# Patient Record
Sex: MALE | Race: WHITE | NOT HISPANIC OR LATINO | Employment: OTHER | URBAN - METROPOLITAN AREA
[De-identification: names, ages, dates, MRNs, and addresses within clinical notes are randomized per-mention and may not be internally consistent; named-entity substitution may affect disease eponyms.]

---

## 2017-07-24 ENCOUNTER — HOSPITAL ENCOUNTER (OUTPATIENT)
Dept: LAB | Facility: HOSPITAL | Age: 82
Discharge: HOME | End: 2017-07-24
Attending: PSYCHIATRY & NEUROLOGY | Admitting: PSYCHIATRY & NEUROLOGY

## 2017-07-24 LAB
FERRITIN SERPL-MCNC: 142 NG/ML (ref 24–336)
IRON SATN MFR SERPL: 31 % (ref 20–50)
IRON SERPL-MCNC: 79 UG/DL (ref 45–182)
TIBC SERPL-MCNC: 256 UG/DL (ref 228–428)

## 2018-04-03 ENCOUNTER — OFFICE (OUTPATIENT)
Dept: URBAN - METROPOLITAN AREA CLINIC 64 | Facility: CLINIC | Age: 83
End: 2018-04-03
Payer: COMMERCIAL

## 2018-04-03 VITALS
DIASTOLIC BLOOD PRESSURE: 85 MMHG | WEIGHT: 172 LBS | HEART RATE: 74 BPM | SYSTOLIC BLOOD PRESSURE: 141 MMHG | HEIGHT: 71 IN

## 2018-04-03 DIAGNOSIS — K59.00 CONSTIPATION, UNSPECIFIED: ICD-10-CM

## 2018-04-03 DIAGNOSIS — R10.32 LEFT LOWER QUADRANT PAIN: ICD-10-CM

## 2018-04-03 PROCEDURE — 99213 OFFICE O/P EST LOW 20 MIN: CPT | Performed by: NURSE PRACTITIONER

## 2018-06-05 ENCOUNTER — OFFICE (OUTPATIENT)
Dept: URBAN - METROPOLITAN AREA CLINIC 64 | Facility: CLINIC | Age: 83
End: 2018-06-05
Payer: COMMERCIAL

## 2018-06-05 VITALS
DIASTOLIC BLOOD PRESSURE: 88 MMHG | SYSTOLIC BLOOD PRESSURE: 124 MMHG | HEART RATE: 56 BPM | HEIGHT: 71 IN | WEIGHT: 168 LBS

## 2018-06-05 DIAGNOSIS — R10.32 LEFT LOWER QUADRANT PAIN: ICD-10-CM

## 2018-06-05 DIAGNOSIS — K59.00 CONSTIPATION, UNSPECIFIED: ICD-10-CM

## 2018-06-05 DIAGNOSIS — K21.9 GASTRO-ESOPHAGEAL REFLUX DISEASE WITHOUT ESOPHAGITIS: ICD-10-CM

## 2018-06-05 PROCEDURE — 99212 OFFICE O/P EST SF 10 MIN: CPT | Performed by: INTERNAL MEDICINE

## 2018-06-18 ENCOUNTER — HOSPITAL ENCOUNTER (OUTPATIENT)
Dept: OTHER | Facility: HOSPITAL | Age: 83
Discharge: HOME | End: 2018-06-18
Attending: SURGERY | Admitting: SURGERY

## 2018-06-18 LAB
ANION GAP SERPL CALC-SCNC: 9.9 MMOL/L (ref 10–20)
BASOPHILS # BLD AUTO: 0.1 10*3/UL (ref 0–0.2)
BASOPHILS NFR BLD AUTO: 1 % (ref 0–2)
BUN SERPL-MCNC: 22 MG/DL (ref 8–20)
BUN/CREAT SERPL: 16.9 (ref 6.2–20.3)
CALCIUM SERPL-MCNC: 9.2 MG/DL (ref 8.9–10.3)
CHLORIDE SERPL-SCNC: 103 MMOL/L (ref 101–111)
CONV CO2: 28 MMOL/L (ref 22–32)
CREAT UR-MCNC: 1.3 MG/DL (ref 0.7–1.2)
DIFFERENTIAL METHOD BLD: (no result)
EOSINOPHIL # BLD AUTO: 0.1 10*3/UL (ref 0–0.3)
EOSINOPHIL # BLD AUTO: 1 % (ref 0–3)
ERYTHROCYTE [DISTWIDTH] IN BLOOD BY AUTOMATED COUNT: 13.3 % (ref 11.5–14.5)
GLUCOSE SERPL-MCNC: 118 MG/DL (ref 65–99)
HCT VFR BLD AUTO: 42.1 % (ref 40–54)
HGB BLD-MCNC: 14.2 G/DL (ref 14–18)
LYMPHOCYTES # BLD AUTO: 1.7 10*3/UL (ref 0.8–4.8)
LYMPHOCYTES NFR BLD AUTO: 20 % (ref 18–42)
Lab: NORMAL
MCH RBC QN AUTO: 30.5 PG (ref 26–32)
MCHC RBC AUTO-ENTMCNC: 33.8 G/DL (ref 32–36)
MCV RBC AUTO: 90.2 FL (ref 80–94)
MICRO REPORT STATUS: NORMAL
MONOCYTES # BLD AUTO: 0.7 10*3/UL (ref 0.1–1.3)
MONOCYTES NFR BLD AUTO: 8 % (ref 2–11)
NEUTROPHILS # BLD AUTO: 5.9 10*3/UL (ref 2.3–8.6)
NEUTROPHILS NFR BLD AUTO: 70 % (ref 50–75)
NRBC BLD AUTO-RTO: 0 /100{WBCS}
NRBC/RBC NFR BLD MANUAL: 0 10*3/UL
PLATELET # BLD AUTO: 168 10*3/UL (ref 150–450)
PMV BLD AUTO: 10.4 FL (ref 7.4–10.4)
POTASSIUM SERPL-SCNC: 3.9 MMOL/L (ref 3.6–5.1)
RBC # BLD AUTO: 4.67 10*6/UL (ref 4.6–6)
SODIUM SERPL-SCNC: 137 MMOL/L (ref 136–144)
SPECIMEN SOURCE: NORMAL
WBC # BLD AUTO: 8.6 10*3/UL (ref 4.5–11.5)

## 2018-06-20 ENCOUNTER — HOSPITAL ENCOUNTER (OUTPATIENT)
Dept: PREOP | Facility: HOSPITAL | Age: 83
Setting detail: HOSPITAL OUTPATIENT SURGERY
Discharge: HOME | End: 2018-06-20
Attending: SURGERY | Admitting: SURGERY

## 2019-06-14 ENCOUNTER — OFFICE (OUTPATIENT)
Dept: URBAN - METROPOLITAN AREA CLINIC 64 | Facility: CLINIC | Age: 84
End: 2019-06-14
Payer: COMMERCIAL

## 2019-06-14 VITALS
DIASTOLIC BLOOD PRESSURE: 58 MMHG | HEIGHT: 71 IN | SYSTOLIC BLOOD PRESSURE: 119 MMHG | HEART RATE: 106 BPM | WEIGHT: 162 LBS

## 2019-06-14 DIAGNOSIS — K59.00 CONSTIPATION, UNSPECIFIED: ICD-10-CM

## 2019-06-14 DIAGNOSIS — K21.9 GASTRO-ESOPHAGEAL REFLUX DISEASE WITHOUT ESOPHAGITIS: ICD-10-CM

## 2019-06-14 PROCEDURE — 99212 OFFICE O/P EST SF 10 MIN: CPT | Performed by: INTERNAL MEDICINE

## 2019-06-14 RX ORDER — OMEPRAZOLE 20 MG/1
CAPSULE, DELAYED RELEASE ORAL
Qty: 90 | Refills: 4 | Status: COMPLETED
End: 2024-04-08

## 2019-06-14 RX ORDER — POLYETHYLENE GLYCOL 3350 17 G/17G
17 POWDER, FOR SOLUTION ORAL
Qty: 3 | Refills: 3 | Status: COMPLETED
Start: 2019-06-14 | End: 2024-04-08

## 2019-07-01 ENCOUNTER — CLINICAL SUPPORT NO REQUIREMENTS (OUTPATIENT)
Dept: CARDIOLOGY | Facility: CLINIC | Age: 84
End: 2019-07-01

## 2019-07-01 DIAGNOSIS — Z95.818 STATUS POST PLACEMENT OF IMPLANTABLE LOOP RECORDER: ICD-10-CM

## 2019-07-01 DIAGNOSIS — R55 SYNCOPE AND COLLAPSE: Primary | ICD-10-CM

## 2019-07-01 PROCEDURE — 93299 PR REM INTERROG ICPMS/SCRMS <30 D TECH REVIEW: CPT | Performed by: INTERNAL MEDICINE

## 2019-07-01 PROCEDURE — 93298 REM INTERROG DEV EVAL SCRMS: CPT | Performed by: INTERNAL MEDICINE

## 2019-07-12 ENCOUNTER — CLINICAL SUPPORT NO REQUIREMENTS (OUTPATIENT)
Dept: CARDIOLOGY | Facility: CLINIC | Age: 84
End: 2019-07-12

## 2019-07-12 DIAGNOSIS — Z95.818 STATUS POST PLACEMENT OF IMPLANTABLE LOOP RECORDER: ICD-10-CM

## 2019-07-12 DIAGNOSIS — R55 SYNCOPE AND COLLAPSE: Primary | ICD-10-CM

## 2019-07-12 PROCEDURE — 93299 PR REM INTERROG ICPMS/SCRMS <30 D TECH REVIEW: CPT | Performed by: INTERNAL MEDICINE

## 2019-07-12 PROCEDURE — 93298 REM INTERROG DEV EVAL SCRMS: CPT | Performed by: INTERNAL MEDICINE

## 2019-08-01 ENCOUNTER — CLINICAL SUPPORT NO REQUIREMENTS (OUTPATIENT)
Dept: CARDIOLOGY | Facility: CLINIC | Age: 84
End: 2019-08-01

## 2019-08-01 DIAGNOSIS — Z95.818 STATUS POST PLACEMENT OF IMPLANTABLE LOOP RECORDER: ICD-10-CM

## 2019-08-01 DIAGNOSIS — R55 SYNCOPE, UNSPECIFIED SYNCOPE TYPE: Primary | ICD-10-CM

## 2019-08-12 RX ORDER — DIAZEPAM 5 MG/1
TABLET ORAL
Qty: 60 TABLET | Refills: 2 | Status: SHIPPED | OUTPATIENT
Start: 2019-08-12 | End: 2019-11-09 | Stop reason: SDUPTHER

## 2019-08-22 RX ORDER — AZELASTINE HCL 205.5 UG/1
SPRAY NASAL
COMMUNITY
Start: 2019-03-07 | End: 2019-12-04 | Stop reason: SDUPTHER

## 2019-08-22 RX ORDER — AMLODIPINE BESYLATE 10 MG/1
TABLET ORAL
COMMUNITY
Start: 2017-02-21 | End: 2019-10-14 | Stop reason: SDUPTHER

## 2019-08-22 RX ORDER — HYDROCHLOROTHIAZIDE 25 MG/1
TABLET ORAL EVERY 24 HOURS
COMMUNITY
Start: 2018-08-10 | End: 2020-09-30 | Stop reason: SDUPTHER

## 2019-08-22 RX ORDER — OMEPRAZOLE 20 MG/1
20 CAPSULE, DELAYED RELEASE ORAL DAILY
COMMUNITY
Start: 2019-07-13 | End: 2022-09-07 | Stop reason: SDUPTHER

## 2019-08-23 ENCOUNTER — OFFICE VISIT (OUTPATIENT)
Dept: FAMILY MEDICINE CLINIC | Facility: CLINIC | Age: 84
End: 2019-08-23

## 2019-08-23 VITALS
TEMPERATURE: 97.7 F | WEIGHT: 161 LBS | SYSTOLIC BLOOD PRESSURE: 128 MMHG | RESPIRATION RATE: 16 BRPM | DIASTOLIC BLOOD PRESSURE: 74 MMHG | BODY MASS INDEX: 22.45 KG/M2 | HEART RATE: 76 BPM

## 2019-08-23 DIAGNOSIS — N30.00 ACUTE CYSTITIS WITHOUT HEMATURIA: ICD-10-CM

## 2019-08-23 DIAGNOSIS — R60.0 LOWER EXTREMITY EDEMA: ICD-10-CM

## 2019-08-23 DIAGNOSIS — R42 DIZZINESS: ICD-10-CM

## 2019-08-23 DIAGNOSIS — R07.81 RIB PAIN ON LEFT SIDE: Primary | ICD-10-CM

## 2019-08-23 DIAGNOSIS — I10 ESSENTIAL HYPERTENSION: ICD-10-CM

## 2019-08-23 DIAGNOSIS — R07.81 RIB PAIN ON LEFT SIDE: ICD-10-CM

## 2019-08-23 PROBLEM — J06.9 VIRAL URI: Status: ACTIVE | Noted: 2019-03-07

## 2019-08-23 PROBLEM — I25.10 CORONARY HEART DISEASE: Status: ACTIVE | Noted: 2017-11-16

## 2019-08-23 PROBLEM — E78.5 HYPERLIPIDEMIA: Status: ACTIVE | Noted: 2019-08-23

## 2019-08-23 PROBLEM — K40.90 INGUINAL HERNIA, RIGHT: Status: ACTIVE | Noted: 2018-05-31

## 2019-08-23 PROBLEM — Z95.818 PRESENCE OF OTHER CARDIAC IMPLANTS AND GRAFTS: Status: ACTIVE | Noted: 2018-01-18

## 2019-08-23 PROBLEM — Z98.61 STATUS POST PERCUTANEOUS TRANSLUMINAL CORONARY ANGIOPLASTY: Status: ACTIVE | Noted: 2019-08-23

## 2019-08-23 PROBLEM — J30.9 ALLERGIC RHINITIS: Status: ACTIVE | Noted: 2019-03-07

## 2019-08-23 PROBLEM — I21.9 MYOCARDIAL INFARCTION (HCC): Status: ACTIVE | Noted: 2019-08-23

## 2019-08-23 PROBLEM — Z23 NEED FOR OTHER PROPHYLACTIC VACCINATION AND INOCULATION AGAINST SINGLE DISEASES: Status: ACTIVE | Noted: 2018-10-01

## 2019-08-23 LAB
BILIRUB BLD-MCNC: ABNORMAL MG/DL
CLARITY, POC: CLEAR
COLOR UR: YELLOW
GLUCOSE UR STRIP-MCNC: NEGATIVE MG/DL
KETONES UR QL: ABNORMAL
LEUKOCYTE EST, POC: ABNORMAL
NITRITE UR-MCNC: NEGATIVE MG/ML
PH UR: 5.5 [PH] (ref 5–8)
PROT UR STRIP-MCNC: ABNORMAL MG/DL
RBC # UR STRIP: NEGATIVE /UL
SP GR UR: 1.02 (ref 1–1.03)
UROBILINOGEN UR QL: NORMAL

## 2019-08-23 PROCEDURE — 81003 URINALYSIS AUTO W/O SCOPE: CPT | Performed by: INTERNAL MEDICINE

## 2019-08-23 PROCEDURE — 99213 OFFICE O/P EST LOW 20 MIN: CPT | Performed by: INTERNAL MEDICINE

## 2019-08-23 PROCEDURE — 87086 URINE CULTURE/COLONY COUNT: CPT | Performed by: INTERNAL MEDICINE

## 2019-08-23 RX ORDER — AZITHROMYCIN MONOHYDRATE 10 MG/ML
SOLUTION/ DROPS OPHTHALMIC
COMMUNITY
Start: 2019-08-21 | End: 2020-11-21 | Stop reason: RX

## 2019-08-23 RX ORDER — NITROFURANTOIN 25; 75 MG/1; MG/1
100 CAPSULE ORAL 2 TIMES DAILY
Qty: 14 CAPSULE | Refills: 0 | Status: SHIPPED | OUTPATIENT
Start: 2019-08-23 | End: 2019-10-14

## 2019-08-24 LAB — BACTERIA SPEC AEROBE CULT: NO GROWTH

## 2019-08-27 ENCOUNTER — TELEPHONE (OUTPATIENT)
Dept: FAMILY MEDICINE CLINIC | Facility: CLINIC | Age: 84
End: 2019-08-27

## 2019-09-03 ENCOUNTER — CLINICAL SUPPORT NO REQUIREMENTS (OUTPATIENT)
Dept: CARDIOLOGY | Facility: CLINIC | Age: 84
End: 2019-09-03

## 2019-09-03 DIAGNOSIS — R55 SYNCOPE, UNSPECIFIED SYNCOPE TYPE: Primary | ICD-10-CM

## 2019-09-03 DIAGNOSIS — Z95.818 STATUS POST PLACEMENT OF IMPLANTABLE LOOP RECORDER: ICD-10-CM

## 2019-09-03 PROCEDURE — 93299 PR REM INTERROG ICPMS/SCRMS <30 D TECH REVIEW: CPT | Performed by: INTERNAL MEDICINE

## 2019-09-03 PROCEDURE — 93298 REM INTERROG DEV EVAL SCRMS: CPT | Performed by: INTERNAL MEDICINE

## 2019-09-16 ENCOUNTER — TELEPHONE (OUTPATIENT)
Dept: FAMILY MEDICINE CLINIC | Facility: CLINIC | Age: 84
End: 2019-09-16

## 2019-09-16 RX ORDER — GABAPENTIN 100 MG/1
100 CAPSULE ORAL 2 TIMES DAILY
Qty: 180 CAPSULE | Refills: 2 | Status: SHIPPED | OUTPATIENT
Start: 2019-09-16 | End: 2020-05-18

## 2019-09-16 NOTE — TELEPHONE ENCOUNTER
Patient is asking for Gabapentin 100mg 1 po bid to be sent to pharmacy. Stated he used to get this from Dr. Negrete. Would like 90-day supply with refills. Thank you.

## 2019-09-26 ENCOUNTER — CLINICAL SUPPORT NO REQUIREMENTS (OUTPATIENT)
Dept: CARDIOLOGY | Facility: CLINIC | Age: 84
End: 2019-09-26

## 2019-09-26 DIAGNOSIS — Z95.818 STATUS POST PLACEMENT OF IMPLANTABLE LOOP RECORDER: ICD-10-CM

## 2019-09-26 DIAGNOSIS — R55 SYNCOPE, UNSPECIFIED SYNCOPE TYPE: Primary | ICD-10-CM

## 2019-10-04 ENCOUNTER — CLINICAL SUPPORT NO REQUIREMENTS (OUTPATIENT)
Dept: CARDIOLOGY | Facility: CLINIC | Age: 84
End: 2019-10-04

## 2019-10-04 DIAGNOSIS — Z95.818 STATUS POST PLACEMENT OF IMPLANTABLE LOOP RECORDER: ICD-10-CM

## 2019-10-04 DIAGNOSIS — R55 SYNCOPE AND COLLAPSE: Primary | ICD-10-CM

## 2019-10-04 PROCEDURE — 93298 REM INTERROG DEV EVAL SCRMS: CPT | Performed by: INTERNAL MEDICINE

## 2019-10-04 PROCEDURE — 93299 PR REM INTERROG ICPMS/SCRMS <30 D TECH REVIEW: CPT | Performed by: INTERNAL MEDICINE

## 2019-10-14 ENCOUNTER — OFFICE VISIT (OUTPATIENT)
Dept: CARDIOLOGY | Facility: CLINIC | Age: 84
End: 2019-10-14

## 2019-10-14 VITALS
WEIGHT: 162 LBS | HEIGHT: 71 IN | DIASTOLIC BLOOD PRESSURE: 75 MMHG | SYSTOLIC BLOOD PRESSURE: 131 MMHG | BODY MASS INDEX: 22.68 KG/M2 | HEART RATE: 53 BPM | OXYGEN SATURATION: 97 %

## 2019-10-14 DIAGNOSIS — Z95.818 PRESENCE OF OTHER CARDIAC IMPLANTS AND GRAFTS: ICD-10-CM

## 2019-10-14 DIAGNOSIS — I10 ESSENTIAL HYPERTENSION: ICD-10-CM

## 2019-10-14 DIAGNOSIS — Z98.61 STATUS POST PERCUTANEOUS TRANSLUMINAL CORONARY ANGIOPLASTY: ICD-10-CM

## 2019-10-14 DIAGNOSIS — I25.10 CORONARY ARTERY DISEASE INVOLVING NATIVE CORONARY ARTERY OF NATIVE HEART WITHOUT ANGINA PECTORIS: ICD-10-CM

## 2019-10-14 DIAGNOSIS — I48.0 PAROXYSMAL ATRIAL FIBRILLATION (HCC): Primary | ICD-10-CM

## 2019-10-14 DIAGNOSIS — E78.2 MIXED HYPERLIPIDEMIA: ICD-10-CM

## 2019-10-14 DIAGNOSIS — Z95.1 STATUS POST CORONARY ARTERY BYPASS GRAFT: ICD-10-CM

## 2019-10-14 PROCEDURE — 99214 OFFICE O/P EST MOD 30 MIN: CPT | Performed by: INTERNAL MEDICINE

## 2019-10-14 PROCEDURE — 93000 ELECTROCARDIOGRAM COMPLETE: CPT | Performed by: INTERNAL MEDICINE

## 2019-10-14 RX ORDER — AMLODIPINE BESYLATE 10 MG/1
10 TABLET ORAL DAILY
Qty: 90 TABLET | Refills: 3 | Status: SHIPPED | OUTPATIENT
Start: 2019-10-14 | End: 2020-08-24

## 2019-10-14 NOTE — PROGRESS NOTES
Encounter Date:10/14/2019  Last seen 4/15/2019      Patient ID: Sage Flores is a 89 y.o. male.    Chief Complaint:  Follow-up  CABG  History of syncope  History of atrial fibrillation  Tachycardia bradycardia syndrome  Hypertension  Dyslipidemia    History of Present Illness    Since I have last seen, the patient has been without any chest discomfort ,shortness of breath, palpitations, dizziness or syncope.  Denies having any headache ,abdominal pain ,nausea, vomiting , diarrhea constipation, loss of weight or loss of appetite.  Denies having any excessive bruising ,hematuria or blood in the stool.    Review of all systems negative except as indicated  Assessment and Plan       //////////////////////////  Impression  =============  - history of syncope-no further episodes.    -status post loop recorder placement.  [employer] LINQ 12/29/2017     - status post CABG October 2001. cardiac catheterization 12/26/2014 revealed 60% distal circumflex and total LAD and right coronary arteries.  Lima to LAD was patent ( lima coming off the left vertebral artery).  SVG to diagonal   marginal and PDA were patent.  SVG to left ventricular branch was totally occluded (chronic)    - atrial fibrillation -has converted to sinus rhythm and maintaining sinus rhythm.  Recently patient was noted to have atrial dysrhythmia on the monitor with loop recorder.    - sinus bradycardia-asymptomatic    - status post acute inferior myocardial infarction prior to surgery requiring acute stent placement to right coronary artery ) complicated by ventricular fibrillation)     -Dyslipidemia and hypertension    - lower extremity weakness.   Arterial Doppler study of the lower extremity is normal. -  improved .   arterial Doppler study of the lower extremity was normal    - status post cholecystectomy    - allergy to penicillin iodine and metoprolol (rash).  Intolerance to atenolol due to bradycardia.  Allergy to Levaquin and penicillin.  Intolerance  to prednisone Nitropatch IV nitroglycerin and prednisone.  =================    Plan  ==============  EKG showed sinus bradycardia nonspecific ST-T changes   patient did not have any dizziness or syncope  Patient is not having any angina pectoris or congestive heart failure   no need for pacemaker at this time  Medications were reviewed and updated.     Followup in the office in   Six months  //////////////////////////////////////             Diagnosis Plan   1. Paroxysmal atrial fibrillation (CMS/HCC)     2. Coronary artery disease involving native coronary artery of native heart without angina pectoris     3. Mixed hyperlipidemia     4. Essential hypertension     5. Status post coronary artery bypass graft     6. Status post percutaneous transluminal coronary angioplasty     7. Presence of other cardiac implants and grafts     LAB RESULTS (LAST 7 DAYS)    CBC        BMP        CMP         BNP        TROPONIN        CoAg        Creatinine Clearance  CrCl cannot be calculated (Patient's most recent lab result is older than the maximum 30 days allowed.).    ABG        Radiology  No radiology results for the last day                The following portions of the patient's history were reviewed and updated as appropriate: allergies, current medications, past family history, past medical history, past social history, past surgical history and problem list.    Review of Systems   Constitution: Negative for malaise/fatigue.   Cardiovascular: Positive for chest pain and leg swelling. Negative for palpitations and syncope.   Respiratory: Positive for shortness of breath.    Skin: Negative for rash.   Gastrointestinal: Negative for nausea and vomiting.   Neurological: Negative for dizziness, light-headedness and numbness.         Current Outpatient Medications:   •  amLODIPine (NORVASC) 10 MG tablet, Take 1 tablet by mouth Daily., Disp: 90 tablet, Rfl: 3  •  AZASITE 1 % ophthalmic solution, , Disp: , Rfl:   •  azelastine  (ASTEPRO) 0.15 % solution nasal spray, ASTEPRO 0.15 % SOLN, Disp: , Rfl:   •  diazePAM (VALIUM) 5 MG tablet, TAKE 1 TABLET BY MOUTH TWICE DAILY, Disp: 60 tablet, Rfl: 2  •  gabapentin (NEURONTIN) 100 MG capsule, Take 1 capsule by mouth 2 (Two) Times a Day., Disp: 180 capsule, Rfl: 2  •  hydrochlorothiazide (HYDRODIURIL) 25 MG tablet, Daily., Disp: , Rfl:   •  omeprazole (priLOSEC) 20 MG capsule, , Disp: , Rfl:     Allergies   Allergen Reactions   • Iodine Unknown (See Comments)   • Levofloxacin Unknown (See Comments)   • Nitroglycerin Unknown (See Comments) and Other (See Comments)   • Paroxetine Unknown (See Comments)   • Penicillin G Unknown (See Comments)   • Prednisone Unknown (See Comments)   • Sucralfate Unknown (See Comments)   • Diltiazem Hcl Hives   • Metoprolol Other (See Comments)     Lowers heart rate    • Pramipexole Dihydrochloride Unknown (See Comments)   • Ropinirole Hcl Other (See Comments)       Family History   Problem Relation Age of Onset   • Heart disease Mother    • Heart disease Father        No past surgical history on file.    Past Medical History:   Diagnosis Date   • Atrial fibrillation (CMS/HCC)    • Benign prostatic hyperplasia    • CAD (coronary artery disease)    • Hyperlipidemia    • Hypertension    • Myocardial infarction (CMS/HCC)        Family History   Problem Relation Age of Onset   • Heart disease Mother    • Heart disease Father        Social History     Socioeconomic History   • Marital status:      Spouse name: Not on file   • Number of children: Not on file   • Years of education: Not on file   • Highest education level: Not on file   Tobacco Use   • Smoking status: Former Smoker   • Smokeless tobacco: Never Used   • Tobacco comment: more than 50yrs   Substance and Sexual Activity   • Alcohol use: No     Frequency: Never   • Drug use: No   • Sexual activity: Defer           ECG 12 Lead  Date/Time: 10/14/2019 2:42 PM  Performed by: Missy Domínguez MD  Authorized by:  "Missy Domínguez MD   Comparison: compared with previous ECG   Comments: Sinus bradycardia short MN interval 59/min nonspecific ST-T wave changes normal axis normal intervals otherwise no ectopy.  No change from 4/15/2019              Objective:       Physical Exam    /75 (BP Location: Left arm, Patient Position: Sitting, Cuff Size: Adult)   Pulse 53   Ht 180.3 cm (71\")   Wt 73.5 kg (162 lb)   SpO2 97%   BMI 22.59 kg/m²   The patient is alert, oriented and in no distress.    Vital signs as noted above.    Head and neck revealed no carotid bruits or jugular venous distension.  No thyromegaly or lymphadenopathy is present.    Lungs clear.  No wheezing.  Breath sounds are normal bilaterally.    Heart normal first and second heart sounds.  No murmur..  No pericardial rub is present.  No gallop is present.    Abdomen soft and nontender.  No organomegaly is present.    Extremities revealed good peripheral pulses without any pedal edema.    Skin warm and dry.  Loop recorder site looks normal.    Musculoskeletal system is grossly normal.    CNS grossly normal.        "

## 2019-10-28 ENCOUNTER — FLU SHOT (OUTPATIENT)
Dept: FAMILY MEDICINE CLINIC | Facility: CLINIC | Age: 84
End: 2019-10-28

## 2019-10-28 DIAGNOSIS — Z23 IMMUNIZATION DUE: Primary | ICD-10-CM

## 2019-10-28 PROCEDURE — G0008 ADMIN INFLUENZA VIRUS VAC: HCPCS | Performed by: INTERNAL MEDICINE

## 2019-10-28 PROCEDURE — 90653 IIV ADJUVANT VACCINE IM: CPT | Performed by: INTERNAL MEDICINE

## 2019-11-05 ENCOUNTER — CLINICAL SUPPORT NO REQUIREMENTS (OUTPATIENT)
Dept: CARDIOLOGY | Facility: CLINIC | Age: 84
End: 2019-11-05

## 2019-11-05 DIAGNOSIS — Z95.818 STATUS POST PLACEMENT OF IMPLANTABLE LOOP RECORDER: ICD-10-CM

## 2019-11-05 DIAGNOSIS — R55 SYNCOPE AND COLLAPSE: Primary | ICD-10-CM

## 2019-11-05 PROCEDURE — 93299 PR REM INTERROG ICPMS/SCRMS <30 D TECH REVIEW: CPT | Performed by: INTERNAL MEDICINE

## 2019-11-05 PROCEDURE — 93298 REM INTERROG DEV EVAL SCRMS: CPT | Performed by: INTERNAL MEDICINE

## 2019-11-11 RX ORDER — DIAZEPAM 5 MG/1
TABLET ORAL
Qty: 60 TABLET | Refills: 2 | Status: SHIPPED | OUTPATIENT
Start: 2019-11-11 | End: 2019-12-04 | Stop reason: SDUPTHER

## 2019-12-04 ENCOUNTER — OFFICE VISIT (OUTPATIENT)
Dept: FAMILY MEDICINE CLINIC | Facility: CLINIC | Age: 84
End: 2019-12-04

## 2019-12-04 VITALS
DIASTOLIC BLOOD PRESSURE: 68 MMHG | SYSTOLIC BLOOD PRESSURE: 136 MMHG | HEIGHT: 71 IN | OXYGEN SATURATION: 95 % | TEMPERATURE: 97.5 F | WEIGHT: 163.8 LBS | RESPIRATION RATE: 18 BRPM | BODY MASS INDEX: 22.93 KG/M2 | HEART RATE: 62 BPM

## 2019-12-04 DIAGNOSIS — L30.4 INTERTRIGO: ICD-10-CM

## 2019-12-04 DIAGNOSIS — H61.21 IMPACTED CERUMEN OF RIGHT EAR: ICD-10-CM

## 2019-12-04 DIAGNOSIS — J06.9 ACUTE URI: Primary | ICD-10-CM

## 2019-12-04 DIAGNOSIS — F41.9 ANXIETY: ICD-10-CM

## 2019-12-04 PROCEDURE — 99214 OFFICE O/P EST MOD 30 MIN: CPT | Performed by: NURSE PRACTITIONER

## 2019-12-04 PROCEDURE — 69210 REMOVE IMPACTED EAR WAX UNI: CPT | Performed by: NURSE PRACTITIONER

## 2019-12-04 RX ORDER — POLYMYXIN B SULFATE AND TRIMETHOPRIM 1; 10000 MG/ML; [USP'U]/ML
1 SOLUTION OPHTHALMIC 3 TIMES DAILY
COMMUNITY
End: 2020-11-21 | Stop reason: RX

## 2019-12-04 RX ORDER — POLYETHYLENE GLYCOL 3350 17 G/17G
17 POWDER, FOR SOLUTION ORAL DAILY
COMMUNITY
End: 2021-04-01

## 2019-12-04 RX ORDER — AZELASTINE HCL 205.5 UG/1
2 SPRAY NASAL 2 TIMES DAILY
Qty: 1 EACH | Refills: 3 | Status: SHIPPED | OUTPATIENT
Start: 2019-12-04 | End: 2020-10-27

## 2019-12-04 RX ORDER — DIAZEPAM 5 MG/1
5 TABLET ORAL 2 TIMES DAILY
Qty: 60 TABLET | Refills: 0 | Status: SHIPPED | OUTPATIENT
Start: 2019-12-04 | End: 2020-01-09

## 2019-12-04 NOTE — PROGRESS NOTES
"Subjective   Sage Flores is a 89 y.o. male.     Chief Complaint   Patient presents with   • URI   • Rash       /68 (BP Location: Left arm, Patient Position: Sitting, Cuff Size: Adult)   Pulse 62   Temp 97.5 °F (36.4 °C) (Oral)   Resp 18   Ht 180.3 cm (71\")   Wt 74.3 kg (163 lb 12.8 oz)   SpO2 95%   BMI 22.85 kg/m²     BP Readings from Last 3 Encounters:   12/04/19 136/68   10/14/19 131/75   08/23/19 128/74       Wt Readings from Last 3 Encounters:   12/04/19 74.3 kg (163 lb 12.8 oz)   10/14/19 73.5 kg (162 lb)   08/23/19 73 kg (161 lb)       Pt comes in today with c/o drainage, congestion, and runny nose. No cough. No fever or chills. Gets similar symptoms about once a year during this time.   Not taking anything otc.   Does have rx for astepro and needs to get refill.    Also with c/o rash and irritation in butt crack. Has been going on for about 1-3 months. Has tried mult products and lotions. Was trying vicks vapor rub and that made it worse. Has tried Gisela lotion and it helps some. No diarrhea or change in bowels. Denies changing in detergents, soaps, etc.     Pt is also requesting refill on valium. Takes this BID for anxiety and his \"nerves\".          The following portions of the patient's history were reviewed and updated as appropriate: allergies, current medications, past family history, past medical history, past social history, past surgical history and problem list.    Review of Systems   Constitutional: Negative for chills and fever.   HENT: Positive for congestion, postnasal drip, rhinorrhea and sneezing. Negative for ear pain, sinus pressure, sore throat and swollen glands.    Respiratory: Negative for cough, chest tightness, shortness of breath and wheezing.    Gastrointestinal: Negative for nausea and vomiting.   Skin: Positive for rash.   Neurological: Negative for headache.       Objective     Physical Exam   Constitutional: He is oriented to person, place, and time. He appears " well-developed and well-nourished.   HENT:   Nose: Rhinorrhea present.   +PND     Right ear with cerumen impaction    Eyes: Pupils are equal, round, and reactive to light.   Cardiovascular: Normal rate and regular rhythm.   Pulmonary/Chest: Effort normal and breath sounds normal.   Neurological: He is alert and oriented to person, place, and time.   Skin:   Raw and redness rash intergluteal cleft      Ear Cerumen Removal  Date/Time: 12/4/2019 7:54 AM  Performed by: Gema Lu APRN  Authorized by: Gema Lu APRN     Anesthesia:  Local Anesthetic: none  Location details: right ear  Patient tolerance: Patient tolerated the procedure well with no immediate complications  Procedure type: instrumentation and irrigation   Sedation:  Patient sedated: no          Diagnoses and all orders for this visit:    1. Acute URI (Primary)  Comments:  will refill astepro, and also start otc claritin.   Orders:  -     azelastine (ASTEPRO) 0.15 % solution nasal spray; 2 sprays into the nostril(s) as directed by provider 2 (Two) Times a Day.  Dispense: 1 each; Refill: 3    2. Intertrigo  Comments:  will try vasoline     3. Impacted cerumen of right ear    4. Anxiety    Other orders  -     diazePAM (VALIUM) 5 MG tablet; Take 1 tablet by mouth 2 (Two) Times a Day.  Dispense: 60 tablet; Refill: 0  -     Ear Cerumen Removal    During this office visit, we discussed the pertinent aspects of the visit and treatment recommendations. Pt verbalizes understanding. Follow up was discussed. Patient was given the opportunity to ask questions and discuss other concerns.       Return if symptoms worsen or fail to improve.

## 2019-12-06 ENCOUNTER — CLINICAL SUPPORT NO REQUIREMENTS (OUTPATIENT)
Dept: CARDIOLOGY | Facility: CLINIC | Age: 84
End: 2019-12-06

## 2019-12-06 DIAGNOSIS — R55 SYNCOPE AND COLLAPSE: Primary | ICD-10-CM

## 2019-12-06 DIAGNOSIS — Z95.818 STATUS POST PLACEMENT OF IMPLANTABLE LOOP RECORDER: ICD-10-CM

## 2019-12-06 PROCEDURE — 93298 REM INTERROG DEV EVAL SCRMS: CPT | Performed by: INTERNAL MEDICINE

## 2019-12-06 PROCEDURE — 93299 PR REM INTERROG ICPMS/SCRMS <30 D TECH REVIEW: CPT | Performed by: INTERNAL MEDICINE

## 2019-12-16 ENCOUNTER — CLINICAL SUPPORT NO REQUIREMENTS (OUTPATIENT)
Dept: CARDIOLOGY | Facility: CLINIC | Age: 84
End: 2019-12-16

## 2019-12-16 DIAGNOSIS — Z95.818 STATUS POST PLACEMENT OF IMPLANTABLE LOOP RECORDER: ICD-10-CM

## 2019-12-16 DIAGNOSIS — I48.0 PAROXYSMAL ATRIAL FIBRILLATION (HCC): Primary | ICD-10-CM

## 2019-12-26 ENCOUNTER — CLINICAL SUPPORT NO REQUIREMENTS (OUTPATIENT)
Dept: CARDIOLOGY | Facility: CLINIC | Age: 84
End: 2019-12-26

## 2019-12-26 DIAGNOSIS — I48.0 PAROXYSMAL ATRIAL FIBRILLATION (HCC): Primary | ICD-10-CM

## 2019-12-26 DIAGNOSIS — Z95.818 STATUS POST PLACEMENT OF IMPLANTABLE LOOP RECORDER: ICD-10-CM

## 2019-12-26 DIAGNOSIS — R55 SYNCOPE AND COLLAPSE: ICD-10-CM

## 2020-01-06 ENCOUNTER — CLINICAL SUPPORT NO REQUIREMENTS (OUTPATIENT)
Dept: CARDIOLOGY | Facility: CLINIC | Age: 85
End: 2020-01-06

## 2020-01-06 DIAGNOSIS — Z95.818 STATUS POST PLACEMENT OF IMPLANTABLE LOOP RECORDER: ICD-10-CM

## 2020-01-06 DIAGNOSIS — I48.0 PAROXYSMAL ATRIAL FIBRILLATION (HCC): Primary | ICD-10-CM

## 2020-01-06 PROCEDURE — 93298 REM INTERROG DEV EVAL SCRMS: CPT | Performed by: INTERNAL MEDICINE

## 2020-01-09 RX ORDER — DIAZEPAM 5 MG/1
TABLET ORAL
Qty: 60 TABLET | Refills: 3 | Status: SHIPPED | OUTPATIENT
Start: 2020-01-09 | End: 2020-05-07

## 2020-01-27 ENCOUNTER — CLINICAL SUPPORT NO REQUIREMENTS (OUTPATIENT)
Dept: CARDIOLOGY | Facility: CLINIC | Age: 85
End: 2020-01-27

## 2020-01-27 DIAGNOSIS — I48.0 PAROXYSMAL ATRIAL FIBRILLATION (HCC): Primary | ICD-10-CM

## 2020-01-27 DIAGNOSIS — Z95.818 STATUS POST PLACEMENT OF IMPLANTABLE LOOP RECORDER: ICD-10-CM

## 2020-02-07 ENCOUNTER — CLINICAL SUPPORT NO REQUIREMENTS (OUTPATIENT)
Dept: CARDIOLOGY | Facility: CLINIC | Age: 85
End: 2020-02-07

## 2020-02-07 DIAGNOSIS — R55 SYNCOPE AND COLLAPSE: Primary | ICD-10-CM

## 2020-02-07 DIAGNOSIS — Z95.818 STATUS POST PLACEMENT OF IMPLANTABLE LOOP RECORDER: ICD-10-CM

## 2020-02-07 PROCEDURE — G2066 INTER DEVC REMOTE 30D: HCPCS | Performed by: INTERNAL MEDICINE

## 2020-02-07 PROCEDURE — 93298 REM INTERROG DEV EVAL SCRMS: CPT | Performed by: INTERNAL MEDICINE

## 2020-02-24 ENCOUNTER — OFFICE VISIT (OUTPATIENT)
Dept: FAMILY MEDICINE CLINIC | Facility: CLINIC | Age: 85
End: 2020-02-24

## 2020-02-24 VITALS
HEIGHT: 71 IN | HEART RATE: 64 BPM | RESPIRATION RATE: 8 BRPM | SYSTOLIC BLOOD PRESSURE: 140 MMHG | WEIGHT: 158 LBS | DIASTOLIC BLOOD PRESSURE: 80 MMHG | BODY MASS INDEX: 22.12 KG/M2 | TEMPERATURE: 98 F

## 2020-02-24 DIAGNOSIS — L30.9 DERMATITIS: Primary | ICD-10-CM

## 2020-02-24 PROCEDURE — 99213 OFFICE O/P EST LOW 20 MIN: CPT | Performed by: NURSE PRACTITIONER

## 2020-02-24 NOTE — PROGRESS NOTES
"Subjective   Sage Flores is a 89 y.o. male.     Chief Complaint   Patient presents with   • Edema     ankles       /80 (BP Location: Left arm, Patient Position: Sitting, Cuff Size: Adult)   Pulse 64   Temp 98 °F (36.7 °C) (Oral)   Resp 8   Ht 180.3 cm (71\")   Wt 71.7 kg (158 lb)   BMI 22.04 kg/m²     BP Readings from Last 3 Encounters:   02/24/20 140/80   12/04/19 136/68   10/14/19 131/75       Wt Readings from Last 3 Encounters:   02/24/20 71.7 kg (158 lb)   12/04/19 74.3 kg (163 lb 12.8 oz)   10/14/19 73.5 kg (162 lb)       Pt comes in today with c/o rash around left ankle that started about 2 months ago. Has tried neosporin, Vicks, and hydrocortisone. C/o itching if he doesn't put anything on it.   Says he usually wears compression socks for edema.  Denies any change in laundry detergents, soaps, etc.        The following portions of the patient's history were reviewed and updated as appropriate: allergies, current medications, past family history, past medical history, past social history, past surgical history and problem list.    Review of Systems   Skin: Positive for rash.       Objective   Physical Exam   Constitutional: He is oriented to person, place, and time. He appears well-developed and well-nourished.   Eyes: Pupils are equal, round, and reactive to light.   Cardiovascular: Normal rate and regular rhythm.   Pulmonary/Chest: Effort normal and breath sounds normal.   Neurological: He is alert and oriented to person, place, and time.   Skin: Rash noted. Rash is maculopapular.              Diagnoses and all orders for this visit:    1. Dermatitis (Primary)  Comments:  will try triamcinalone prn. Recommend lubiderm lotion for dry skin.   Orders:  -     triamcinolone (KENALOG) 0.1 % ointment; Apply  topically to the appropriate area as directed 2 (Two) Times a Day.  Dispense: 30 g; Refill: 0    During this office visit, we discussed the pertinent aspects of the visit and treatment " recommendations. Pt verbalizes understanding. Follow up was discussed. Patient was given the opportunity to ask questions and discuss other concerns.       Return if symptoms worsen or fail to improve.

## 2020-02-25 ENCOUNTER — HOSPITAL ENCOUNTER (OUTPATIENT)
Facility: HOSPITAL | Age: 85
Setting detail: HOSPITAL OUTPATIENT SURGERY
End: 2020-02-25
Attending: INTERNAL MEDICINE | Admitting: INTERNAL MEDICINE

## 2020-02-25 ENCOUNTER — OFFICE (OUTPATIENT)
Dept: URBAN - METROPOLITAN AREA CLINIC 64 | Facility: CLINIC | Age: 85
End: 2020-02-25
Payer: COMMERCIAL

## 2020-02-25 VITALS
DIASTOLIC BLOOD PRESSURE: 69 MMHG | HEIGHT: 71 IN | HEART RATE: 60 BPM | WEIGHT: 157 LBS | SYSTOLIC BLOOD PRESSURE: 127 MMHG

## 2020-02-25 DIAGNOSIS — K59.00 CONSTIPATION, UNSPECIFIED: ICD-10-CM

## 2020-02-25 DIAGNOSIS — R13.10 DYSPHAGIA, UNSPECIFIED: ICD-10-CM

## 2020-02-25 DIAGNOSIS — R10.31 RIGHT LOWER QUADRANT PAIN: ICD-10-CM

## 2020-02-25 DIAGNOSIS — R10.32 LEFT LOWER QUADRANT PAIN: ICD-10-CM

## 2020-02-25 PROCEDURE — 99214 OFFICE O/P EST MOD 30 MIN: CPT | Performed by: NURSE PRACTITIONER

## 2020-02-27 ENCOUNTER — TELEPHONE (OUTPATIENT)
Dept: FAMILY MEDICINE CLINIC | Facility: CLINIC | Age: 85
End: 2020-02-27

## 2020-02-27 NOTE — TELEPHONE ENCOUNTER
PT CALLED AND STATED THAT AT HIS APPT HE WAS GIVEN A NAME OF A COMPANY FOR A [employer] [facility] AND CANNOT REMEMBER THE NAME     PLEASE ADVISE       [phone]

## 2020-03-09 ENCOUNTER — CLINICAL SUPPORT NO REQUIREMENTS (OUTPATIENT)
Dept: CARDIOLOGY | Facility: CLINIC | Age: 85
End: 2020-03-09

## 2020-03-09 DIAGNOSIS — I48.0 PAROXYSMAL ATRIAL FIBRILLATION (HCC): Primary | ICD-10-CM

## 2020-03-09 DIAGNOSIS — Z95.818 STATUS POST PLACEMENT OF IMPLANTABLE LOOP RECORDER: ICD-10-CM

## 2020-03-09 PROCEDURE — 93298 REM INTERROG DEV EVAL SCRMS: CPT | Performed by: INTERNAL MEDICINE

## 2020-03-09 PROCEDURE — G2066 INTER DEVC REMOTE 30D: HCPCS | Performed by: INTERNAL MEDICINE

## 2020-03-11 DIAGNOSIS — L30.9 DERMATITIS: ICD-10-CM

## 2020-04-05 DIAGNOSIS — L30.9 DERMATITIS: ICD-10-CM

## 2020-04-10 ENCOUNTER — CLINICAL SUPPORT NO REQUIREMENTS (OUTPATIENT)
Dept: CARDIOLOGY | Facility: CLINIC | Age: 85
End: 2020-04-10

## 2020-04-10 DIAGNOSIS — Z95.818 STATUS POST PLACEMENT OF IMPLANTABLE LOOP RECORDER: ICD-10-CM

## 2020-04-10 DIAGNOSIS — I48.0 PAROXYSMAL ATRIAL FIBRILLATION (HCC): Primary | ICD-10-CM

## 2020-04-10 PROCEDURE — G2066 INTER DEVC REMOTE 30D: HCPCS | Performed by: INTERNAL MEDICINE

## 2020-04-10 PROCEDURE — 93298 REM INTERROG DEV EVAL SCRMS: CPT | Performed by: INTERNAL MEDICINE

## 2020-04-13 ENCOUNTER — OFFICE VISIT (OUTPATIENT)
Dept: CARDIOLOGY | Facility: CLINIC | Age: 85
End: 2020-04-13

## 2020-04-13 VITALS
WEIGHT: 158 LBS | BODY MASS INDEX: 22.12 KG/M2 | SYSTOLIC BLOOD PRESSURE: 130 MMHG | DIASTOLIC BLOOD PRESSURE: 79 MMHG | HEIGHT: 71 IN | HEART RATE: 60 BPM

## 2020-04-13 DIAGNOSIS — E78.2 MIXED HYPERLIPIDEMIA: ICD-10-CM

## 2020-04-13 DIAGNOSIS — Z95.818 STATUS POST PLACEMENT OF IMPLANTABLE LOOP RECORDER: Primary | ICD-10-CM

## 2020-04-13 DIAGNOSIS — Z95.1 STATUS POST CORONARY ARTERY BYPASS GRAFT: ICD-10-CM

## 2020-04-13 DIAGNOSIS — I10 ESSENTIAL HYPERTENSION: ICD-10-CM

## 2020-04-13 PROCEDURE — 99442 PR PHYS/QHP TELEPHONE EVALUATION 11-20 MIN: CPT | Performed by: INTERNAL MEDICINE

## 2020-04-13 RX ORDER — SACCHAROMYCES BOULARDII 250 MG
250 CAPSULE ORAL 2 TIMES DAILY
COMMUNITY
End: 2020-11-21 | Stop reason: RX

## 2020-04-13 NOTE — PROGRESS NOTES
You have chosen to receive care through a telephone visit. Do you consent to use a telephone visit for your medical care today? Yes  This visit has been rescheduled as a phone visit to comply with patient safety concerns in accordance with CDC recommendations. Total time of discussion was 12  minutes.    Encounter Date:04/13/2020  Last seen October 2019      Patient ID: Sage Flores is a 89 y.o. male.    Chief Complaint:  Status post CABG  History of syncope  Dyslipidemia  Hypertension    History of Present Illness  Since I have last seen, the patient has been without any chest discomfort ,shortness of breath, palpitations, dizziness or syncope.  Denies having any headache ,abdominal pain ,nausea, vomiting , diarrhea constipation, loss of weight or loss of appetite.  Denies having any excessive bruising ,hematuria or blood in the stool.    Review of all systems negative except as indicated    Assessment and Plan       //////////////////////////  Impression  =============  - history of syncope-no further episodes.     -status post loop recorder placement.  [employer] LINQ 12/29/2017      - status post CABG October 2001. cardiac catheterization 12/26/2014 revealed 60% distal circumflex and total LAD and right coronary arteries.  Lima to LAD was patent ( lima coming off the left vertebral artery).  SVG to diagonal   marginal and PDA were patent.  SVG to left ventricular branch was totally occluded (chronic)     - atrial fibrillation -has converted to sinus rhythm and maintaining sinus rhythm.  Recently patient was noted to have atrial dysrhythmia on the monitor with loop recorder.     - sinus bradycardia-asymptomatic     - status post acute inferior myocardial infarction prior to surgery requiring acute stent placement to right coronary artery ) complicated by ventricular fibrillation)      -Dyslipidemia and hypertension     - lower extremity weakness.   Arterial Doppler study of the lower extremity is normal. -   improved .   arterial Doppler study of the lower extremity was normal     - status post cholecystectomy     - allergy to penicillin iodine and metoprolol (rash).  Intolerance to atenolol due to bradycardia.  Allergy to Levaquin and penicillin.  Intolerance to prednisone Nitropatch IV nitroglycerin and prednisone.  =================    Plan  ==============  Telephone visit  Patient did not have any dizziness or syncope  Patient is not having any angina pectoris or congestive heart failure  no need for pacemaker at this time  Medications were reviewed and updated.  Followup in the office in Six months  Further plan will depend on patient's progress.  //////////////////////////////////////           Diagnosis Plan   1. Status post placement of implantable loop recorder     2. Status post coronary artery bypass graft     3. Mixed hyperlipidemia     4. Essential hypertension     LAB RESULTS (LAST 7 DAYS)    CBC        BMP        CMP         BNP        TROPONIN        CoAg        Creatinine Clearance  CrCl cannot be calculated (Patient's most recent lab result is older than the maximum 30 days allowed.).    ABG        Radiology  No radiology results for the last day                The following portions of the patient's history were reviewed and updated as appropriate: allergies, current medications, past family history, past medical history, past social history, past surgical history and problem list.    Review of Systems   Constitution: Negative for malaise/fatigue.   Cardiovascular: Negative for chest pain, leg swelling, palpitations and syncope.   Respiratory: Negative for shortness of breath.    Skin: Negative for rash.   Gastrointestinal: Negative for nausea and vomiting.   Neurological: Negative for dizziness, light-headedness and numbness.         Current Outpatient Medications:   •  amLODIPine (NORVASC) 10 MG tablet, Take 1 tablet by mouth Daily., Disp: 90 tablet, Rfl: 3  •  AZASITE 1 % ophthalmic solution, ,  Disp: , Rfl:   •  azelastine (ASTEPRO) 0.15 % solution nasal spray, 2 sprays into the nostril(s) as directed by provider 2 (Two) Times a Day., Disp: 1 each, Rfl: 3  •  diazePAM (VALIUM) 5 MG tablet, TAKE 1 TABLET BY MOUTH TWICE DAILY, Disp: 60 tablet, Rfl: 3  •  gabapentin (NEURONTIN) 100 MG capsule, Take 1 capsule by mouth 2 (Two) Times a Day., Disp: 180 capsule, Rfl: 2  •  hydrochlorothiazide (HYDRODIURIL) 25 MG tablet, Daily., Disp: , Rfl:   •  omeprazole (priLOSEC) 20 MG capsule, Take 20 mg by mouth Daily., Disp: , Rfl:   •  polyethylene glycol (MIRALAX) packet, Take 17 g by mouth Daily., Disp: , Rfl:   •  saccharomyces boulardii (FLORASTOR) 250 MG capsule, Take 250 mg by mouth 2 (Two) Times a Day., Disp: , Rfl:   •  triamcinolone (KENALOG) 0.1 % ointment, APPLY TO THE AFFECTED AREA TWICE DAILY, Disp: 30 g, Rfl: 0  •  trimethoprim-polymyxin b (POLYTRIM) 33204-9.1 UNIT/ML-% ophthalmic solution, 1 drop 3 (Three) Times a Day., Disp: , Rfl:     Allergies   Allergen Reactions   • Iodine Unknown (See Comments)   • Levofloxacin Unknown (See Comments)   • Nitroglycerin Unknown (See Comments) and Other (See Comments)   • Paroxetine Unknown (See Comments)   • Penicillin G Unknown (See Comments)   • Prednisone Unknown (See Comments)   • Sucralfate Unknown (See Comments)   • Diltiazem Hcl Hives   • Metoprolol Other (See Comments)     Lowers heart rate    • Pramipexole Dihydrochloride Unknown (See Comments)   • Ropinirole Hcl Other (See Comments)       Family History   Problem Relation Age of Onset   • Heart disease Mother    • Heart disease Father        History reviewed. No pertinent surgical history.    Past Medical History:   Diagnosis Date   • Atrial fibrillation (CMS/HCC)    • Benign prostatic hyperplasia    • CAD (coronary artery disease)    • Hyperlipidemia    • Hypertension    • Myocardial infarction (CMS/HCC)        Family History   Problem Relation Age of Onset   • Heart disease Mother    • Heart disease Father   "      Social History     Socioeconomic History   • Marital status:      Spouse name: Not on file   • Number of children: Not on file   • Years of education: Not on file   • Highest education level: Not on file   Tobacco Use   • Smoking status: Former Smoker   • Smokeless tobacco: Never Used   • Tobacco comment: more than 50yrs   Substance and Sexual Activity   • Alcohol use: No     Frequency: Never   • Drug use: No   • Sexual activity: Defer         Procedures      Objective:       Physical Exam    /79   Pulse 60   Ht 180.3 cm (71\")   Wt 71.7 kg (158 lb)   BMI 22.04 kg/m²   The patient is alert, oriented and in no distress.    Vital signs as noted above.    Speech is normal.  CNS grossly normal  "

## 2020-05-07 DIAGNOSIS — F41.9 ANXIETY: Primary | ICD-10-CM

## 2020-05-07 RX ORDER — DIAZEPAM 5 MG/1
TABLET ORAL
Qty: 60 TABLET | Refills: 1 | Status: SHIPPED | OUTPATIENT
Start: 2020-05-07 | End: 2020-07-06

## 2020-05-11 ENCOUNTER — CLINICAL SUPPORT NO REQUIREMENTS (OUTPATIENT)
Dept: CARDIOLOGY | Facility: CLINIC | Age: 85
End: 2020-05-11

## 2020-05-11 DIAGNOSIS — Z95.818 STATUS POST PLACEMENT OF IMPLANTABLE LOOP RECORDER: Primary | ICD-10-CM

## 2020-05-11 DIAGNOSIS — R55 SYNCOPE AND COLLAPSE: ICD-10-CM

## 2020-05-11 PROCEDURE — G2066 INTER DEVC REMOTE 30D: HCPCS | Performed by: INTERNAL MEDICINE

## 2020-05-11 PROCEDURE — 93298 REM INTERROG DEV EVAL SCRMS: CPT | Performed by: INTERNAL MEDICINE

## 2020-05-18 RX ORDER — GABAPENTIN 100 MG/1
CAPSULE ORAL
Qty: 180 CAPSULE | Refills: 2 | Status: SHIPPED | OUTPATIENT
Start: 2020-05-18 | End: 2021-03-17 | Stop reason: SDUPTHER

## 2020-06-11 ENCOUNTER — CLINICAL SUPPORT NO REQUIREMENTS (OUTPATIENT)
Dept: CARDIOLOGY | Facility: CLINIC | Age: 85
End: 2020-06-11

## 2020-06-11 DIAGNOSIS — I48.0 PAROXYSMAL ATRIAL FIBRILLATION (HCC): Primary | ICD-10-CM

## 2020-06-11 DIAGNOSIS — Z95.818 STATUS POST PLACEMENT OF IMPLANTABLE LOOP RECORDER: ICD-10-CM

## 2020-06-11 PROCEDURE — 93298 REM INTERROG DEV EVAL SCRMS: CPT | Performed by: INTERNAL MEDICINE

## 2020-06-11 PROCEDURE — G2066 INTER DEVC REMOTE 30D: HCPCS | Performed by: INTERNAL MEDICINE

## 2020-07-06 DIAGNOSIS — F41.9 ANXIETY: ICD-10-CM

## 2020-07-13 ENCOUNTER — CLINICAL SUPPORT NO REQUIREMENTS (OUTPATIENT)
Dept: CARDIOLOGY | Facility: CLINIC | Age: 85
End: 2020-07-13

## 2020-07-13 DIAGNOSIS — I48.0 PAROXYSMAL ATRIAL FIBRILLATION (HCC): Primary | ICD-10-CM

## 2020-07-13 DIAGNOSIS — Z95.818 STATUS POST PLACEMENT OF IMPLANTABLE LOOP RECORDER: ICD-10-CM

## 2020-07-13 PROCEDURE — 93298 REM INTERROG DEV EVAL SCRMS: CPT | Performed by: INTERNAL MEDICINE

## 2020-07-13 PROCEDURE — G2066 INTER DEVC REMOTE 30D: HCPCS | Performed by: INTERNAL MEDICINE

## 2020-07-14 RX ORDER — DIAZEPAM 5 MG/1
TABLET ORAL
Qty: 60 TABLET | Refills: 2 | Status: SHIPPED | OUTPATIENT
Start: 2020-07-14 | End: 2020-10-06

## 2020-07-14 NOTE — TELEPHONE ENCOUNTER
PT CALLED TO CHECK THE STATUS ON HIS MED REFILL. PT SAYS THAT HE TOOK HIS LAST PILL THIS MORNING.    CALL BACK # [phone]

## 2020-08-14 ENCOUNTER — CLINICAL SUPPORT NO REQUIREMENTS (OUTPATIENT)
Dept: CARDIOLOGY | Facility: CLINIC | Age: 85
End: 2020-08-14

## 2020-08-14 DIAGNOSIS — Z95.818 STATUS POST PLACEMENT OF IMPLANTABLE LOOP RECORDER: Primary | ICD-10-CM

## 2020-08-14 DIAGNOSIS — R55 SYNCOPE AND COLLAPSE: ICD-10-CM

## 2020-08-14 PROCEDURE — 93298 REM INTERROG DEV EVAL SCRMS: CPT | Performed by: INTERNAL MEDICINE

## 2020-08-14 PROCEDURE — G2066 INTER DEVC REMOTE 30D: HCPCS | Performed by: INTERNAL MEDICINE

## 2020-08-24 RX ORDER — AMLODIPINE BESYLATE 10 MG/1
10 TABLET ORAL DAILY
Qty: 90 TABLET | Refills: 3 | Status: SHIPPED | OUTPATIENT
Start: 2020-08-24 | End: 2021-01-13

## 2020-08-25 DIAGNOSIS — L30.9 DERMATITIS: ICD-10-CM

## 2020-09-15 ENCOUNTER — OFFICE VISIT (OUTPATIENT)
Dept: FAMILY MEDICINE CLINIC | Facility: CLINIC | Age: 85
End: 2020-09-15

## 2020-09-15 VITALS
TEMPERATURE: 97.8 F | HEIGHT: 71 IN | BODY MASS INDEX: 20.58 KG/M2 | SYSTOLIC BLOOD PRESSURE: 140 MMHG | WEIGHT: 147 LBS | OXYGEN SATURATION: 98 % | HEART RATE: 60 BPM | DIASTOLIC BLOOD PRESSURE: 80 MMHG

## 2020-09-15 DIAGNOSIS — R07.81 RIB PAIN ON LEFT SIDE: ICD-10-CM

## 2020-09-15 DIAGNOSIS — I87.2 VENOUS STASIS DERMATITIS OF LEFT LOWER EXTREMITY: Primary | ICD-10-CM

## 2020-09-15 DIAGNOSIS — R35.1 NOCTURIA: ICD-10-CM

## 2020-09-15 DIAGNOSIS — I48.0 PAROXYSMAL ATRIAL FIBRILLATION (HCC): ICD-10-CM

## 2020-09-15 DIAGNOSIS — R60.0 LOWER EXTREMITY EDEMA: ICD-10-CM

## 2020-09-15 DIAGNOSIS — R35.0 URINARY FREQUENCY: ICD-10-CM

## 2020-09-15 DIAGNOSIS — I73.9 CLAUDICATION (HCC): ICD-10-CM

## 2020-09-15 LAB
BACTERIA UR QL AUTO: ABNORMAL /HPF
BILIRUB UR QL STRIP: NEGATIVE
CLARITY UR: CLEAR
COLOR UR: ABNORMAL
GLUCOSE UR STRIP-MCNC: NEGATIVE MG/DL
HGB UR QL STRIP.AUTO: NEGATIVE
HYALINE CASTS UR QL AUTO: ABNORMAL /LPF
KETONES UR QL STRIP: ABNORMAL
LEUKOCYTE ESTERASE UR QL STRIP.AUTO: ABNORMAL
NITRITE UR QL STRIP: NEGATIVE
PH UR STRIP.AUTO: 6 [PH] (ref 5–8)
PROT UR QL STRIP: ABNORMAL
RBC # UR: ABNORMAL /HPF
REF LAB TEST METHOD: ABNORMAL
SP GR UR STRIP: 1.03 (ref 1–1.03)
SQUAMOUS #/AREA URNS HPF: ABNORMAL /HPF
UROBILINOGEN UR QL STRIP: ABNORMAL
WBC UR QL AUTO: ABNORMAL /HPF

## 2020-09-15 PROCEDURE — 81001 URINALYSIS AUTO W/SCOPE: CPT | Performed by: NURSE PRACTITIONER

## 2020-09-15 PROCEDURE — G0008 ADMIN INFLUENZA VIRUS VAC: HCPCS | Performed by: NURSE PRACTITIONER

## 2020-09-15 PROCEDURE — 90694 VACC AIIV4 NO PRSRV 0.5ML IM: CPT | Performed by: NURSE PRACTITIONER

## 2020-09-15 PROCEDURE — 99214 OFFICE O/P EST MOD 30 MIN: CPT | Performed by: NURSE PRACTITIONER

## 2020-09-15 NOTE — PROGRESS NOTES
"Subjective   Sage Flores is a 89 y.o. male.     Chief Complaint   Patient presents with   • Rash       /80 (BP Location: Left arm, Patient Position: Sitting, Cuff Size: Adult)   Pulse 60   Temp 97.8 °F (36.6 °C) (Temporal)   Ht 180.3 cm (71\")   Wt 66.7 kg (147 lb)   SpO2 98%   BMI 20.50 kg/m²     BP Readings from Last 3 Encounters:   09/15/20 140/80   04/13/20 130/79   02/24/20 140/80       Wt Readings from Last 3 Encounters:   09/15/20 66.7 kg (147 lb)   04/13/20 71.7 kg (158 lb)   02/24/20 71.7 kg (158 lb)       Patient presents with 3 complaints:  1. \"Rash\" on his L lower medial leg. The rash has been present since the beginning of the year. He had been using a steroid ointment on it but there have been no changes to it. The reddened area is not raised. Does not itch. No drainage or oozing. He does have significant swelling of his bilateral lower extremities that is worse at the end of the day and is improved by elevation.   2. He has pain located on his L rib cage. He had a fall 2 years ago and ever since then he has an intermittent ache in his L rib cage that is occasionally sharp. The sharp pain occurs at night when he is laying down, when he pushes on his ribs, and when he takes a deep breath. The pain wakes him up from sleep and has progressively gotten worse over the last few months.   3. He has had nocturia that occurs intermittently over the last year. He c/o frequency and urgency at night. He says at times he will get up 6-7 times per night. No fever or chills. No back pain. He says at times he has burning with urination. He does have a hx of BPH but is not currently on Flomax. No blood in urine. His urine is dark in color. He does not drink much water at all during the day. He says he will usually have one cup of water in the morning and that's all for the day. He usually drinks apple juice with supper.        The following portions of the patient's history were reviewed and updated as " appropriate: allergies, current medications, past family history, past medical history, past social history, past surgical history and problem list.    Review of Systems   Respiratory: Negative for cough and shortness of breath.    Cardiovascular: Positive for leg swelling. Negative for chest pain and palpitations.   Genitourinary: Positive for nocturia and urgency. Negative for difficulty urinating and hematuria.   Musculoskeletal: Positive for arthralgias.   Skin: Positive for color change and rash.       Objective   Physical Exam  Constitutional:       Appearance: He is well-developed.   Eyes:      Pupils: Pupils are equal, round, and reactive to light.   Cardiovascular:      Rate and Rhythm: Normal rate and regular rhythm.   Pulmonary:      Effort: Pulmonary effort is normal.      Breath sounds: Normal breath sounds.   Musculoskeletal:      Right lower leg: Edema (2+) present.      Left lower leg: Edema (2+) present.   Skin:     Comments: Left lower leg around ankle with venous stasis dermatitis    Neurological:      Mental Status: He is alert and oriented to person, place, and time.           Diagnoses and all orders for this visit:    1. Venous stasis dermatitis of left lower extremity (Primary)    2. Rib pain on left side  -     XR Ribs 2 View Left; Future    3. Nocturia  -     Urinalysis With Culture If Indicated -; Future  -     Urinalysis With Culture If Indicated - Urine, Clean Catch  -     Urinalysis, Microscopic Only - Urine, Clean Catch    4. Urinary frequency  -     Urinalysis With Culture If Indicated -; Future  -     Urinalysis With Culture If Indicated - Urine, Clean Catch  -     Urinalysis, Microscopic Only - Urine, Clean Catch    5. Paroxysmal atrial fibrillation (CMS/[facility])  Assessment & Plan:  Sees cardiology. No change       6. Claudication (CMS/[facility])  Assessment & Plan:  Unchanged       7. Lower extremity edema  Comments:  compression socks, elevate legs, limit sodium     Other orders  -      Fluad Quad >65 years  x-ray ribs  Check UA  Compression socks  During this office visit, we discussed the pertinent aspects of the visit and treatment recommendations. Pt verbalizes understanding. Follow up was discussed. Patient was given the opportunity to ask questions and discuss other concerns.       Return in about 3 months (around 12/15/2020) for Medicare Wellness.

## 2020-09-16 DIAGNOSIS — R07.81 RIB PAIN ON LEFT SIDE: ICD-10-CM

## 2020-09-30 RX ORDER — HYDROCHLOROTHIAZIDE 25 MG/1
25 TABLET ORAL DAILY
Qty: 90 TABLET | Refills: 1 | Status: SHIPPED | OUTPATIENT
Start: 2020-09-30 | End: 2021-01-13

## 2020-10-06 DIAGNOSIS — F41.9 ANXIETY: ICD-10-CM

## 2020-10-06 RX ORDER — DIAZEPAM 5 MG/1
TABLET ORAL
Qty: 60 TABLET | Refills: 2 | Status: SHIPPED | OUTPATIENT
Start: 2020-10-06

## 2020-10-26 DIAGNOSIS — J06.9 ACUTE URI: ICD-10-CM

## 2020-10-27 RX ORDER — AZELASTINE HCL 205.5 UG/1
SPRAY NASAL
Qty: 30 ML | Refills: 3 | Status: SHIPPED | OUTPATIENT
Start: 2020-10-27

## 2020-11-04 ENCOUNTER — TELEPHONE (OUTPATIENT)
Dept: FAMILY MEDICINE CLINIC | Facility: CLINIC | Age: 85
End: 2020-11-04

## 2020-11-04 NOTE — TELEPHONE ENCOUNTER
Patient is wanting to know if he can use aleve instead of Tylenol Arthritis because the store was out of the tylenol.    Please advise  [phone]

## 2020-11-21 ENCOUNTER — APPOINTMENT (OUTPATIENT)
Dept: CARDIOLOGY | Facility: HOSPITAL | Age: 85
End: 2020-11-21

## 2020-11-21 ENCOUNTER — APPOINTMENT (OUTPATIENT)
Dept: GENERAL RADIOLOGY | Facility: HOSPITAL | Age: 85
End: 2020-11-21

## 2020-11-21 ENCOUNTER — APPOINTMENT (OUTPATIENT)
Dept: CT IMAGING | Facility: HOSPITAL | Age: 85
End: 2020-11-21

## 2020-11-21 ENCOUNTER — HOSPITAL ENCOUNTER (INPATIENT)
Facility: HOSPITAL | Age: 85
LOS: 1 days | Discharge: NURSING, REHAB OR LONG-TERM CARE | End: 2020-11-23
Attending: HOSPITALIST | Admitting: STUDENT IN AN ORGANIZED HEALTH CARE EDUCATION/TRAINING PROGRAM

## 2020-11-21 DIAGNOSIS — D72.829 LEUKOCYTOSIS, UNSPECIFIED TYPE: ICD-10-CM

## 2020-11-21 DIAGNOSIS — R05.9 COUGH: ICD-10-CM

## 2020-11-21 DIAGNOSIS — A41.9 SEPSIS, DUE TO UNSPECIFIED ORGANISM, UNSPECIFIED WHETHER ACUTE ORGAN DYSFUNCTION PRESENT (HCC): ICD-10-CM

## 2020-11-21 DIAGNOSIS — Z20.822 LAB TEST NEGATIVE FOR COVID-19 VIRUS: Primary | ICD-10-CM

## 2020-11-21 DIAGNOSIS — R50.9 FEVER, UNSPECIFIED FEVER CAUSE: ICD-10-CM

## 2020-11-21 LAB
ALBUMIN SERPL-MCNC: 4.3 G/DL (ref 3.5–5.2)
ALBUMIN/GLOB SERPL: 2.5 G/DL
ALP SERPL-CCNC: 86 U/L (ref 39–117)
ALT SERPL W P-5'-P-CCNC: 16 U/L (ref 1–41)
ANION GAP SERPL CALCULATED.3IONS-SCNC: 10 MMOL/L (ref 5–15)
ANION GAP SERPL CALCULATED.3IONS-SCNC: 8 MMOL/L (ref 5–15)
AST SERPL-CCNC: 19 U/L (ref 1–40)
B PARAPERT DNA SPEC QL NAA+PROBE: NOT DETECTED
B PERT DNA SPEC QL NAA+PROBE: NOT DETECTED
BASOPHILS # BLD AUTO: 0.1 10*3/MM3 (ref 0–0.2)
BASOPHILS NFR BLD AUTO: 0.4 % (ref 0–1.5)
BH CV LOWER VASCULAR LEFT COMMON FEMORAL AUGMENT: NORMAL
BH CV LOWER VASCULAR LEFT COMMON FEMORAL COMPETENT: NORMAL
BH CV LOWER VASCULAR LEFT COMMON FEMORAL COMPRESS: NORMAL
BH CV LOWER VASCULAR LEFT COMMON FEMORAL PHASIC: NORMAL
BH CV LOWER VASCULAR LEFT COMMON FEMORAL SPONT: NORMAL
BH CV LOWER VASCULAR LEFT DISTAL FEMORAL COMPRESS: NORMAL
BH CV LOWER VASCULAR LEFT GASTRONEMIUS COMPRESS: NORMAL
BH CV LOWER VASCULAR LEFT GREATER SAPH AK COMPRESS: NORMAL
BH CV LOWER VASCULAR LEFT GREATER SAPH BK COMPRESS: NORMAL
BH CV LOWER VASCULAR LEFT LESSER SAPH COMPRESS: NORMAL
BH CV LOWER VASCULAR LEFT MID FEMORAL AUGMENT: NORMAL
BH CV LOWER VASCULAR LEFT MID FEMORAL COMPETENT: NORMAL
BH CV LOWER VASCULAR LEFT MID FEMORAL COMPRESS: NORMAL
BH CV LOWER VASCULAR LEFT MID FEMORAL PHASIC: NORMAL
BH CV LOWER VASCULAR LEFT MID FEMORAL SPONT: NORMAL
BH CV LOWER VASCULAR LEFT PERONEAL COMPRESS: NORMAL
BH CV LOWER VASCULAR LEFT POPLITEAL AUGMENT: NORMAL
BH CV LOWER VASCULAR LEFT POPLITEAL COMPETENT: NORMAL
BH CV LOWER VASCULAR LEFT POPLITEAL COMPRESS: NORMAL
BH CV LOWER VASCULAR LEFT POPLITEAL PHASIC: NORMAL
BH CV LOWER VASCULAR LEFT POPLITEAL SPONT: NORMAL
BH CV LOWER VASCULAR LEFT POSTERIOR TIBIAL COMPRESS: NORMAL
BH CV LOWER VASCULAR LEFT PROXIMAL FEMORAL COMPRESS: NORMAL
BH CV LOWER VASCULAR LEFT SAPHENOFEMORAL JUNCTION COMPRESS: NORMAL
BH CV LOWER VASCULAR RIGHT COMMON FEMORAL AUGMENT: NORMAL
BH CV LOWER VASCULAR RIGHT COMMON FEMORAL COMPETENT: NORMAL
BH CV LOWER VASCULAR RIGHT COMMON FEMORAL COMPRESS: NORMAL
BH CV LOWER VASCULAR RIGHT COMMON FEMORAL PHASIC: NORMAL
BH CV LOWER VASCULAR RIGHT COMMON FEMORAL SPONT: NORMAL
BH CV LOWER VASCULAR RIGHT DISTAL FEMORAL COMPRESS: NORMAL
BH CV LOWER VASCULAR RIGHT GASTRONEMIUS COMPRESS: NORMAL
BH CV LOWER VASCULAR RIGHT GREATER SAPH AK COMPRESS: NORMAL
BH CV LOWER VASCULAR RIGHT GREATER SAPH BK COMPRESS: NORMAL
BH CV LOWER VASCULAR RIGHT LESSER SAPH COMPRESS: NORMAL
BH CV LOWER VASCULAR RIGHT MID FEMORAL AUGMENT: NORMAL
BH CV LOWER VASCULAR RIGHT MID FEMORAL COMPETENT: NORMAL
BH CV LOWER VASCULAR RIGHT MID FEMORAL COMPRESS: NORMAL
BH CV LOWER VASCULAR RIGHT MID FEMORAL PHASIC: NORMAL
BH CV LOWER VASCULAR RIGHT MID FEMORAL SPONT: NORMAL
BH CV LOWER VASCULAR RIGHT PERONEAL COMPRESS: NORMAL
BH CV LOWER VASCULAR RIGHT POPLITEAL AUGMENT: NORMAL
BH CV LOWER VASCULAR RIGHT POPLITEAL COMPETENT: NORMAL
BH CV LOWER VASCULAR RIGHT POPLITEAL COMPRESS: NORMAL
BH CV LOWER VASCULAR RIGHT POPLITEAL PHASIC: NORMAL
BH CV LOWER VASCULAR RIGHT POPLITEAL SPONT: NORMAL
BH CV LOWER VASCULAR RIGHT POSTERIOR TIBIAL COMPRESS: NORMAL
BH CV LOWER VASCULAR RIGHT PROXIMAL FEMORAL COMPRESS: NORMAL
BH CV LOWER VASCULAR RIGHT SAPHENOFEMORAL JUNCTION COMPRESS: NORMAL
BH CV LOWER VASCULAR RIGHT VARICOSITY BK COMPRESS: NORMAL
BILIRUB SERPL-MCNC: 1.2 MG/DL (ref 0–1.2)
BILIRUB UR QL STRIP: NEGATIVE
BUN SERPL-MCNC: 24 MG/DL (ref 8–23)
BUN SERPL-MCNC: 27 MG/DL (ref 8–23)
BUN/CREAT SERPL: 20.3 (ref 7–25)
BUN/CREAT SERPL: 24.1 (ref 7–25)
C PNEUM DNA NPH QL NAA+NON-PROBE: NOT DETECTED
CALCIUM SPEC-SCNC: 8.2 MG/DL (ref 8.2–9.6)
CALCIUM SPEC-SCNC: 9.1 MG/DL (ref 8.2–9.6)
CHLORIDE SERPL-SCNC: 104 MMOL/L (ref 98–107)
CHLORIDE SERPL-SCNC: 105 MMOL/L (ref 98–107)
CK SERPL-CCNC: 156 U/L (ref 20–200)
CLARITY UR: CLEAR
CO2 SERPL-SCNC: 23 MMOL/L (ref 22–29)
CO2 SERPL-SCNC: 25 MMOL/L (ref 22–29)
COLOR UR: YELLOW
CREAT SERPL-MCNC: 1.12 MG/DL (ref 0.76–1.27)
CREAT SERPL-MCNC: 1.18 MG/DL (ref 0.76–1.27)
CRP SERPL-MCNC: 0.36 MG/DL (ref 0–0.5)
D DIMER PPP FEU-MCNC: 1.96 MG/L (FEU) (ref 0–0.59)
D-LACTATE SERPL-SCNC: 1.2 MMOL/L (ref 0.5–2)
DEPRECATED RDW RBC AUTO: 44.2 FL (ref 37–54)
DEPRECATED RDW RBC AUTO: 45.5 FL (ref 37–54)
EOSINOPHIL # BLD AUTO: 0 10*3/MM3 (ref 0–0.4)
EOSINOPHIL NFR BLD AUTO: 0 % (ref 0.3–6.2)
ERYTHROCYTE [DISTWIDTH] IN BLOOD BY AUTOMATED COUNT: 13.3 % (ref 12.3–15.4)
ERYTHROCYTE [DISTWIDTH] IN BLOOD BY AUTOMATED COUNT: 13.5 % (ref 12.3–15.4)
FERRITIN SERPL-MCNC: 503.8 NG/ML (ref 30–400)
FLUAV H1 2009 PAND RNA NPH QL NAA+PROBE: NOT DETECTED
FLUAV H1 HA GENE NPH QL NAA+PROBE: NOT DETECTED
FLUAV H3 RNA NPH QL NAA+PROBE: NOT DETECTED
FLUAV SUBTYP SPEC NAA+PROBE: NOT DETECTED
FLUBV RNA ISLT QL NAA+PROBE: NOT DETECTED
GFR SERPL CREATININE-BSD FRML MDRD: 58 ML/MIN/1.73
GFR SERPL CREATININE-BSD FRML MDRD: 62 ML/MIN/1.73
GIANT PLATELETS: ABNORMAL
GLOBULIN UR ELPH-MCNC: 1.7 GM/DL
GLUCOSE SERPL-MCNC: 144 MG/DL (ref 65–99)
GLUCOSE SERPL-MCNC: 96 MG/DL (ref 65–99)
GLUCOSE UR STRIP-MCNC: ABNORMAL MG/DL
HADV DNA SPEC NAA+PROBE: NOT DETECTED
HCOV 229E RNA SPEC QL NAA+PROBE: NOT DETECTED
HCOV HKU1 RNA SPEC QL NAA+PROBE: NOT DETECTED
HCOV NL63 RNA SPEC QL NAA+PROBE: NOT DETECTED
HCOV OC43 RNA SPEC QL NAA+PROBE: NOT DETECTED
HCT VFR BLD AUTO: 33.7 % (ref 37.5–51)
HCT VFR BLD AUTO: 38 % (ref 37.5–51)
HGB BLD-MCNC: 11.2 G/DL (ref 13–17.7)
HGB BLD-MCNC: 12.8 G/DL (ref 13–17.7)
HGB UR QL STRIP.AUTO: NEGATIVE
HMPV RNA NPH QL NAA+NON-PROBE: NOT DETECTED
HOLD SPECIMEN: NORMAL
HOLD SPECIMEN: NORMAL
HPIV1 RNA SPEC QL NAA+PROBE: NOT DETECTED
HPIV2 RNA SPEC QL NAA+PROBE: NOT DETECTED
HPIV3 RNA NPH QL NAA+PROBE: NOT DETECTED
HPIV4 P GENE NPH QL NAA+PROBE: NOT DETECTED
KETONES UR QL STRIP: ABNORMAL
LDH SERPL-CCNC: 221 U/L (ref 135–225)
LEUKOCYTE ESTERASE UR QL STRIP.AUTO: NEGATIVE
LYMPHOCYTES # BLD AUTO: 0.5 10*3/MM3 (ref 0.7–3.1)
LYMPHOCYTES # BLD MANUAL: 0.35 10*3/MM3 (ref 0.7–3.1)
LYMPHOCYTES NFR BLD AUTO: 2.2 % (ref 19.6–45.3)
LYMPHOCYTES NFR BLD MANUAL: 1 % (ref 19.6–45.3)
LYMPHOCYTES NFR BLD MANUAL: 1 % (ref 5–12)
M PNEUMO IGG SER IA-ACNC: NOT DETECTED
MCH RBC QN AUTO: 31.6 PG (ref 26.6–33)
MCH RBC QN AUTO: 31.7 PG (ref 26.6–33)
MCHC RBC AUTO-ENTMCNC: 33.2 G/DL (ref 31.5–35.7)
MCHC RBC AUTO-ENTMCNC: 33.6 G/DL (ref 31.5–35.7)
MCV RBC AUTO: 94.4 FL (ref 79–97)
MCV RBC AUTO: 95.1 FL (ref 79–97)
MONOCYTES # BLD AUTO: 0.35 10*3/MM3 (ref 0.1–0.9)
MONOCYTES # BLD AUTO: 0.9 10*3/MM3 (ref 0.1–0.9)
MONOCYTES NFR BLD AUTO: 4 % (ref 5–12)
NEUTROPHILS # BLD AUTO: 33.81 10*3/MM3 (ref 1.7–7)
NEUTROPHILS NFR BLD AUTO: 20 10*3/MM3 (ref 1.7–7)
NEUTROPHILS NFR BLD AUTO: 93.4 % (ref 42.7–76)
NEUTROPHILS NFR BLD MANUAL: 64 % (ref 42.7–76)
NEUTS BAND NFR BLD MANUAL: 34 % (ref 0–5)
NITRITE UR QL STRIP: NEGATIVE
NRBC BLD AUTO-RTO: 0 /100 WBC (ref 0–0.2)
NT-PROBNP SERPL-MCNC: 942.4 PG/ML (ref 0–1800)
PH UR STRIP.AUTO: 8 [PH] (ref 5–8)
PLATELET # BLD AUTO: 126 10*3/MM3 (ref 140–450)
PLATELET # BLD AUTO: 130 10*3/MM3 (ref 140–450)
PMV BLD AUTO: 10.4 FL (ref 6–12)
PMV BLD AUTO: 10.6 FL (ref 6–12)
POTASSIUM SERPL-SCNC: 3.4 MMOL/L (ref 3.5–5.2)
POTASSIUM SERPL-SCNC: 3.9 MMOL/L (ref 3.5–5.2)
PROCALCITONIN SERPL-MCNC: 0.71 NG/ML (ref 0–0.25)
PROT SERPL-MCNC: 6 G/DL (ref 6–8.5)
PROT UR QL STRIP: NEGATIVE
RBC # BLD AUTO: 3.54 10*6/MM3 (ref 4.14–5.8)
RBC # BLD AUTO: 4.03 10*6/MM3 (ref 4.14–5.8)
RBC MORPH BLD: NORMAL
RHINOVIRUS RNA SPEC NAA+PROBE: NOT DETECTED
RSV RNA NPH QL NAA+NON-PROBE: NOT DETECTED
SARS-COV-2 RNA NPH QL NAA+NON-PROBE: NOT DETECTED
SCAN SLIDE: NORMAL
SMALL PLATELETS BLD QL SMEAR: ABNORMAL
SODIUM SERPL-SCNC: 136 MMOL/L (ref 136–145)
SODIUM SERPL-SCNC: 139 MMOL/L (ref 136–145)
SP GR UR STRIP: 1.01 (ref 1–1.03)
TROPONIN T SERPL-MCNC: 0.01 NG/ML (ref 0–0.03)
UROBILINOGEN UR QL STRIP: ABNORMAL
WBC # BLD AUTO: 21.4 10*3/MM3 (ref 3.4–10.8)
WBC # BLD AUTO: 34.5 10*3/MM3 (ref 3.4–10.8)
WBC MORPH BLD: NORMAL

## 2020-11-21 PROCEDURE — 94640 AIRWAY INHALATION TREATMENT: CPT

## 2020-11-21 PROCEDURE — 83880 ASSAY OF NATRIURETIC PEPTIDE: CPT | Performed by: NURSE PRACTITIONER

## 2020-11-21 PROCEDURE — 93005 ELECTROCARDIOGRAM TRACING: CPT | Performed by: NURSE PRACTITIONER

## 2020-11-21 PROCEDURE — 81003 URINALYSIS AUTO W/O SCOPE: CPT | Performed by: NURSE PRACTITIONER

## 2020-11-21 PROCEDURE — 82550 ASSAY OF CK (CPK): CPT | Performed by: HOSPITALIST

## 2020-11-21 PROCEDURE — 85007 BL SMEAR W/DIFF WBC COUNT: CPT | Performed by: HOSPITALIST

## 2020-11-21 PROCEDURE — 82728 ASSAY OF FERRITIN: CPT | Performed by: NURSE PRACTITIONER

## 2020-11-21 PROCEDURE — 83605 ASSAY OF LACTIC ACID: CPT | Performed by: NURSE PRACTITIONER

## 2020-11-21 PROCEDURE — 25010000002 HEPARIN (PORCINE) PER 1000 UNITS: Performed by: HOSPITALIST

## 2020-11-21 PROCEDURE — 83615 LACTATE (LD) (LDH) ENZYME: CPT | Performed by: NURSE PRACTITIONER

## 2020-11-21 PROCEDURE — 25010000002 DIPHENHYDRAMINE PER 50 MG: Performed by: NURSE PRACTITIONER

## 2020-11-21 PROCEDURE — 25010000002 VANCOMYCIN 10 G RECONSTITUTED SOLUTION: Performed by: HOSPITALIST

## 2020-11-21 PROCEDURE — 0202U NFCT DS 22 TRGT SARS-COV-2: CPT | Performed by: NURSE PRACTITIONER

## 2020-11-21 PROCEDURE — 99221 1ST HOSP IP/OBS SF/LOW 40: CPT | Performed by: HOSPITALIST

## 2020-11-21 PROCEDURE — 99284 EMERGENCY DEPT VISIT MOD MDM: CPT

## 2020-11-21 PROCEDURE — 85379 FIBRIN DEGRADATION QUANT: CPT | Performed by: NURSE PRACTITIONER

## 2020-11-21 PROCEDURE — 84484 ASSAY OF TROPONIN QUANT: CPT | Performed by: NURSE PRACTITIONER

## 2020-11-21 PROCEDURE — 71045 X-RAY EXAM CHEST 1 VIEW: CPT

## 2020-11-21 PROCEDURE — G0378 HOSPITAL OBSERVATION PER HR: HCPCS

## 2020-11-21 PROCEDURE — U0004 COV-19 TEST NON-CDC HGH THRU: HCPCS | Performed by: HOSPITALIST

## 2020-11-21 PROCEDURE — 85025 COMPLETE CBC W/AUTO DIFF WBC: CPT | Performed by: NURSE PRACTITIONER

## 2020-11-21 PROCEDURE — 94799 UNLISTED PULMONARY SVC/PX: CPT

## 2020-11-21 PROCEDURE — 25010000002 CEFTRIAXONE IN SWFI 1 GRAM/10ML IV PUSH SYRINGE (SIMPLE): Performed by: NURSE PRACTITIONER

## 2020-11-21 PROCEDURE — 71275 CT ANGIOGRAPHY CHEST: CPT

## 2020-11-21 PROCEDURE — 85025 COMPLETE CBC W/AUTO DIFF WBC: CPT | Performed by: HOSPITALIST

## 2020-11-21 PROCEDURE — 84145 PROCALCITONIN (PCT): CPT | Performed by: NURSE PRACTITIONER

## 2020-11-21 PROCEDURE — 93970 EXTREMITY STUDY: CPT

## 2020-11-21 PROCEDURE — 80053 COMPREHEN METABOLIC PANEL: CPT | Performed by: NURSE PRACTITIONER

## 2020-11-21 PROCEDURE — 0 IOPAMIDOL PER 1 ML: Performed by: NURSE PRACTITIONER

## 2020-11-21 PROCEDURE — 86140 C-REACTIVE PROTEIN: CPT | Performed by: NURSE PRACTITIONER

## 2020-11-21 RX ORDER — CALCIUM CARBONATE 200(500)MG
1 TABLET,CHEWABLE ORAL 2 TIMES DAILY PRN
Status: DISCONTINUED | OUTPATIENT
Start: 2020-11-21 | End: 2020-11-23 | Stop reason: HOSPADM

## 2020-11-21 RX ORDER — GABAPENTIN 100 MG/1
100 CAPSULE ORAL 2 TIMES DAILY
Status: DISCONTINUED | OUTPATIENT
Start: 2020-11-21 | End: 2020-11-23 | Stop reason: HOSPADM

## 2020-11-21 RX ORDER — ACETAMINOPHEN 500 MG
1000 TABLET ORAL ONCE
Status: COMPLETED | OUTPATIENT
Start: 2020-11-21 | End: 2020-11-21

## 2020-11-21 RX ORDER — DIPHENHYDRAMINE HYDROCHLORIDE 50 MG/ML
25 INJECTION INTRAMUSCULAR; INTRAVENOUS ONCE
Status: COMPLETED | OUTPATIENT
Start: 2020-11-21 | End: 2020-11-21

## 2020-11-21 RX ORDER — ONDANSETRON 2 MG/ML
4 INJECTION INTRAMUSCULAR; INTRAVENOUS EVERY 6 HOURS PRN
Status: DISCONTINUED | OUTPATIENT
Start: 2020-11-21 | End: 2020-11-23 | Stop reason: HOSPADM

## 2020-11-21 RX ORDER — HYDROCHLOROTHIAZIDE 25 MG/1
25 TABLET ORAL DAILY
Status: DISCONTINUED | OUTPATIENT
Start: 2020-11-21 | End: 2020-11-23 | Stop reason: HOSPADM

## 2020-11-21 RX ORDER — DIAZEPAM 5 MG/1
5 TABLET ORAL 2 TIMES DAILY
Status: DISCONTINUED | OUTPATIENT
Start: 2020-11-21 | End: 2020-11-23 | Stop reason: HOSPADM

## 2020-11-21 RX ORDER — ACETAMINOPHEN 325 MG/1
650 TABLET ORAL EVERY 4 HOURS PRN
Status: DISCONTINUED | OUTPATIENT
Start: 2020-11-21 | End: 2020-11-23 | Stop reason: HOSPADM

## 2020-11-21 RX ORDER — DOCUSATE SODIUM 100 MG/1
100 CAPSULE, LIQUID FILLED ORAL 2 TIMES DAILY PRN
Status: DISCONTINUED | OUTPATIENT
Start: 2020-11-21 | End: 2020-11-23 | Stop reason: HOSPADM

## 2020-11-21 RX ORDER — ACETAMINOPHEN 650 MG/1
650 SUPPOSITORY RECTAL EVERY 4 HOURS PRN
Status: DISCONTINUED | OUTPATIENT
Start: 2020-11-21 | End: 2020-11-23 | Stop reason: HOSPADM

## 2020-11-21 RX ORDER — SODIUM CHLORIDE 0.9 % (FLUSH) 0.9 %
10 SYRINGE (ML) INJECTION AS NEEDED
Status: DISCONTINUED | OUTPATIENT
Start: 2020-11-21 | End: 2020-11-23 | Stop reason: HOSPADM

## 2020-11-21 RX ORDER — ACETAMINOPHEN 160 MG/5ML
650 SOLUTION ORAL EVERY 4 HOURS PRN
Status: DISCONTINUED | OUTPATIENT
Start: 2020-11-21 | End: 2020-11-23 | Stop reason: HOSPADM

## 2020-11-21 RX ORDER — FAMOTIDINE 10 MG/ML
20 INJECTION, SOLUTION INTRAVENOUS ONCE
Status: COMPLETED | OUTPATIENT
Start: 2020-11-21 | End: 2020-11-21

## 2020-11-21 RX ORDER — SODIUM CHLORIDE 0.9 % (FLUSH) 0.9 %
10 SYRINGE (ML) INJECTION EVERY 12 HOURS SCHEDULED
Status: DISCONTINUED | OUTPATIENT
Start: 2020-11-21 | End: 2020-11-23 | Stop reason: HOSPADM

## 2020-11-21 RX ORDER — HEPARIN SODIUM 5000 [USP'U]/ML
5000 INJECTION, SOLUTION INTRAVENOUS; SUBCUTANEOUS EVERY 8 HOURS SCHEDULED
Status: DISCONTINUED | OUTPATIENT
Start: 2020-11-21 | End: 2020-11-23 | Stop reason: HOSPADM

## 2020-11-21 RX ORDER — AMLODIPINE BESYLATE 5 MG/1
10 TABLET ORAL DAILY
Status: DISCONTINUED | OUTPATIENT
Start: 2020-11-21 | End: 2020-11-23 | Stop reason: HOSPADM

## 2020-11-21 RX ORDER — SODIUM CHLORIDE 9 MG/ML
100 INJECTION, SOLUTION INTRAVENOUS CONTINUOUS
Status: DISCONTINUED | OUTPATIENT
Start: 2020-11-21 | End: 2020-11-23

## 2020-11-21 RX ORDER — PANTOPRAZOLE SODIUM 40 MG/1
40 TABLET, DELAYED RELEASE ORAL
Status: DISCONTINUED | OUTPATIENT
Start: 2020-11-22 | End: 2020-11-23 | Stop reason: HOSPADM

## 2020-11-21 RX ORDER — ONDANSETRON 4 MG/1
4 TABLET, FILM COATED ORAL EVERY 6 HOURS PRN
Status: DISCONTINUED | OUTPATIENT
Start: 2020-11-21 | End: 2020-11-23 | Stop reason: HOSPADM

## 2020-11-21 RX ORDER — IPRATROPIUM BROMIDE AND ALBUTEROL SULFATE 2.5; .5 MG/3ML; MG/3ML
3 SOLUTION RESPIRATORY (INHALATION)
Status: DISCONTINUED | OUTPATIENT
Start: 2020-11-21 | End: 2020-11-23 | Stop reason: HOSPADM

## 2020-11-21 RX ADMIN — HEPARIN SODIUM 5000 UNITS: 5000 INJECTION INTRAVENOUS; SUBCUTANEOUS at 22:01

## 2020-11-21 RX ADMIN — SODIUM CHLORIDE 100 ML/HR: 9 INJECTION, SOLUTION INTRAVENOUS at 17:43

## 2020-11-21 RX ADMIN — IOPAMIDOL 100 ML: 755 INJECTION, SOLUTION INTRAVENOUS at 13:09

## 2020-11-21 RX ADMIN — ACETAMINOPHEN 1000 MG: 500 TABLET, FILM COATED ORAL at 10:11

## 2020-11-21 RX ADMIN — HYDROCHLOROTHIAZIDE 25 MG: 25 TABLET ORAL at 17:46

## 2020-11-21 RX ADMIN — HEPARIN SODIUM 5000 UNITS: 5000 INJECTION INTRAVENOUS; SUBCUTANEOUS at 17:45

## 2020-11-21 RX ADMIN — Medication 10 ML: at 22:02

## 2020-11-21 RX ADMIN — IPRATROPIUM BROMIDE AND ALBUTEROL SULFATE 3 ML: 2.5; .5 SOLUTION RESPIRATORY (INHALATION) at 16:25

## 2020-11-21 RX ADMIN — DIPHENHYDRAMINE HYDROCHLORIDE 25 MG: 50 INJECTION, SOLUTION INTRAMUSCULAR; INTRAVENOUS at 12:47

## 2020-11-21 RX ADMIN — DIAZEPAM 5 MG: 5 TABLET ORAL at 20:28

## 2020-11-21 RX ADMIN — IPRATROPIUM BROMIDE AND ALBUTEROL SULFATE 3 ML: 2.5; .5 SOLUTION RESPIRATORY (INHALATION) at 20:32

## 2020-11-21 RX ADMIN — SODIUM CHLORIDE 500 ML: 0.9 INJECTION, SOLUTION INTRAVENOUS at 10:12

## 2020-11-21 RX ADMIN — GABAPENTIN 100 MG: 100 CAPSULE ORAL at 20:28

## 2020-11-21 RX ADMIN — VANCOMYCIN HYDROCHLORIDE 1500 MG: 100 INJECTION, POWDER, LYOPHILIZED, FOR SOLUTION INTRAVENOUS at 17:43

## 2020-11-21 RX ADMIN — AMLODIPINE BESYLATE 10 MG: 5 TABLET ORAL at 17:46

## 2020-11-21 RX ADMIN — FAMOTIDINE 20 MG: 10 INJECTION, SOLUTION INTRAVENOUS at 12:52

## 2020-11-21 RX ADMIN — CEFTRIAXONE SODIUM 1 G: 1 INJECTION, POWDER, FOR SOLUTION INTRAMUSCULAR; INTRAVENOUS at 13:25

## 2020-11-21 NOTE — ED PROVIDER NOTES
"Subjective   Chief complaint: Fever weakness      Context: Patient is a 89-year-old male who comes in complaining of a fever weakness started around 4:00 this morning.  He denies any chest pain shortness of breath nausea vomiting diarrhea or urinary complaints.  He states he has had a cough that has been intermittently productive since January.  He has some left-sided chest wall pain that he states he has had since September that has previously been evaluated by his family doctor and is not new worse or different than it has been.  He denies any unilateral focal deficits confusion ataxia or lethargy but does complain of some just generalized weakness and chills.  No recent antibiotics.  States he did not take any antipyretics prior to arrival.  He states he has chronic swelling to his BLE which is not new worse or different than normal, states the discoloration with the redness has \"been there for years and is not new or worsening.\"    Duration: 0400    Timing: waxes and wanes    Severity: mild to moderate    Associated symptoms: denies          PCP:  tere soriano          Review of Systems   Constitutional: Positive for fever.   HENT: Positive for congestion.    Eyes: Negative for visual disturbance.   Respiratory: Positive for cough and shortness of breath.    Cardiovascular: Negative.    Gastrointestinal: Negative.    Genitourinary: Negative.    Musculoskeletal: Positive for myalgias.   Skin: Negative.    Allergic/Immunologic: Negative for immunocompromised state.   Neurological: Positive for weakness.   Psychiatric/Behavioral: Negative for confusion.       Past Medical History:   Diagnosis Date   • Atrial fibrillation (CMS/HCC)    • Benign prostatic hyperplasia    • CAD (coronary artery disease)    • Hyperlipidemia    • Hypertension    • Myocardial infarction (CMS/HCC)        Allergies   Allergen Reactions   • Iodine Unknown (See Comments)     Pt unsure of reaction     • Levofloxacin Unknown (See Comments)   • " Nitroglycerin Unknown (See Comments) and Other (See Comments)   • Paroxetine Unknown (See Comments)   • Penicillin G Unknown (See Comments)   • Prednisone Unknown (See Comments)   • Sucralfate Unknown (See Comments)   • Diltiazem Hcl Hives   • Metoprolol Other (See Comments)     Lowers heart rate    • Pramipexole Dihydrochloride Unknown (See Comments)   • Ropinirole Hcl Other (See Comments)       No past surgical history on file.    Family History   Problem Relation Age of Onset   • Heart disease Mother    • Heart disease Father        Social History     Socioeconomic History   • Marital status:      Spouse name: Not on file   • Number of children: Not on file   • Years of education: Not on file   • Highest education level: Not on file   Tobacco Use   • Smoking status: Former Smoker   • Smokeless tobacco: Never Used   • Tobacco comment: more than 50yrs   Substance and Sexual Activity   • Alcohol use: No     Frequency: Never   • Drug use: No   • Sexual activity: Defer           Objective   Physical Exam     Vital signs and triage nurse note reviewed.   Constitutional: Awake, alert; thin  HEENT: Normocephalic, atraumatic; pupils are PERRL with intact EOM; oropharynx is pink and moist without exudate or erythema.   Neck: Supple, full range of motion without pain;    Cardiovascular: Regular rate and rhythm, normal S1-S2.   Pulmonary: Respiratory effort regular nonlabored, breath sounds diminished bilateral lower lobes   Abdomen: Soft, nontender nondistended with normoactive bowel sounds; no rebound or guarding.   Musculoskeletal: Independent range of motion of all extremities.  1-2+ nonpitting edema bilateral lower extremities.  There is some erythema and warmth noted to the bilateral tib-fib area which patient states is normal for him.  Negative Benedict.   Neuro: Alert oriented x3, speech is clear and appropriate, GCS 15   Skin:  Fleshtone warm, dry, intact; no erythematous or petechial rash or lesion       ECG 12  "Lead      Date/Time: 11/21/2020 10:20 AM  Performed by: Purvi Valdez APRN  Authorized by: Purvi Valdez APRN   Interpreted by physician (Garrick)  Rhythm: sinus rhythm  BPM: 85  Comments: Baseline artifact.                 ED Course  ED Course as of Nov 21 1344   Sat Nov 21, 2020   1223 Vascular tech reports ultrasound negative    [JW]      ED Course User Index  [JW] Purvi Valdez APRN           Labs Reviewed   COMPREHENSIVE METABOLIC PANEL - Abnormal; Notable for the following components:       Result Value    BUN 27 (*)     Potassium 3.4 (*)     All other components within normal limits    Narrative:     GFR Normal >60  Chronic Kidney Disease <60  Kidney Failure <15     PROCALCITONIN - Abnormal; Notable for the following components:    Procalcitonin 0.71 (*)     All other components within normal limits    Narrative:     As a Marker for Sepsis (Non-Neonates):   1. <0.5 ng/mL represents a low risk of severe sepsis and/or septic shock.  1. >2 ng/mL represents a high risk of severe sepsis and/or septic shock.    As a Marker for Lower Respiratory Tract Infections that require antibiotic therapy:  PCT on Admission     Antibiotic Therapy             6-12 Hrs later  > 0.5                Strongly Recommended            >0.25 - <0.5         Recommended  0.1 - 0.25           Discouraged                   Remeasure/reassess PCT  <0.1                 Strongly Discouraged          Remeasure/reassess PCT      As 28 day mortality risk marker: \"Change in Procalcitonin Result\" (> 80 % or <=80 %) if Day 0 (or Day 1) and Day 4 values are available. Refer to http://www.[facility]-pct-calculator.com/   Change in PCT <=80 %   A decrease of PCT levels below or equal to 80 % defines a positive change in PCT test result representing a higher risk for 28-day all-cause mortality of patients diagnosed with severe sepsis or septic shock.  Change in PCT > 80 %   A decrease of PCT levels of more than 80 % defines a negative change in " PCT result representing a lower risk for 28-day all-cause mortality of patients diagnosed with severe sepsis or septic shock.                Results may be falsely decreased if patient taking Biotin.    D-DIMER, QUANTITATIVE - Abnormal; Notable for the following components:    D-Dimer, Quantitative 1.96 (*)     All other components within normal limits    Narrative:     Reference Range  --------------------------------------------------------------------     < 0.50   Negative Predictive Value  0.50-0.59   Indeterminate    >= 0.60   Probable VTE             A very low percentage of patients with DVT may yield D-Dimer results   below the cut-off of 0.50 mg/L FEU.  This is known to be more   prevalent in patients with distal DVT.             Results of this test should always be interpreted in conjunction with   the patient's medical history, clinical presentation and other   findings.  Clinical diagnosis should not be based on the result of   INNOVANCE D-Dimer alone.   FERRITIN - Abnormal; Notable for the following components:    Ferritin 503.80 (*)     All other components within normal limits    Narrative:     Results may be falsely decreased if patient taking Biotin.     URINALYSIS W/ CULTURE IF INDICATED - Abnormal; Notable for the following components:    Glucose,  mg/dL (Trace) (*)     Ketones, UA Trace (*)     All other components within normal limits    Narrative:     Urine microscopic not indicated.   CBC WITH AUTO DIFFERENTIAL - Abnormal; Notable for the following components:    WBC 21.40 (*)     RBC 4.03 (*)     Hemoglobin 12.8 (*)     Platelets 130 (*)     Neutrophil % 93.4 (*)     Lymphocyte % 2.2 (*)     Monocyte % 4.0 (*)     Eosinophil % 0.0 (*)     Neutrophils, Absolute 20.00 (*)     Lymphocytes, Absolute 0.50 (*)     All other components within normal limits   RESPIRATORY PANEL PCR W/ COVID-19 (SARS-COV-2) SANDRA/EMILY/EULOGIO/PAD/COR/MAD/MIGNON IN-HOUSE, NP SWAB IN [place], 3-4 HR TAT - Normal     Narrative:     Fact sheet for providers: https://docs.[employer]/wp-content/uploads/LCY9498-4598-FC7.1-EUA-Provider-Fact-Sheet-3.pdf    Fact sheet for patients: https://docs.[employer]/wp-content/uploads/QQE4204-0715-AD8.1-EUA-Patient-Fact-Sheet-1.pdf   LACTATE DEHYDROGENASE - Normal   C-REACTIVE PROTEIN - Normal   LACTIC ACID, PLASMA - Normal   BNP (IN-HOUSE) - Normal    Narrative:     Among patients with dyspnea, NT-proBNP is highly sensitive for the detection of acute congestive heart failure. In addition NT-proBNP of <300 pg/ml effectively rules out acute congestive heart failure with 99% negative predictive value.    Results may be falsely decreased if patient taking Biotin.     TROPONIN (IN-HOUSE) - Normal    Narrative:     Troponin T Reference Range:  <= 0.03 ng/mL-   Negative for AMI  >0.03 ng/mL-     Abnormal for myocardial necrosis.  Clinicians would have to utilize clinical acumen, EKG, Troponin and serial changes to determine if it is an Acute Myocardial Infarction or myocardial injury due to an underlying chronic condition.       Results may be falsely decreased if patient taking Biotin.     CBC AND DIFFERENTIAL    Narrative:     The following orders were created for panel order CBC & Differential.  Procedure                               Abnormality         Status                     ---------                               -----------         ------                     CBC Auto Differential[[id number]]        Abnormal            Final result                 Please view results for these tests on the individual orders.   EXTRA TUBES    Narrative:     The following orders were created for panel order Extra Tubes.  Procedure                               Abnormality         Status                     ---------                               -----------         ------                     Gold Top - SST[[id number]]                                   Final result               Green Top (Gel)[[id number]]                                   Final result                 Please view results for these tests on the individual orders.   GOLD TOP - SST   GREEN TOP     Medications   acetaminophen (TYLENOL) tablet 1,000 mg (1,000 mg Oral Given 11/21/20 1011)   sodium chloride 0.9 % bolus 500 mL (0 mL Intravenous Stopped 11/21/20 1028)   cefTRIAXone (ROCEPHIN) in SWFI 1 gram/10ml IV PUSH syringe (1 g Intravenous Given 11/21/20 1325)   diphenhydrAMINE (BENADRYL) injection 25 mg (25 mg Intravenous Given 11/21/20 1247)   famotidine (PEPCID) injection 20 mg (20 mg Intravenous Given 11/21/20 1252)   iopamidol (ISOVUE-370) 76 % injection 100 mL (100 mL Intravenous Given 11/21/20 1309)     Ct Chest Pulmonary Embolism    Result Date: 11/21/2020   1.  No evidence of pulmonary embolus 2.  Minimal bilateral lower lobe atelectasis  Electronically Signed By-Aidan Romero MD On:11/21/2020 1:37 PM This report was finalized on [id number] by  Aidan Romero MD.    Xr Chest Ap    Result Date: 11/21/2020   1. No acute cardiopulmonary disease.   Electronically Signed By-Aidan Romero MD On:11/21/2020 10:17 AM This report was finalized on [id number] by  Aidan Romero MD.                                    MDM  Number of Diagnoses or Management Options  Cough:   Fever, unspecified fever cause:   Lab test negative for COVID-19 virus:   Leukocytosis, unspecified type:   Sepsis, due to unspecified organism, unspecified whether acute organ dysfunction present (CMS/HCC):   Diagnosis management comments: Chart review: X-ray reviewed of left ribs from 9/15/2020 from [facility] negative    Comorbidities:  has a past medical history of Atrial fibrillation (CMS/HCC), Benign prostatic hyperplasia, CAD (coronary artery disease), Hyperlipidemia, Hypertension, and Myocardial infarction (CMS/HCC).  Differentials: Virus pneumonia infection sepsis not all inclusive of differentials considered  Discussion with provider: Hospitalist  Radiology  interpretation:  X-rays reviewed by me and interpreted by radiologist,   Ct Chest Pulmonary Embolism    Result Date: 11/21/2020   1.  No evidence of pulmonary embolus 2.  Minimal bilateral lower lobe atelectasis  Electronically Signed By-Aidan Romero MD On:11/21/2020 1:37 PM This report was finalized on [id number] by  Aidan Romero MD.    Xr Chest Ap    Result Date: 11/21/2020   1. No acute cardiopulmonary disease.   Electronically Signed By-Aidan Romero MD On:11/21/2020 10:17 AM This report was finalized on [id number] by  Aidan Romero MD.    Lab interpretation:  Labs viewed by me significant for, elevated procalcitonin D-dimer and white blood cell count    Appropriate PPE worn during exam.   Patient screened positive for hospital sepsis policy and orders were placed accordingly. patient was given antibiotics, IVF, and antipyretic.  I attest that I have reassessed tissue perfusion      i discussed findings with patient who voices understanding of admission        Final diagnoses:   Lab test negative for COVID-19 virus   Fever, unspecified fever cause   Sepsis, due to unspecified organism, unspecified whether acute organ dysfunction present (CMS/[facility])   Leukocytosis, unspecified type   Cough            Purvi Valdez, APRN  11/21/20 1052

## 2020-11-21 NOTE — PROGRESS NOTES
"Pharmacy Antimicrobial Dosing Service    Subjective:  Sage Flores is a 89 y.o.male admitted with BLE swelling with possible cellulitis. Pharmacy has been consulted to dose Vancomycin for possible SSTI.    PMH:       Assessment/Plan    1. Day #1 Vancomycin: Goal -600 mcg*h/mL. Will give 1500mg (20 mg/kg ABW) x1 dose then start with 1000mg (15mg/kg ABW) Q24H. Will order levels prior to fourth dose: Peak 11/23 @1900, Trough 11/24@1400    2. Day #1 Ceftriaxone: 2g IV q24h.    Will continue to monitor drug levels, renal function, culture and sensitivities, and patient clinical status.       Objective:  Relevant clinical data and objective history reviewed:  182.9 cm (72\")   72.6 kg (160 lb)   Ideal body weight: 77.6 kg (171 lb 1.2 oz)  Body mass index is 21.7 kg/m².        Results from last 7 days   Lab Units 11/21/20  1011   CREATININE mg/dL 1.12     Estimated Creatinine Clearance: 45 mL/min (by C-G formula based on SCr of 1.12 mg/dL).  No intake/output data recorded.    Results from last 7 days   Lab Units 11/21/20  1011   WBC 10*3/mm3 21.40*     Temperature    11/21/20 0901 11/21/20 1125   Temp: (!) 101.3 °F (38.5 °C) 99.7 °F (37.6 °C)     Baseline culture/source/susceptibility:  Microbiology Results (last 10 days)       Procedure Component Value - Date/Time    Respiratory Panel PCR w/COVID-19(SARS-CoV-2) SANDRA/EMILY/EULOGIO/PAD/COR/MAD/MIGNON In-House, NP Swab in UTM/VTM, 3-4 HR TAT - Swab, Nasopharynx [[id number]]  (Normal) Collected: 11/21/20 1011    Lab Status: Final result Specimen: Swab from Nasopharynx Updated: 11/21/20 1118     ADENOVIRUS, PCR Not Detected     Coronavirus 229E Not Detected     Coronavirus HKU1 Not Detected     Coronavirus NL63 Not Detected     Coronavirus OC43 Not Detected     COVID19 Not Detected     Human Metapneumovirus Not Detected     Human Rhinovirus/Enterovirus Not Detected     Influenza A PCR Not Detected     Influenza A H1 Not Detected     Influenza A H1 2009 PCR Not Detected     " Influenza A H3 Not Detected     Influenza B PCR Not Detected     Parainfluenza Virus 1 Not Detected     Parainfluenza Virus 2 Not Detected     Parainfluenza Virus 3 Not Detected     Parainfluenza Virus 4 Not Detected     RSV, PCR Not Detected     Bordetella pertussis pcr Not Detected     Bordetella parapertussis PCR Not Detected     Chlamydophila pneumoniae PCR Not Detected     Mycoplasma pneumo by PCR Not Detected    Narrative:      Fact sheet for providers: https://docs.[employer]/wp-content/uploads/KAX3140-9573-WW6.1-EUA-Provider-Fact-Sheet-3.pdf    Fact sheet for patients: https://docs.[employer]/wp-content/uploads/ORZ3155-7900-MR2.1-EUA-Patient-Fact-Sheet-1.pdf             Anti-Infectives (From admission, onward)      Ordered     Dose/Rate Route Frequency Start Stop    11/21/20 1418  cefTRIAXone (ROCEPHIN) in SWFI 1 gram/10ml IV PUSH syringe     Note to Pharmacy: Please give after verifying reaction to reported pcn allergy if appropriate   Ordering Provider: John Blackwell MD    1 g  over 5 Minutes Intravenous Every 24 Hours 11/21/20 1420 11/28/20 1419    11/21/20 1418  Pharmacy to dose vancomycin     Ordering Provider: John Blackwell MD     Does not apply Continuous PRN 11/21/20 1417 11/28/20 1416    11/21/20 1123  cefTRIAXone (ROCEPHIN) in SWFI 1 gram/10ml IV PUSH syringe     Note to Pharmacy: Please give after verifying reaction to reported pcn allergy if appropriate   Ordering Provider: Purvi Valdez APRN    1 g  over 5 Minutes Intravenous Once 11/21/20 1124 11/21/20 1330            Deonna Ba, PharmD  11/21/20 14:26 EST

## 2020-11-21 NOTE — H&P
"[facility] HOSPITALIST     Ashley Cano MD    CHIEF COMPLAINT: Fever    HISTORY OF PRESENT ILLNESS:  Patient is a 9-year-old gentleman with past medical history as outlined below.  He lives by himself however his son checks up on him daily.  This morning he woke up not feeling well.  He was having shaking chills all morning.  He is also had a subjective fever at home.  He called his son and he was brought to the hospital for evaluation.  He otherwise complains of left lower extremity pain radiating from the hip down.  He says the pain is new today as well.  Otherwise he denies headache denies shortness of breath denies chest pain denies cough denies body aches denies abdominal pain denies diarrhea denies dysuria denies polyuria denies back pain denies focal weakness denies focal numbness.  He has had left lower extremity redness for \"some time\" however he thinks it might have gotten worse.  He also notes to have chronic bilateral lower extremity edema.  He tries to use compression stockings for that with variable result.  He denies blood per any orifice denies syncope denies dizziness denies trauma.  Denies taking recently any steroids denies taking any antibiotics recently denies hospitalization recently    ER left lower extremity duplex negative for DVT, CT chest showed no evidence of pulmonary embolus minimal bilateral lower lobe atelectasis, UA negative leukocytes negative nitrite creatinine WBC count 21 hemoglobin 12.8 platelet count 130 1.12 sodium 139 potassium 3.4 CO2 25 ALT 16 AST 19 alk phos 86  procalcitonin 0.71, D-dimer 1.96, CRP 0.36 lactate 1.2 ferritin 503 COVID-19 negative respiratory viral panel otherwise negative proBNP 942 troponin 0 0.01 EKG normal sinus rhythm without acute injury pattern,    Review of systems negative apart for HPI      Past Medical History:   Diagnosis Date   • Atrial fibrillation (CMS/HCC)    • Benign prostatic hyperplasia    • CAD (coronary " "artery disease)    • Hyperlipidemia    • Hypertension    • Myocardial infarction (CMS/HCC)      No past surgical history on file.  Family History   Problem Relation Age of Onset   • Heart disease Mother    • Heart disease Father      Social History     Tobacco Use   • Smoking status: Former Smoker   • Smokeless tobacco: Never Used   • Tobacco comment: more than 50yrs   Substance Use Topics   • Alcohol use: No     Frequency: Never   • Drug use: No     (Not in a hospital admission)    Allergies:  Iodine, Levofloxacin, Nitroglycerin, Paroxetine, Penicillin g, Prednisone, Sucralfate, Diltiazem hcl, Metoprolol, Pramipexole dihydrochloride, and Ropinirole hcl    Immunization History   Administered Date(s) Administered   • Flu Vaccine Intradermal Quad 18-64YR 10/25/2012, 10/07/2013, 09/30/2014   • Flu Vaccine Quad PF >18YRS 10/16/2015   • Fluad Quad 65+ 10/28/2019, 09/15/2020   • Fluzone High Dose =>65 Years ([employer] ONLY) 10/21/2016, 10/12/2017, 10/01/2018, 10/28/2019   • Pneumococcal Conjugate 13-Valent (PCV13) 04/14/2016   • Pneumococcal Polysaccharide (PPSV23) 01/01/2010, 02/22/2016           REVIEW OF SYSTEMS:  Please see the above history of present illness for pertinent positives and negatives.  The remainder of the patient's systems have been reviewed and are negative.     Vital Signs  Visit Vitals  /59 (BP Location: Right arm, Patient Position: Sitting)   Pulse 76   Temp 99.7 °F (37.6 °C) (Oral)   Resp 20   Ht 182.9 cm (72\")   Wt 72.6 kg (160 lb)   SpO2 96%   BMI 21.70 kg/m²       Physical Exam  Physical Exam   Constitutional:  oriented to person, place, and time. No distress.   HENT:   Head: Normocephalic and atraumatic.   Eyes: Conjunctivae and EOM are normal. Pupils are equal, round, and reactive to light.   Neck: No JVD present. No thyromegaly present.   Cardiovascular: Normal rate, regular rhythm, normal heart sounds and intact distal pulses. Exam reveals no gallop and no friction rub.   No murmur " heard.  Pulmonary/Chest: Effort normal and breath sounds normal. No stridor. No respiratory distress.  has no wheezes.  has no rales.  exhibits no tenderness.   Abdominal: Soft. Bowel sounds are normal.  no distension and no mass. There is no tenderness. There is no rebound and no guarding. No hernia.   Musculoskeletal: Normal range of motion.  Lateral lower extremity edema left more than right.  Left lower extremity with erythema extending from distally below the knee just proximally to the ankle on the anterior side, tender to touch with pitting edema distal pulses intact leg feels warm  Lymphadenopathy:     no cervical adenopathy.   Neurological:  alert and oriented to person, place, and time. No cranial nerve deficit or sensory deficit. exhibits normal muscle tone.   Skin:   not diaphoretic.   Psychiatric:  normal mood and affect.   Vitals reviewed.         Results Review:    I reviewed the patient's new clinical results.  Lab Results (last 24 hours)     Procedure Component Value Units Date/Time    Respiratory Panel PCR w/COVID-19(SARS-CoV-2) SANDRA/EMILY/EULOGIO/PAD/COR/MAD/MIGNON In-House, NP Swab in UTM/VTM, 3-4 HR TAT - Swab, Nasopharynx [[id number]]  (Normal) Collected: 11/21/20 1011    Specimen: Swab from Nasopharynx Updated: 11/21/20 1118     ADENOVIRUS, PCR Not Detected     Coronavirus 229E Not Detected     Coronavirus HKU1 Not Detected     Coronavirus NL63 Not Detected     Coronavirus OC43 Not Detected     COVID19 Not Detected     Human Metapneumovirus Not Detected     Human Rhinovirus/Enterovirus Not Detected     Influenza A PCR Not Detected     Influenza A H1 Not Detected     Influenza A H1 2009 PCR Not Detected     Influenza A H3 Not Detected     Influenza B PCR Not Detected     Parainfluenza Virus 1 Not Detected     Parainfluenza Virus 2 Not Detected     Parainfluenza Virus 3 Not Detected     Parainfluenza Virus 4 Not Detected     RSV, PCR Not Detected     Bordetella pertussis pcr Not Detected     Bordetella  parapertussis PCR Not Detected     Chlamydophila pneumoniae PCR Not Detected     Mycoplasma pneumo by PCR Not Detected    Narrative:      Fact sheet for providers: https://docs.[employer].com/wp-content/uploads/RQW6392-9150-YA4.1-EUA-Provider-Fact-Sheet-3.pdf    Fact sheet for patients: https://docs.[employer].com/wp-content/uploads/XOU6316-5738-MX0.1-EUA-Patient-Fact-Sheet-1.pdf    Extra Tubes [[id number]] Collected: 11/21/20 1011    Specimen: Blood, Venous Line Updated: 11/21/20 1115    Narrative:      The following orders were created for panel order Extra Tubes.  Procedure                               Abnormality         Status                     ---------                               -----------         ------                     Gold Top - SST[[id number]]                                   Final result               Green Top (Gel)[[id number]]                                  Final result                 Please view results for these tests on the individual orders.    Green Top (Gel) [[id number]] Collected: 11/21/20 1011    Specimen: Blood Updated: 11/21/20 1115     Extra Tube Hold for add-ons.     Comment: Auto resulted.       Gold Top - SST [[id number]] Collected: 11/21/20 1011    Specimen: Blood Updated: 11/21/20 1115     Extra Tube Hold for add-ons.     Comment: Auto resulted.       Procalcitonin [[id number]]  (Abnormal) Collected: 11/21/20 1011    Specimen: Blood Updated: 11/21/20 1052     Procalcitonin 0.71 ng/mL     Narrative:      As a Marker for Sepsis (Non-Neonates):   1. <0.5 ng/mL represents a low risk of severe sepsis and/or septic shock.  1. >2 ng/mL represents a high risk of severe sepsis and/or septic shock.    As a Marker for Lower Respiratory Tract Infections that require antibiotic therapy:  PCT on Admission     Antibiotic Therapy             6-12 Hrs later  > 0.5                Strongly Recommended            >0.25 - <0.5         Recommended  0.1 - 0.25           Discouraged                    "Remeasure/reassess PCT  <0.1                 Strongly Discouraged          Remeasure/reassess PCT      As 28 day mortality risk marker: \"Change in Procalcitonin Result\" (> 80 % or <=80 %) if Day 0 (or Day 1) and Day 4 values are available. Refer to http://www.[facility]-pct-calculator.com/   Change in PCT <=80 %   A decrease of PCT levels below or equal to 80 % defines a positive change in PCT test result representing a higher risk for 28-day all-cause mortality of patients diagnosed with severe sepsis or septic shock.  Change in PCT > 80 %   A decrease of PCT levels of more than 80 % defines a negative change in PCT result representing a lower risk for 28-day all-cause mortality of patients diagnosed with severe sepsis or septic shock.                Results may be falsely decreased if patient taking Biotin.     Ferritin [[id number]]  (Abnormal) Collected: 11/21/20 1011    Specimen: Blood Updated: 11/21/20 1051     Ferritin 503.80 ng/mL     Narrative:      Results may be falsely decreased if patient taking Biotin.      BNP [[id number]]  (Normal) Collected: 11/21/20 1011    Specimen: Blood Updated: 11/21/20 1051     proBNP 942.4 pg/mL     Narrative:      Among patients with dyspnea, NT-proBNP is highly sensitive for the detection of acute congestive heart failure. In addition NT-proBNP of <300 pg/ml effectively rules out acute congestive heart failure with 99% negative predictive value.    Results may be falsely decreased if patient taking Biotin.      Troponin [[id number]]  (Normal) Collected: 11/21/20 1011    Specimen: Blood Updated: 11/21/20 1051     Troponin T 0.011 ng/mL     Narrative:      Troponin T Reference Range:  <= 0.03 ng/mL-   Negative for AMI  >0.03 ng/mL-     Abnormal for myocardial necrosis.  Clinicians would have to utilize clinical acumen, EKG, Troponin and serial changes to determine if it is an Acute Myocardial Infarction or myocardial injury due to an underlying chronic condition.       Results may be " falsely decreased if patient taking Biotin.      Comprehensive Metabolic Panel [[id number]]  (Abnormal) Collected: 11/21/20 1011    Specimen: Blood Updated: 11/21/20 1049     Glucose 96 mg/dL      BUN 27 mg/dL      Creatinine 1.12 mg/dL      Sodium 139 mmol/L      Potassium 3.4 mmol/L      Chloride 104 mmol/L      CO2 25.0 mmol/L      Calcium 9.1 mg/dL      Total Protein 6.0 g/dL      Albumin 4.30 g/dL      ALT (SGPT) 16 U/L      AST (SGOT) 19 U/L      Alkaline Phosphatase 86 U/L      Total Bilirubin 1.2 mg/dL      eGFR Non African Amer 62 mL/min/1.73      Globulin 1.7 gm/dL      A/G Ratio 2.5 g/dL      BUN/Creatinine Ratio 24.1     Anion Gap 10.0 mmol/L     Narrative:      GFR Normal >60  Chronic Kidney Disease <60  Kidney Failure <15      Lactate Dehydrogenase [[id number]]  (Normal) Collected: 11/21/20 1011    Specimen: Blood Updated: 11/21/20 1049      U/L     C-reactive Protein [[id number]]  (Normal) Collected: 11/21/20 1011    Specimen: Blood Updated: 11/21/20 1049     C-Reactive Protein 0.36 mg/dL     Lactic Acid, Plasma [[id number]]  (Normal) Collected: 11/21/20 1011    Specimen: Blood Updated: 11/21/20 1048     Lactate 1.2 mmol/L     D-dimer, Quantitative [[id number]]  (Abnormal) Collected: 11/21/20 1011    Specimen: Blood Updated: 11/21/20 1037     D-Dimer, Quantitative 1.96 mg/L (FEU)     Narrative:      Reference Range  --------------------------------------------------------------------     < 0.50   Negative Predictive Value  0.50-0.59   Indeterminate    >= 0.60   Probable VTE             A very low percentage of patients with DVT may yield D-Dimer results   below the cut-off of 0.50 mg/L FEU.  This is known to be more   prevalent in patients with distal DVT.             Results of this test should always be interpreted in conjunction with   the patient's medical history, clinical presentation and other   findings.  Clinical diagnosis should not be based on the result of   IN[place]OV[place] D-Dimer alone.     Urinalysis With Culture If Indicated - Urine, Clean Catch [[id number]]  (Abnormal) Collected: 11/21/20 1028    Specimen: Urine, Clean Catch Updated: 11/21/20 1035     Color, UA Yellow     Appearance, UA Clear     pH, UA 8.0     Specific Gravity, UA 1.015     Glucose,  mg/dL (Trace)     Ketones, UA Trace     Bilirubin, UA Negative     Blood, UA Negative     Protein, UA Negative     Leuk Esterase, UA Negative     Nitrite, UA Negative     Urobilinogen, UA 1.0 E.U./dL    Narrative:      Urine microscopic not indicated.    CBC & Differential [[id number]]  (Abnormal) Collected: 11/21/20 1011    Specimen: Blood Updated: 11/21/20 1022    Narrative:      The following orders were created for panel order CBC & Differential.  Procedure                               Abnormality         Status                     ---------                               -----------         ------                     CBC Auto Differential[[id number]]        Abnormal            Final result                 Please view results for these tests on the individual orders.    CBC Auto Differential [[id number]]  (Abnormal) Collected: 11/21/20 1011    Specimen: Blood Updated: 11/21/20 1022     WBC 21.40 10*3/mm3      RBC 4.03 10*6/mm3      Hemoglobin 12.8 g/dL      Hematocrit 38.0 %      MCV 94.4 fL      MCH 31.7 pg      MCHC 33.6 g/dL      RDW 13.3 %      RDW-SD 44.2 fl      MPV 10.4 fL      Platelets 130 10*3/mm3      Neutrophil % 93.4 %      Lymphocyte % 2.2 %      Monocyte % 4.0 %      Eosinophil % 0.0 %      Basophil % 0.4 %      Neutrophils, Absolute 20.00 10*3/mm3      Lymphocytes, Absolute 0.50 10*3/mm3      Monocytes, Absolute 0.90 10*3/mm3      Eosinophils, Absolute 0.00 10*3/mm3      Basophils, Absolute 0.10 10*3/mm3      nRBC 0.0 /100 WBC               Assessment/Plan   Fever  The only source at this time appears to be left lower extremity cellulitis  His COVID-19 test is negative with mildly elevated inflammatory markers will repeat COVID-19  test  He is saturating comfortably on room air  Chest imaging without any acute infiltrates or PE  We will cover with antibiotics for cellulitis, he received ceftriaxone in the ER and will add vancomycin  Check CPK-if CPK high we will plan on checking more imaging of the left lower extremity to rule out deep infection  Blood cultures  IV fluids  Symptomatic treatment    Peripheral neuropathy  Restart home meds on gabapentin    Hypertension  Continue amlodipine hydrochlorothiazide    GERD  Continue PPI    DVT PUD prophylaxis    Plan as above        John Blackwell MD  11/21/20  14:22 EST

## 2020-11-21 NOTE — ED NOTES
Patient marked ready for CT per provider's request.       Elin Martinez, RegSched Rep  11/21/20 7206

## 2020-11-21 NOTE — PLAN OF CARE
Goal Outcome Evaluation:        Outcome Summary: admitted from the ER left lower leg cellulitis. leg is red adn painful. ABT given. will continue to monitor.

## 2020-11-22 ENCOUNTER — APPOINTMENT (OUTPATIENT)
Dept: CT IMAGING | Facility: HOSPITAL | Age: 85
End: 2020-11-22

## 2020-11-22 LAB
ANION GAP SERPL CALCULATED.3IONS-SCNC: 10 MMOL/L (ref 5–15)
BASOPHILS # BLD AUTO: 0.1 10*3/MM3 (ref 0–0.2)
BASOPHILS NFR BLD AUTO: 0.4 % (ref 0–1.5)
BUN SERPL-MCNC: 23 MG/DL (ref 8–23)
BUN/CREAT SERPL: 19.2 (ref 7–25)
CALCIUM SPEC-SCNC: 8.1 MG/DL (ref 8.2–9.6)
CHLORIDE SERPL-SCNC: 105 MMOL/L (ref 98–107)
CO2 SERPL-SCNC: 25 MMOL/L (ref 22–29)
CREAT SERPL-MCNC: 1.2 MG/DL (ref 0.76–1.27)
DEPRECATED RDW RBC AUTO: 45.1 FL (ref 37–54)
EOSINOPHIL # BLD AUTO: 0 10*3/MM3 (ref 0–0.4)
EOSINOPHIL NFR BLD AUTO: 0 % (ref 0.3–6.2)
ERYTHROCYTE [DISTWIDTH] IN BLOOD BY AUTOMATED COUNT: 13.6 % (ref 12.3–15.4)
GFR SERPL CREATININE-BSD FRML MDRD: 57 ML/MIN/1.73
GLUCOSE BLDC GLUCOMTR-MCNC: 121 MG/DL (ref 70–105)
GLUCOSE SERPL-MCNC: 63 MG/DL (ref 65–99)
HCT VFR BLD AUTO: 33.3 % (ref 37.5–51)
HGB BLD-MCNC: 11 G/DL (ref 13–17.7)
LYMPHOCYTES # BLD AUTO: 1.1 10*3/MM3 (ref 0.7–3.1)
LYMPHOCYTES NFR BLD AUTO: 3.7 % (ref 19.6–45.3)
MAGNESIUM SERPL-MCNC: 1.5 MG/DL (ref 1.6–2.4)
MCH RBC QN AUTO: 31.2 PG (ref 26.6–33)
MCHC RBC AUTO-ENTMCNC: 32.9 G/DL (ref 31.5–35.7)
MCV RBC AUTO: 94.9 FL (ref 79–97)
MONOCYTES # BLD AUTO: 0.7 10*3/MM3 (ref 0.1–0.9)
MONOCYTES NFR BLD AUTO: 2.5 % (ref 5–12)
NEUTROPHILS NFR BLD AUTO: 27.9 10*3/MM3 (ref 1.7–7)
NEUTROPHILS NFR BLD AUTO: 93.4 % (ref 42.7–76)
NRBC BLD AUTO-RTO: 0 /100 WBC (ref 0–0.2)
PLATELET # BLD AUTO: 124 10*3/MM3 (ref 140–450)
PMV BLD AUTO: 11.2 FL (ref 6–12)
POTASSIUM SERPL-SCNC: 3.3 MMOL/L (ref 3.5–5.2)
QT INTERVAL: 379 MS
RBC # BLD AUTO: 3.51 10*6/MM3 (ref 4.14–5.8)
SODIUM SERPL-SCNC: 140 MMOL/L (ref 136–145)
WBC # BLD AUTO: 29.9 10*3/MM3 (ref 3.4–10.8)

## 2020-11-22 PROCEDURE — 25010000002 VANCOMYCIN 1 G RECONSTITUTED SOLUTION 1 EACH VIAL: Performed by: HOSPITALIST

## 2020-11-22 PROCEDURE — 99232 SBSQ HOSP IP/OBS MODERATE 35: CPT | Performed by: HOSPITALIST

## 2020-11-22 PROCEDURE — 87040 BLOOD CULTURE FOR BACTERIA: CPT | Performed by: HOSPITALIST

## 2020-11-22 PROCEDURE — 94799 UNLISTED PULMONARY SVC/PX: CPT

## 2020-11-22 PROCEDURE — 97530 THERAPEUTIC ACTIVITIES: CPT

## 2020-11-22 PROCEDURE — 93005 ELECTROCARDIOGRAM TRACING: CPT | Performed by: HOSPITALIST

## 2020-11-22 PROCEDURE — 73700 CT LOWER EXTREMITY W/O DYE: CPT

## 2020-11-22 PROCEDURE — 97162 PT EVAL MOD COMPLEX 30 MIN: CPT

## 2020-11-22 PROCEDURE — G0378 HOSPITAL OBSERVATION PER HR: HCPCS

## 2020-11-22 PROCEDURE — 93010 ELECTROCARDIOGRAM REPORT: CPT | Performed by: INTERNAL MEDICINE

## 2020-11-22 PROCEDURE — 25010000002 HEPARIN (PORCINE) PER 1000 UNITS: Performed by: HOSPITALIST

## 2020-11-22 PROCEDURE — 85025 COMPLETE CBC W/AUTO DIFF WBC: CPT | Performed by: HOSPITALIST

## 2020-11-22 PROCEDURE — 82962 GLUCOSE BLOOD TEST: CPT

## 2020-11-22 PROCEDURE — 83735 ASSAY OF MAGNESIUM: CPT | Performed by: HOSPITALIST

## 2020-11-22 PROCEDURE — 80048 BASIC METABOLIC PNL TOTAL CA: CPT | Performed by: HOSPITALIST

## 2020-11-22 PROCEDURE — 25010000002 CEFTRIAXONE PER 250 MG: Performed by: HOSPITALIST

## 2020-11-22 RX ORDER — MAGNESIUM SULFATE HEPTAHYDRATE 40 MG/ML
2 INJECTION, SOLUTION INTRAVENOUS AS NEEDED
Status: DISCONTINUED | OUTPATIENT
Start: 2020-11-22 | End: 2020-11-23 | Stop reason: HOSPADM

## 2020-11-22 RX ORDER — POTASSIUM CHLORIDE 20 MEQ/1
40 TABLET, EXTENDED RELEASE ORAL AS NEEDED
Status: DISCONTINUED | OUTPATIENT
Start: 2020-11-22 | End: 2020-11-23 | Stop reason: HOSPADM

## 2020-11-22 RX ORDER — POTASSIUM CHLORIDE 7.45 MG/ML
10 INJECTION INTRAVENOUS
Status: DISCONTINUED | OUTPATIENT
Start: 2020-11-22 | End: 2020-11-23 | Stop reason: HOSPADM

## 2020-11-22 RX ORDER — POTASSIUM CHLORIDE 1.5 G/1.77G
40 POWDER, FOR SOLUTION ORAL AS NEEDED
Status: DISCONTINUED | OUTPATIENT
Start: 2020-11-22 | End: 2020-11-23 | Stop reason: HOSPADM

## 2020-11-22 RX ORDER — MAGNESIUM SULFATE HEPTAHYDRATE 40 MG/ML
4 INJECTION, SOLUTION INTRAVENOUS AS NEEDED
Status: DISCONTINUED | OUTPATIENT
Start: 2020-11-22 | End: 2020-11-23 | Stop reason: HOSPADM

## 2020-11-22 RX ADMIN — CEFTRIAXONE SODIUM 1 G: 1 INJECTION, POWDER, FOR SOLUTION INTRAMUSCULAR; INTRAVENOUS at 15:13

## 2020-11-22 RX ADMIN — Medication 10 ML: at 21:06

## 2020-11-22 RX ADMIN — IPRATROPIUM BROMIDE AND ALBUTEROL SULFATE 3 ML: 2.5; .5 SOLUTION RESPIRATORY (INHALATION) at 06:34

## 2020-11-22 RX ADMIN — AMLODIPINE BESYLATE 10 MG: 5 TABLET ORAL at 08:29

## 2020-11-22 RX ADMIN — DIAZEPAM 5 MG: 5 TABLET ORAL at 08:30

## 2020-11-22 RX ADMIN — IPRATROPIUM BROMIDE AND ALBUTEROL SULFATE 3 ML: 2.5; .5 SOLUTION RESPIRATORY (INHALATION) at 19:36

## 2020-11-22 RX ADMIN — ACETAMINOPHEN 650 MG: 325 TABLET, FILM COATED ORAL at 12:01

## 2020-11-22 RX ADMIN — IPRATROPIUM BROMIDE AND ALBUTEROL SULFATE 3 ML: 2.5; .5 SOLUTION RESPIRATORY (INHALATION) at 15:49

## 2020-11-22 RX ADMIN — HEPARIN SODIUM 5000 UNITS: 5000 INJECTION INTRAVENOUS; SUBCUTANEOUS at 06:06

## 2020-11-22 RX ADMIN — SODIUM CHLORIDE 100 ML/HR: 9 INJECTION, SOLUTION INTRAVENOUS at 18:28

## 2020-11-22 RX ADMIN — HYDROCHLOROTHIAZIDE 25 MG: 25 TABLET ORAL at 08:30

## 2020-11-22 RX ADMIN — GABAPENTIN 100 MG: 100 CAPSULE ORAL at 08:29

## 2020-11-22 RX ADMIN — Medication 10 ML: at 08:31

## 2020-11-22 RX ADMIN — DIAZEPAM 5 MG: 5 TABLET ORAL at 21:07

## 2020-11-22 RX ADMIN — DOCUSATE SODIUM 100 MG: 100 CAPSULE, LIQUID FILLED ORAL at 21:07

## 2020-11-22 RX ADMIN — GABAPENTIN 100 MG: 100 CAPSULE ORAL at 21:07

## 2020-11-22 RX ADMIN — HEPARIN SODIUM 5000 UNITS: 5000 INJECTION INTRAVENOUS; SUBCUTANEOUS at 21:06

## 2020-11-22 RX ADMIN — SODIUM CHLORIDE 1000 MG: 900 INJECTION, SOLUTION INTRAVENOUS at 16:03

## 2020-11-22 RX ADMIN — PANTOPRAZOLE SODIUM 40 MG: 40 TABLET, DELAYED RELEASE ORAL at 08:30

## 2020-11-22 RX ADMIN — HEPARIN SODIUM 5000 UNITS: 5000 INJECTION INTRAVENOUS; SUBCUTANEOUS at 15:19

## 2020-11-22 NOTE — PLAN OF CARE
Goal Outcome Evaluation:      Patient rested through most of the shift without complaints of pain or discomfort. Will continue to monitor.

## 2020-11-22 NOTE — PLAN OF CARE
Goal Outcome Evaluation:  Plan of Care Reviewed With: patient     Outcome Summary: patient has had no complaints this shift. had a CT of leg. planning rehab at [place]

## 2020-11-22 NOTE — PLAN OF CARE
Problem: Adult Inpatient Plan of Care  Goal: Plan of Care Review  Recent Flowsheet Documentation  Taken 11/22/2020 0815 by Christin Lennon PT  Plan of Care Reviewed With: patient  Outcome Summary: 89 yo male from home with LLE pain, fever.  d/o LLE cellulitis.  Pt lives alone, normally independent using RW, drives, mows his lawn.  Pt presents with weakness BLEs, pain LLE with movement and wt bearing.  Requires min A for transfers and limited ambulation in room.  Will follow 3x/week and recommend IP rehab at d/c.  PPE worn:  gloves, mask, goggles.

## 2020-11-22 NOTE — THERAPY EVALUATION
Patient Name: Sage Flores  : 1930    MRN: [id number]                              Today's Date: 2020       Admit Date: 2020    Visit Dx:     ICD-10-CM ICD-9-CM   1. Lab test negative for COVID-19 virus  Z03.818 V71.83   2. Fever, unspecified fever cause  R50.9 780.60   3. Sepsis, due to unspecified organism, unspecified whether acute organ dysfunction present (CMS/HCC)  A41.9 038.9     995.91   4. Leukocytosis, unspecified type  D72.829 288.60   5. Cough  R05 786.2     Patient Active Problem List   Diagnosis   • Allergic rhinitis   • Atrial fibrillation (CMS/HCC)   • Claudication (CMS/HCC)   • Coronary heart disease   • Edema   • Extremity pain   • History of left heart catheterization (LHC)   • Hyperlipidemia   • Hypertension   • Inguinal hernia, right   • Laceration   • Lower extremity edema   • Myocardial infarction (CMS/HCC)   • Need for other prophylactic vaccination and inoculation against single diseases   • Presence of other cardiac implants and grafts   • Status post coronary artery bypass graft   • Status post percutaneous transluminal coronary angioplasty   • Viral URI   • Rib pain on left side   • Dizziness   • Acute cystitis without hematuria   • Status post placement of implantable loop recorder   • Lab test negative for COVID-19 virus     Past Medical History:   Diagnosis Date   • Atrial fibrillation (CMS/HCC)    • Benign prostatic hyperplasia    • CAD (coronary artery disease)    • Hyperlipidemia    • Hypertension    • Myocardial infarction (CMS/HCC)      No past surgical history on file.  General Information     Row Name 20 0813          Physical Therapy Time and Intention    Document Type  evaluation  -     Mode of Treatment  physical therapy  -     Row Name 20 0813          General Information    Patient Profile Reviewed  yes  -JH     Prior Level of Function  independent: RW or cane, still drives short distances  -     Existing Precautions/Restrictions  fall   -[facility] Name 11/22/20 0813          Living Environment    Lives With  alone son checks on him daily  -[facility] Name 11/22/20 0813          Home Main Entrance    Number of Stairs, Main Entrance  none  -JH     Row Name 11/22/20 0813          Stairs Within Home, Primary    Number of Stairs, Within Home, Primary  none  -JH     Row Name 11/22/20 0813          Cognition    Orientation Status (Cognition)  oriented x 4  -JH     Row Name 11/22/20 0813          Safety Issues, Functional Mobility    Impairments Affecting Function (Mobility)  balance;strength;pain;range of motion (ROM)  -       User Key  (r) = Recorded By, (t) = Taken By, (c) = Cosigned By    Initials Name Provider Type     Christin Lennon PT Physical Therapist        Mobility     [facility] Name 11/22/20 0813          Bed Mobility    Bed Mobility  -- up on [facility] upon entering  -[facility] 11/22/20 0813          Transfers    Comment (Transfers)  stand from [facility], support needed in standing for cleaning self, [facility] to bed  -[facility] 11/22/20 0813          Bed-Chair Transfer    Bed-Chair [place] (Transfers)  minimum assist (75% patient effort);1 person assist  -JH     Row Name 11/22/20 0813          Sit-Stand Transfer    Sit-Stand [place] (Transfers)  minimum assist (75% patient effort);1 person assist  -JH     Row Name 11/22/20 0813          Gait/Stairs (Locomotion)    [place] Level (Gait)  minimum assist (75% patient effort);1 person assist  -     Assistive Device (Gait)  walker, front-wheeled  -     Distance in Feet (Gait)  10'  -     Deviations/Abnormal Patterns (Gait)  festinating/shuffling;gait speed decreased  -     Bilateral Gait Deviations  forward flexed posture;heel strike decreased  -       User Key  (r) = Recorded By, (t) = Taken By, (c) = Cosigned By    Initials Name Provider Type    Christin Oneil PT Physical Therapist        Obj/Interventions     [facility] Name 11/22/20 0814          Range of Motion Comprehensive     General Range of Motion  bilateral upper extremity ROM [facility]     Comment, General Range of Motion  limited R knee AROM, L ankle limited DF with pain and edema  -[facility] Name 11/22/20 0814          Strength Comprehensive (MMT)    Comment, General Manual Muscle Testing (MMT) Assessment  UEs 4/5,  RLE hip/knee 3-/5, ankle 3/5.  LLE hip/knee 3+/5, ankle 2/5  -[facility] Name 11/22/20 0814          Motor Skills    Therapeutic Exercise  -- seated knee flexion/extension, hip flexion, ankle pumps  -[facility] Name 11/22/20 0814          Balance    Balance Assessment  sitting static balance;sitting dynamic balance;standing static balance;standing dynamic balance  -     Static Sitting Balance  WFL  -     Dynamic Sitting Balance  WFL  -     Static Standing Balance  mild impairment;standing;supported  -     Dynamic Standing Balance  mild impairment;supported;standing  -       User Key  (r) = Recorded By, (t) = Taken By, (c) = Cosigned By    Initials Name Provider Type     Christin Lennon, PT Physical Therapist        Goals/Plan     Row Name 11/22/20 0817          Bed Mobility Goal 1 (PT)    Activity/Assistive Device (Bed Mobility Goal 1, PT)  bed mobility activities, all  -     [place] Level/Cues Needed (Bed Mobility Goal 1, PT)  modified independence  -     Time Frame (Bed Mobility Goal 1, PT)  long term goal (LTG);2 weeks  -JH     Row Name 11/22/20 0817          Transfer Goal 1 (PT)    Activity/Assistive Device (Transfer Goal 1, PT)  sit-to-stand/stand-to-sit;bed-to-chair/chair-to-bed;walker, rolling  -     [place] Level/Cues Needed (Transfer Goal 1, PT)  contact guard assist;1 person assist  -     Time Frame (Transfer Goal 1, PT)  long term goal (LTG);2 weeks  -JH     Row Name 11/22/20 0817          Gait Training Goal 1 (PT)    Activity/Assistive Device (Gait Training Goal 1, PT)  gait (walking locomotion);assistive device use;walker, rolling  -     [place] Level (Gait Training Goal  1, PT)  contact guard assist  -     Distance (Gait Training Goal 1, PT)  100'  -     Time Frame (Gait Training Goal 1, PT)  long term goal (LTG);2 weeks  -       User Key  (r) = Recorded By, (t) = Taken By, (c) = Cosigned By    Initials Name Provider Type     Christin Lennon, PT Physical Therapist        Clinical Impression     Row Name 11/22/20 0815          Pain    Additional Documentation  Pain Scale: FACES Pre/Post-Treatment (Group)  -[facility] Name 11/22/20 0815          Pain Scale: FACES Pre/Post-Treatment    Pain: FACES Scale, Pretreatment  2-->hurts little bit  -     Posttreatment Pain Rating  4-->hurts little more  -     Pain Location - Side  Left  -     Pain Location - Orientation  lower  -     Pain Location  extremity  -[facility] Name 11/22/20 0815          Plan of Care Review    Plan of Care Reviewed With  patient  -     Outcome Summary  89 yo male from home with LLE pain, fever.  d/o LLE cellulitis.  Pt lives alone, normally independent using RW, drives, mows his lawn.  Pt presents with weakness BLEs, pain LLE with movement and wt bearing.  Requires min A for transfers and limited ambulation in room.  Will follow 3x/week and recommend IP rehab at d/c.  PPE worn:  gloves, mask, goggles.  -[facility] Name 11/22/20 0815          Therapy Assessment/Plan (PT)    Rehab Potential (PT)  good, to achieve stated therapy goals  -     Criteria for Skilled Interventions Met (PT)  yes;skilled treatment is necessary  -[facility] Name 11/22/20 0815          Vital Signs    O2 Delivery Pre Treatment  room air  -     O2 Delivery Intra Treatment  room air  -     O2 Delivery Post Treatment  room air  -[facility] Name 11/22/20 0815          Positioning and Restraints    Pre-Treatment Position  bedside commode  -     Post Treatment Position  chair  -     In Chair  notified nsg;sitting;call light within reach;encouraged to call for assist;exit alarm on  -       User Key  (r) = Recorded By, (t) =  Taken By, (c) = Cosigned By    Initials Name Provider Type     Christin Lennon PT Physical Therapist        Outcome Measures    No documentation.       Physical Therapy Education                 Title: PT OT SLP Therapies (In Progress)     Topic: Physical Therapy (In Progress)     Point: Mobility training (In Progress)     Learning Progress Summary           Patient Acceptance, E, NR by  at 11/22/2020 0818                   Point: Home exercise program (Not Started)     Learner Progress:  Not documented in this visit.          Point: Body mechanics (Not Started)     Learner Progress:  Not documented in this visit.          Point: Precautions (In Progress)     Learning Progress Summary           Patient Acceptance, E, NR by  at 11/22/2020 0818                               User Key     Initials Effective Dates Name Provider Type Discipline     03/01/19 -  Christin Lennon PT Physical Therapist PT              PT Recommendation and Plan  Planned Therapy Interventions (PT): balance training, bed mobility training, gait training, transfer training, ROM (range of motion), strengthening, patient/family education  Plan of Care Reviewed With: patient  Outcome Summary: 89 yo male from home with LLE pain, fever.  d/o LLE cellulitis.  Pt lives alone, normally independent using RW, drives, mows his lawn.  Pt presents with weakness BLEs, pain LLE with movement and wt bearing.  Requires min A for transfers and limited ambulation in room.  Will follow 3x/week and recommend IP rehab at d/c.  PPE worn:  gloves, mask, goggles.     Time Calculation:   PT Charges     Row Name 11/22/20 0819             Time Calculation    Start Time  0748  -      Stop Time  0808  -      Time Calculation (min)  20 min  -      PT Received On  11/22/20  -      PT - Next Appointment  11/24/20  -      PT Goal Re-Cert Due Date  12/06/20  -         Time Calculation- PT    Total Timed Code Minutes- PT  8 minute(s)  -        User Key  (r) =  Recorded By, (t) = Taken By, (c) = Cosigned By    Initials Name Provider Type     Christin Lennon, PT Physical Therapist        Therapy Charges for Today     Code Description Service Date Service Provider Modifiers Qty    56641105391  PT EVAL MOD COMPLEXITY 2 11/22/2020 Christin Lennon, PT GP 1    [id number]  PT THERAPEUTIC ACT EA 15 MIN 11/22/2020 Christin Lennon, PT GP 1               Christin Lennon PT  11/22/2020

## 2020-11-22 NOTE — PROGRESS NOTES
"Chief complaint left lower extremity pain        Subjective     Patient seen and examined at bedside.  He continues to have left lower extremity pain distally to the knee.       Objective     Vital Signs  Visit Vitals  /63 (BP Location: Right arm, Patient Position: Lying)   Pulse 64   Temp 99.1 °F (37.3 °C) (Oral)   Resp 16   Ht 182.9 cm (72\")   Wt 72.6 kg (160 lb)   SpO2 93%   BMI 21.70 kg/m²       Physical Exam:  Physical Exam   Constitutional:  oriented to person, place, and time. No distress.   HENT:   Head: Normocephalic and atraumatic.   Eyes: Conjunctivae and EOM are normal. Pupils are equal, round, and reactive to light.   Neck: No JVD present. No thyromegaly present.   Cardiovascular: Normal rate, regular rhythm, normal heart sounds and intact distal pulses. Exam reveals no gallop and no friction rub.   No murmur heard.  Pulmonary/Chest: Effort normal and breath sounds normal. No stridor. No respiratory distress.  has no wheezes.  has no rales.  exhibits no tenderness.   Abdominal: Soft. Bowel sounds are normal.  no distension and no mass. There is no tenderness. There is no rebound and no guarding. No hernia.   Musculoskeletal: Normal range of motion.  Left lower extremity distal from knee with erythema edema tenderness especially on the anterior aspect distal pulses intact   Lymphadenopathy:     no cervical adenopathy.   Neurological:  alert and oriented to person, place, and time. No cranial nerve deficit or sensory deficit. exhibits normal muscle tone.   Skin: No rash noted.  not diaphoretic.   Psychiatric:  normal mood and affect.   Vitals reviewed.    Physical Exam          Results Review:    CMP:  Lab Results   Component Value Date    BUN 23 11/22/2020    CREATININE 1.20 11/22/2020    EGFRIFNONA 57 (L) 11/22/2020     11/22/2020    K 3.3 (L) 11/22/2020     11/22/2020    CALCIUM 8.1 (L) 11/22/2020    ALBUMIN 4.30 11/21/2020    BILITOT 1.2 11/21/2020    ALKPHOS 86 11/21/2020    AST 19 " 11/21/2020    ALT 16 11/21/2020     CBC:  Lab Results   Component Value Date    WBC 29.90 (H) 11/22/2020    RBC 3.51 (L) 11/22/2020    HGB 11.0 (L) 11/22/2020    HCT 33.3 (L) 11/22/2020    MCV 94.9 11/22/2020    MCH 31.2 11/22/2020    MCHC 32.9 11/22/2020    RDW 13.6 11/22/2020     (L) 11/22/2020         Medication Review:     Scheduled Meds:[START ON 11/23/2020] !Vancomycin Level Draw Needed, , Does not apply, Once  [START ON 11/24/2020] !Vancomycin Level Draw Needed, , Does not apply, Once  amLODIPine, 10 mg, Oral, Daily  cefTRIAXone, 1 g, Intravenous, Q24H  diazePAM, 5 mg, Oral, BID  gabapentin, 100 mg, Oral, BID  heparin (porcine), 5,000 Units, Subcutaneous, Q8H  hydroCHLOROthiazide, 25 mg, Oral, Daily  ipratropium-albuterol, 3 mL, Nebulization, 4x Daily - RT  pantoprazole, 40 mg, Oral, QAM AC  sodium chloride, 10 mL, Intravenous, Q12H  vancomycin, 1,000 mg, Intravenous, Q24H      Continuous Infusions:Pharmacy to dose vancomycin,   sodium chloride, 100 mL/hr, Last Rate: 100 mL/hr (11/21/20 1743)      PRN Meds:.acetaminophen **OR** acetaminophen **OR** acetaminophen  •  calcium carbonate  •  docusate sodium  •  ondansetron **OR** ondansetron  •  Pharmacy to dose vancomycin  •  sodium chloride    Assessment/Plan      Left lower extremity pain  With pain and tenderness that significant and also with high WBC count  CPK is normal lactic normal inflammatory markers initially not very impressive however with fever and high white count will need to rule out deep infection such as necrotizing fasciitis will get CT scan of that extremity  Continue broad-spectrum antibiotics  Blood cultures pending    Fever  The only source at this time appears to be left lower extremity cellulitis-is only complaint is left lower extremity pain  His COVID-19 test is negative with mildly elevated inflammatory markers will repeat COVID-19 test  He is saturating comfortably on room air  Chest imaging without any acute infiltrates or  PE  We will cover with antibiotics for cellulitis, he received ceftriaxone in the ER and will add vancomycin     Peripheral neuropathy  Restart home meds on gabapentin     Hypertension  Continue amlodipine hydrochlorothiazide     GERD  Continue PPI     DVT PUD prophylaxis     Plan as above      John Blackwell MD  11/22/20  09:44 EST

## 2020-11-23 VITALS
RESPIRATION RATE: 18 BRPM | HEIGHT: 72 IN | SYSTOLIC BLOOD PRESSURE: 107 MMHG | DIASTOLIC BLOOD PRESSURE: 69 MMHG | HEART RATE: 68 BPM | TEMPERATURE: 99.9 F | BODY MASS INDEX: 21.67 KG/M2 | WEIGHT: 160 LBS | OXYGEN SATURATION: 96 %

## 2020-11-23 PROBLEM — L03.116 CELLULITIS OF LEFT LEG: Status: ACTIVE | Noted: 2020-11-23

## 2020-11-23 LAB
ANION GAP SERPL CALCULATED.3IONS-SCNC: 8 MMOL/L (ref 5–15)
BASOPHILS # BLD AUTO: 0.1 10*3/MM3 (ref 0–0.2)
BASOPHILS NFR BLD AUTO: 0.3 % (ref 0–1.5)
BUN SERPL-MCNC: 23 MG/DL (ref 8–23)
BUN/CREAT SERPL: 23.2 (ref 7–25)
CALCIUM SPEC-SCNC: 8.1 MG/DL (ref 8.2–9.6)
CHLORIDE SERPL-SCNC: 108 MMOL/L (ref 98–107)
CO2 SERPL-SCNC: 24 MMOL/L (ref 22–29)
CREAT SERPL-MCNC: 0.99 MG/DL (ref 0.76–1.27)
DEPRECATED RDW RBC AUTO: 46.4 FL (ref 37–54)
EOSINOPHIL # BLD AUTO: 0 10*3/MM3 (ref 0–0.4)
EOSINOPHIL NFR BLD AUTO: 0.2 % (ref 0.3–6.2)
ERYTHROCYTE [DISTWIDTH] IN BLOOD BY AUTOMATED COUNT: 14.1 % (ref 12.3–15.4)
GFR SERPL CREATININE-BSD FRML MDRD: 71 ML/MIN/1.73
GLUCOSE SERPL-MCNC: 88 MG/DL (ref 65–99)
HCT VFR BLD AUTO: 30.1 % (ref 37.5–51)
HGB BLD-MCNC: 10 G/DL (ref 13–17.7)
LYMPHOCYTES # BLD AUTO: 1.1 10*3/MM3 (ref 0.7–3.1)
LYMPHOCYTES NFR BLD AUTO: 5.6 % (ref 19.6–45.3)
MAGNESIUM SERPL-MCNC: 1.8 MG/DL (ref 1.6–2.4)
MCH RBC QN AUTO: 31.1 PG (ref 26.6–33)
MCHC RBC AUTO-ENTMCNC: 33.2 G/DL (ref 31.5–35.7)
MCV RBC AUTO: 93.7 FL (ref 79–97)
MONOCYTES # BLD AUTO: 0.6 10*3/MM3 (ref 0.1–0.9)
MONOCYTES NFR BLD AUTO: 3.2 % (ref 5–12)
NEUTROPHILS NFR BLD AUTO: 17.3 10*3/MM3 (ref 1.7–7)
NEUTROPHILS NFR BLD AUTO: 90.7 % (ref 42.7–76)
NRBC BLD AUTO-RTO: 0 /100 WBC (ref 0–0.2)
PLATELET # BLD AUTO: 98 10*3/MM3 (ref 140–450)
PMV BLD AUTO: 10.8 FL (ref 6–12)
POTASSIUM SERPL-SCNC: 3.2 MMOL/L (ref 3.5–5.2)
RBC # BLD AUTO: 3.22 10*6/MM3 (ref 4.14–5.8)
SARS-COV-2 RNA RESP QL NAA+PROBE: NOT DETECTED
SODIUM SERPL-SCNC: 140 MMOL/L (ref 136–145)
WBC # BLD AUTO: 19 10*3/MM3 (ref 3.4–10.8)

## 2020-11-23 PROCEDURE — 99239 HOSP IP/OBS DSCHRG MGMT >30: CPT | Performed by: STUDENT IN AN ORGANIZED HEALTH CARE EDUCATION/TRAINING PROGRAM

## 2020-11-23 PROCEDURE — 25010000002 CEFTRIAXONE PER 250 MG: Performed by: HOSPITALIST

## 2020-11-23 PROCEDURE — 80048 BASIC METABOLIC PNL TOTAL CA: CPT | Performed by: HOSPITALIST

## 2020-11-23 PROCEDURE — 25010000002 HEPARIN (PORCINE) PER 1000 UNITS: Performed by: HOSPITALIST

## 2020-11-23 PROCEDURE — 83735 ASSAY OF MAGNESIUM: CPT | Performed by: HOSPITALIST

## 2020-11-23 PROCEDURE — 94799 UNLISTED PULMONARY SVC/PX: CPT

## 2020-11-23 PROCEDURE — 85025 COMPLETE CBC W/AUTO DIFF WBC: CPT | Performed by: HOSPITALIST

## 2020-11-23 PROCEDURE — 97110 THERAPEUTIC EXERCISES: CPT

## 2020-11-23 PROCEDURE — 97116 GAIT TRAINING THERAPY: CPT

## 2020-11-23 RX ORDER — CLINDAMYCIN HYDROCHLORIDE 300 MG/1
300 CAPSULE ORAL 3 TIMES DAILY
Qty: 30 CAPSULE | Refills: 0 | Status: SHIPPED | OUTPATIENT
Start: 2020-11-23 | End: 2020-12-03

## 2020-11-23 RX ADMIN — AMLODIPINE BESYLATE 10 MG: 5 TABLET ORAL at 10:09

## 2020-11-23 RX ADMIN — HEPARIN SODIUM 5000 UNITS: 5000 INJECTION INTRAVENOUS; SUBCUTANEOUS at 13:03

## 2020-11-23 RX ADMIN — IPRATROPIUM BROMIDE AND ALBUTEROL SULFATE 3 ML: 2.5; .5 SOLUTION RESPIRATORY (INHALATION) at 11:32

## 2020-11-23 RX ADMIN — HEPARIN SODIUM 5000 UNITS: 5000 INJECTION INTRAVENOUS; SUBCUTANEOUS at 06:15

## 2020-11-23 RX ADMIN — CEFTRIAXONE SODIUM 1 G: 1 INJECTION, POWDER, FOR SOLUTION INTRAMUSCULAR; INTRAVENOUS at 13:02

## 2020-11-23 RX ADMIN — Medication 10 ML: at 10:09

## 2020-11-23 RX ADMIN — DIAZEPAM 5 MG: 5 TABLET ORAL at 10:10

## 2020-11-23 RX ADMIN — IPRATROPIUM BROMIDE AND ALBUTEROL SULFATE 3 ML: 2.5; .5 SOLUTION RESPIRATORY (INHALATION) at 07:50

## 2020-11-23 RX ADMIN — PANTOPRAZOLE SODIUM 40 MG: 40 TABLET, DELAYED RELEASE ORAL at 10:10

## 2020-11-23 RX ADMIN — GABAPENTIN 100 MG: 100 CAPSULE ORAL at 10:10

## 2020-11-23 RX ADMIN — HYDROCHLOROTHIAZIDE 25 MG: 25 TABLET ORAL at 10:09

## 2020-11-23 NOTE — DISCHARGE SUMMARY
[facility] Medicine Services  DISCHARGE SUMMARY        Prepared For PCP:  Ashley Cano MD    Patient Name: Sage Flores  : 1930  MRN: [id number]      Date of Admission:   2020    Date of Discharge:  2020    Length of stay:  LOS: 0 days     Hospital Course     Presenting Problem:   Cough [R05]  Fever, unspecified fever cause [R50.9]  Leukocytosis, unspecified type [D72.829]  Sepsis, due to unspecified organism, unspecified whether acute organ dysfunction present (CMS/[facility]) [A41.9]  Lab test negative for COVID-19 virus [Z03.818]  Cellulitis of left leg [L03.116]      Active Hospital Problems    Diagnosis  POA   • Cellulitis of left leg [L03.116]  Yes   • Lab test negative for COVID-19 virus [Z03.818]  Not Applicable      Resolved Hospital Problems   No resolved problems to display.           Hospital Course:  Sage Flores is a 89 y.o. male who presented to [facility] ED with fevers and LLE Erythema complicated by leukocytosis and elevated CRP.  Patient was started on broad spectrum abx.  Venous duplex negative for DVT and CT PE unremarkable as was COVID 19.  CT of LLE revealed soft tissue swelling consistent with cellulitis.  White count  Dropped dramatically from 35 and patient became afebrile on abx.  He was evaluated by PT/OT who recommended SNF, patient was agreeable.  Patient discharged on 2020 to finish a 10 day course of abx.  Discharge instructions as below.          Recommendation for Outpatient Providers:       -Follow up with PCP in 5-7 days  -Take Clindamycin 300mg by mouth three times a day for 10 days  -Wrap legs in compression stockings daily  -Weekly CBC and BMP on abx        Reasons For Change In Medications and Indications for New Medications:        Day of Discharge     HPI:   Patient eager for discharge, approved for SNF, afebrile with downtrending white count.    Vital Signs:   Temp:  [98.4 °F (36.9 °C)-99.9 °F (37.7 °C)] 99.9 °F (37.7  °C)  Heart Rate:  [58-81] 68  Resp:  [17-18] 18  BP: (107-118)/(57-69) 107/69     Physical Exam:  Physical Exam  Constitutional:       General: He is not in acute distress.     Appearance: He is not toxic-appearing.      Comments: Thin   HENT:      Head: Normocephalic.      Nose: Nose normal.      Mouth/Throat:      Pharynx: No oropharyngeal exudate.   Eyes:      General: No scleral icterus.  Neck:      Musculoskeletal: Normal range of motion.   Cardiovascular:      Rate and Rhythm: Normal rate and regular rhythm.      Heart sounds: No murmur. No friction rub. No gallop.    Pulmonary:      Effort: No respiratory distress.      Breath sounds: No wheezing or rales.   Abdominal:      General: There is no distension.      Tenderness: There is no abdominal tenderness. There is no guarding.   Musculoskeletal:      Right lower leg: Edema present.      Left lower leg: Edema present.      Comments: LLE erythema, serosanguinous drainage   Skin:     Coloration: Skin is not jaundiced.      Findings: Erythema present. No bruising or lesion.   Neurological:      Mental Status: He is alert and oriented to person, place, and time.      Cranial Nerves: No cranial nerve deficit.      Motor: No weakness.      Gait: Gait normal.   Psychiatric:         Mood and Affect: Mood normal.         Behavior: Behavior normal.         Thought Content: Thought content normal.         Judgment: Judgment normal.         Pertinent  and/or Most Recent Results     Results from last 7 days   Lab Units 11/23/20  0527 11/22/20  0239 11/21/20  1915 11/21/20  1011   WBC 10*3/mm3 19.00* 29.90* 34.50* 21.40*   HEMOGLOBIN g/dL 10.0* 11.0* 11.2* 12.8*   HEMATOCRIT % 30.1* 33.3* 33.7* 38.0   PLATELETS 10*3/mm3 98* 124* 126* 130*   SODIUM mmol/L 140 140 136 139   POTASSIUM mmol/L 3.2* 3.3* 3.9 3.4*   CHLORIDE mmol/L 108* 105 105 104   CO2 mmol/L 24.0 25.0 23.0 25.0   BUN mg/dL 23 23 24* 27*   CREATININE mg/dL 0.99 1.20 1.18 1.12   GLUCOSE mg/dL 88 63* 144* 96    CALCIUM mg/dL 8.1* 8.1* 8.2 9.1     Results from last 7 days   Lab Units 11/21/20  1011   BILIRUBIN mg/dL 1.2   ALK PHOS U/L 86   ALT (SGPT) U/L 16   AST (SGOT) U/L 19           Invalid input(s): TG, LDLCALC, LDLREALC  Results from last 7 days   Lab Units 11/21/20  1011   PROBNP pg/mL 942.4   TROPONIN T ng/mL 0.011   PROCALCITONIN ng/mL 0.71*   LACTATE mmol/L 1.2       Brief Urine Lab Results  (Last result in the past 365 days)      Color   Clarity   Blood   Leuk Est   Nitrite   Protein   CREAT   Urine HCG        11/21/20 1028 Yellow Clear Negative Negative Negative Negative               Microbiology Results Abnormal     Procedure Component Value - Date/Time    Blood Culture - Blood, Arm, Left [[id number]] Collected: 11/22/20 0834    Lab Status: Preliminary result Specimen: Blood from Arm, Left Updated: 11/23/20 0900     Blood Culture No growth at 24 hours    Blood Culture - Blood, Arm, Right [[id number]] Collected: 11/22/20 0834    Lab Status: Preliminary result Specimen: Blood from Arm, Right Updated: 11/23/20 0900     Blood Culture No growth at 24 hours    COVID PRE-OP / PRE-PROCEDURE SCREENING ORDER (NO ISOLATION) - Swab, Nasopharynx [[id number]] Collected: 11/21/20 1836    Lab Status: Final result Specimen: Swab from Nasopharynx Updated: 11/23/20 0139    Narrative:      The following orders were created for panel order COVID PRE-OP / PRE-PROCEDURE SCREENING ORDER (NO ISOLATION) - Swab, Nasopharynx.  Procedure                               Abnormality         Status                     ---------                               -----------         ------                     COVID-19 PCR, [facility]...[[id number]]                      Final result                 Please view results for these tests on the individual orders.    COVID-19 PCR, [facility], NP SWAB IN LEXAR VIRAL TRANSPORT MEDIA 24-30 HR TAT - Swab, Nasopharynx [[id number]] Collected: 11/21/20 1836    Lab Status: Final result Specimen: Swab from  Nasopharynx Updated: 11/23/20 0139     SARS-CoV-2 BELKIS Not Detected    Respiratory Panel PCR w/COVID-19(SARS-CoV-2) [facility] In-House, NP Swab in UTM/VTM, 3-4 HR TAT - Swab, Nasopharynx [[id number]]  (Normal) Collected: 11/21/20 1011    Lab Status: Final result Specimen: Swab from Nasopharynx Updated: 11/21/20 1118     ADENOVIRUS, PCR Not Detected     Coronavirus 229E Not Detected     Coronavirus HKU1 Not Detected     Coronavirus NL63 Not Detected     Coronavirus OC43 Not Detected     COVID19 Not Detected     Human Metapneumovirus Not Detected     Human Rhinovirus/Enterovirus Not Detected     Influenza A PCR Not Detected     Influenza A H1 Not Detected     Influenza A H1 2009 PCR Not Detected     Influenza A H3 Not Detected     Influenza B PCR Not Detected     Parainfluenza Virus 1 Not Detected     Parainfluenza Virus 2 Not Detected     Parainfluenza Virus 3 Not Detected     Parainfluenza Virus 4 Not Detected     RSV, PCR Not Detected     Bordetella pertussis pcr Not Detected     Bordetella parapertussis PCR Not Detected     Chlamydophila pneumoniae PCR Not Detected     Mycoplasma pneumo by PCR Not Detected    Narrative:      Fact sheet for providers: https://docs.[employer]/wp-content/uploads/QBG2840-2828-YH9.1-EUA-Provider-Fact-Sheet-3.pdf    Fact sheet for patients: https://docs.[employer]/wp-content/uploads/WKQ5171-0351-XJ7.1-EUA-Patient-Fact-Sheet-1.pdf          Ct Chest Pulmonary Embolism    Result Date: 11/21/2020  Impression:  1.  No evidence of pulmonary embolus 2.  Minimal bilateral lower lobe atelectasis  Electronically Signed By-Aidan Romero MD On:11/21/2020 1:37 PM This report was finalized on [id number] by  Aidan Romero MD.    Ct Lower Extremity Left Without Contrast    Result Date: 11/22/2020  Impression:  1.  Diffuse soft tissue edema the left lower extremity and foot compatible changes of cellulitis.  No evidence of discrete fluid collection or abscess. 2.  No abnormal  periosteal reaction or bony destruction to suggest osteomyelitis at this time.  Electronically Signed By-Aidan Romero MD On:11/22/2020 11:45 AM This report was finalized on [id number] by  Aidan Romero MD.    Xr Chest Ap    Result Date: 11/21/2020  Impression:  1. No acute cardiopulmonary disease.   Electronically Signed By-Aidan Romero MD On:11/21/2020 10:17 AM This report was finalized on [id number] by  Aidan Romero MD.      Results for orders placed during the hospital encounter of 11/21/20   Duplex Venous Lower Extremity - Bilateral    Narrative · Normal bilateral lower extremity venous duplex scan.  · Varicose veins seen in the right leg below the level of the knee.          Results for orders placed during the hospital encounter of 11/21/20   Duplex Venous Lower Extremity - Bilateral    Narrative · Normal bilateral lower extremity venous duplex scan.  · Varicose veins seen in the right leg below the level of the knee.                       Test Results Pending at Discharge  Pending Labs     Order Current Status    Blood Culture - Blood, Arm, Left Preliminary result    Blood Culture - Blood, Arm, Right Preliminary result            Procedures Performed           Consults:   Consults     No orders found from 10/23/2020 to 11/22/2020.            Discharge Details        Discharge Medications      New Medications      Instructions Start Date   clindamycin 300 MG capsule  Commonly known as: Cleocin   300 mg, Oral, 3 Times Daily         Changes to Medications      Instructions Start Date   azelastine 0.15 % solution nasal spray  Commonly known as: ASTEPRO  What changed: See the new instructions.   USE 2 SPRAYS IN EACH NOSTRIL TWICE DAILY AS DIRECTED BY PROVIDER         Continue These Medications      Instructions Start Date   amLODIPine 10 MG tablet  Commonly known as: NORVASC   10 mg, Oral, Daily      diazePAM 5 MG tablet  Commonly known as: VALIUM   TAKE 1 TABLET BY MOUTH TWICE DAILY      gabapentin  100 MG capsule  Commonly known as: NEURONTIN   TAKE 1 CAPSULE BY MOUTH TWO TIMES DAILY      hydroCHLOROthiazide 25 MG tablet  Commonly known as: HYDRODIURIL   25 mg, Oral, Daily      omeprazole 20 MG capsule  Commonly known as: priLOSEC   20 mg, Oral, Daily      polyethylene glycol packet  Commonly known as: MIRALAX   17 g, Oral, Daily      Polyvinyl Alcohol-Povidone 5-6 MG/ML solution   1 drop, Ophthalmic, As Needed             Allergies   Allergen Reactions   • Iodine Unknown (See Comments)     Pt unsure of reaction     • Levofloxacin Unknown (See Comments)   • Nitroglycerin Unknown (See Comments) and Other (See Comments)   • Paroxetine Unknown (See Comments)   • Penicillin G Unknown (See Comments)   • Prednisone Unknown (See Comments)   • Sucralfate Unknown (See Comments)   • Diltiazem Hcl Hives   • Metoprolol Other (See Comments)     Lowers heart rate    • Pramipexole Dihydrochloride Unknown (See Comments)   • Ropinirole Hcl Other (See Comments)         Discharge Disposition:  Skilled Nursing Facility (DC - External)    Diet:  Hospital:  Diet Order   Procedures   • Diet Regular         Discharge Activity:   Activity Instructions     Activity as Tolerated              CODE STATUS:    Code Status and Medical Interventions:   Ordered at: 11/21/20 1357     Level Of Support Discussed With:    Patient     Code Status:    CPR     Medical Interventions (Level of Support Prior to Arrest):    Full         Follow-up Appointments  Future Appointments   Date Time Provider Department [place]   12/16/2020 10:30 AM Gema Lu APRN [facility]  [facility]       Additional Instructions for the Follow-ups that You Need to Schedule     Call MD With Problems / Concerns   As directed      Instructions: Call [phone] or email [email or web address] for problems or concerns.    Order Comments: Instructions: Call [phone] or email [email or web address] for problems or concerns.          Discharge Follow-up with PCP   As  directed       Currently Documented PCP:    Ashley Cano MD    PCP Phone Number:    [phone]     Follow Up Details: Follow up with PCP in 5-7 days                 Condition on Discharge:      Stable      This patient has been examined wearing appropriate Personal Protective Equipment and discussed with Patient. 11/23/20      Electronically signed by Talha Young DO, 11/23/20, 4:09 PM EST.      Time: I spent  38  minutes on this discharge activity which included face-to-face encounter with the patient/reviewing the data in the system/coordination of the care with the nursing staff as well as consultants/documentation/entering orders.

## 2020-11-23 NOTE — PROGRESS NOTES
"      [facility] Medicine Services Daily Progress Note      Hospitalist Team  LOS 0 days      Patient Care Team:  Ashley Cano MD as PCP - Missy Serrano MD as Consulting Physician (Cardiology)    Patient Location: 359/1      Subjective   Subjective     Chief Complaint / Subjective  Chief Complaint   Patient presents with   • Fever         Brief Synopsis of Hospital Course/HPI  Patient is a 9-year-old gentleman with past medical history as outlined below.  He lives by himself however his son checks up on him daily.  This morning he woke up not feeling well.  He was having shaking chills all morning.  He is also had a subjective fever at home.  He called his son and he was brought to the hospital for evaluation.  He otherwise complains of left lower extremity pain radiating from the hip down.  He says the pain is new today as well.  Otherwise he denies headache denies shortness of breath denies chest pain denies cough denies body aches denies abdominal pain denies diarrhea denies dysuria denies polyuria denies back pain denies focal weakness denies focal numbness.  He has had left lower extremity redness for \"some time\" however he thinks it might have gotten worse.  He also notes to have chronic bilateral lower extremity edema.  He tries to use compression stockings for that with variable result.  He denies blood per any orifice denies syncope denies dizziness denies trauma.  Denies taking recently any steroids denies taking any antibiotics recently denies hospitalization recently     ER left lower extremity duplex negative for DVT, CT chest showed no evidence of pulmonary embolus minimal bilateral lower lobe atelectasis, UA negative leukocytes negative nitrite creatinine WBC count 21 hemoglobin 12.8 platelet count 130 1.12 sodium 139 potassium 3.4 CO2 25 ALT 16 AST 19 alk phos 86  procalcitonin 0.71, D-dimer 1.96, CRP 0.36 lactate 1.2 ferritin 503 COVID-19 negative " "respiratory viral panel otherwise negative proBNP 942 troponin 0 0.01 EKG normal sinus rhythm without acute injury pattern,      Date::    11/23/2020  CT suggestive of cellulitis infection to LLE, no fracture or evidence of osteo.  Labs continue to improve, continue abx.  PT/OT.  Case mgt for possible SNF      ROS  ROS negative except as mentioned above    Objective   Objective      Vital Signs  Temp:  [98.4 °F (36.9 °C)-99.9 °F (37.7 °C)] 99.9 °F (37.7 °C)  Heart Rate:  [58-81] 68  Resp:  [17-18] 18  BP: (107-118)/(57-69) 107/69  Oxygen Therapy  SpO2: 96 %  Pulse Oximetry Type: Intermittent  Device (Oxygen Therapy): room air  Flowsheet Rows      First Filed Value   Admission Height  182.9 cm (72\") Documented at 11/21/2020 0901   Admission Weight  72.6 kg (160 lb) Documented at 11/21/2020 0901        Intake & Output (last 3 days)       11/20 0701 - 11/21 0700 11/21 0701 - 11/22 0700 11/22 0701 - 11/23 0700 11/23 0701 - 11/24 0700    P.O.   240     IV Piggyback  500      Total Intake(mL/kg)  500 (6.9) 240 (3.3)     Urine (mL/kg/hr)  400 900 (0.5)     Stool  0      Total Output  400 900     Net  +100 -660             Urine Unmeasured Occurrence   3 x 1 x    Stool Unmeasured Occurrence  1 x 3 x 1 x        Lines, Drains & Airways    Active LDAs     Name:   Placement date:   Placement time:   Site:   Days:    Peripheral IV 11/21/20 1007 Left Arm   11/21/20    1007    Arm   2                  Physical Exam:    Physical Exam  GENERAL: The patient is well developed and nontoxic.  HEENT: Nonicteric sclerae, PERRLA, EOMI. Oropharynx clear. Moist mucous membranes. Conjunctivae appear well perfused.  CHEST: Chest wall is nontender.  HEART: Regular rate and rhythm without murmurs. No MRG  LUNGS: Clear to auscultation bilaterally. No WRR  ABDOMEN: Soft, positive bowel sounds, nontender, no organomegaly.  RECTAL: Deferred.  SKIN: No rash, no excessive bruising, petechiae, or purpura.  Lateral lower extremity edema left more than " right.  Left lower extremity with erythema extending from distally below the knee just proximally to the ankle on the anterior side, tender to touch with pitting edema distal pulses intact leg feels warm  NEUROLOGIC: Cranial nerves II-XII intact without motor/sensory deficit          Procedures:              Results Review:     I reviewed the patient's new clinical results.      Lab Results (last 24 hours)     Procedure Component Value Units Date/Time    Blood Culture - Blood, Arm, Left [[id number]] Collected: 11/22/20 0834    Specimen: Blood from Arm, Left Updated: 11/23/20 0900     Blood Culture No growth at 24 hours    Blood Culture - Blood, Arm, Right [[id number]] Collected: 11/22/20 0834    Specimen: Blood from Arm, Right Updated: 11/23/20 0900     Blood Culture No growth at 24 hours    Basic Metabolic Panel [[id number]]  (Abnormal) Collected: 11/23/20 0527    Specimen: Blood Updated: 11/23/20 0718     Glucose 88 mg/dL      BUN 23 mg/dL      Creatinine 0.99 mg/dL      Sodium 140 mmol/L      Potassium 3.2 mmol/L      Chloride 108 mmol/L      CO2 24.0 mmol/L      Calcium 8.1 mg/dL      eGFR Non African Amer 71 mL/min/1.73      BUN/Creatinine Ratio 23.2     Anion Gap 8.0 mmol/L     Narrative:      GFR Normal >60  Chronic Kidney Disease <60  Kidney Failure <15      Magnesium [[id number]]  (Normal) Collected: 11/23/20 0527    Specimen: Blood Updated: 11/23/20 0718     Magnesium 1.8 mg/dL     CBC & Differential [[id number]]  (Abnormal) Collected: 11/23/20 0527    Specimen: Blood Updated: 11/23/20 0658    Narrative:      The following orders were created for panel order CBC & Differential.  Procedure                               Abnormality         Status                     ---------                               -----------         ------                     CBC Auto Differential[[id number]]        Abnormal            Final result                 Please view results for these tests on the individual orders.    CBC Auto  Differential [[id number]]  (Abnormal) Collected: 11/23/20 0527    Specimen: Blood Updated: 11/23/20 0658     WBC 19.00 10*3/mm3      RBC 3.22 10*6/mm3      Hemoglobin 10.0 g/dL      Hematocrit 30.1 %      MCV 93.7 fL      MCH 31.1 pg      MCHC 33.2 g/dL      RDW 14.1 %      RDW-SD 46.4 fl      MPV 10.8 fL      Platelets 98 10*3/mm3      Neutrophil % 90.7 %      Lymphocyte % 5.6 %      Monocyte % 3.2 %      Eosinophil % 0.2 %      Basophil % 0.3 %      Neutrophils, Absolute 17.30 10*3/mm3      Lymphocytes, Absolute 1.10 10*3/mm3      Monocytes, Absolute 0.60 10*3/mm3      Eosinophils, Absolute 0.00 10*3/mm3      Basophils, Absolute 0.10 10*3/mm3      nRBC 0.0 /100 WBC     COVID PRE-OP / PRE-PROCEDURE SCREENING ORDER (NO ISOLATION) - Swab, Nasopharynx [[id number]] Collected: 11/21/20 1836    Specimen: Swab from Nasopharynx Updated: 11/23/20 0139    Narrative:      The following orders were created for panel order COVID PRE-OP / PRE-PROCEDURE SCREENING ORDER (NO ISOLATION) - Swab, Nasopharynx.  Procedure                               Abnormality         Status                     ---------                               -----------         ------                     COVID-19 PCR, [facility]...[[id number]]                      Final result                 Please view results for these tests on the individual orders.    COVID-19 PCR, [facility], NP SWAB IN LEXAR VIRAL TRANSPORT MEDIA 24-30 HR TAT - Swab, Nasopharynx [[id number]] Collected: 11/21/20 1836    Specimen: Swab from Nasopharynx Updated: 11/23/20 0139     SARS-CoV-2 BELKIS Not Detected        No results found for: HGBA1C            No results found for: LIPASE  No results found for: CHOL, CHLPL, TRIG, HDL, LDL, LDLDIRECT    No results found for: INTRAOP, PREDX, FINALDX, COMDX    Microbiology Results (last 10 days)     Procedure Component Value - Date/Time    Blood Culture - Blood, Arm, Left [[id number]] Collected: 11/22/20 0834    Lab Status: Preliminary result Specimen:  Blood from Arm, Left Updated: 11/23/20 0900     Blood Culture No growth at 24 hours    Blood Culture - Blood, Arm, Right [[id number]] Collected: 11/22/20 0834    Lab Status: Preliminary result Specimen: Blood from Arm, Right Updated: 11/23/20 0900     Blood Culture No growth at 24 hours    COVID PRE-OP / PRE-PROCEDURE SCREENING ORDER (NO ISOLATION) - Swab, Nasopharynx [[id number]] Collected: 11/21/20 1836    Lab Status: Final result Specimen: Swab from Nasopharynx Updated: 11/23/20 0139    Narrative:      The following orders were created for panel order COVID PRE-OP / PRE-PROCEDURE SCREENING ORDER (NO ISOLATION) - Swab, Nasopharynx.  Procedure                               Abnormality         Status                     ---------                               -----------         ------                     COVID-19 PCR, [facility]...[[id number]]                      Final result                 Please view results for these tests on the individual orders.    COVID-19 PCR, [facility], NP SWAB IN LEXAR VIRAL TRANSPORT MEDIA 24-30 HR TAT - Swab, Nasopharynx [[id number]] Collected: 11/21/20 1836    Lab Status: Final result Specimen: Swab from Nasopharynx Updated: 11/23/20 0139     SARS-CoV-2 BELKIS Not Detected    Respiratory Panel PCR w/COVID-19(SARS-CoV-2) [facility]/EMILY/EULOGIO/PAD/COR/MAD/MIGNON In-House, NP Swab in UTM/VTM, 3-4 HR TAT - Swab, Nasopharynx [[id number]]  (Normal) Collected: 11/21/20 1011    Lab Status: Final result Specimen: Swab from Nasopharynx Updated: 11/21/20 1118     ADENOVIRUS, PCR Not Detected     Coronavirus 229E Not Detected     Coronavirus HKU1 Not Detected     Coronavirus NL63 Not Detected     Coronavirus OC43 Not Detected     COVID19 Not Detected     Human Metapneumovirus Not Detected     Human Rhinovirus/Enterovirus Not Detected     Influenza A PCR Not Detected     Influenza A H1 Not Detected     Influenza A H1 2009 PCR Not Detected     Influenza A H3 Not Detected     Influenza B PCR Not Detected      Parainfluenza Virus 1 Not Detected     Parainfluenza Virus 2 Not Detected     Parainfluenza Virus 3 Not Detected     Parainfluenza Virus 4 Not Detected     RSV, PCR Not Detected     Bordetella pertussis pcr Not Detected     Bordetella parapertussis PCR Not Detected     Chlamydophila pneumoniae PCR Not Detected     Mycoplasma pneumo by PCR Not Detected    Narrative:      Fact sheet for providers: https://docs.durchblicker.at/wp-content/uploads/XKG8210-8508-IE6.1-EUA-Provider-Fact-Sheet-3.pdf    Fact sheet for patients: https://docs.durchblicker.at/wp-content/uploads/NJX3939-9516-SB2.1-EUA-Patient-Fact-Sheet-1.pdf          ECG/EMG Results (most recent)     Procedure Component Value Units Date/Time    ECG 12 Lead [[id number]] Resulted: 11/21/20 1346     Updated: 11/21/20 1346    ECG 12 Lead [[id number]] Collected: 11/21/20 1003     Updated: 11/22/20 0832     QT Interval 379 ms     Narrative:      HEART RATE= 85  bpm  RR Interval= 708  ms  OK Interval= 132  ms  P Horizontal Axis= -59  deg  P Front Axis= 57  deg  QRSD Interval= 99  ms  QT Interval= 379  ms  QRS Axis= 19  deg  T Wave Axis=   deg  - ABNORMAL ECG -  Sinus rhythm  Atrial premature complexes  No previous ECG available for comparison  Electronically Signed By: Luc Mccauley ([facility]) 22-Nov-2020 08:31:15  Date and Time of Study: 2020-11-21 10:03:57    ECG 12 Lead [[id number]] Collected: 11/22/20 1215     Updated: 11/22/20 1217     QT Interval 384 ms     Narrative:      HEART RATE= 78  bpm  RR Interval= 768  ms  OK Interval= 136  ms  P Horizontal Axis= -55  deg  P Front Axis= 51  deg  QRSD Interval= 92  ms  QT Interval= 384  ms  QRS Axis= 14  deg  T Wave Axis= 61  deg  - ABNORMAL ECG -  Sinus rhythm  Low voltage, extremity leads  Consider anteroseptal infarct  When compared with ECG of 21-Nov-2020 10:03:57,  No significant change  Electronically Signed By:   Date and Time of Study: 2020-11-22 12:15:56          Results for orders placed during the hospital encounter of  11/21/20   Duplex Venous Lower Extremity - Bilateral    Narrative · Normal bilateral lower extremity venous duplex scan.  · Varicose veins seen in the right leg below the level of the knee.               Ct Chest Pulmonary Embolism    Result Date: 11/21/2020   1.  No evidence of pulmonary embolus 2.  Minimal bilateral lower lobe atelectasis  Electronically Signed By-Aidan Romero MD On:11/21/2020 1:37 PM This report was finalized on [id number] by  Aidan Romero MD.    Ct Lower Extremity Left Without Contrast    Result Date: 11/22/2020   1.  Diffuse soft tissue edema the left lower extremity and foot compatible changes of cellulitis.  No evidence of discrete fluid collection or abscess. 2.  No abnormal periosteal reaction or bony destruction to suggest osteomyelitis at this time.  Electronically Signed By-Aidan Romero MD On:11/22/2020 11:45 AM This report was finalized on [id number] by  Aidan Romero MD.    Xr Chest Ap    Result Date: 11/21/2020   1. No acute cardiopulmonary disease.   Electronically Signed By-Aidan Romero MD On:11/21/2020 10:17 AM This report was finalized on [id number] by  Aidan Romero MD.          Xrays, labs reviewed personally by physician.    Medication Review:   I have reviewed the patient's current medication list      Scheduled Meds  !Vancomycin Level Draw Needed, , Does not apply, Once  [START ON 11/24/2020] !Vancomycin Level Draw Needed, , Does not apply, Once  amLODIPine, 10 mg, Oral, Daily  cefTRIAXone, 1 g, Intravenous, Q24H  diazePAM, 5 mg, Oral, BID  gabapentin, 100 mg, Oral, BID  heparin (porcine), 5,000 Units, Subcutaneous, Q8H  hydroCHLOROthiazide, 25 mg, Oral, Daily  ipratropium-albuterol, 3 mL, Nebulization, 4x Daily - RT  pantoprazole, 40 mg, Oral, QAM AC  sodium chloride, 10 mL, Intravenous, Q12H  vancomycin, 1,000 mg, Intravenous, Q24H        Meds Infusions  Pharmacy to dose vancomycin,   sodium chloride, 100 mL/hr, Last Rate: 100 mL/hr (11/22/20  1828)        Meds PRN  acetaminophen **OR** acetaminophen **OR** acetaminophen  •  calcium carbonate  •  docusate sodium  •  magnesium sulfate **OR** magnesium sulfate **OR** magnesium sulfate  •  ondansetron **OR** ondansetron  •  Pharmacy to dose vancomycin  •  potassium chloride **OR** potassium chloride **OR** potassium chloride  •  sodium chloride        Assessment/Plan   Assessment/Plan     Active Hospital Problems    Diagnosis  POA   • Cellulitis of left leg [L03.116]  Yes   • Lab test negative for COVID-19 virus [Z03.818]  Not Applicable      Resolved Hospital Problems   No resolved problems to display.       MEDICAL DECISION MAKING COMPLEXITY BY PROBLEM:     #Left lower extremity Cellulitis  #Leukocytosis    -With pain and tenderness that significant and also with high WBC count  CPK is normal lactic normal inflammatory markers initially not very impressive however with fever and high white count will need to rule out deep infection such as necrotizing fasciitis will get CT scan of that extremity    -CT suggestive of LLE cellulitis    - White count decreased from 34.5 to 19 today    -Continue broad-spectrum antibiotics    -Blood cultures pending    - pt/ot and case mgt for possible SNF vs C    - Patient improving     #Fever    -most likely 2/2 cellulitis    -His COVID-19 test is negative with mildly elevated inflammatory markers     -He is saturating comfortably on room air    -Chest imaging without any acute infiltrates or PE    -afebrile overnight    #Chronic LE Edema  #Venous insufficiency    - pt wears compression stockings at home     #Peripheral neuropathy    -Restart home meds on gabapentin     #Hypertension    -Continue amlodipine hydrochlorothiazide     #GERD    -Continue PPI     #DVT prophylaxis    -heparin    VTE Prophylaxis -   Mechanical Order History:     None      Pharmalogical Order History:      Ordered     Dose Route Frequency Stop    11/21/20 9692  heparin (porcine) 5000 UNIT/ML  injection 5,000 Units      5,000 Units SC Every 8 Hours Scheduled --                  Code Status -   Code Status and Medical Interventions:   Ordered at: 11/21/20 7779     Level Of Support Discussed With:    Patient     Code Status:    CPR     Medical Interventions (Level of Support Prior to Arrest):    Full       This patient has been examined wearing appropriate Personal Protective Equipment and discussed with Patient. 11/23/20        Discharge Planning  Referral to [facility] for rehab      Electronically signed by Talha Young DO, 11/23/20, 12:45 EST.  [facility] Hospitalist Team

## 2020-11-23 NOTE — THERAPY TREATMENT NOTE
Patient Name: Sage Flores  : 1930    MRN: [id number]                              Today's Date: 2020       Admit Date: 2020    Visit Dx:     ICD-10-CM ICD-9-CM   1. Lab test negative for COVID-19 virus  Z03.818 V71.83   2. Fever, unspecified fever cause  R50.9 780.60   3. Sepsis, due to unspecified organism, unspecified whether acute organ dysfunction present (CMS/HCC)  A41.9 038.9     995.91   4. Leukocytosis, unspecified type  D72.829 288.60   5. Cough  R05 786.2     Patient Active Problem List   Diagnosis   • Allergic rhinitis   • Atrial fibrillation (CMS/HCC)   • Claudication (CMS/HCC)   • Coronary heart disease   • Edema   • Extremity pain   • History of left heart catheterization (LHC)   • Hyperlipidemia   • Hypertension   • Inguinal hernia, right   • Laceration   • Lower extremity edema   • Myocardial infarction (CMS/HCC)   • Need for other prophylactic vaccination and inoculation against single diseases   • Presence of other cardiac implants and grafts   • Status post coronary artery bypass graft   • Status post percutaneous transluminal coronary angioplasty   • Viral URI   • Rib pain on left side   • Dizziness   • Acute cystitis without hematuria   • Status post placement of implantable loop recorder   • Lab test negative for COVID-19 virus     Past Medical History:   Diagnosis Date   • Atrial fibrillation (CMS/HCC)    • Benign prostatic hyperplasia    • CAD (coronary artery disease)    • Hyperlipidemia    • Hypertension    • Myocardial infarction (CMS/HCC)      History reviewed. No pertinent surgical history.  General Information     Row Name 20 1107          Physical Therapy Time and Intention    Document Type  therapy note (daily note)  -     Mode of Treatment  physical therapy  -     Row Name 20 1107          General Information    Patient Profile Reviewed  yes  -     Row Name 20 1105          Cognition    Orientation Status (Cognition)  oriented x 3  -      Row Name 11/23/20 1107          Safety Issues, Functional Mobility    Impairments Affecting Function (Mobility)  strength;endurance/activity tolerance;pain;postural/trunk control;balance  -       User Key  (r) = Recorded By, (t) = Taken By, (c) = Cosigned By    Initials Name Provider Type    Olamide Sheehan, DEVEN Physical Therapist        Mobility     Row Name 11/23/20 1107          Bed Mobility    Bed Mobility  rolling right;sit-supine  -     Rolling Right Homer (Bed Mobility)  minimum assist (75% patient effort)  -     Sit-Supine Homer (Bed Mobility)  minimum assist (75% patient effort)  -     Assistive Device (Bed Mobility)  bed rails  -     Row Name 11/23/20 1107          Sit-Stand Transfer    Sit-Stand Homer (Transfers)  moderate assist (50% patient effort)  -     Assistive Device (Sit-Stand Transfers)  walker, front-wheeled  -     Row Name 11/23/20 1107          Gait/Stairs (Locomotion)    Homer Level (Gait)  minimum assist (75% patient effort)  -     Assistive Device (Gait)  walker, front-wheeled  -     Distance in Feet (Gait)  10 feet x 2  -WC     Deviations/Abnormal Patterns (Gait)  stride length decreased;gait speed decreased  -     Bilateral Gait Deviations  foot drop/toe drag;forward flexed posture  -     Left Sided Gait Deviations  foot drop/toe drag  -       User Key  (r) = Recorded By, (t) = Taken By, (c) = Cosigned By    Initials Name Provider Type    Olamide Sheehan PT Physical Therapist        Obj/Interventions     Row Name 11/23/20 1109          Balance    Static Sitting Balance  WFL  -     Dynamic Sitting Balance  mild impairment  -     Static Standing Balance  moderate impairment  -     Dynamic Standing Balance  moderate impairment  -     Balance Interventions  sitting;standing;sit to stand;supported;static;dynamic;minimal challenge  -       User Key  (r) = Recorded By, (t) = Taken By, (c) = Cosigned By    Initials Name Provider  Type    Olamide Sheehan, DEVEN Physical Therapist        Goals/Plan    No documentation.       Clinical Impression     Row Name 11/23/20 1109          Pain    Additional Documentation  Pain Scale: FACES Pre/Post-Treatment (Group)  -     Row Name 11/23/20 1109          Pain Scale: FACES Pre/Post-Treatment    Pain: FACES Scale, Pretreatment  2-->hurts little bit  -WC     Posttreatment Pain Rating  2-->hurts little bit  -WC     Pain Location - Side  Left  -     Pain Location - Orientation  lower  -     Pain Location  extremity  -     Row Name 11/23/20 1109          Plan of Care Review    Plan of Care Reviewed With  patient  -     Row Name 11/23/20 1109          Therapy Assessment/Plan (PT)    Rehab Potential (PT)  good, to achieve stated therapy goals  -     Criteria for Skilled Interventions Met (PT)  yes;skilled treatment is necessary  -     Predicted Duration of Therapy Intervention (PT)  Until D/C  -     Row Name 11/23/20 1109          Vital Signs    Pre Patient Position  Sitting  -     Intra Patient Position  Standing  -WC     Post Patient Position  Supine  -     Row Name 11/23/20 1109          Positioning and Restraints    Pre-Treatment Position  sitting in chair/recliner  -WC     Post Treatment Position  bed  -       User Key  (r) = Recorded By, (t) = Taken By, (c) = Cosigned By    Initials Name Provider Type    Olamide Sheehan PT Physical Therapist        Outcome Measures     Row Name 11/23/20 1110          How much help from another person do you currently need...    Turning from your back to your side while in flat bed without using bedrails?  3  -WC     Moving from lying on back to sitting on the side of a flat bed without bedrails?  3  -WC     Moving to and from a bed to a chair (including a wheelchair)?  3  -WC     Standing up from a chair using your arms (e.g., wheelchair, bedside chair)?  2  -WC     Climbing 3-5 steps with a railing?  2  -WC     To walk in hospital room?  2  -WC      -[facility] 6 Clicks Score (PT)  15  -     Row Name 11/23/20 1110          Functional Assessment    Outcome Measure Options  AM-PAC 6 Clicks Basic Mobility (PT)  -       User Key  (r) = Recorded By, (t) = Taken By, (c) = Cosigned By    Initials Name Provider Type     Olamide Germain PT Physical Therapist        Physical Therapy Education                 Title: PT OT SLP Therapies (Done)     Topic: Physical Therapy (Done)     Point: Mobility training (Done)     Learning Progress Summary           Patient Acceptance, E,TB, VU,NR by  at 11/23/2020 1111    Acceptance, E, NR by  at 11/22/2020 0818                   Point: Home exercise program (Done)     Learning Progress Summary           Patient Acceptance, E,TB, VU,NR by  at 11/23/2020 1111                   Point: Body mechanics (Done)     Learning Progress Summary           Patient Acceptance, E,TB, VU,NR by  at 11/23/2020 1111                   Point: Precautions (Done)     Learning Progress Summary           Patient Acceptance, E,TB, VU,NR by  at 11/23/2020 1111    Acceptance, E, NR by  at 11/22/2020 0818                               User Key     Initials Effective Dates Name Provider Type Discipline     03/01/19 -  Christin Lennon, PT Physical Therapist PT     01/07/20 -  Olamide Germain PT Physical Therapist PT              PT Recommendation and Plan  Pt was alert and oriented x 3. Pt c/o mild pain left LE. Left L LE weeping. Pt needed MOD A sit to stand with FWW x 5 with rests. Pt needed MIN A  gait 10 feet x 2. Pt needed VC for posture and hand placement for transfers. Pt needed assist with transfers and hygiene for toileting. Pt needed VC and visual cues for seated LE exercises, DF, PF, LAQ, hip flex 5 reps 2 sets. Pt needed MIN A sit to supine and assist for positioning Le's. Pt is below baseline.     Recommendation is D/C to IP Rehab. PPE: Mask, eyeshield, gloves.     Plan of Care Reviewed With: patient     Time Calculation:   PT Charges      Row Name 11/23/20 1115             Time Calculation    Start Time  1025  -WC      Stop Time  1100  -WC      Time Calculation (min)  35 min  -WC      PT Received On  11/23/20  -WC      PT - Next Appointment  11/24/20  -WC         Time Calculation- PT    TCU Minutes- PT  35 min  -WC        User Key  (r) = Recorded By, (t) = Taken By, (c) = Cosigned By    Initials Name Provider Type    WC Olamide Germain, PT Physical Therapist        Therapy Charges for Today     Code Description Service Date Service Provider Modifiers Qty    58422843516 HC GAIT TRAINING EA 15 MIN 11/23/2020 Olamide Germain, PT GP 1    25152984772 HC PT THER PROC EA 15 MIN 11/23/2020 Olamide Germain, PT GP 1          PT G-Codes  Outcome Measure Options: AM-PAC 6 Clicks Basic Mobility (PT)  AM-PAC 6 Clicks Score (PT): 15    Olamide Germain PT  11/23/2020

## 2020-11-23 NOTE — PROGRESS NOTES
Discharge Planning Assessment   Reji     Patient Name: Sage Flores  MRN: [id number]  Today's Date: 11/23/2020    Admit Date: 11/21/2020          Plan    Plan  accepted to [facility] subacute level for rehab    Plan Comments  per earline with [facility] patient is accepted to rehab there and he is able to discharge today primary nurse and md notified       Carol naegele rn  Case management  Office number [phone]  Cell phone [phone]

## 2020-11-23 NOTE — DISCHARGE PLACEMENT REQUEST
"Vikki Eli (89 y.o. Male)     Date of Birth Social Security Number Address Home Phone MRN    09/07/1930  [address] [facility] [place] [facility] [phone] [id number]    Spiritism Marital Status          Other        Admission Date Admission Type Admitting Provider Attending Provider Department, Room/Bed    11/21/20 Emergency Talha Young DO Taylor, Waitman, DO [facility] 3C MEDICAL INPATIENT, 359/1    Discharge Date Discharge Disposition Discharge Destination                       Attending Provider: Talha Young DO    Allergies: Iodine, Levofloxacin, Nitroglycerin, Paroxetine, Penicillin G, Prednisone, Sucralfate, Diltiazem Hcl, Metoprolol, Pramipexole Dihydrochloride, Ropinirole Hcl    Isolation: None   Infection: None   Code Status: CPR    Ht: 182.9 cm (72\")   Wt: 72.6 kg (160 lb)    Admission Cmt: None   Principal Problem: None                Active Insurance as of 11/21/2020     Primary Coverage     Payor Plan Insurance Group Employer/Plan Group    MEDICARE MEDICARE A & B      Payor Plan Address Payor Plan Phone Number Payor Plan Fax Number Effective Dates    [address] [phone]  9/1/1995 - None Entered    [facility] [address]       Subscriber Name Subscriber Birth Date Member ID       VIKKI ELI 9/7/1930 [id number]           Secondary Coverage     Payor Plan Insurance Group Employer/Plan Group    BANKERS FIDELITY BANKERS FIDELITY PLAN F     Payor Plan Address Payor Plan Phone Number Payor Plan Fax Number Effective Dates    [address] [phone]  1/1/2017 - None Entered    [facility] [address]       Subscriber Name Subscriber Birth Date Member ID       VIKKI ELI 9/7/1930 [id number]                 Emergency Contacts      (Rel.) Home Phone Work Phone Mobile Phone    GREGORIA ELI (Son) [phone] -- --    TODD BAIN (Power of ) -- -- [phone]              "

## 2020-11-23 NOTE — PLAN OF CARE
Goal Outcome Evaluation:  Plan of Care Reviewed With: patient  Progress: no change  Outcome Summary: Patient able to sleep through the night without complaint.  Will continue to monitor.

## 2020-11-23 NOTE — PLAN OF CARE
Problem: Adult Inpatient Plan of Care  Goal: Plan of Care Review  Recent Flowsheet Documentation  Taken 11/23/2020 1109 by Olamide Germain, PT  Plan of Care Reviewed With: patient   Pt was alert and oriented x 3. Pt c/o mild pain left LE. L LE weeping. Pt needed MOD A sit to stand with FWW x 5 with rests. Pt needed MIN A gait 10 feet x 2. Pt needed VC for posture and hand placement for transfers. Pt needed assist with transfers and hygiene for toileting. Pt needed VC and visual cues for seated LE exercises, DF, PF, LAQ, hip flex 5 reps 2 sets. Pt needed MIN A sit to supine and assist for positioning Le's. Pt is below baseline. Recommendation is D/C to IP Rehab. PPE: Mask, eyeshield, gloves.

## 2020-11-23 NOTE — PROGRESS NOTES
Discharge Planning Assessment   Reji     Patient Name: Sage Flores  MRN: [id number]  Today's Date: 11/23/2020    Admit Date: 11/21/2020    Discharge Needs Assessment     Row Name 11/23/20 0957       Living Environment    Lives With  alone    Current Living Arrangements  home/apartment/condo    Potentially Unsafe Housing Conditions  unable to assess    Primary Care Provided by  self    Provides Primary Care For  no one, unable/limited ability to care for self    Family Caregiver if Needed  child(laney), adult has son and daughter that help him    Quality of Family Relationships  unable to assess    Able to Return to Prior Arrangements  other (see comments) per therapy patient needs inpt rehab       Resource/Environmental Concerns    Resource/Environmental Concerns  none       Transition Planning    Patient/Family Anticipates Transition to  inpatient rehabilitation facility    Transportation Anticipated  family or friend will provide       Discharge Needs Assessment    Readmission Within the Last 30 Days  no previous admission in last 30 days    Equipment Currently Used at Home  walker, standard;cane, straight;wheelchair    Concerns to be Addressed  discharge planning    Anticipated Changes Related to Illness  inability to care for self    Provided Post Acute Provider List?  Yes    Post Acute Provider List  Inpatient Rehab;Nursing Home    Delivered To  Patient    Method of Delivery  In person        Discharge Plan     Row Name 11/23/20 0959       Plan    Plan  referral to [facility] for rehab    Patient/Family in Agreement with Plan  yes    Plan Comments  spoke to patient in room with ppe(mask and goggles); staying 6 feet away and less than 15 mins in room; he lives at home alone and follows with dr carranza; he states he drives but will not after this hospital stay; he denies any home health; reviewed with patient rehab choices and [facility] was first choice; referral made to liason        Continued Care and Services -  Admitted Since 11/21/2020     Destination     Service Provider Request Status Selected Services Address Phone Fax Patient Preferred    [facility]  Pending - Request Sent N/A [address] [facility] [place] [address] [phone] --             Carol naegele rn  Case management  Office number [phone]  Cell phone [phone]

## 2020-11-24 ENCOUNTER — EPISODE CHANGES (OUTPATIENT)
Dept: CASE MANAGEMENT | Facility: OTHER | Age: 85
End: 2020-11-24

## 2020-11-24 ENCOUNTER — LAB REQUISITION (OUTPATIENT)
Dept: LAB | Facility: HOSPITAL | Age: 85
End: 2020-11-24

## 2020-11-24 DIAGNOSIS — Z00.00 ENCOUNTER FOR GENERAL ADULT MEDICAL EXAMINATION WITHOUT ABNORMAL FINDINGS: ICD-10-CM

## 2020-11-24 LAB
ALBUMIN SERPL-MCNC: 2.9 G/DL (ref 3.5–5.2)
ALBUMIN/GLOB SERPL: 1.3 G/DL
ALP SERPL-CCNC: 77 U/L (ref 39–117)
ALT SERPL W P-5'-P-CCNC: 22 U/L (ref 1–41)
ANION GAP SERPL CALCULATED.3IONS-SCNC: 10 MMOL/L (ref 5–15)
AST SERPL-CCNC: 25 U/L (ref 1–40)
BILIRUB SERPL-MCNC: 0.6 MG/DL (ref 0–1.2)
BUN SERPL-MCNC: 20 MG/DL (ref 8–23)
BUN/CREAT SERPL: 19 (ref 7–25)
CALCIUM SPEC-SCNC: 8 MG/DL (ref 8.2–9.6)
CHLORIDE SERPL-SCNC: 106 MMOL/L (ref 98–107)
CO2 SERPL-SCNC: 23 MMOL/L (ref 22–29)
CREAT SERPL-MCNC: 1.05 MG/DL (ref 0.76–1.27)
DEPRECATED RDW RBC AUTO: 45.1 FL (ref 37–54)
ERYTHROCYTE [DISTWIDTH] IN BLOOD BY AUTOMATED COUNT: 13.9 % (ref 12.3–15.4)
GFR SERPL CREATININE-BSD FRML MDRD: 66 ML/MIN/1.73
GLOBULIN UR ELPH-MCNC: 2.2 GM/DL
GLUCOSE SERPL-MCNC: 91 MG/DL (ref 65–99)
HCT VFR BLD AUTO: 29.6 % (ref 37.5–51)
HGB BLD-MCNC: 10 G/DL (ref 13–17.7)
MAGNESIUM SERPL-MCNC: 1.6 MG/DL (ref 1.6–2.4)
MCH RBC QN AUTO: 31.7 PG (ref 26.6–33)
MCHC RBC AUTO-ENTMCNC: 33.7 G/DL (ref 31.5–35.7)
MCV RBC AUTO: 94 FL (ref 79–97)
PHOSPHATE SERPL-MCNC: 1.5 MG/DL (ref 2.5–4.5)
PLATELET # BLD AUTO: 106 10*3/MM3 (ref 140–450)
PMV BLD AUTO: 12.1 FL (ref 6–12)
POTASSIUM SERPL-SCNC: 3.1 MMOL/L (ref 3.5–5.2)
PROT SERPL-MCNC: 5.1 G/DL (ref 6–8.5)
RBC # BLD AUTO: 3.15 10*6/MM3 (ref 4.14–5.8)
SODIUM SERPL-SCNC: 139 MMOL/L (ref 136–145)
WBC # BLD AUTO: 14.7 10*3/MM3 (ref 3.4–10.8)

## 2020-11-24 PROCEDURE — 83735 ASSAY OF MAGNESIUM: CPT | Performed by: INTERNAL MEDICINE

## 2020-11-24 PROCEDURE — 84100 ASSAY OF PHOSPHORUS: CPT | Performed by: INTERNAL MEDICINE

## 2020-11-24 PROCEDURE — 85027 COMPLETE CBC AUTOMATED: CPT | Performed by: INTERNAL MEDICINE

## 2020-11-24 PROCEDURE — 80053 COMPREHEN METABOLIC PANEL: CPT | Performed by: INTERNAL MEDICINE

## 2020-11-24 NOTE — PROGRESS NOTES
Discharge Planning Assessment   Reji     Patient Name: Sage Flores  MRN: [id number]  Today's Date: 11/24/2020    Admit Date: 11/21/2020          Plan    Final Discharge Disposition Code  03 - skilled nursing facility (SNF)    Final Note  to [facility] for subacute level of rehab       Carol naegele rn  Case management  Office number [phone]  Cell phone [phone]

## 2020-11-25 ENCOUNTER — LAB REQUISITION (OUTPATIENT)
Dept: LAB | Facility: HOSPITAL | Age: 85
End: 2020-11-25

## 2020-11-25 DIAGNOSIS — Z00.00 ENCOUNTER FOR GENERAL ADULT MEDICAL EXAMINATION WITHOUT ABNORMAL FINDINGS: ICD-10-CM

## 2020-11-25 LAB
PHOSPHATE SERPL-MCNC: 1.9 MG/DL (ref 2.5–4.5)
QT INTERVAL: 384 MS

## 2020-11-25 PROCEDURE — 84100 ASSAY OF PHOSPHORUS: CPT | Performed by: INTERNAL MEDICINE

## 2020-11-26 ENCOUNTER — LAB REQUISITION (OUTPATIENT)
Dept: LAB | Facility: HOSPITAL | Age: 85
End: 2020-11-26

## 2020-11-26 DIAGNOSIS — L03.116 CELLULITIS OF LEFT LOWER LIMB: ICD-10-CM

## 2020-11-26 DIAGNOSIS — R50.9 FEVER, UNSPECIFIED: ICD-10-CM

## 2020-11-26 LAB
PHOSPHATE SERPL-MCNC: 2.7 MG/DL (ref 2.5–4.5)
POTASSIUM SERPL-SCNC: 3.8 MMOL/L (ref 3.5–5.2)

## 2020-11-26 PROCEDURE — 84100 ASSAY OF PHOSPHORUS: CPT | Performed by: INTERNAL MEDICINE

## 2020-11-26 PROCEDURE — 84132 ASSAY OF SERUM POTASSIUM: CPT | Performed by: INTERNAL MEDICINE

## 2020-11-27 LAB
BACTERIA SPEC AEROBE CULT: NORMAL
BACTERIA SPEC AEROBE CULT: NORMAL

## 2020-11-29 ENCOUNTER — LAB REQUISITION (OUTPATIENT)
Dept: LAB | Facility: HOSPITAL | Age: 85
End: 2020-11-29

## 2020-11-29 DIAGNOSIS — L03.116 CELLULITIS OF LEFT LOWER LIMB: ICD-10-CM

## 2020-11-29 DIAGNOSIS — L03.119 CELLULITIS OF UNSPECIFIED PART OF LIMB: ICD-10-CM

## 2020-11-29 LAB
ALBUMIN SERPL-MCNC: 2.8 G/DL (ref 3.5–5.2)
ALBUMIN/GLOB SERPL: 1.3 G/DL
ALP SERPL-CCNC: 117 U/L (ref 39–117)
ALT SERPL W P-5'-P-CCNC: 51 U/L (ref 1–41)
ANION GAP SERPL CALCULATED.3IONS-SCNC: 7 MMOL/L (ref 5–15)
AST SERPL-CCNC: 25 U/L (ref 1–40)
BASOPHILS # BLD AUTO: 0.1 10*3/MM3 (ref 0–0.2)
BASOPHILS NFR BLD AUTO: 0.7 % (ref 0–1.5)
BILIRUB SERPL-MCNC: 0.4 MG/DL (ref 0–1.2)
BUN SERPL-MCNC: 29 MG/DL (ref 8–23)
BUN/CREAT SERPL: 29.3 (ref 7–25)
CALCIUM SPEC-SCNC: 8.3 MG/DL (ref 8.2–9.6)
CHLORIDE SERPL-SCNC: 98 MMOL/L (ref 98–107)
CO2 SERPL-SCNC: 29 MMOL/L (ref 22–29)
CREAT SERPL-MCNC: 0.99 MG/DL (ref 0.76–1.27)
DEPRECATED RDW RBC AUTO: 44.6 FL (ref 37–54)
EOSINOPHIL # BLD AUTO: 0.3 10*3/MM3 (ref 0–0.4)
EOSINOPHIL NFR BLD AUTO: 3.1 % (ref 0.3–6.2)
ERYTHROCYTE [DISTWIDTH] IN BLOOD BY AUTOMATED COUNT: 13.6 % (ref 12.3–15.4)
GFR SERPL CREATININE-BSD FRML MDRD: 71 ML/MIN/1.73
GLOBULIN UR ELPH-MCNC: 2.2 GM/DL
GLUCOSE SERPL-MCNC: 105 MG/DL (ref 65–99)
HCT VFR BLD AUTO: 29.3 % (ref 37.5–51)
HGB BLD-MCNC: 10.3 G/DL (ref 13–17.7)
LYMPHOCYTES # BLD AUTO: 1.4 10*3/MM3 (ref 0.7–3.1)
LYMPHOCYTES NFR BLD AUTO: 12.5 % (ref 19.6–45.3)
MCH RBC QN AUTO: 33.1 PG (ref 26.6–33)
MCHC RBC AUTO-ENTMCNC: 35 G/DL (ref 31.5–35.7)
MCV RBC AUTO: 94.6 FL (ref 79–97)
MONOCYTES # BLD AUTO: 0.8 10*3/MM3 (ref 0.1–0.9)
MONOCYTES NFR BLD AUTO: 7.4 % (ref 5–12)
NEUTROPHILS NFR BLD AUTO: 76.3 % (ref 42.7–76)
NEUTROPHILS NFR BLD AUTO: 8.4 10*3/MM3 (ref 1.7–7)
NRBC BLD AUTO-RTO: 0.2 /100 WBC (ref 0–0.2)
PLATELET # BLD AUTO: 186 10*3/MM3 (ref 140–450)
PMV BLD AUTO: 11.2 FL (ref 6–12)
POTASSIUM SERPL-SCNC: 4.1 MMOL/L (ref 3.5–5.2)
PROT SERPL-MCNC: 5 G/DL (ref 6–8.5)
RBC # BLD AUTO: 3.1 10*6/MM3 (ref 4.14–5.8)
SODIUM SERPL-SCNC: 134 MMOL/L (ref 136–145)
WBC # BLD AUTO: 11 10*3/MM3 (ref 3.4–10.8)

## 2020-11-29 PROCEDURE — 85025 COMPLETE CBC W/AUTO DIFF WBC: CPT | Performed by: INTERNAL MEDICINE

## 2020-11-29 PROCEDURE — 80053 COMPREHEN METABOLIC PANEL: CPT | Performed by: INTERNAL MEDICINE

## 2020-11-30 ENCOUNTER — LAB REQUISITION (OUTPATIENT)
Dept: LAB | Facility: HOSPITAL | Age: 85
End: 2020-11-30

## 2020-11-30 ENCOUNTER — HOSPITAL ENCOUNTER (OUTPATIENT)
Dept: GENERAL RADIOLOGY | Facility: HOSPITAL | Age: 85
Discharge: HOME | End: 2020-11-30

## 2020-11-30 DIAGNOSIS — Z00.00 ENCOUNTER FOR GENERAL ADULT MEDICAL EXAMINATION WITHOUT ABNORMAL FINDINGS: ICD-10-CM

## 2020-11-30 DIAGNOSIS — R09.02 HYPOXIA: ICD-10-CM

## 2020-11-30 LAB
ANION GAP SERPL CALCULATED.3IONS-SCNC: 10 MMOL/L (ref 5–15)
BUN SERPL-MCNC: 24 MG/DL (ref 8–23)
BUN/CREAT SERPL: 26.7 (ref 7–25)
CALCIUM SPEC-SCNC: 8.4 MG/DL (ref 8.2–9.6)
CHLORIDE SERPL-SCNC: 95 MMOL/L (ref 98–107)
CO2 SERPL-SCNC: 29 MMOL/L (ref 22–29)
CREAT SERPL-MCNC: 0.9 MG/DL (ref 0.76–1.27)
DEPRECATED RDW RBC AUTO: 44.2 FL (ref 37–54)
ERYTHROCYTE [DISTWIDTH] IN BLOOD BY AUTOMATED COUNT: 13.4 % (ref 12.3–15.4)
GFR SERPL CREATININE-BSD FRML MDRD: 79 ML/MIN/1.73
GLUCOSE SERPL-MCNC: 98 MG/DL (ref 65–99)
HCT VFR BLD AUTO: 30.9 % (ref 37.5–51)
HGB BLD-MCNC: 10.2 G/DL (ref 13–17.7)
MCH RBC QN AUTO: 31.2 PG (ref 26.6–33)
MCHC RBC AUTO-ENTMCNC: 32.9 G/DL (ref 31.5–35.7)
MCV RBC AUTO: 94.7 FL (ref 79–97)
PLATELET # BLD AUTO: 256 10*3/MM3 (ref 140–450)
PMV BLD AUTO: 10.9 FL (ref 6–12)
POTASSIUM SERPL-SCNC: 4.4 MMOL/L (ref 3.5–5.2)
RBC # BLD AUTO: 3.26 10*6/MM3 (ref 4.14–5.8)
SODIUM SERPL-SCNC: 134 MMOL/L (ref 136–145)
WBC # BLD AUTO: 12.8 10*3/MM3 (ref 3.4–10.8)

## 2020-11-30 PROCEDURE — U0004 COV-19 TEST NON-CDC HGH THRU: HCPCS | Performed by: INTERNAL MEDICINE

## 2020-11-30 PROCEDURE — 85027 COMPLETE CBC AUTOMATED: CPT | Performed by: INTERNAL MEDICINE

## 2020-11-30 PROCEDURE — 80048 BASIC METABOLIC PNL TOTAL CA: CPT | Performed by: INTERNAL MEDICINE

## 2020-11-30 PROCEDURE — 71046 X-RAY EXAM CHEST 2 VIEWS: CPT

## 2020-12-01 ENCOUNTER — LAB REQUISITION (OUTPATIENT)
Dept: LAB | Facility: HOSPITAL | Age: 85
End: 2020-12-01

## 2020-12-01 DIAGNOSIS — R50.9 FEVER, UNSPECIFIED: ICD-10-CM

## 2020-12-01 DIAGNOSIS — Z00.00 ENCOUNTER FOR GENERAL ADULT MEDICAL EXAMINATION WITHOUT ABNORMAL FINDINGS: ICD-10-CM

## 2020-12-01 DIAGNOSIS — L03.818 CELLULITIS OF OTHER SITES: ICD-10-CM

## 2020-12-01 LAB
ANION GAP SERPL CALCULATED.3IONS-SCNC: 9 MMOL/L (ref 5–15)
BASOPHILS # BLD AUTO: 0.1 10*3/MM3 (ref 0–0.2)
BASOPHILS NFR BLD AUTO: 0.7 % (ref 0–1.5)
BILIRUB UR QL STRIP: NEGATIVE
BUN SERPL-MCNC: 24 MG/DL (ref 8–23)
BUN/CREAT SERPL: 25.3 (ref 7–25)
CALCIUM SPEC-SCNC: 8.2 MG/DL (ref 8.2–9.6)
CHLORIDE SERPL-SCNC: 94 MMOL/L (ref 98–107)
CLARITY UR: CLEAR
CO2 SERPL-SCNC: 29 MMOL/L (ref 22–29)
COLOR UR: YELLOW
CREAT SERPL-MCNC: 0.95 MG/DL (ref 0.76–1.27)
DEPRECATED RDW RBC AUTO: 44.6 FL (ref 37–54)
EOSINOPHIL # BLD AUTO: 0.3 10*3/MM3 (ref 0–0.4)
EOSINOPHIL NFR BLD AUTO: 2.8 % (ref 0.3–6.2)
ERYTHROCYTE [DISTWIDTH] IN BLOOD BY AUTOMATED COUNT: 13.6 % (ref 12.3–15.4)
GFR SERPL CREATININE-BSD FRML MDRD: 74 ML/MIN/1.73
GLUCOSE SERPL-MCNC: 93 MG/DL (ref 65–99)
GLUCOSE UR STRIP-MCNC: ABNORMAL MG/DL
HCT VFR BLD AUTO: 28 % (ref 37.5–51)
HGB BLD-MCNC: 9.4 G/DL (ref 13–17.7)
HGB UR QL STRIP.AUTO: NEGATIVE
KETONES UR QL STRIP: NEGATIVE
LEUKOCYTE ESTERASE UR QL STRIP.AUTO: NEGATIVE
LYMPHOCYTES # BLD AUTO: 2 10*3/MM3 (ref 0.7–3.1)
LYMPHOCYTES NFR BLD AUTO: 17 % (ref 19.6–45.3)
MCH RBC QN AUTO: 31.7 PG (ref 26.6–33)
MCHC RBC AUTO-ENTMCNC: 33.5 G/DL (ref 31.5–35.7)
MCV RBC AUTO: 94.6 FL (ref 79–97)
MONOCYTES # BLD AUTO: 1.1 10*3/MM3 (ref 0.1–0.9)
MONOCYTES NFR BLD AUTO: 8.9 % (ref 5–12)
NEUTROPHILS NFR BLD AUTO: 70.6 % (ref 42.7–76)
NEUTROPHILS NFR BLD AUTO: 8.4 10*3/MM3 (ref 1.7–7)
NITRITE UR QL STRIP: NEGATIVE
NRBC BLD AUTO-RTO: 0.1 /100 WBC (ref 0–0.2)
NT-PROBNP SERPL-MCNC: 1282 PG/ML (ref 0–1800)
PH UR STRIP.AUTO: 6 [PH] (ref 5–8)
PLATELET # BLD AUTO: 234 10*3/MM3 (ref 140–450)
PMV BLD AUTO: 10.8 FL (ref 6–12)
POTASSIUM SERPL-SCNC: 4.1 MMOL/L (ref 3.5–5.2)
PROT UR QL STRIP: NEGATIVE
RBC # BLD AUTO: 2.96 10*6/MM3 (ref 4.14–5.8)
SARS-COV-2 RNA RESP QL NAA+PROBE: NOT DETECTED
SODIUM SERPL-SCNC: 132 MMOL/L (ref 136–145)
SP GR UR STRIP: 1.01 (ref 1–1.03)
UROBILINOGEN UR QL STRIP: ABNORMAL
WBC # BLD AUTO: 11.9 10*3/MM3 (ref 3.4–10.8)

## 2020-12-01 PROCEDURE — 80048 BASIC METABOLIC PNL TOTAL CA: CPT

## 2020-12-01 PROCEDURE — 83880 ASSAY OF NATRIURETIC PEPTIDE: CPT

## 2020-12-01 PROCEDURE — 85025 COMPLETE CBC W/AUTO DIFF WBC: CPT

## 2020-12-01 PROCEDURE — 81003 URINALYSIS AUTO W/O SCOPE: CPT | Performed by: INTERNAL MEDICINE

## 2020-12-07 ENCOUNTER — LAB REQUISITION (OUTPATIENT)
Dept: LAB | Facility: HOSPITAL | Age: 85
End: 2020-12-07

## 2020-12-07 DIAGNOSIS — Z00.00 ENCOUNTER FOR GENERAL ADULT MEDICAL EXAMINATION WITHOUT ABNORMAL FINDINGS: ICD-10-CM

## 2020-12-07 LAB
ANION GAP SERPL CALCULATED.3IONS-SCNC: 9 MMOL/L (ref 5–15)
BUN SERPL-MCNC: 19 MG/DL (ref 8–23)
BUN/CREAT SERPL: 21.8 (ref 7–25)
CALCIUM SPEC-SCNC: 8.3 MG/DL (ref 8.2–9.6)
CHLORIDE SERPL-SCNC: 97 MMOL/L (ref 98–107)
CO2 SERPL-SCNC: 28 MMOL/L (ref 22–29)
CREAT SERPL-MCNC: 0.87 MG/DL (ref 0.76–1.27)
DEPRECATED RDW RBC AUTO: 42.9 FL (ref 37–54)
ERYTHROCYTE [DISTWIDTH] IN BLOOD BY AUTOMATED COUNT: 13 % (ref 12.3–15.4)
GFR SERPL CREATININE-BSD FRML MDRD: 82 ML/MIN/1.73
GLUCOSE SERPL-MCNC: 95 MG/DL (ref 65–99)
HCT VFR BLD AUTO: 27 % (ref 37.5–51)
HGB BLD-MCNC: 9.1 G/DL (ref 13–17.7)
MCH RBC QN AUTO: 31.5 PG (ref 26.6–33)
MCHC RBC AUTO-ENTMCNC: 33.8 G/DL (ref 31.5–35.7)
MCV RBC AUTO: 93.3 FL (ref 79–97)
PLATELET # BLD AUTO: 373 10*3/MM3 (ref 140–450)
PMV BLD AUTO: 9.6 FL (ref 6–12)
POTASSIUM SERPL-SCNC: 4.3 MMOL/L (ref 3.5–5.2)
RBC # BLD AUTO: 2.9 10*6/MM3 (ref 4.14–5.8)
SODIUM SERPL-SCNC: 134 MMOL/L (ref 136–145)
WBC # BLD AUTO: 9.2 10*3/MM3 (ref 3.4–10.8)

## 2020-12-07 PROCEDURE — 80048 BASIC METABOLIC PNL TOTAL CA: CPT | Performed by: INTERNAL MEDICINE

## 2020-12-07 PROCEDURE — 85027 COMPLETE CBC AUTOMATED: CPT | Performed by: INTERNAL MEDICINE

## 2020-12-08 PROCEDURE — 87086 URINE CULTURE/COLONY COUNT: CPT | Performed by: INTERNAL MEDICINE

## 2020-12-08 PROCEDURE — 81001 URINALYSIS AUTO W/SCOPE: CPT | Performed by: INTERNAL MEDICINE

## 2020-12-09 ENCOUNTER — LAB REQUISITION (OUTPATIENT)
Dept: LAB | Facility: HOSPITAL | Age: 85
End: 2020-12-09

## 2020-12-09 DIAGNOSIS — Z00.00 ENCOUNTER FOR GENERAL ADULT MEDICAL EXAMINATION WITHOUT ABNORMAL FINDINGS: ICD-10-CM

## 2020-12-09 LAB
BACTERIA UR QL AUTO: ABNORMAL /HPF
BILIRUB UR QL STRIP: ABNORMAL
BILIRUB UR QL STRIP: NEGATIVE
CLARITY UR: ABNORMAL
CLARITY UR: CLEAR
COLOR UR: ABNORMAL
COLOR UR: YELLOW
GLUCOSE UR STRIP-MCNC: ABNORMAL MG/DL
GLUCOSE UR STRIP-MCNC: NEGATIVE MG/DL
HGB UR QL STRIP.AUTO: NEGATIVE
HGB UR QL STRIP.AUTO: NEGATIVE
HYALINE CASTS UR QL AUTO: ABNORMAL /LPF
KETONES UR QL STRIP: ABNORMAL
KETONES UR QL STRIP: NEGATIVE
LEUKOCYTE ESTERASE UR QL STRIP.AUTO: ABNORMAL
LEUKOCYTE ESTERASE UR QL STRIP.AUTO: NEGATIVE
MUCOUS THREADS URNS QL MICRO: ABNORMAL /HPF
NITRITE UR QL STRIP: NEGATIVE
NITRITE UR QL STRIP: NEGATIVE
PH UR STRIP.AUTO: 5.5 [PH] (ref 5–8)
PH UR STRIP.AUTO: 6 [PH] (ref 5–8)
PROT UR QL STRIP: ABNORMAL
PROT UR QL STRIP: NEGATIVE
RBC # UR: ABNORMAL /HPF
REF LAB TEST METHOD: ABNORMAL
SP GR UR STRIP: 1.01 (ref 1–1.03)
SP GR UR STRIP: 1.03 (ref 1–1.03)
SQUAMOUS #/AREA URNS HPF: ABNORMAL /HPF
UROBILINOGEN UR QL STRIP: ABNORMAL
UROBILINOGEN UR QL STRIP: NORMAL
WBC UR QL AUTO: ABNORMAL /HPF

## 2020-12-09 PROCEDURE — 81003 URINALYSIS AUTO W/O SCOPE: CPT | Performed by: INTERNAL MEDICINE

## 2020-12-10 LAB — BACTERIA SPEC AEROBE CULT: ABNORMAL

## 2020-12-14 ENCOUNTER — LAB REQUISITION (OUTPATIENT)
Dept: LAB | Facility: HOSPITAL | Age: 85
End: 2020-12-14

## 2020-12-14 DIAGNOSIS — Z00.00 ENCOUNTER FOR GENERAL ADULT MEDICAL EXAMINATION WITHOUT ABNORMAL FINDINGS: ICD-10-CM

## 2020-12-14 LAB
ANION GAP SERPL CALCULATED.3IONS-SCNC: 6 MMOL/L (ref 5–15)
BUN SERPL-MCNC: 19 MG/DL (ref 8–23)
BUN/CREAT SERPL: 18.6 (ref 7–25)
CALCIUM SPEC-SCNC: 8.6 MG/DL (ref 8.2–9.6)
CHLORIDE SERPL-SCNC: 104 MMOL/L (ref 98–107)
CO2 SERPL-SCNC: 29 MMOL/L (ref 22–29)
CREAT SERPL-MCNC: 1.02 MG/DL (ref 0.76–1.27)
DEPRECATED RDW RBC AUTO: 44.6 FL (ref 37–54)
ERYTHROCYTE [DISTWIDTH] IN BLOOD BY AUTOMATED COUNT: 13.5 % (ref 12.3–15.4)
GFR SERPL CREATININE-BSD FRML MDRD: 69 ML/MIN/1.73
GLUCOSE SERPL-MCNC: 96 MG/DL (ref 65–99)
HCT VFR BLD AUTO: 27.9 % (ref 37.5–51)
HGB BLD-MCNC: 9.2 G/DL (ref 13–17.7)
MCH RBC QN AUTO: 30.9 PG (ref 26.6–33)
MCHC RBC AUTO-ENTMCNC: 33.1 G/DL (ref 31.5–35.7)
MCV RBC AUTO: 93.3 FL (ref 79–97)
PLATELET # BLD AUTO: 285 10*3/MM3 (ref 140–450)
PMV BLD AUTO: 10.1 FL (ref 6–12)
POTASSIUM SERPL-SCNC: 4.7 MMOL/L (ref 3.5–5.2)
RBC # BLD AUTO: 2.99 10*6/MM3 (ref 4.14–5.8)
SODIUM SERPL-SCNC: 139 MMOL/L (ref 136–145)
WBC # BLD AUTO: 8.8 10*3/MM3 (ref 3.4–10.8)

## 2020-12-14 PROCEDURE — 85027 COMPLETE CBC AUTOMATED: CPT | Performed by: INTERNAL MEDICINE

## 2020-12-14 PROCEDURE — 80048 BASIC METABOLIC PNL TOTAL CA: CPT | Performed by: INTERNAL MEDICINE

## 2020-12-21 ENCOUNTER — LAB REQUISITION (OUTPATIENT)
Dept: LAB | Facility: HOSPITAL | Age: 85
End: 2020-12-21

## 2020-12-21 DIAGNOSIS — Z00.00 ENCOUNTER FOR GENERAL ADULT MEDICAL EXAMINATION WITHOUT ABNORMAL FINDINGS: ICD-10-CM

## 2020-12-21 LAB
ANION GAP SERPL CALCULATED.3IONS-SCNC: 7 MMOL/L (ref 5–15)
BUN SERPL-MCNC: 22 MG/DL (ref 8–23)
BUN/CREAT SERPL: 20 (ref 7–25)
CALCIUM SPEC-SCNC: 8.5 MG/DL (ref 8.2–9.6)
CHLORIDE SERPL-SCNC: 100 MMOL/L (ref 98–107)
CO2 SERPL-SCNC: 28 MMOL/L (ref 22–29)
CREAT SERPL-MCNC: 1.1 MG/DL (ref 0.76–1.27)
DEPRECATED RDW RBC AUTO: 48.6 FL (ref 37–54)
ERYTHROCYTE [DISTWIDTH] IN BLOOD BY AUTOMATED COUNT: 14.7 % (ref 12.3–15.4)
GFR SERPL CREATININE-BSD FRML MDRD: 63 ML/MIN/1.73
GLUCOSE SERPL-MCNC: 89 MG/DL (ref 65–99)
HCT VFR BLD AUTO: 29.8 % (ref 37.5–51)
HGB BLD-MCNC: 9.8 G/DL (ref 13–17.7)
MCH RBC QN AUTO: 30.5 PG (ref 26.6–33)
MCHC RBC AUTO-ENTMCNC: 32.9 G/DL (ref 31.5–35.7)
MCV RBC AUTO: 92.8 FL (ref 79–97)
PLATELET # BLD AUTO: 219 10*3/MM3 (ref 140–450)
PMV BLD AUTO: 10.6 FL (ref 6–12)
POTASSIUM SERPL-SCNC: 4.3 MMOL/L (ref 3.5–5.2)
RBC # BLD AUTO: 3.21 10*6/MM3 (ref 4.14–5.8)
SODIUM SERPL-SCNC: 135 MMOL/L (ref 136–145)
WBC # BLD AUTO: 10.4 10*3/MM3 (ref 3.4–10.8)

## 2020-12-21 PROCEDURE — 80048 BASIC METABOLIC PNL TOTAL CA: CPT | Performed by: INTERNAL MEDICINE

## 2020-12-21 PROCEDURE — 85027 COMPLETE CBC AUTOMATED: CPT | Performed by: INTERNAL MEDICINE

## 2021-01-04 ENCOUNTER — TELEPHONE (OUTPATIENT)
Dept: FAMILY MEDICINE CLINIC | Facility: CLINIC | Age: 86
End: 2021-01-04

## 2021-01-04 NOTE — TELEPHONE ENCOUNTER
Hub staff attempted to follow warm transfer process and was unsuccessful     Caller: TODD BAIN    Relationship to patient: Emergency Contact    Best call back number: [phone]      Patient is needing: RETURNING CALL TO [facility], PLEASE CALL BACK

## 2021-01-04 NOTE — TELEPHONE ENCOUNTER
Hub staff attempted to follow warm transfer process and was unsuccessful     Caller: TODD BAIN    Relationship to patient: Emergency Contact    Best call back number:[phone]    Patient is needing: PATIENT DAUGHTER SAID SHE WAS DISCONNECTED WHILE TALKING TO RITA. TRIED TO GET HER BACK TO OFFICE AND UNABLE TO.

## 2021-01-04 NOTE — TELEPHONE ENCOUNTER
Spoke with patient's daughter and scheduled a hospital/rehab follow up with SANJUANITA on 01/13/2021 at 1pm.

## 2021-01-04 NOTE — TELEPHONE ENCOUNTER
Caller: TODD BAIN    Relationship to patient: Emergency Contact    Best call back number:  [phone]    Patient is needing: Patient called in and was wanting to return call to [facility] . Hub attempted to warm transfer not successful .

## 2021-01-04 NOTE — TELEPHONE ENCOUNTER
Caller: TODD BAIN    Relationship to patient: DAUGHTER    Best call back number: [phone] (M)    New or established patient?  [] New  [x] Established    Date of discharge: 12/30/2020    Facility discharged from: [facility]    Diagnosis/Symptoms: LEG INFECTION    Length of stay (If applicable): JUST UNDER 4 WEEKS    Specialty Only: Did you see a [facility] health provider?    [x] Yes  [] No  If so, who? KORTNEY CORTEZ    PATIENT'S DAUGHTER NEEDS A CALL BACK TO SCHEDULE THE HOSPITAL FOLLOW UP APPOINTMENT. ATTEMPTED TO WARM TRANSFER UNSUCCESSFULLY. NO APPOINTMENTS AVAILABLE ON SCHEDULE WITHIN 7 DAYS.    PLEASE ADVISE.

## 2021-01-05 ENCOUNTER — PATIENT OUTREACH (OUTPATIENT)
Dept: CASE MANAGEMENT | Facility: OTHER | Age: 86
End: 2021-01-05

## 2021-01-05 NOTE — OUTREACH NOTE
Patient Outreach Note    Pt discharged from [facility] to home with HH on 12/30/21 per PING notification. RN-ACM outreach attempts to [facility] for additional information unsuccessful, unable to reach staff. RN-ACM outreach call made to pt. Pt reports he is current with HH PT, he's unsure which HH agency, states his daughter coordinated scheduling. Pt reports to have all needed DME including walker, raised commode seat, and shower chair. He denies medication questions, states his son manages medications for him. Pt lives alone, has support from his children. He has follow up PCP appt scheduled for 1/13/21. No questions or concerns per pt. RN-ACM contact information and [facility] 24 hour nurse line number provided. Advised pt to call with any needs. Pt agrees to follow up outreach in 2 weeks, scheduled.     Jose Pizano RN  Ambulatory     1/5/2021, 15:03 EST

## 2021-01-13 ENCOUNTER — OFFICE VISIT (OUTPATIENT)
Dept: FAMILY MEDICINE CLINIC | Facility: CLINIC | Age: 86
End: 2021-01-13

## 2021-01-13 ENCOUNTER — LAB (OUTPATIENT)
Dept: FAMILY MEDICINE CLINIC | Facility: CLINIC | Age: 86
End: 2021-01-13

## 2021-01-13 VITALS
SYSTOLIC BLOOD PRESSURE: 126 MMHG | HEART RATE: 53 BPM | DIASTOLIC BLOOD PRESSURE: 62 MMHG | HEIGHT: 71 IN | TEMPERATURE: 97.1 F | BODY MASS INDEX: 19.82 KG/M2 | WEIGHT: 141.6 LBS | OXYGEN SATURATION: 98 % | RESPIRATION RATE: 16 BRPM

## 2021-01-13 DIAGNOSIS — Z76.89 ENCOUNTER FOR SUPPORT AND COORDINATION OF TRANSITION OF CARE: ICD-10-CM

## 2021-01-13 DIAGNOSIS — I48.0 PAROXYSMAL ATRIAL FIBRILLATION (HCC): ICD-10-CM

## 2021-01-13 DIAGNOSIS — D50.9 IRON DEFICIENCY ANEMIA, UNSPECIFIED IRON DEFICIENCY ANEMIA TYPE: Primary | ICD-10-CM

## 2021-01-13 DIAGNOSIS — L03.116 CELLULITIS OF LEFT LEG: ICD-10-CM

## 2021-01-13 DIAGNOSIS — D50.9 IRON DEFICIENCY ANEMIA, UNSPECIFIED IRON DEFICIENCY ANEMIA TYPE: ICD-10-CM

## 2021-01-13 LAB
BASOPHILS # BLD AUTO: 0.06 10*3/MM3 (ref 0–0.2)
BASOPHILS NFR BLD AUTO: 0.7 % (ref 0–1.5)
DEPRECATED RDW RBC AUTO: 46.1 FL (ref 37–54)
EOSINOPHIL # BLD AUTO: 0.17 10*3/MM3 (ref 0–0.4)
EOSINOPHIL NFR BLD AUTO: 2.1 % (ref 0.3–6.2)
ERYTHROCYTE [DISTWIDTH] IN BLOOD BY AUTOMATED COUNT: 14 % (ref 12.3–15.4)
FOLATE SERPL-MCNC: >20 NG/ML (ref 4.78–24.2)
HCT VFR BLD AUTO: 36.4 % (ref 37.5–51)
HGB BLD-MCNC: 11.9 G/DL (ref 13–17.7)
IMM GRANULOCYTES # BLD AUTO: 0.03 10*3/MM3 (ref 0–0.05)
IMM GRANULOCYTES NFR BLD AUTO: 0.4 % (ref 0–0.5)
IRON 24H UR-MRATE: 50 MCG/DL (ref 59–158)
IRON SATN MFR SERPL: 24 % (ref 20–50)
LYMPHOCYTES # BLD AUTO: 1.85 10*3/MM3 (ref 0.7–3.1)
LYMPHOCYTES NFR BLD AUTO: 22.7 % (ref 19.6–45.3)
MCH RBC QN AUTO: 30.6 PG (ref 26.6–33)
MCHC RBC AUTO-ENTMCNC: 32.7 G/DL (ref 31.5–35.7)
MCV RBC AUTO: 93.6 FL (ref 79–97)
MONOCYTES # BLD AUTO: 0.61 10*3/MM3 (ref 0.1–0.9)
MONOCYTES NFR BLD AUTO: 7.5 % (ref 5–12)
NEUTROPHILS NFR BLD AUTO: 5.42 10*3/MM3 (ref 1.7–7)
NEUTROPHILS NFR BLD AUTO: 66.6 % (ref 42.7–76)
NRBC BLD AUTO-RTO: 0 /100 WBC (ref 0–0.2)
PLATELET # BLD AUTO: 207 10*3/MM3 (ref 140–450)
PMV BLD AUTO: 12.9 FL (ref 6–12)
RBC # BLD AUTO: 3.89 10*6/MM3 (ref 4.14–5.8)
TIBC SERPL-MCNC: 210 MCG/DL (ref 298–536)
TRANSFERRIN SERPL-MCNC: 141 MG/DL (ref 200–360)
VIT B12 BLD-MCNC: 1029 PG/ML (ref 211–946)
WBC # BLD AUTO: 8.14 10*3/MM3 (ref 3.4–10.8)

## 2021-01-13 PROCEDURE — 83540 ASSAY OF IRON: CPT | Performed by: INTERNAL MEDICINE

## 2021-01-13 PROCEDURE — 99495 TRANSJ CARE MGMT MOD F2F 14D: CPT | Performed by: INTERNAL MEDICINE

## 2021-01-13 PROCEDURE — 82746 ASSAY OF FOLIC ACID SERUM: CPT | Performed by: INTERNAL MEDICINE

## 2021-01-13 PROCEDURE — 1111F DSCHRG MED/CURRENT MED MERGE: CPT | Performed by: INTERNAL MEDICINE

## 2021-01-13 PROCEDURE — 84466 ASSAY OF TRANSFERRIN: CPT | Performed by: INTERNAL MEDICINE

## 2021-01-13 PROCEDURE — 82607 VITAMIN B-12: CPT | Performed by: INTERNAL MEDICINE

## 2021-01-13 PROCEDURE — 85025 COMPLETE CBC W/AUTO DIFF WBC: CPT | Performed by: INTERNAL MEDICINE

## 2021-01-13 PROCEDURE — 36415 COLL VENOUS BLD VENIPUNCTURE: CPT

## 2021-01-13 RX ORDER — DOCUSATE SODIUM 250 MG
250 CAPSULE ORAL DAILY
COMMUNITY

## 2021-01-13 RX ORDER — MULTIPLE VITAMINS W/ MINERALS TAB 9MG-400MCG
1 TAB ORAL DAILY
COMMUNITY
End: 2021-04-01

## 2021-01-13 RX ORDER — TAMSULOSIN HYDROCHLORIDE 0.4 MG/1
1 CAPSULE ORAL DAILY
COMMUNITY
End: 2021-01-26 | Stop reason: SDUPTHER

## 2021-01-13 RX ORDER — TRAMADOL HYDROCHLORIDE 50 MG/1
50 TABLET ORAL EVERY 6 HOURS PRN
COMMUNITY
End: 2021-02-25

## 2021-01-13 RX ORDER — ASPIRIN 81 MG/1
81 TABLET, CHEWABLE ORAL DAILY
COMMUNITY

## 2021-01-15 ENCOUNTER — TELEPHONE (OUTPATIENT)
Dept: FAMILY MEDICINE CLINIC | Facility: CLINIC | Age: 86
End: 2021-01-15

## 2021-01-15 NOTE — TELEPHONE ENCOUNTER
PATIENT'S SON GREGOIRA CALLED AND STATES THAT THEY MISSED A PHONE CALL FROM THE OFFICE.    WARM TRANSFER UNSUCCESSFUL.    PLEASE CALL BACK @ [phone]

## 2021-01-26 ENCOUNTER — PATIENT OUTREACH (OUTPATIENT)
Dept: CASE MANAGEMENT | Facility: OTHER | Age: 86
End: 2021-01-26

## 2021-01-26 RX ORDER — TAMSULOSIN HYDROCHLORIDE 0.4 MG/1
1 CAPSULE ORAL DAILY
Qty: 30 CAPSULE | Refills: 6 | Status: SHIPPED | OUTPATIENT
Start: 2021-01-26 | End: 2021-01-27

## 2021-01-26 NOTE — OUTREACH NOTE
Care Plan Note      Responses   Annual Wellness Visit:   Patient Will Schedule   Care Gaps Addressed  Colon Cancer Screening, Flu Shot, Pneumonia Vaccine, Other (See Comment) [lipid panel, shingles vaccine, covid vaccine]   Colon Cancer Screening Type  Exempt   Flu Shot Status  Up to Date   Pneumonia Vaccine Status  Up to Date   Care Gap Comments  covid vaccine- scheduled for 1/31/21   Other Patient Education/Resources   24/7 [facility] Nurse Call Line   24/7 Nurse Call Line Education Method  Send Materials   Does patient have depression diagnosis?  No   Advanced Directives:  Patient Has        The main concerns and/or symptoms the patient would like to address are: Follow up RN-ACM outreach call made to pt. Pt states he's doing fine. He denies any symptoms or concerns.    Education/instruction provided by Care Coordinator: reviewed with pt: HH status- pt reports still current with  PT, states it has been going great, he's doing home exercises himself, using his walker with ambulation; safety/fall precautions; med adherence- pt reports compliant, states his daughter manages his medi-planner; diet; medical appointments; RN-ACM contact information; [facility] 24 hour nurse line number; health maintenance gaps; AD- on file; MyChart- pt not interested; SDOH- pt denies food, medication, or transportation insecurities. Pt has good family support. Discussed AWV, pt will complete. He has next routine PCP appt scheduled. No questions or concerns per pt. Pt appreciates the phone call and declines need for further calls. Advised pt to call with any needs. Follow up outreach as needed.    Follow Up Outreach Due: as needed    Jose Pizano RN  Ambulatory     1/26/2021, 11:24 EST

## 2021-01-26 NOTE — OUTREACH NOTE
Care Coordination Assessment    Documented/Reviewed By: Jose Pizano RN Date/time: 1/26/2021 11:15 AM   Assessment completed with: patient  Enrolled in care management program: No  Living arrangement: alone  Support system: children, home care staff, family  Type of residence: private residence  Home care services: Yes  Equipment used at home: walker, tub seat (Comment: raised commode seat)  Communication device: Yes  Bed or wheelchair confined: No  Inadequate nutrition: No  Medication adherence problem: No  History of fall(s) in last 6 months: No  Difficulty keeping appointments: No  Family aware of the patient's advance care planning wishes: Yes

## 2021-01-26 NOTE — TELEPHONE ENCOUNTER
Caller: BAIN, TODD    Relationship: Emergency Contact    Best call back number: [phone] (M)    Medication needed:   Requested Prescriptions     Pending Prescriptions Disp Refills   • tamsulosin (FLOMAX) 0.4 MG capsule 24 hr capsule 30 capsule      Sig: Take 1 capsule by mouth Daily.       When do you need the refill by: ASAP    What details did the patient provide when requesting the medication: PATIENT ONLY HAS A COUPLE OF DAYS LEFT     Does the patient have less than a 3 day supply:  [x] Yes  [] No    What is the patient's preferred pharmacy: [id number] [place] [place] - [address] - [phone] [facility] [phone] FX

## 2021-01-27 RX ORDER — TAMSULOSIN HYDROCHLORIDE 0.4 MG/1
1 CAPSULE ORAL DAILY
Qty: 90 CAPSULE | Refills: 1 | Status: SHIPPED | OUTPATIENT
Start: 2021-01-27 | End: 2021-07-06

## 2021-01-28 ENCOUNTER — TELEPHONE (OUTPATIENT)
Dept: FAMILY MEDICINE CLINIC | Facility: CLINIC | Age: 86
End: 2021-01-28

## 2021-01-28 NOTE — TELEPHONE ENCOUNTER
Hilda it looks like you talked to the patient on 1/15 about the results-   Call pt and see if he remembers or may be his daughter asking- but can let know we already talked on 1/15 and repeat the message

## 2021-01-28 NOTE — TELEPHONE ENCOUNTER
Caller: TODD BAIN    Relationship: Emergency Contact    Best call back number: [phone]       Caller requesting test results: LAB     What test was performed:LAB    When was the test performed: 01/13/2021    Where was the test performed: OFFICE     Additional notes:

## 2021-01-31 PROCEDURE — 93298 REM INTERROG DEV EVAL SCRMS: CPT | Performed by: INTERNAL MEDICINE

## 2021-01-31 PROCEDURE — G2066 INTER DEVC REMOTE 30D: HCPCS | Performed by: INTERNAL MEDICINE

## 2021-02-07 PROBLEM — D50.9 IRON DEFICIENCY ANEMIA: Status: ACTIVE | Noted: 2021-02-07

## 2021-02-07 PROBLEM — Z76.89 ENCOUNTER FOR SUPPORT AND COORDINATION OF TRANSITION OF CARE: Status: ACTIVE | Noted: 2021-02-07

## 2021-02-25 ENCOUNTER — OFFICE VISIT (OUTPATIENT)
Dept: FAMILY MEDICINE CLINIC | Facility: CLINIC | Age: 86
End: 2021-02-25

## 2021-02-25 VITALS
HEART RATE: 56 BPM | HEIGHT: 71 IN | OXYGEN SATURATION: 98 % | SYSTOLIC BLOOD PRESSURE: 136 MMHG | DIASTOLIC BLOOD PRESSURE: 64 MMHG | WEIGHT: 156 LBS | RESPIRATION RATE: 17 BRPM | BODY MASS INDEX: 21.84 KG/M2 | TEMPERATURE: 97.1 F

## 2021-02-25 DIAGNOSIS — R09.89 DISTENTION OF VEIN: Primary | ICD-10-CM

## 2021-02-25 PROCEDURE — 99212 OFFICE O/P EST SF 10 MIN: CPT | Performed by: NURSE PRACTITIONER

## 2021-02-25 NOTE — PROGRESS NOTES
"Subjective   Sage Flores is a 89 y.o. male presents for   Chief Complaint   Patient presents with   • Arm Pain       Health Maintenance Due   Topic Date Due   • TDAP/TD VACCINES (1 - Tdap) 09/07/1949   • ZOSTER VACCINE (1 of 2) 09/07/1980   • ANNUAL WELLNESS VISIT  07/01/2019   • LIPID PANEL  08/23/2019       History of Present Illness   Pt present with concern for \"lump\" on left forearm.  He denies pain, swelling, injury, or bruising and states it has been there for a few weeks.  He states it is worse in the morning and improves throughout the day.  He states he is concerned he has blood clot. He states the area became enlarged after several blood pressures were taken on that arm.      Vitals:    02/25/21 1111   BP: 136/64   Pulse: 56   Resp: 17   Temp: 97.1 °F (36.2 °C)   SpO2: 98%   Weight: 70.8 kg (156 lb)   Height: 180.3 cm (71\")     Body mass index is 21.76 kg/m².    Current Outpatient Medications on File Prior to Visit   Medication Sig Dispense Refill   • aspirin 81 MG chewable tablet Chew 81 mg Daily.     • azelastine (ASTEPRO) 0.15 % solution nasal spray USE 2 SPRAYS IN EACH NOSTRIL TWICE DAILY AS DIRECTED BY PROVIDER (Patient taking differently: 2 sprays into the nostril(s) as directed by provider 2 (Two) Times a Day.) 30 mL 3   • diazePAM (VALIUM) 5 MG tablet TAKE 1 TABLET BY MOUTH TWICE DAILY 60 tablet 2   • docusate sodium (COLACE) 250 MG capsule Take 250 mg by mouth Daily.     • gabapentin (NEURONTIN) 100 MG capsule TAKE 1 CAPSULE BY MOUTH TWO TIMES DAILY 180 capsule 2   • multivitamin with minerals tablet tablet Take 1 tablet by mouth Daily.     • omeprazole (priLOSEC) 20 MG capsule Take 20 mg by mouth Daily.     • polyethylene glycol (MIRALAX) packet Take 17 g by mouth Daily.     • tamsulosin (FLOMAX) 0.4 MG capsule 24 hr capsule TAKE 1 CAPSULE BY MOUTH DAILY 90 capsule 1   • Polyvinyl Alcohol-Povidone 5-6 MG/ML solution Apply 1 drop to eye(s) as directed by provider As Needed (dry eyes).     • " [DISCONTINUED] traMADol (ULTRAM) 50 MG tablet Take 50 mg by mouth Every 6 (Six) Hours As Needed for Moderate Pain .       No current facility-administered medications on file prior to visit.        The following portions of the patient's history were reviewed and updated as appropriate: allergies, current medications, past family history, past medical history, past social history, past surgical history, and problem list.    Review of Systems   Constitutional: Negative for chills and fever.   HENT: Negative for sinus pressure and sore throat.    Eyes: Negative for blurred vision.   Respiratory: Negative for cough and shortness of breath.    Cardiovascular: Negative for chest pain.        Enlarged blood vessel right upper extremity   Gastrointestinal: Negative for abdominal pain.   Endocrine: Negative.    Genitourinary: Negative.    Musculoskeletal: Negative for arthralgias and joint swelling.   Skin: Negative for color change.   Allergic/Immunologic: Negative.    Neurological: Negative for dizziness.   Hematological: Bruises/bleeds easily.   Psychiatric/Behavioral: Negative for behavioral problems.       Objective   Physical Exam  PHQ-9 Total Score: 0    Assessment/Plan   Diagnoses and all orders for this visit:    1. Distention of vein (Primary)  Comments:  pt and family instructed to monitor for increased size, pain, swelling and will order US if indicated.     pt instructed to elevate arm, continue daily aspirin and have blood pressures monitored opposite side.     There are no Patient Instructions on file for this visit.

## 2021-03-03 PROCEDURE — 93298 REM INTERROG DEV EVAL SCRMS: CPT | Performed by: INTERNAL MEDICINE

## 2021-03-03 PROCEDURE — G2066 INTER DEVC REMOTE 30D: HCPCS | Performed by: INTERNAL MEDICINE

## 2021-03-17 RX ORDER — GABAPENTIN 100 MG/1
100 CAPSULE ORAL 2 TIMES DAILY
Qty: 180 CAPSULE | Refills: 2 | Status: SHIPPED | OUTPATIENT
Start: 2021-03-17 | End: 2021-12-17

## 2021-03-17 NOTE — TELEPHONE ENCOUNTER
Caller: TAYA TODD    Relationship: Emergency Contact    Best call back number:[phone]    Medication needed:   Requested Prescriptions     Pending Prescriptions Disp Refills   • gabapentin (NEURONTIN) 100 MG capsule 180 capsule 2     Sig: Take 1 capsule by mouth 2 (Two) Times a Day.       When do you need the refill by: 1 WEEK      Does the patient have less than a 3 day supply:  [] Yes  [x] No    What is the patient's preferred pharmacy: [id number] - [place], [place] - [address] AT [facility] [address]  - [phone]  - [phone] FX

## 2021-03-26 ENCOUNTER — APPOINTMENT (OUTPATIENT)
Dept: GENERAL RADIOLOGY | Facility: HOSPITAL | Age: 86
End: 2021-03-26

## 2021-03-26 ENCOUNTER — APPOINTMENT (OUTPATIENT)
Dept: CARDIOLOGY | Facility: HOSPITAL | Age: 86
End: 2021-03-26

## 2021-03-26 ENCOUNTER — HOSPITAL ENCOUNTER (EMERGENCY)
Facility: HOSPITAL | Age: 86
Discharge: HOME | End: 2021-03-26
Admitting: EMERGENCY MEDICINE

## 2021-03-26 VITALS
TEMPERATURE: 98 F | OXYGEN SATURATION: 96 % | BODY MASS INDEX: 22.4 KG/M2 | HEART RATE: 62 BPM | HEIGHT: 71 IN | WEIGHT: 160 LBS | SYSTOLIC BLOOD PRESSURE: 159 MMHG | DIASTOLIC BLOOD PRESSURE: 65 MMHG | RESPIRATION RATE: 18 BRPM

## 2021-03-26 DIAGNOSIS — M79.604 RIGHT LEG PAIN: Primary | ICD-10-CM

## 2021-03-26 DIAGNOSIS — S80.11XA CONTUSION OF RIGHT LOWER EXTREMITY, INITIAL ENCOUNTER: ICD-10-CM

## 2021-03-26 LAB
ANION GAP SERPL CALCULATED.3IONS-SCNC: 10 MMOL/L (ref 5–15)
BASOPHILS # BLD AUTO: 0.1 10*3/MM3 (ref 0–0.2)
BASOPHILS NFR BLD AUTO: 0.8 % (ref 0–1.5)
BH CV LOWER VASCULAR LEFT COMMON FEMORAL AUGMENT: NORMAL
BH CV LOWER VASCULAR LEFT COMMON FEMORAL COMPETENT: NORMAL
BH CV LOWER VASCULAR LEFT COMMON FEMORAL COMPRESS: NORMAL
BH CV LOWER VASCULAR LEFT COMMON FEMORAL PHASIC: NORMAL
BH CV LOWER VASCULAR LEFT COMMON FEMORAL SPONT: NORMAL
BH CV LOWER VASCULAR RIGHT COMMON FEMORAL AUGMENT: NORMAL
BH CV LOWER VASCULAR RIGHT COMMON FEMORAL COMPETENT: NORMAL
BH CV LOWER VASCULAR RIGHT COMMON FEMORAL COMPRESS: NORMAL
BH CV LOWER VASCULAR RIGHT COMMON FEMORAL PHASIC: NORMAL
BH CV LOWER VASCULAR RIGHT COMMON FEMORAL SPONT: NORMAL
BH CV LOWER VASCULAR RIGHT DISTAL FEMORAL COMPRESS: NORMAL
BH CV LOWER VASCULAR RIGHT GASTRONEMIUS COMPRESS: NORMAL
BH CV LOWER VASCULAR RIGHT GREATER SAPH AK COMPRESS: NORMAL
BH CV LOWER VASCULAR RIGHT GREATER SAPH BK COMPRESS: NORMAL
BH CV LOWER VASCULAR RIGHT LESSER SAPH COMPRESS: NORMAL
BH CV LOWER VASCULAR RIGHT MID FEMORAL AUGMENT: NORMAL
BH CV LOWER VASCULAR RIGHT MID FEMORAL COMPETENT: NORMAL
BH CV LOWER VASCULAR RIGHT MID FEMORAL COMPRESS: NORMAL
BH CV LOWER VASCULAR RIGHT MID FEMORAL PHASIC: NORMAL
BH CV LOWER VASCULAR RIGHT MID FEMORAL SPONT: NORMAL
BH CV LOWER VASCULAR RIGHT PERONEAL COMPRESS: NORMAL
BH CV LOWER VASCULAR RIGHT POPLITEAL AUGMENT: NORMAL
BH CV LOWER VASCULAR RIGHT POPLITEAL COMPETENT: NORMAL
BH CV LOWER VASCULAR RIGHT POPLITEAL COMPRESS: NORMAL
BH CV LOWER VASCULAR RIGHT POPLITEAL PHASIC: NORMAL
BH CV LOWER VASCULAR RIGHT POPLITEAL SPONT: NORMAL
BH CV LOWER VASCULAR RIGHT POSTERIOR TIBIAL COMPRESS: NORMAL
BH CV LOWER VASCULAR RIGHT PROXIMAL FEMORAL COMPRESS: NORMAL
BH CV LOWER VASCULAR RIGHT SAPHENOFEMORAL JUNCTION COMPRESS: NORMAL
BUN SERPL-MCNC: 16 MG/DL (ref 8–23)
BUN/CREAT SERPL: 13.3 (ref 7–25)
CALCIUM SPEC-SCNC: 8.9 MG/DL (ref 8.2–9.6)
CHLORIDE SERPL-SCNC: 102 MMOL/L (ref 98–107)
CO2 SERPL-SCNC: 25 MMOL/L (ref 22–29)
CREAT SERPL-MCNC: 1.2 MG/DL (ref 0.76–1.27)
DEPRECATED RDW RBC AUTO: 45.9 FL (ref 37–54)
EOSINOPHIL # BLD AUTO: 0.1 10*3/MM3 (ref 0–0.4)
EOSINOPHIL NFR BLD AUTO: 0.7 % (ref 0.3–6.2)
ERYTHROCYTE [DISTWIDTH] IN BLOOD BY AUTOMATED COUNT: 14.3 % (ref 12.3–15.4)
GFR SERPL CREATININE-BSD FRML MDRD: 57 ML/MIN/1.73
GLUCOSE SERPL-MCNC: 98 MG/DL (ref 65–99)
HCT VFR BLD AUTO: 32.4 % (ref 37.5–51)
HGB BLD-MCNC: 11.2 G/DL (ref 13–17.7)
LYMPHOCYTES # BLD AUTO: 1.3 10*3/MM3 (ref 0.7–3.1)
LYMPHOCYTES NFR BLD AUTO: 12.9 % (ref 19.6–45.3)
MCH RBC QN AUTO: 31.1 PG (ref 26.6–33)
MCHC RBC AUTO-ENTMCNC: 34.4 G/DL (ref 31.5–35.7)
MCV RBC AUTO: 90.2 FL (ref 79–97)
MONOCYTES # BLD AUTO: 1.1 10*3/MM3 (ref 0.1–0.9)
MONOCYTES NFR BLD AUTO: 11 % (ref 5–12)
NEUTROPHILS NFR BLD AUTO: 7.4 10*3/MM3 (ref 1.7–7)
NEUTROPHILS NFR BLD AUTO: 74.6 % (ref 42.7–76)
NRBC BLD AUTO-RTO: 0.1 /100 WBC (ref 0–0.2)
PLATELET # BLD AUTO: 156 10*3/MM3 (ref 140–450)
PMV BLD AUTO: 9.6 FL (ref 6–12)
POTASSIUM SERPL-SCNC: 4.2 MMOL/L (ref 3.5–5.2)
RBC # BLD AUTO: 3.59 10*6/MM3 (ref 4.14–5.8)
SODIUM SERPL-SCNC: 137 MMOL/L (ref 136–145)
WBC # BLD AUTO: 9.9 10*3/MM3 (ref 3.4–10.8)
WHOLE BLOOD HOLD SPECIMEN: NORMAL

## 2021-03-26 PROCEDURE — 73590 X-RAY EXAM OF LOWER LEG: CPT

## 2021-03-26 PROCEDURE — 96375 TX/PRO/DX INJ NEW DRUG ADDON: CPT

## 2021-03-26 PROCEDURE — 85025 COMPLETE CBC W/AUTO DIFF WBC: CPT | Performed by: PHYSICIAN ASSISTANT

## 2021-03-26 PROCEDURE — 25010000002 ONDANSETRON PER 1 MG: Performed by: PHYSICIAN ASSISTANT

## 2021-03-26 PROCEDURE — 73610 X-RAY EXAM OF ANKLE: CPT

## 2021-03-26 PROCEDURE — 99284 EMERGENCY DEPT VISIT MOD MDM: CPT

## 2021-03-26 PROCEDURE — 36415 COLL VENOUS BLD VENIPUNCTURE: CPT

## 2021-03-26 PROCEDURE — 93971 EXTREMITY STUDY: CPT

## 2021-03-26 PROCEDURE — 96374 THER/PROPH/DIAG INJ IV PUSH: CPT

## 2021-03-26 PROCEDURE — 25010000002 MORPHINE PER 10 MG: Performed by: PHYSICIAN ASSISTANT

## 2021-03-26 PROCEDURE — 80048 BASIC METABOLIC PNL TOTAL CA: CPT | Performed by: PHYSICIAN ASSISTANT

## 2021-03-26 PROCEDURE — 96376 TX/PRO/DX INJ SAME DRUG ADON: CPT

## 2021-03-26 RX ORDER — SODIUM CHLORIDE 0.9 % (FLUSH) 0.9 %
10 SYRINGE (ML) INJECTION AS NEEDED
Status: DISCONTINUED | OUTPATIENT
Start: 2021-03-26 | End: 2021-03-26 | Stop reason: HOSPADM

## 2021-03-26 RX ORDER — MORPHINE SULFATE 4 MG/ML
2 INJECTION, SOLUTION INTRAMUSCULAR; INTRAVENOUS ONCE
Status: COMPLETED | OUTPATIENT
Start: 2021-03-26 | End: 2021-03-26

## 2021-03-26 RX ORDER — ONDANSETRON 2 MG/ML
4 INJECTION INTRAMUSCULAR; INTRAVENOUS ONCE
Status: COMPLETED | OUTPATIENT
Start: 2021-03-26 | End: 2021-03-26

## 2021-03-26 RX ADMIN — MORPHINE SULFATE 2 MG: 4 INJECTION INTRAVENOUS at 09:12

## 2021-03-26 RX ADMIN — ONDANSETRON 4 MG: 2 INJECTION INTRAMUSCULAR; INTRAVENOUS at 09:12

## 2021-03-26 RX ADMIN — MORPHINE SULFATE 2 MG: 4 INJECTION INTRAVENOUS at 10:02

## 2021-03-26 NOTE — DISCHARGE INSTRUCTIONS
Tylenol as needed for pain    Use Ace wrap for the right ankle/leg  for the next 5-7 days; perform range of motion exercises 3-4 times daily as instructed; apply ice for 20 minutes at a time for the next 48 hours to reduce swelling;  gradually begin weight bearing as tolerated by pain. Return for worsening symptoms including increased pain, swelling, discoloration, or coldness of a extremity.    Follow-up with orthopedics as listed below in 1 week if your pain continues.    Follow-up with your primary care provider in 3-5 days.  If you do not have a primary care provider call [phone] for help in finding one, or you may follow up with [facility] at [phone].

## 2021-03-26 NOTE — ED NOTES
Pt c/o right leg pain.  Pt dropped a 2 liter coke bottle on his leg.     Ciara Maria, LPN  03/26/21 0900

## 2021-03-26 NOTE — ED PROVIDER NOTES
Subjective   Patient is a 89-year-old male who presents with complaints of right lower extremity pain for the past several days that progressed yesterday.  Patient states that he was trying to open a  2L Coke bottle when it slipped out of his hand and fell hitting him in the ankle and then bouncing up and hitting him in the shin.  Patient states he started noting redness and bruising yesterday along with some swelling.  He describes his pain as a constant sharp pain that is worse with ambulation states it starts in his ankle and radiates about midway up his calf.  He currently rates it a 7/10 severity.  He does report some paresthesias in his ankle.  Denies any numbness or weakness of his lower extremity.  Patient states he is on no blood thinners.  Denies any fever chills chest pain or shortness of breath.  He denies any lacerations or abrasions from the injury.          Review of Systems   Constitutional: Negative.    HENT: Negative.    Respiratory: Negative.    Cardiovascular: Negative.    Gastrointestinal: Negative for abdominal distention, abdominal pain, nausea and vomiting.   Genitourinary: Negative.    Musculoskeletal: Positive for arthralgias and joint swelling. Negative for back pain, neck pain and neck stiffness.   Skin: Positive for color change. Negative for rash and wound.   Neurological: Negative.        Past Medical History:   Diagnosis Date   • Atrial fibrillation (CMS/HCC)    • Benign prostatic hyperplasia    • CAD (coronary artery disease)    • Hyperlipidemia    • Hypertension    • Myocardial infarction (CMS/HCC)        Allergies   Allergen Reactions   • Iodine Unknown (See Comments)     Pt unsure of reaction     • Levofloxacin Unknown (See Comments)   • Nitroglycerin Unknown (See Comments) and Other (See Comments)   • Paroxetine Unknown (See Comments)   • Penicillin G Unknown (See Comments)   • Prednisone Unknown (See Comments)   • Sucralfate Unknown (See Comments)   • Diltiazem Hcl Hives   •  Metoprolol Other (See Comments)     Lowers heart rate    • Pramipexole Dihydrochloride Unknown (See Comments)   • Ropinirole Hcl Other (See Comments)       No past surgical history on file.    Family History   Problem Relation Age of Onset   • Heart disease Mother    • Heart disease Father        Social History     Socioeconomic History   • Marital status:      Spouse name: Not on file   • Number of children: Not on file   • Years of education: Not on file   • Highest education level: Not on file   Tobacco Use   • Smoking status: Former Smoker   • Smokeless tobacco: Never Used   • Tobacco comment: more than 50yrs   Substance and Sexual Activity   • Alcohol use: No   • Drug use: No   • Sexual activity: Defer           Objective   Physical Exam  Vitals and nursing note reviewed.   Constitutional:       General: He is not in acute distress.     Appearance: Normal appearance. He is well-developed. He is not ill-appearing, toxic-appearing or diaphoretic.   HENT:      Head: Normocephalic and atraumatic.      Mouth/Throat:      Mouth: Mucous membranes are moist.      Pharynx: Oropharynx is clear.   Eyes:      General: No scleral icterus.     Extraocular Movements: Extraocular movements intact.      Pupils: Pupils are equal, round, and reactive to light.   Cardiovascular:      Rate and Rhythm: Normal rate and regular rhythm.      Heart sounds: No murmur heard.   No friction rub. No gallop.    Pulmonary:      Effort: Pulmonary effort is normal. No tachypnea or accessory muscle usage.      Breath sounds: No decreased breath sounds, wheezing, rhonchi or rales.   Chest:      Chest wall: No mass, deformity, tenderness or crepitus.   Musculoskeletal:      Right knee: Normal.      Right lower leg: Swelling present.      Left lower leg: Normal.      Right ankle: Swelling and ecchymosis present. No deformity or lacerations. Tenderness present over the lateral malleolus. Decreased range of motion.      Right Achilles Tendon:  "No tenderness. Frazier's test negative.      Left ankle: Normal.      Left Achilles Tendon: Normal.      Right foot: Normal capillary refill. Swelling present. No laceration, tenderness or bony tenderness. Normal pulse.      Left foot: Normal.      Comments: Significant edema erythema and ecchymosis noted in the distal aspect of the right lower extremity starting about midway down the shin into the ankle and foot.  Pitting edema noted.  Peripheral pulses are intact compartments are soft bilaterally.  Decreased range of motion with flexion extension inversion eversion of right foot and ankle secondary to pain and swelling.  Most pain noted along the lateral aspect of the right ankle on the lateral malleolus.  Positive Homans' sign of the right lower extremity.   Skin:     General: Skin is warm.      Capillary Refill: Capillary refill takes less than 2 seconds.      Findings: No rash.   Neurological:      Mental Status: He is alert and oriented to person, place, and time.   Psychiatric:         Mood and Affect: Mood normal.         Behavior: Behavior normal.         Procedures           ED Course  ED Course as of Mar 26 1211   Fri Mar 26, 2021   1010 Patient given additional pain medicine.  Lab results were reviewed x-ray showed no acute osseous abnormalities patient currently getting duplex for DVT rule out.      [AA]      ED Course User Index  [AA] Landen Mitchell PA      /70   Pulse 63   Temp 98.3 °F (36.8 °C) (Oral)   Resp 18   Ht 180.3 cm (71\")   Wt 72.6 kg (160 lb)   SpO2 95%   BMI 22.32 kg/m²   Medications   sodium chloride 0.9 % flush 10 mL (has no administration in time range)   Morphine sulfate (PF) injection 2 mg (2 mg Intravenous Given 3/26/21 0912)   ondansetron (ZOFRAN) injection 4 mg (4 mg Intravenous Given 3/26/21 0912)   Morphine sulfate (PF) injection 2 mg (2 mg Intravenous Given 3/26/21 1002)     Labs Reviewed   BASIC METABOLIC PANEL - Abnormal; Notable for the following " components:       Result Value    eGFR Non  Amer 57 (*)     All other components within normal limits    Narrative:     GFR Normal >60  Chronic Kidney Disease <60  Kidney Failure <15     CBC WITH AUTO DIFFERENTIAL - Abnormal; Notable for the following components:    RBC 3.59 (*)     Hemoglobin 11.2 (*)     Hematocrit 32.4 (*)     Lymphocyte % 12.9 (*)     Neutrophils, Absolute 7.40 (*)     Monocytes, Absolute 1.10 (*)     All other components within normal limits   CBC AND DIFFERENTIAL    Narrative:     The following orders were created for panel order CBC & Differential.  Procedure                               Abnormality         Status                     ---------                               -----------         ------                     CBC Auto Differential[[id number]]        Abnormal            Final result                 Please view results for these tests on the individual orders.   EXTRA TUBES    Narrative:     The following orders were created for panel order Extra Tubes.  Procedure                               Abnormality         Status                     ---------                               -----------         ------                     Light Blue Top[[id number]]                                   Final result                 Please view results for these tests on the individual orders.   LIGHT BLUE TOP     XR Tibia Fibula 2 View Right    Result Date: 3/26/2021  1. No acute osseous abnormality of the right tibia and fibula. 2. Diffuse soft tissue swelling of the lower leg.  Electronically Signed By-Jean Swenson MD On:3/26/2021 9:25 AM This report was finalized on [id number] by  Jean Swenson MD.    XR Ankle 3+ View Right    Result Date: 3/26/2021  1. No acute osseous abnormality of the right ankle. 2. Diffuse soft tissue swelling.  Electronically Signed By-Jean Swenson MD On:3/26/2021 9:24 AM This report was finalized on [id number] by  Jean Swenson MD.                                          MDM  Number of Diagnoses or Management Options  Contusion of right lower extremity, initial encounter  Right leg pain  Diagnosis management comments: Chart Review:  Comorbidity: As per past medical history  Differentials: Fracture dislocation contusion sprain strain cellulitis DVT      ;this list is not all inclusive and does not constitute the entirety of considered causes  Labs: BMP unremarkable as above.  CBC shows WBC 9.9 hemoglobin 11.2 hematocrit 32.4 platelets 156.  Imaging: Was interpreted by physician and reviewed by myself:  XR Tibia Fibula 2 View Right  Result Date: 3/26/2021  1. No acute osseous abnormality of the right tibia and fibula. 2. Diffuse soft tissue swelling of the lower leg.  Electronically Signed By-Jean Swenson MD On:3/26/2021 9:25 AM This report was finalized on [id number] by  Jean Swenson MD.    XR Ankle 3+ View Right  Result Date: 3/26/2021  1. No acute osseous abnormality of the right ankle. 2. Diffuse soft tissue swelling.  Electronically Signed By-Jean Swenson MD On:3/26/2021 9:24 AM This report was finalized on [id number] by  Jean Swenson MD.    Disposition/Treatment:  Appropriate PPE was worn during exam and throughout all encounters with the patient.  When the ED IV was placed and labs were obtained patient was afebrile nontoxic.patient was given morphine and Zofran for his pain.  Lab results were fairly unremarkable WBC within normal limits.  Patient is a history of anemia chronic and stable at this time.  X-ray showed no acute osseous abnormalities as above of the right ankle tibia and fibula.  Duplex was negative for acute DVT.  There are no cellulitic changes noted of the right lower extremity.  Patient symptoms likely secondary to contusion from the 2 L.  Patient was placed in an Ace wrap he was able to ambulate with walker without any significant difficulty.  Patient typically walks with a walker.  Patient was advised to ice and  elevate his leg for the next few days and use walker with ambulation.  He was also advised use Tylenol for pain.  He voiced understanding of discharge instructions along with signs and symptoms to return to the ED.  He was advised to follow-up with PCP and orthopedist for further evaluation and management of his pain continues.  Patient and family at bedside were in agreement with plan.       Amount and/or Complexity of Data Reviewed  Clinical lab tests: reviewed  Tests in the radiology section of CPT®: reviewed        Final diagnoses:   Right leg pain   Contusion of right lower extremity, initial encounter       ED Disposition  ED Disposition     ED Disposition Condition Comment    Discharge Stable           Ashley Cano MD  [address] [facility]   [address]  [place] [address] [phone]    Schedule an appointment as soon as possible for a visit in 3 days      [facility] EMERGENCY DEPARTMENT  [address] [facility] [address] [phone]  Go to   If symptoms worsen    Ed Lynch MD  [address] [place] [address] [phone]    Schedule an appointment as soon as possible for a visit   if you pain continues in 1 week         Medication List      Changed    azelastine 0.15 % solution nasal spray  Commonly known as: ASTEPRO  USE 2 SPRAYS IN EACH NOSTRIL TWICE DAILY AS DIRECTED BY PROVIDER  What changed: See the new instructions.             Landen Mitchell PA  03/26/21 1211       Landen Mitchell PA  03/26/21 1212

## 2021-03-26 NOTE — ED NOTES
I called and left a voicemail for vascular requesting they call as soon as they're ready for this patient.     Elin Martinez, RegSched Rep  03/26/21 0976

## 2021-03-26 NOTE — ED NOTES
Pt ambulated w/ walker without difficulty.   PA notified.     Ciara Maria, DHIRAJN  03/26/21 110

## 2021-04-01 ENCOUNTER — OFFICE VISIT (OUTPATIENT)
Dept: FAMILY MEDICINE CLINIC | Facility: CLINIC | Age: 86
End: 2021-04-01

## 2021-04-01 VITALS
TEMPERATURE: 97.5 F | OXYGEN SATURATION: 97 % | BODY MASS INDEX: 22.82 KG/M2 | HEART RATE: 66 BPM | RESPIRATION RATE: 16 BRPM | HEIGHT: 71 IN | SYSTOLIC BLOOD PRESSURE: 152 MMHG | WEIGHT: 163 LBS | DIASTOLIC BLOOD PRESSURE: 84 MMHG

## 2021-04-01 DIAGNOSIS — S80.11XD HEMATOMA OF RIGHT LOWER EXTREMITY, SUBSEQUENT ENCOUNTER: Primary | ICD-10-CM

## 2021-04-01 PROCEDURE — 99213 OFFICE O/P EST LOW 20 MIN: CPT | Performed by: INTERNAL MEDICINE

## 2021-04-01 NOTE — PROGRESS NOTES
"Rooming Tab(CC,VS,Pt Hx,Fall Screen)  Chief Complaint   Patient presents with   • Leg Swelling     ER aftercare       Subjective   Pt had 2 liter coke full container  Dropped on right leg- had bruising immediately  Went to ER and had doppler that was negative. Has large hematoma  Been elevating leg as much as possible- pain can get severe at times  No fever, no warmth    I have reviewed and updated his medications, medical history and problem list during today's office visit.     Patient Care Team:  Ashley Cano MD as PCP - Missy Serrano MD as Consulting Physician (Cardiology)    Problem List Tab  Medications Tab  Synopsis Tab  Chart Review Tab  Care Everywhere Tab  Immunizations Tab  Patient History Tab    Social History     Tobacco Use   • Smoking status: Former Smoker   • Smokeless tobacco: Never Used   • Tobacco comment: more than 50yrs   Substance Use Topics   • Alcohol use: No       Review of Systems    Objective     Rooming Tab(CC,VS,Pt Hx,Fall Screen)  /84 (BP Location: Left arm, Patient Position: Sitting, Cuff Size: Adult)   Pulse 66   Temp 97.5 °F (36.4 °C) (Skin)   Resp 16   Ht 180.3 cm (71\")   Wt 73.9 kg (163 lb)   SpO2 97%   BMI 22.73 kg/m²     Body mass index is 22.73 kg/m².    Physical Exam  Vitals and nursing note reviewed.   Constitutional:       Appearance: Normal appearance. He is well-developed.   HENT:      Head: Normocephalic and atraumatic.      Right Ear: Tympanic membrane normal.      Left Ear: Tympanic membrane normal.      Nose: No rhinorrhea.      Mouth/Throat:      Pharynx: No posterior oropharyngeal erythema.   Eyes:      Pupils: Pupils are equal, round, and reactive to light.   Cardiovascular:      Rate and Rhythm: Normal rate and regular rhythm.      Pulses: Normal pulses.      Heart sounds: Normal heart sounds. No murmur heard.     Pulmonary:      Effort: Pulmonary effort is normal.      Breath sounds: Normal breath sounds.   Abdominal:      " General: Bowel sounds are normal. There is no distension.      Palpations: Abdomen is soft.   Musculoskeletal:         General: No tenderness.      Cervical back: Normal range of motion and neck supple.      Comments: Right lower leg- with very large hematoma- tender to touch- darkened with some bruising into ankle/foot. No warm. Has mild tenderness into calf posteriorly.  using ice/elevation   Skin:     Capillary Refill: Capillary refill takes less than 2 seconds.      Comments: Good pulse cap refill of toes < 2   Neurological:      Mental Status: He is alert and oriented to person, place, and time.   Psychiatric:         Mood and Affect: Mood normal.         Behavior: Behavior normal.          Statin Choice Calculator  Data Reviewed:    XR Tibia Fibula 2 View Right    Result Date: 3/26/2021  Impression: 1. No acute osseous abnormality of the right tibia and fibula. 2. Diffuse soft tissue swelling of the lower leg.  Electronically Signed By-Jean Swenson MD On:3/26/2021 9:25 AM This report was finalized on [id number] by  Jean Swenson MD.    XR Ankle 3+ View Right    Result Date: 3/26/2021  Impression: 1. No acute osseous abnormality of the right ankle. 2. Diffuse soft tissue swelling.  Electronically Signed By-Jean Swenson MD On:3/26/2021 9:24 AM This report was finalized on [id number] by  Jean Swenson MD.      The data below has been reviewed by Ashley Cano MD on 04/01/2021.      Lab Results   Component Value Date    BUN 16 03/26/2021    CREATININE 1.20 03/26/2021    EGFRIFNONA 57 (L) 03/26/2021     03/26/2021    K 4.2 03/26/2021     03/26/2021    CALCIUM 8.9 03/26/2021    WBC 9.90 03/26/2021    RBC 3.59 (L) 03/26/2021    HCT 32.4 (L) 03/26/2021    MCV 90.2 03/26/2021    MCH 31.1 03/26/2021      Assessment/Plan   Order Review Tab  Health Maintenance Tab  Patient Plan/Order Tab  Diagnoses and all orders for this visit:    1. Hematoma of right lower extremity, subsequent  encounter (Primary)  Comments:  if pain conitues in upper calf will recheck doppler. follow up in 1 week        Wrapup Tab  Return if symptoms worsen or fail to improve.       They were informed of the diagnosis and treatment plan and directed to f/u for any further problems or concerns.

## 2021-04-03 PROCEDURE — G2066 INTER DEVC REMOTE 30D: HCPCS | Performed by: INTERNAL MEDICINE

## 2021-04-03 PROCEDURE — 93298 REM INTERROG DEV EVAL SCRMS: CPT | Performed by: INTERNAL MEDICINE

## 2021-04-13 ENCOUNTER — OFFICE VISIT (OUTPATIENT)
Dept: FAMILY MEDICINE CLINIC | Facility: CLINIC | Age: 86
End: 2021-04-13

## 2021-04-13 VITALS
HEART RATE: 64 BPM | WEIGHT: 164.8 LBS | HEIGHT: 71 IN | OXYGEN SATURATION: 92 % | RESPIRATION RATE: 16 BRPM | BODY MASS INDEX: 23.07 KG/M2 | TEMPERATURE: 97.1 F | SYSTOLIC BLOOD PRESSURE: 170 MMHG | DIASTOLIC BLOOD PRESSURE: 90 MMHG

## 2021-04-13 DIAGNOSIS — I10 ESSENTIAL HYPERTENSION: Primary | ICD-10-CM

## 2021-04-13 DIAGNOSIS — M79.604 PAIN OF RIGHT LOWER EXTREMITY: ICD-10-CM

## 2021-04-13 PROCEDURE — 99214 OFFICE O/P EST MOD 30 MIN: CPT | Performed by: INTERNAL MEDICINE

## 2021-04-13 NOTE — PROGRESS NOTES
"Rooming Tab(CC,VS,Pt Hx,Fall Screen)  Chief Complaint   Patient presents with   • Hypertension   • Anemia       Subjective   Pt here for continued leg pain- still using ace bandage and trying to keep elevated. Not as tender. Eating well- makes own tv dinners.  No fever, no nausea.   no SOA  BP elevated- denies headache or CP. Taking tylenol for leg pain  I have reviewed and updated his medications, medical history and problem list during today's office visit.     Patient Care Team:  Ashley Cano MD as PCP - Missy Serrano MD as Consulting Physician (Cardiology)    Problem List Tab  Medications Tab  Synopsis Tab  Chart Review Tab  Care Everywhere Tab  Immunizations Tab  Patient History Tab    Social History     Tobacco Use   • Smoking status: Former Smoker   • Smokeless tobacco: Never Used   • Tobacco comment: more than 50yrs   Substance Use Topics   • Alcohol use: No       Review of Systems    Objective     Rooming Tab(CC,VS,Pt Hx,Fall Screen)  /90   Pulse 64   Temp 97.1 °F (36.2 °C)   Resp 16   Ht 180.3 cm (71\")   Wt 74.8 kg (164 lb 12.8 oz)   SpO2 92%   BMI 22.98 kg/m²     Body mass index is 22.98 kg/m².    Physical Exam  Vitals and nursing note reviewed.   Constitutional:       Appearance: Normal appearance. He is well-developed.   HENT:      Head: Normocephalic and atraumatic.      Right Ear: Tympanic membrane normal.      Left Ear: Tympanic membrane normal.      Nose: No rhinorrhea.      Mouth/Throat:      Pharynx: No posterior oropharyngeal erythema.   Eyes:      Pupils: Pupils are equal, round, and reactive to light.   Cardiovascular:      Rate and Rhythm: Normal rate and regular rhythm.      Pulses: Normal pulses.      Heart sounds: Normal heart sounds. No murmur heard.     Pulmonary:      Effort: Pulmonary effort is normal.      Breath sounds: Normal breath sounds.   Abdominal:      General: Bowel sounds are normal. There is no distension.      Palpations: Abdomen is soft. "   Musculoskeletal:         General: Tenderness present.      Cervical back: Normal range of motion and neck supple.      Comments: Right leg with large hematoma lower tib/fib- no warmth- redness down, still very tender. bruising on foot more yellow- 2+ edema. Homans negative  No open sore   Skin:     Capillary Refill: Capillary refill takes less than 2 seconds.   Neurological:      Mental Status: He is alert and oriented to person, place, and time.   Psychiatric:         Mood and Affect: Mood normal.         Behavior: Behavior normal.          Statin Choice Calculator  Data Reviewed:    XR Tibia Fibula 2 View Right    Result Date: 3/26/2021  Impression: 1. No acute osseous abnormality of the right tibia and fibula. 2. Diffuse soft tissue swelling of the lower leg.  Electronically Signed By-Jean Swenson MD On:3/26/2021 9:25 AM This report was finalized on [id number] by  Jean Swenson MD.    XR Ankle 3+ View Right    Result Date: 3/26/2021  Impression: 1. No acute osseous abnormality of the right ankle. 2. Diffuse soft tissue swelling.  Electronically Signed By-Jean Swenson MD On:3/26/2021 9:24 AM This report was finalized on [id number] by  Jean Swenson MD.      The data below has been reviewed by Ashley Cano MD on 04/13/2021.      Lab Results   Component Value Date    BUN 16 03/26/2021    CREATININE 1.20 03/26/2021    EGFRIFNONA 57 (L) 03/26/2021     03/26/2021    K 4.2 03/26/2021     03/26/2021    CALCIUM 8.9 03/26/2021    WBC 9.90 03/26/2021    RBC 3.59 (L) 03/26/2021    HCT 32.4 (L) 03/26/2021    MCV 90.2 03/26/2021    MCH 31.1 03/26/2021      Assessment/Plan   Order Review Tab  Health Maintenance Tab  Patient Plan/Order Tab  Diagnoses and all orders for this visit:    1. Essential hypertension (Primary)  Comments:  BP very elevated today- was dizzy this morning- will watch at home- needs to drink more water.     2. Pain of right lower extremity  Comments:  large  hematoma- some improvement- with swelling in foot too. bruising improved negative homans sign        Wrapup Tab  Return in about 2 months (around 6/13/2021), or if symptoms worsen or fail to improve.       They were informed of the diagnosis and treatment plan and directed to f/u for any further problems or concerns.

## 2021-04-18 PROBLEM — S80.11XA HEMATOMA OF RIGHT LOWER EXTREMITY: Status: ACTIVE | Noted: 2021-04-18

## 2021-04-21 ENCOUNTER — TELEPHONE (OUTPATIENT)
Dept: FAMILY MEDICINE CLINIC | Facility: CLINIC | Age: 86
End: 2021-04-21

## 2021-04-21 RX ORDER — LOSARTAN POTASSIUM 50 MG/1
50 TABLET ORAL DAILY
Qty: 30 TABLET | Refills: 3 | Status: SHIPPED | OUTPATIENT
Start: 2021-04-21 | End: 2021-04-22

## 2021-04-21 NOTE — TELEPHONE ENCOUNTER
Caller: TODD BAIN    Relationship to patient: Emergency Contact    Best call back number: [phone]    Patient is needing: PATIENTS DAUGHTER CALLED STATING PATIENTS BLOOD PRESSURE IS STILL HIGH AND WOULD LIKE ADVICE. PATIENTS BLOOD PRESSURE /83 AND THAT IS THE LOWEST READING HE HAS HAD SINCE 04/13. HIS HIGHEST BEING 189/93

## 2021-04-23 RX ORDER — LOSARTAN POTASSIUM 50 MG/1
50 TABLET ORAL DAILY
Qty: 90 TABLET | Refills: 1 | Status: SHIPPED | OUTPATIENT
Start: 2021-04-23 | End: 2021-06-15

## 2021-05-04 PROCEDURE — G2066 INTER DEVC REMOTE 30D: HCPCS | Performed by: INTERNAL MEDICINE

## 2021-05-04 PROCEDURE — 93298 REM INTERROG DEV EVAL SCRMS: CPT | Performed by: INTERNAL MEDICINE

## 2021-05-05 ENCOUNTER — OFFICE VISIT (OUTPATIENT)
Dept: FAMILY MEDICINE CLINIC | Facility: CLINIC | Age: 86
End: 2021-05-05

## 2021-05-05 VITALS
RESPIRATION RATE: 20 BRPM | OXYGEN SATURATION: 97 % | SYSTOLIC BLOOD PRESSURE: 112 MMHG | DIASTOLIC BLOOD PRESSURE: 78 MMHG | WEIGHT: 162.6 LBS | TEMPERATURE: 96.9 F | HEART RATE: 58 BPM | HEIGHT: 71 IN | BODY MASS INDEX: 22.76 KG/M2

## 2021-05-05 DIAGNOSIS — S80.11XD HEMATOMA OF RIGHT LOWER EXTREMITY, SUBSEQUENT ENCOUNTER: ICD-10-CM

## 2021-05-05 DIAGNOSIS — I10 ESSENTIAL HYPERTENSION: Primary | ICD-10-CM

## 2021-05-05 PROCEDURE — 99213 OFFICE O/P EST LOW 20 MIN: CPT | Performed by: INTERNAL MEDICINE

## 2021-05-06 ENCOUNTER — TELEPHONE (OUTPATIENT)
Dept: FAMILY MEDICINE CLINIC | Facility: CLINIC | Age: 86
End: 2021-05-06

## 2021-06-04 PROCEDURE — 93298 REM INTERROG DEV EVAL SCRMS: CPT | Performed by: INTERNAL MEDICINE

## 2021-06-04 PROCEDURE — G2066 INTER DEVC REMOTE 30D: HCPCS | Performed by: INTERNAL MEDICINE

## 2021-06-15 RX ORDER — LOSARTAN POTASSIUM 50 MG/1
50 TABLET ORAL DAILY
Qty: 90 TABLET | Refills: 1 | Status: SHIPPED | OUTPATIENT
Start: 2021-06-15 | End: 2021-12-08

## 2021-07-05 PROCEDURE — 93298 REM INTERROG DEV EVAL SCRMS: CPT | Performed by: INTERNAL MEDICINE

## 2021-07-05 PROCEDURE — G2066 INTER DEVC REMOTE 30D: HCPCS | Performed by: INTERNAL MEDICINE

## 2021-07-06 RX ORDER — TAMSULOSIN HYDROCHLORIDE 0.4 MG/1
1 CAPSULE ORAL DAILY
Qty: 90 CAPSULE | Refills: 1 | Status: SHIPPED | OUTPATIENT
Start: 2021-07-06 | End: 2021-11-01

## 2021-08-05 PROCEDURE — G2066 INTER DEVC REMOTE 30D: HCPCS | Performed by: INTERNAL MEDICINE

## 2021-08-05 PROCEDURE — 93298 REM INTERROG DEV EVAL SCRMS: CPT | Performed by: INTERNAL MEDICINE

## 2021-08-19 ENCOUNTER — TELEPHONE (OUTPATIENT)
Dept: CARDIOLOGY | Facility: CLINIC | Age: 86
End: 2021-08-19

## 2021-08-19 NOTE — TELEPHONE ENCOUNTER
Spoke to patient's daughter, informed her loop recorder battery is nearing depletion. Patient may leave loop recorder in or may make an appt with Dr Domínguez to consult to have it removed at any time in the future. Daughter states she will discuss this with her family and let us know if they decide to remove it.

## 2021-08-19 NOTE — TELEPHONE ENCOUNTER
Murj report is showing battery status as RRT since 8/16/21. Please advise pt and how he would like to proceed. cjRN

## 2021-08-25 ENCOUNTER — OFFICE VISIT (OUTPATIENT)
Dept: FAMILY MEDICINE CLINIC | Facility: CLINIC | Age: 86
End: 2021-08-25

## 2021-08-25 ENCOUNTER — LAB (OUTPATIENT)
Dept: FAMILY MEDICINE CLINIC | Facility: CLINIC | Age: 86
End: 2021-08-25

## 2021-08-25 VITALS
HEIGHT: 71 IN | WEIGHT: 169.6 LBS | RESPIRATION RATE: 22 BRPM | BODY MASS INDEX: 23.74 KG/M2 | HEART RATE: 64 BPM | OXYGEN SATURATION: 96 % | TEMPERATURE: 97.8 F | DIASTOLIC BLOOD PRESSURE: 80 MMHG | SYSTOLIC BLOOD PRESSURE: 140 MMHG

## 2021-08-25 DIAGNOSIS — E55.9 VITAMIN D DEFICIENCY: ICD-10-CM

## 2021-08-25 DIAGNOSIS — Z12.5 ENCOUNTER FOR SCREENING FOR MALIGNANT NEOPLASM OF PROSTATE: ICD-10-CM

## 2021-08-25 DIAGNOSIS — Z00.00 MEDICARE ANNUAL WELLNESS VISIT, SUBSEQUENT: Primary | ICD-10-CM

## 2021-08-25 PROCEDURE — 99213 OFFICE O/P EST LOW 20 MIN: CPT | Performed by: INTERNAL MEDICINE

## 2021-08-25 PROCEDURE — G0103 PSA SCREENING: HCPCS | Performed by: INTERNAL MEDICINE

## 2021-08-25 PROCEDURE — 85025 COMPLETE CBC W/AUTO DIFF WBC: CPT | Performed by: INTERNAL MEDICINE

## 2021-08-25 PROCEDURE — 83540 ASSAY OF IRON: CPT | Performed by: INTERNAL MEDICINE

## 2021-08-25 PROCEDURE — 80053 COMPREHEN METABOLIC PANEL: CPT | Performed by: INTERNAL MEDICINE

## 2021-08-25 PROCEDURE — 82306 VITAMIN D 25 HYDROXY: CPT | Performed by: INTERNAL MEDICINE

## 2021-08-25 PROCEDURE — 36415 COLL VENOUS BLD VENIPUNCTURE: CPT | Performed by: INTERNAL MEDICINE

## 2021-08-25 PROCEDURE — 80061 LIPID PANEL: CPT | Performed by: INTERNAL MEDICINE

## 2021-08-25 PROCEDURE — 84466 ASSAY OF TRANSFERRIN: CPT | Performed by: INTERNAL MEDICINE

## 2021-08-25 PROCEDURE — G0439 PPPS, SUBSEQ VISIT: HCPCS | Performed by: INTERNAL MEDICINE

## 2021-08-26 LAB
25(OH)D3 SERPL-MCNC: 19 NG/ML (ref 30–100)
ALBUMIN SERPL-MCNC: 4.4 G/DL (ref 3.5–5.2)
ALBUMIN/GLOB SERPL: 2 G/DL
ALP SERPL-CCNC: 71 U/L (ref 39–117)
ALT SERPL W P-5'-P-CCNC: 11 U/L (ref 1–41)
ANION GAP SERPL CALCULATED.3IONS-SCNC: 9.5 MMOL/L (ref 5–15)
AST SERPL-CCNC: 13 U/L (ref 1–40)
BASOPHILS # BLD AUTO: 0.06 10*3/MM3 (ref 0–0.2)
BASOPHILS NFR BLD AUTO: 0.5 % (ref 0–1.5)
BILIRUB SERPL-MCNC: 0.5 MG/DL (ref 0–1.2)
BUN SERPL-MCNC: 15 MG/DL (ref 8–23)
BUN/CREAT SERPL: 12.4 (ref 7–25)
CALCIUM SPEC-SCNC: 9.1 MG/DL (ref 8.2–9.6)
CHLORIDE SERPL-SCNC: 100 MMOL/L (ref 98–107)
CHOLEST SERPL-MCNC: 161 MG/DL (ref 0–200)
CO2 SERPL-SCNC: 24.5 MMOL/L (ref 22–29)
CREAT SERPL-MCNC: 1.21 MG/DL (ref 0.76–1.27)
DEPRECATED RDW RBC AUTO: 47.8 FL (ref 37–54)
EOSINOPHIL # BLD AUTO: 0.13 10*3/MM3 (ref 0–0.4)
EOSINOPHIL NFR BLD AUTO: 1.1 % (ref 0.3–6.2)
ERYTHROCYTE [DISTWIDTH] IN BLOOD BY AUTOMATED COUNT: 14.4 % (ref 12.3–15.4)
GFR SERPL CREATININE-BSD FRML MDRD: 56 ML/MIN/1.73
GLOBULIN UR ELPH-MCNC: 2.2 GM/DL
GLUCOSE SERPL-MCNC: 100 MG/DL (ref 65–99)
HCT VFR BLD AUTO: 39.5 % (ref 37.5–51)
HDLC SERPL-MCNC: 45 MG/DL (ref 40–60)
HGB BLD-MCNC: 13.4 G/DL (ref 13–17.7)
IMM GRANULOCYTES # BLD AUTO: 0.05 10*3/MM3 (ref 0–0.05)
IMM GRANULOCYTES NFR BLD AUTO: 0.4 % (ref 0–0.5)
IRON 24H UR-MRATE: 81 MCG/DL (ref 59–158)
IRON SATN MFR SERPL: 31 % (ref 20–50)
LDLC SERPL CALC-MCNC: 91 MG/DL (ref 0–100)
LDLC/HDLC SERPL: 1.93 {RATIO}
LYMPHOCYTES # BLD AUTO: 1.95 10*3/MM3 (ref 0.7–3.1)
LYMPHOCYTES NFR BLD AUTO: 16.4 % (ref 19.6–45.3)
MCH RBC QN AUTO: 31.4 PG (ref 26.6–33)
MCHC RBC AUTO-ENTMCNC: 33.9 G/DL (ref 31.5–35.7)
MCV RBC AUTO: 92.5 FL (ref 79–97)
MONOCYTES # BLD AUTO: 0.92 10*3/MM3 (ref 0.1–0.9)
MONOCYTES NFR BLD AUTO: 7.8 % (ref 5–12)
NEUTROPHILS NFR BLD AUTO: 73.8 % (ref 42.7–76)
NEUTROPHILS NFR BLD AUTO: 8.76 10*3/MM3 (ref 1.7–7)
NRBC BLD AUTO-RTO: 0 /100 WBC (ref 0–0.2)
PLATELET # BLD AUTO: 194 10*3/MM3 (ref 140–450)
PMV BLD AUTO: 12.3 FL (ref 6–12)
POTASSIUM SERPL-SCNC: 4.4 MMOL/L (ref 3.5–5.2)
PROT SERPL-MCNC: 6.6 G/DL (ref 6–8.5)
PSA SERPL-MCNC: 1.19 NG/ML (ref 0–4)
RBC # BLD AUTO: 4.27 10*6/MM3 (ref 4.14–5.8)
SODIUM SERPL-SCNC: 134 MMOL/L (ref 136–145)
TIBC SERPL-MCNC: 264 MCG/DL (ref 298–536)
TRANSFERRIN SERPL-MCNC: 177 MG/DL (ref 200–360)
TRIGL SERPL-MCNC: 145 MG/DL (ref 0–150)
VLDLC SERPL-MCNC: 25 MG/DL (ref 5–40)
WBC # BLD AUTO: 11.87 10*3/MM3 (ref 3.4–10.8)

## 2021-09-05 PROCEDURE — 93298 REM INTERROG DEV EVAL SCRMS: CPT | Performed by: INTERNAL MEDICINE

## 2021-09-05 PROCEDURE — G2066 INTER DEVC REMOTE 30D: HCPCS | Performed by: INTERNAL MEDICINE

## 2021-09-13 PROBLEM — Z12.5 ENCOUNTER FOR SCREENING FOR MALIGNANT NEOPLASM OF PROSTATE: Status: ACTIVE | Noted: 2021-09-13

## 2021-09-13 PROBLEM — E55.9 VITAMIN D DEFICIENCY: Status: ACTIVE | Noted: 2021-09-13

## 2021-09-13 PROBLEM — Z00.00 MEDICARE ANNUAL WELLNESS VISIT, SUBSEQUENT: Status: ACTIVE | Noted: 2021-09-13

## 2021-10-06 PROCEDURE — 93298 REM INTERROG DEV EVAL SCRMS: CPT | Performed by: INTERNAL MEDICINE

## 2021-10-06 PROCEDURE — G2066 INTER DEVC REMOTE 30D: HCPCS | Performed by: INTERNAL MEDICINE

## 2021-11-01 RX ORDER — TAMSULOSIN HYDROCHLORIDE 0.4 MG/1
1 CAPSULE ORAL DAILY
Qty: 90 CAPSULE | Refills: 1 | Status: SHIPPED | OUTPATIENT
Start: 2021-11-01 | End: 2022-05-27

## 2021-12-08 RX ORDER — LOSARTAN POTASSIUM 50 MG/1
50 TABLET ORAL DAILY
Qty: 90 TABLET | Refills: 1 | Status: SHIPPED | OUTPATIENT
Start: 2021-12-08 | End: 2022-02-16

## 2021-12-17 RX ORDER — GABAPENTIN 100 MG/1
CAPSULE ORAL
Qty: 180 CAPSULE | Refills: 2 | Status: SHIPPED | OUTPATIENT
Start: 2021-12-17 | End: 2022-09-07 | Stop reason: SDUPTHER

## 2022-02-16 ENCOUNTER — LAB (OUTPATIENT)
Dept: FAMILY MEDICINE CLINIC | Facility: CLINIC | Age: 87
End: 2022-02-16

## 2022-02-16 ENCOUNTER — OFFICE VISIT (OUTPATIENT)
Dept: FAMILY MEDICINE CLINIC | Facility: CLINIC | Age: 87
End: 2022-02-16

## 2022-02-16 VITALS
BODY MASS INDEX: 23.46 KG/M2 | HEIGHT: 71 IN | OXYGEN SATURATION: 100 % | WEIGHT: 167.6 LBS | SYSTOLIC BLOOD PRESSURE: 170 MMHG | HEART RATE: 63 BPM | TEMPERATURE: 97.1 F | DIASTOLIC BLOOD PRESSURE: 100 MMHG | RESPIRATION RATE: 22 BRPM

## 2022-02-16 DIAGNOSIS — E55.9 VITAMIN D DEFICIENCY: ICD-10-CM

## 2022-02-16 DIAGNOSIS — I10 ESSENTIAL HYPERTENSION: Primary | ICD-10-CM

## 2022-02-16 LAB
25(OH)D3 SERPL-MCNC: 29.3 NG/ML (ref 30–100)
ANION GAP SERPL CALCULATED.3IONS-SCNC: 6.5 MMOL/L (ref 5–15)
BUN SERPL-MCNC: 15 MG/DL (ref 8–23)
BUN/CREAT SERPL: 13.5 (ref 7–25)
CALCIUM SPEC-SCNC: 9.2 MG/DL (ref 8.2–9.6)
CHLORIDE SERPL-SCNC: 97 MMOL/L (ref 98–107)
CO2 SERPL-SCNC: 25.5 MMOL/L (ref 22–29)
CREAT SERPL-MCNC: 1.11 MG/DL (ref 0.76–1.27)
GFR SERPL CREATININE-BSD FRML MDRD: 62 ML/MIN/1.73
GLUCOSE SERPL-MCNC: 103 MG/DL (ref 65–99)
POTASSIUM SERPL-SCNC: 4.3 MMOL/L (ref 3.5–5.2)
SODIUM SERPL-SCNC: 129 MMOL/L (ref 136–145)
TSH SERPL DL<=0.05 MIU/L-ACNC: 2.28 UIU/ML (ref 0.27–4.2)

## 2022-02-16 PROCEDURE — 80048 BASIC METABOLIC PNL TOTAL CA: CPT | Performed by: INTERNAL MEDICINE

## 2022-02-16 PROCEDURE — 84443 ASSAY THYROID STIM HORMONE: CPT | Performed by: INTERNAL MEDICINE

## 2022-02-16 PROCEDURE — 36415 COLL VENOUS BLD VENIPUNCTURE: CPT | Performed by: INTERNAL MEDICINE

## 2022-02-16 PROCEDURE — 82306 VITAMIN D 25 HYDROXY: CPT | Performed by: INTERNAL MEDICINE

## 2022-02-16 PROCEDURE — 99214 OFFICE O/P EST MOD 30 MIN: CPT | Performed by: INTERNAL MEDICINE

## 2022-02-16 RX ORDER — LOSARTAN POTASSIUM 100 MG/1
100 TABLET ORAL DAILY
Qty: 90 TABLET | Refills: 2 | Status: SHIPPED | OUTPATIENT
Start: 2022-02-16 | End: 2022-09-07

## 2022-02-16 RX ORDER — LOSARTAN POTASSIUM 100 MG/1
100 TABLET ORAL DAILY
Qty: 30 TABLET | Refills: 4 | Status: SHIPPED | OUTPATIENT
Start: 2022-02-16 | End: 2022-02-16

## 2022-02-16 NOTE — PROGRESS NOTES
Rooming Tab(CC,VS,Pt Hx,Fall Screen)  Chief Complaint   Patient presents with   • Hypertension   • Dizziness       Subjective     The patient presents today for evaluation of hypertension. He is accompanied by an adult female.- his daughter    Hypertension  The patient states that his blood pressure has been elevated every time he checks it. He denies any pain or hematoma. The patient states that he still gets up and walks around and he still lives by himself. He denies any back pain. The patient states that he has always been nervous and he does not feel like it has gotten worse. The patient states that he is living alone. The adult female states that the patient fell last week in the bathroom trying to have straining to have a bowel movement. The adult female states that the patient got himself up from the fall. The adult female states that the patient did not hit his head or anything else. The patient states that he remembers getting dizzy and he sat himself down. He reports that he used the bar to pull himself up. The patient states that he is sleeping well. He reports that at times he will wake up at 3:00 AM or 4:00 AM and lay there. He reports that he uses a urinal at night. The adult female states that she had given the patient half of a Valium the day that he fell.    Dizziness  The adult female states that the patient has been complaining of being dizzy. The adult female states that when the patient goes to get up from the bed, he will get dizzy and lay back down. He reports that one morning he was dizzy in bed and notes he turned over to fast. He reports that when his hips hurt he will change sides. He reports that he takes 2 regular Tylenol for this at night.    The patient reports he is not eating a lot of salt. He reports that he gets a lot of frozen meals. The adult female states that he has been buying Lean [employer]isine and Healthy Choice. He reports that he has a water bottle on his chair and he drinks  water. The adult female reports that his wife has gotten him off of caffeine. He reports that he has 1 can of Coke per day.    The adult female states that the patient has not had any swelling in his feet or ankle. The adult female states that the patient is awesome about keeping his legs elevated.     Allergies  The patient states that he usually sneezes every morning. The adult female states that the patient's nose does not run all day. The patient states that he does blow his nose sometimes and it is usually clear. The adult female states that the patient had a nasal spray at home, but he has not been using it. The adult female states that the patient quit using it because he forgot.     Medicine  The patient is taking 50 mg of losartan. He states that he does not take any vitamins.    Hematoma  He reports that he dropped a 2 L Pepsi bottle when he had the large hematoma. He reports that he still has a knot present.    Health maintenance  The adult female states that the patient has not received his influenza vaccine. He reports that he has received his COVID-19 vaccination and booster injection.        I have reviewed and updated his medications, medical history and problem list during today's office visit.     Patient Care Team:  Ashley Cano MD as PCP - General  Missy Domínguez MD as Consulting Physician (Cardiology)    Problem List Tab  Medications Tab  Synopsis Tab  Chart Review Tab  Care Everywhere Tab  Immunizations Tab  Patient History Tab    Social History     Tobacco Use   • Smoking status: Former Smoker   • Smokeless tobacco: Never Used   • Tobacco comment: more than 50yrs   Substance Use Topics   • Alcohol use: No       Review of Systems   A review of systems was performed, and positive findings are noted in the HPI.    Objective     Rooming Tab(CC,VS,Pt Hx,Fall Screen)  /100 (BP Location: Left arm, Patient Position: Sitting, Cuff Size: Adult)   Pulse 63   Temp 97.1 °F (36.2 °C)  "(Temporal)   Resp 22   Ht 180.3 cm (70.98\")   Wt 76 kg (167 lb 9.6 oz)   SpO2 100%   BMI 23.39 kg/m²     Body mass index is 23.39 kg/m².    Physical Exam  Vitals and nursing note reviewed.   Constitutional:       Appearance: Normal appearance. He is well-developed.   HENT:      Head: Normocephalic and atraumatic.      Left Ear: Tympanic membrane normal.      Ears:      Comments: Increased effusion     Nose: No rhinorrhea.      Mouth/Throat:      Pharynx: No posterior oropharyngeal erythema.   Eyes:      Pupils: Pupils are equal, round, and reactive to light.   Cardiovascular:      Rate and Rhythm: Normal rate and regular rhythm.      Pulses: Normal pulses.      Heart sounds: Normal heart sounds. No murmur heard.      Pulmonary:      Effort: Pulmonary effort is normal.      Breath sounds: Normal breath sounds.   Abdominal:      General: Bowel sounds are normal. There is no distension.      Palpations: Abdomen is soft.   Musculoskeletal:         General: No tenderness.      Cervical back: Normal range of motion and neck supple.      Right lower leg: No edema.      Left lower leg: No edema.   Skin:     Capillary Refill: Capillary refill takes less than 2 seconds.   Neurological:      Mental Status: He is alert and oriented to person, place, and time.   Psychiatric:         Mood and Affect: Mood normal.         Behavior: Behavior normal.          Statin Choice Calculator  Data Reviewed:         The data below has been reviewed by Ashley Cano MD on 02/16/2022.      Lab Results   Component Value Date    BUN 15 02/16/2022    CREATININE 1.11 02/16/2022    EGFRIFNONA 62 02/16/2022     (L) 02/16/2022    K 4.3 02/16/2022    CL 97 (L) 02/16/2022    CALCIUM 9.2 02/16/2022    TSH 2.280 02/16/2022    HVZB87ZC 29.3 (L) 02/16/2022      Assessment/Plan   Order Review Tab  Health Maintenance Tab  Patient Plan/Order Tab  Diagnoses and all orders for this visit:    1. Essential hypertension (Primary)  -     TSH  -   "   Basic metabolic panel    2. Vitamin D deficiency  -     Vitamin D 25 Hydroxy    Other orders  -     Discontinue: losartan (Cozaar) 100 MG tablet; Take 1 tablet by mouth Daily.  Dispense: 30 tablet; Refill: 4      1. Hypertension  - Increase losartan from 50 mg to 100 mg.  - Blood work will be done today.  - I will see him back in 2 month.    2. Dizziness  - Restart nasal spray at night.    3. Vitamin D deficiency  - Blood work will be done today.      Wrapup Tab  Return in about 2 months (around 4/16/2022), or if symptoms worsen or fail to improve.       They were informed of the diagnosis and treatment plan and directed to f/u for any further problems or concerns.      Patient's Body mass index is 23.39 kg/m². indicating that he is within normal range (BMI 18.5-24.9). No BMI management plan needed..     Transcribed from ambient dictation for Ashley Cano MD by Jeanine Alvarado.  02/16/22   14:53 EST    Patient verbalized consent to the visit recording.  I have personally performed the services described in this document as transcribed by the above individual, and it is both accurate and complete.  Ashley Cano MD  2/20/2022  15:18 EST

## 2022-04-20 ENCOUNTER — OFFICE VISIT (OUTPATIENT)
Dept: FAMILY MEDICINE CLINIC | Facility: CLINIC | Age: 87
End: 2022-04-20

## 2022-04-20 ENCOUNTER — LAB (OUTPATIENT)
Dept: FAMILY MEDICINE CLINIC | Facility: CLINIC | Age: 87
End: 2022-04-20

## 2022-04-20 VITALS
HEIGHT: 71 IN | WEIGHT: 170 LBS | BODY MASS INDEX: 23.8 KG/M2 | RESPIRATION RATE: 16 BRPM | OXYGEN SATURATION: 99 % | DIASTOLIC BLOOD PRESSURE: 84 MMHG | TEMPERATURE: 96.2 F | HEART RATE: 67 BPM | SYSTOLIC BLOOD PRESSURE: 164 MMHG

## 2022-04-20 DIAGNOSIS — K21.9 GASTROESOPHAGEAL REFLUX DISEASE WITHOUT ESOPHAGITIS: ICD-10-CM

## 2022-04-20 DIAGNOSIS — E87.1 HYPONATREMIA: ICD-10-CM

## 2022-04-20 DIAGNOSIS — I10 ESSENTIAL HYPERTENSION: Primary | ICD-10-CM

## 2022-04-20 LAB
ANION GAP SERPL CALCULATED.3IONS-SCNC: 11.6 MMOL/L (ref 5–15)
BUN SERPL-MCNC: 18 MG/DL (ref 8–23)
BUN/CREAT SERPL: 13.8 (ref 7–25)
CALCIUM SPEC-SCNC: 9.2 MG/DL (ref 8.2–9.6)
CHLORIDE SERPL-SCNC: 103 MMOL/L (ref 98–107)
CO2 SERPL-SCNC: 24.4 MMOL/L (ref 22–29)
CREAT SERPL-MCNC: 1.3 MG/DL (ref 0.76–1.27)
EGFRCR SERPLBLD CKD-EPI 2021: 51.9 ML/MIN/1.73
GLUCOSE SERPL-MCNC: 115 MG/DL (ref 65–99)
POTASSIUM SERPL-SCNC: 4.4 MMOL/L (ref 3.5–5.2)
SODIUM SERPL-SCNC: 139 MMOL/L (ref 136–145)

## 2022-04-20 PROCEDURE — 80048 BASIC METABOLIC PNL TOTAL CA: CPT | Performed by: INTERNAL MEDICINE

## 2022-04-20 PROCEDURE — 99213 OFFICE O/P EST LOW 20 MIN: CPT | Performed by: INTERNAL MEDICINE

## 2022-04-20 PROCEDURE — 36415 COLL VENOUS BLD VENIPUNCTURE: CPT | Performed by: INTERNAL MEDICINE

## 2022-05-07 PROBLEM — K21.9 GASTROESOPHAGEAL REFLUX DISEASE WITHOUT ESOPHAGITIS: Status: ACTIVE | Noted: 2022-05-07

## 2022-05-27 RX ORDER — TAMSULOSIN HYDROCHLORIDE 0.4 MG/1
1 CAPSULE ORAL DAILY
Qty: 90 CAPSULE | Refills: 1 | Status: SHIPPED | OUTPATIENT
Start: 2022-05-27 | End: 2023-02-17

## 2022-05-27 RX ORDER — LOSARTAN POTASSIUM 50 MG/1
50 TABLET ORAL DAILY
Qty: 90 TABLET | Refills: 1 | Status: SHIPPED | OUTPATIENT
Start: 2022-05-27 | End: 2022-11-18 | Stop reason: SDUPTHER

## 2022-09-07 ENCOUNTER — OFFICE VISIT (OUTPATIENT)
Dept: FAMILY MEDICINE CLINIC | Facility: CLINIC | Age: 87
End: 2022-09-07

## 2022-09-07 VITALS
DIASTOLIC BLOOD PRESSURE: 80 MMHG | RESPIRATION RATE: 20 BRPM | SYSTOLIC BLOOD PRESSURE: 130 MMHG | TEMPERATURE: 98.2 F | HEART RATE: 54 BPM | HEIGHT: 71 IN | BODY MASS INDEX: 24.16 KG/M2 | OXYGEN SATURATION: 98 % | WEIGHT: 172.6 LBS

## 2022-09-07 DIAGNOSIS — R60.0 LOWER EXTREMITY EDEMA: ICD-10-CM

## 2022-09-07 DIAGNOSIS — E78.2 MIXED HYPERLIPIDEMIA: ICD-10-CM

## 2022-09-07 DIAGNOSIS — I10 ESSENTIAL HYPERTENSION: Primary | ICD-10-CM

## 2022-09-07 PROCEDURE — 99214 OFFICE O/P EST MOD 30 MIN: CPT | Performed by: INTERNAL MEDICINE

## 2022-09-07 RX ORDER — OMEPRAZOLE 20 MG/1
20 CAPSULE, DELAYED RELEASE ORAL DAILY
Qty: 90 CAPSULE | Refills: 3 | Status: SHIPPED | OUTPATIENT
Start: 2022-09-07

## 2022-09-07 RX ORDER — GABAPENTIN 100 MG/1
100 CAPSULE ORAL 2 TIMES DAILY
Qty: 180 CAPSULE | Refills: 2 | Status: SHIPPED | OUTPATIENT
Start: 2022-09-07 | End: 2022-11-17

## 2022-09-07 NOTE — PROGRESS NOTES
"Rooming Tab(CC,VS,Pt Hx,Fall Screen)  Chief Complaint   Patient presents with   • Follow-up       Subjective   The patient presents today for followup. He is accompanied by his daughter-in-law today.    He reports that he cannot walk well without his walker. He states that he does not get in the shower but sponge bathes instead to prevent falls. He reports that his appetite is good. He states that he has a bowel movement every day to every other day. He notes that he needs to drink more water. He denies heartburn and is still taking omeprazole, and he needs a refill. He denies having any allergy symptoms.    Hypertension  Blood pressure is well controlled today on current regimen.     Lower extremity pain  The patient is unsure if he is taking gabapentin twice daily as directed; however, a family member confirms that he is taking it twice daily. His daughter and granddaughter manage his medications. He denies pain in his lower extremities. He describes a \"prickly\" sensation in his feet at times, such as when he is donning his socks.     Back pain  He states that he notices back pain and associated tightness with walking, especially when he first gets up. He is not awakened from sleep due to pain.     Memory  The patient denies feeling confused. His daughter-in-law states that he forgets things. He does report difficulty with short-term memory such as why he walked in a room or whether he took his medication.     Health maintenance  The patient has had his pneumococcal vaccination. He declines any further COVID-19 boosters.   I have reviewed and updated his medications, medical history and problem list during today's office visit.     Patient Care Team:  Ashley Cano MD as PCP - Missy Serrano MD as Consulting Physician (Cardiology)    Problem List Tab  Medications Tab  Synopsis Tab  Chart Review Tab  Care Everywhere Tab  Immunizations Tab  Patient History Tab    Social History     Tobacco Use " "  • Smoking status: Former Smoker     Quit date: 1970     Years since quittin.0   • Smokeless tobacco: Never Used   • Tobacco comment: more than 50yrs   Substance Use Topics   • Alcohol use: No       Review of Systems   A review of systems was performed, and the pertinent positives are noted in the HPI.     Objective     Rooming Tab(CC,VS,Pt Hx,Fall Screen)  /80   Pulse 54   Temp 98.2 °F (36.8 °C)   Resp 20   Ht 180.3 cm (70.98\")   Wt 78.3 kg (172 lb 9.6 oz)   SpO2 98%   BMI 24.09 kg/m²     Body mass index is 24.09 kg/m².    Physical Exam  Vitals and nursing note reviewed.   Constitutional:       Appearance: Normal appearance. He is well-developed.   HENT:      Head: Normocephalic and atraumatic.      Right Ear: Tympanic membrane normal.      Left Ear: Tympanic membrane normal.      Nose: No rhinorrhea.      Mouth/Throat:      Pharynx: No posterior oropharyngeal erythema.   Eyes:      Pupils: Pupils are equal, round, and reactive to light.   Cardiovascular:      Rate and Rhythm: Normal rate and regular rhythm.      Pulses: Normal pulses.      Heart sounds: Normal heart sounds. No murmur heard.  Pulmonary:      Effort: Pulmonary effort is normal.      Breath sounds: Normal breath sounds.   Musculoskeletal:         General: No tenderness.      Cervical back: Normal range of motion and neck supple.      Right lower leg: Edema present.      Left lower leg: No edema.   Skin:     Capillary Refill: Capillary refill takes less than 2 seconds.   Neurological:      Mental Status: He is alert and oriented to person, place, and time.   Psychiatric:         Mood and Affect: Mood normal.         Behavior: Behavior normal.          Statin Choice Calculator  Data Reviewed:         The data below has been reviewed by Ashley Cano MD on 2022.          Assessment & Plan   Order Review Tab  Health Maintenance Tab  Patient Plan/Order Tab  Diagnoses and all orders for this visit:    1. Essential " hypertension (Primary)       - His blood pressure is well controlled.       - He will continue his current medication regimen.    2. Mixed hyperlipidemia    3. Lower extremity edema  Comments:   right lower leg mild edema stable    - Continue gabapentin.   - Elevate lower extremities when sitting.     4. Health maintenance       - Patient encouraged to drink at least the equivalent of 3 bottles of water daily.        - Patient declines future COVID-19 boosters.       - Patient will receive influenza vaccination when it becomes available.    Other orders  -     omeprazole (priLOSEC) 20 MG capsule; Take 1 capsule by mouth Daily.  Dispense: 90 capsule; Refill: 3  -     gabapentin (NEURONTIN) 100 MG capsule; Take 1 capsule by mouth 2 (Two) Times a Day.  Dispense: 180 capsule; Refill: 2      Wrapup Tab  Return in about 6 months (around 3/7/2023), or if symptoms worsen or fail to improve, for Medicare Wellness.       They were informed of the diagnosis and treatment plan and directed to f/u for any further problems or concerns.    Transcribed from ambient dictation for Ashley Cano MD by Olamide Weldon.  09/07/22   18:31 EDT    Patient verbalized consent to the visit recording.

## 2022-11-17 RX ORDER — GABAPENTIN 100 MG/1
CAPSULE ORAL
Qty: 180 CAPSULE | Refills: 1 | Status: SHIPPED | OUTPATIENT
Start: 2022-11-17

## 2022-11-18 ENCOUNTER — TELEPHONE (OUTPATIENT)
Dept: FAMILY MEDICINE CLINIC | Facility: CLINIC | Age: 87
End: 2022-11-18

## 2022-11-18 DIAGNOSIS — I10 ESSENTIAL HYPERTENSION: Primary | ICD-10-CM

## 2022-11-18 RX ORDER — LOSARTAN POTASSIUM 50 MG/1
50 TABLET ORAL DAILY
Qty: 90 TABLET | Refills: 1 | Status: SHIPPED | OUTPATIENT
Start: 2022-11-18

## 2022-11-18 NOTE — TELEPHONE ENCOUNTER
Incoming Refill Request      Medication requested (name and dose):   losartan (COZAAR) 50 MG tablet  50 mg, Daily 1 ordered         Pharmacy where request should be sent: [id number] - [place], [place] - [address]  - [phone] PH - [phone] FX  [phone]    Additional details provided by patient: NONE    Best call back number: [phone]    Does the patient have less than a 3 day supply:  [x] Yes  [] No    Juhi Wong Rep  11/18/22, 13:10 EST

## 2023-02-17 RX ORDER — TAMSULOSIN HYDROCHLORIDE 0.4 MG/1
1 CAPSULE ORAL DAILY
Qty: 90 CAPSULE | Refills: 1 | Status: SHIPPED | OUTPATIENT
Start: 2023-02-17

## 2023-03-10 ENCOUNTER — LAB (OUTPATIENT)
Dept: FAMILY MEDICINE CLINIC | Facility: CLINIC | Age: 88
End: 2023-03-10
Payer: MEDICARE

## 2023-03-10 ENCOUNTER — OFFICE VISIT (OUTPATIENT)
Dept: FAMILY MEDICINE CLINIC | Facility: CLINIC | Age: 88
End: 2023-03-10
Payer: MEDICARE

## 2023-03-10 VITALS
BODY MASS INDEX: 24.08 KG/M2 | HEIGHT: 71 IN | OXYGEN SATURATION: 99 % | WEIGHT: 172 LBS | SYSTOLIC BLOOD PRESSURE: 148 MMHG | DIASTOLIC BLOOD PRESSURE: 84 MMHG | HEART RATE: 90 BPM

## 2023-03-10 DIAGNOSIS — Z00.00 MEDICARE ANNUAL WELLNESS VISIT, SUBSEQUENT: Primary | ICD-10-CM

## 2023-03-10 DIAGNOSIS — E78.2 MIXED HYPERLIPIDEMIA: ICD-10-CM

## 2023-03-10 DIAGNOSIS — E55.9 VITAMIN D DEFICIENCY: ICD-10-CM

## 2023-03-10 DIAGNOSIS — I10 ESSENTIAL HYPERTENSION: ICD-10-CM

## 2023-03-10 DIAGNOSIS — Z12.5 ENCOUNTER FOR SCREENING FOR MALIGNANT NEOPLASM OF PROSTATE: ICD-10-CM

## 2023-03-10 LAB
25(OH)D3 SERPL-MCNC: 71.5 NG/ML (ref 30–100)
ALBUMIN SERPL-MCNC: 4.2 G/DL (ref 3.5–5.2)
ALBUMIN/GLOB SERPL: 1.9 G/DL
ALP SERPL-CCNC: 77 U/L (ref 39–117)
ALT SERPL W P-5'-P-CCNC: 11 U/L (ref 1–41)
ANION GAP SERPL CALCULATED.3IONS-SCNC: 12.1 MMOL/L (ref 5–15)
AST SERPL-CCNC: 14 U/L (ref 1–40)
BASOPHILS # BLD AUTO: 0.05 10*3/MM3 (ref 0–0.2)
BASOPHILS NFR BLD AUTO: 0.5 % (ref 0–1.5)
BILIRUB SERPL-MCNC: 0.8 MG/DL (ref 0–1.2)
BUN SERPL-MCNC: 15 MG/DL (ref 8–23)
BUN/CREAT SERPL: 11.9 (ref 7–25)
CALCIUM SPEC-SCNC: 9.3 MG/DL (ref 8.2–9.6)
CHLORIDE SERPL-SCNC: 101 MMOL/L (ref 98–107)
CHOLEST SERPL-MCNC: 153 MG/DL (ref 0–200)
CO2 SERPL-SCNC: 23.9 MMOL/L (ref 22–29)
CREAT SERPL-MCNC: 1.26 MG/DL (ref 0.76–1.27)
DEPRECATED RDW RBC AUTO: 45.4 FL (ref 37–54)
EGFRCR SERPLBLD CKD-EPI 2021: 53.5 ML/MIN/1.73
EOSINOPHIL # BLD AUTO: 0.07 10*3/MM3 (ref 0–0.4)
EOSINOPHIL NFR BLD AUTO: 0.7 % (ref 0.3–6.2)
ERYTHROCYTE [DISTWIDTH] IN BLOOD BY AUTOMATED COUNT: 13.1 % (ref 12.3–15.4)
GLOBULIN UR ELPH-MCNC: 2.2 GM/DL
GLUCOSE SERPL-MCNC: 101 MG/DL (ref 65–99)
HCT VFR BLD AUTO: 39.5 % (ref 37.5–51)
HDLC SERPL-MCNC: 37 MG/DL (ref 40–60)
HGB BLD-MCNC: 13.5 G/DL (ref 13–17.7)
IMM GRANULOCYTES # BLD AUTO: 0.04 10*3/MM3 (ref 0–0.05)
IMM GRANULOCYTES NFR BLD AUTO: 0.4 % (ref 0–0.5)
LDLC SERPL CALC-MCNC: 100 MG/DL (ref 0–100)
LDLC/HDLC SERPL: 2.67 {RATIO}
LYMPHOCYTES # BLD AUTO: 1.87 10*3/MM3 (ref 0.7–3.1)
LYMPHOCYTES NFR BLD AUTO: 18.4 % (ref 19.6–45.3)
MCH RBC QN AUTO: 32.5 PG (ref 26.6–33)
MCHC RBC AUTO-ENTMCNC: 34.2 G/DL (ref 31.5–35.7)
MCV RBC AUTO: 95 FL (ref 79–97)
MONOCYTES # BLD AUTO: 0.71 10*3/MM3 (ref 0.1–0.9)
MONOCYTES NFR BLD AUTO: 7 % (ref 5–12)
NEUTROPHILS NFR BLD AUTO: 7.45 10*3/MM3 (ref 1.7–7)
NEUTROPHILS NFR BLD AUTO: 73 % (ref 42.7–76)
NRBC BLD AUTO-RTO: 0 /100 WBC (ref 0–0.2)
PLATELET # BLD AUTO: 178 10*3/MM3 (ref 140–450)
PMV BLD AUTO: 12.7 FL (ref 6–12)
POTASSIUM SERPL-SCNC: 4.4 MMOL/L (ref 3.5–5.2)
PROT SERPL-MCNC: 6.4 G/DL (ref 6–8.5)
PSA SERPL-MCNC: 1.45 NG/ML (ref 0–4)
RBC # BLD AUTO: 4.16 10*6/MM3 (ref 4.14–5.8)
SODIUM SERPL-SCNC: 137 MMOL/L (ref 136–145)
TRIGL SERPL-MCNC: 86 MG/DL (ref 0–150)
VLDLC SERPL-MCNC: 16 MG/DL (ref 5–40)
WBC NRBC COR # BLD: 10.19 10*3/MM3 (ref 3.4–10.8)

## 2023-03-10 PROCEDURE — 80061 LIPID PANEL: CPT | Performed by: INTERNAL MEDICINE

## 2023-03-10 PROCEDURE — 82306 VITAMIN D 25 HYDROXY: CPT | Performed by: INTERNAL MEDICINE

## 2023-03-10 PROCEDURE — 85025 COMPLETE CBC W/AUTO DIFF WBC: CPT | Performed by: INTERNAL MEDICINE

## 2023-03-10 PROCEDURE — 1159F MED LIST DOCD IN RCRD: CPT | Performed by: INTERNAL MEDICINE

## 2023-03-10 PROCEDURE — 36415 COLL VENOUS BLD VENIPUNCTURE: CPT | Performed by: INTERNAL MEDICINE

## 2023-03-10 PROCEDURE — 99213 OFFICE O/P EST LOW 20 MIN: CPT | Performed by: INTERNAL MEDICINE

## 2023-03-10 PROCEDURE — G0103 PSA SCREENING: HCPCS | Performed by: INTERNAL MEDICINE

## 2023-03-10 PROCEDURE — G0439 PPPS, SUBSEQ VISIT: HCPCS | Performed by: INTERNAL MEDICINE

## 2023-03-10 PROCEDURE — 80053 COMPREHEN METABOLIC PANEL: CPT | Performed by: INTERNAL MEDICINE

## 2023-03-10 PROCEDURE — 1170F FXNL STATUS ASSESSED: CPT | Performed by: INTERNAL MEDICINE

## 2023-03-10 NOTE — PROGRESS NOTES
The ABCs of the Annual Wellness Visit  Subsequent Medicare Wellness Visit    Subjective    Sage Flores is a 89 y.o. male who presents for a Subsequent Medicare Wellness Visit.    The following portions of the patient's history were reviewed and   updated as appropriate: allergies, current medications, past family history, past medical history, past social history, past surgical history and problem list.    Compared to one year ago, the patient feels his physical   health is the same.    Compared to one year ago, the patient feels his mental   health is the same.    Recent Hospitalizations:  He was not admitted to the hospital during the last year.       Current Medical Providers:  Patient Care Team:  Ashley Cano MD as PCP - General  Missy Domínguez MD as Consulting Physician (Cardiology)    Outpatient Medications Prior to Visit   Medication Sig Dispense Refill   • aspirin 81 MG chewable tablet Chew 1 tablet Daily.     • azelastine (ASTEPRO) 0.15 % solution nasal spray USE 2 SPRAYS IN EACH NOSTRIL TWICE DAILY AS DIRECTED BY PROVIDER (Patient taking differently: 2 sprays into the nostril(s) as directed by provider 2 (Two) Times a Day.) 30 mL 3   • diazePAM (VALIUM) 5 MG tablet TAKE 1 TABLET BY MOUTH TWICE DAILY (Patient taking differently: 1 (One) Time As Needed.) 60 tablet 2   • docusate sodium (COLACE) 250 MG capsule Take 1 capsule by mouth Daily.     • gabapentin (NEURONTIN) 100 MG capsule TAKE 1 CAPSULE BY MOUTH TWICE DAILY 180 capsule 1   • losartan (COZAAR) 50 MG tablet Take 1 tablet by mouth Daily. 90 tablet 1   • omeprazole (priLOSEC) 20 MG capsule Take 1 capsule by mouth Daily. 90 capsule 3   • tamsulosin (FLOMAX) 0.4 MG capsule 24 hr capsule TAKE 1 CAPSULE BY MOUTH DAILY 90 capsule 1     No facility-administered medications prior to visit.       No opioid medication identified on active medication list. I have reviewed chart for other potential  high risk medication/s and harmful drug  "interactions in the elderly.          Aspirin is on active medication list. Aspirin use is indicated based on review of current medical condition/s. Pros and cons of this therapy have been discussed today. Benefits of this medication outweigh potential harm.  Patient has been encouraged to continue taking this medication.  .      Patient Active Problem List   Diagnosis   • Allergic rhinitis   • Atrial fibrillation (HCC)   • Claudication (HCC)   • Coronary heart disease   • Edema   • Extremity pain   • History of left heart catheterization (LHC)   • Hyperlipidemia   • Essential hypertension   • Inguinal hernia, right   • Laceration   • Lower extremity edema   • Myocardial infarction (HCC)   • Need for other prophylactic vaccination and inoculation against single diseases   • Presence of other cardiac implants and grafts   • Status post coronary artery bypass graft   • Status post percutaneous transluminal coronary angioplasty   • Viral URI   • Rib pain on left side   • Dizziness   • Acute cystitis without hematuria   • Status post placement of implantable loop recorder   • Lab test negative for COVID-19 virus   • Cellulitis of left leg   • Iron deficiency anemia   • Encounter for support and coordination of transition of care   • Hematoma of right lower extremity   • Encounter for screening for malignant neoplasm of prostate    • Medicare annual wellness visit, subsequent   • Vitamin D deficiency   • Hyponatremia   • Gastroesophageal reflux disease without esophagitis     Advance Care Planning  Advance Directive is on file.  ACP discussion was held with the patient during this visit. Patient has an advance directive in EMR which is still valid.      Objective    Vitals:    03/10/23 1044 03/10/23 1117   BP: 166/95 148/84   Pulse: 90    SpO2: 99%    Weight: 78 kg (172 lb)    Height: 180.3 cm (71\")      Estimated body mass index is 23.99 kg/m² as calculated from the following:    Height as of this encounter: 180.3 cm " "(71\").    Weight as of this encounter: 78 kg (172 lb).    BMI is within normal parameters. No other follow-up for BMI required.      Does the patient have evidence of cognitive impairment? No    Lab Results   Component Value Date    TRIG 86 03/10/2023    HDL 37 (L) 03/10/2023     03/10/2023    VLDL 16 03/10/2023        HEALTH RISK ASSESSMENT    Smoking Status:  Social History     Tobacco Use   Smoking Status Former   • Types: Cigarettes   • Quit date: 1970   • Years since quittin.5   Smokeless Tobacco Never   Tobacco Comments    more than 50yrs     Alcohol Consumption:  Social History     Substance and Sexual Activity   Alcohol Use No     Fall Risk Screen:    STEADI Fall Risk Assessment was completed, and patient is at LOW risk for falls.Assessment completed on:3/10/2023    Depression Screening:  PHQ-2/PHQ-9 Depression Screening 3/10/2023   Little Interest or Pleasure in Doing Things 0-->not at all   Feeling Down, Depressed or Hopeless 0-->not at all   PHQ-9: Brief Depression Severity Measure Score 0       Health Habits and Functional and Cognitive Screening:  Functional & Cognitive Status 3/10/2023   Do you have difficulty preparing food and eating? No   Do you have difficulty bathing yourself, getting dressed or grooming yourself? No   Do you have difficulty using the toilet? No   Do you have difficulty moving around from place to place? No   Do you have trouble with steps or getting out of a bed or a chair? No   Current Diet Well Balanced Diet   Dental Exam Up to date   Eye Exam Not up to date   Exercise (times per week) 0 times per week   Current Exercises Include No Regular Exercise   Do you need help using the phone?  No   Are you deaf or do you have serious difficulty hearing?  Yes   Do you need help with transportation? Yes   Do you need help shopping? Yes   Do you need help preparing meals?  Yes   Do you need help with housework?  Yes   Do you need help with laundry? No   Do you need help " taking your medications? Yes   Do you need help managing money? No   Do you ever drive or ride in a car without wearing a seat belt? No   Have you felt unusual stress, anger or loneliness in the last month? No   Who do you live with? Alone   If you need help, do you have trouble finding someone available to you? No   Do you have difficulty concentrating, remembering or making decisions? No       Age-appropriate Screening Schedule:  Refer to the list below for future screening recommendations based on patient's age, sex and/or medical conditions. Orders for these recommended tests are listed in the plan section. The patient has been provided with a written plan.    Health Maintenance   Topic Date Due   • TDAP/TD VACCINES (1 - Tdap) Never done   • ZOSTER VACCINE (1 of 2) Never done   • COVID-19 Vaccine (3 - Booster) 04/04/2021   • ANNUAL WELLNESS VISIT  03/10/2024   • LIPID PANEL  03/10/2024   • INFLUENZA VACCINE  Completed   • Pneumococcal Vaccine 65+  Completed                CMS Preventative Services Quick Reference  Risk Factors Identified During Encounter  Fall Risk-High or Moderate: Discussed Fall Prevention in the home  The above risks/problems have been discussed with the patient.  Pertinent information has been shared with the patient in the After Visit Summary.  An After Visit Summary and PPPS were made available to the patient.    Follow Up:   Next Medicare Wellness visit to be scheduled in 1 year.       Additional E&M Note during same encounter follows:  Patient has multiple medical problems which are significant and separately identifiable that require additional work above and beyond the Medicare Wellness Visit.      Chief Complaint  Medicare Wellness-subsequent    Subjective        HPI  Sage Flores is also being seen today for a follow-up. He is accompanied by his daughter.    Hypertension  The patient's blood pressure is elevated today at 166/95 mmHg and reduced to 148/84 mmHg on recheck. He has a  "blood pressure cuff at home. He used to take blood pressure readings daily or every other day, however, they got out of the habit as he did not feel like his blood pressure was bothering him. He eats a significant amount of frozen meals. He also receives meals from his neighbors.    Health maintenance  The patient drinks 1 can of cherry Coke and a 0.5 cup of coffee daily. He maintains proper hydration. He has 1 episode of nocturia nightly. The patient is fasting. He denies any chest pain or dyspnea. He denies any moles or rashes. He has a bowel movement most days and takes a stool softener as needed. He does not go anywhere without a walker as he does not trust himself. He denies any falls. He has a smooth, paved driveway that he walks on when the weather is warm. He keeps his cell phone with him in case he falls. He has a chair and handrail in the shower. The patient's memory is good. He does not drive. He feels safe at home alone. The adult female's daughter spends at least 1 day with him weekly working from home. His hearing is doing well. The patient's glasses prescription was changed in 09/2023. His knees are doing well. His bilateral ankles have improved. The patient had veins taken out of his leg for his heart surgery approximately 10 years ago.       Objective   Vital Signs:  /84   Pulse 90   Ht 180.3 cm (71\")   Wt 78 kg (172 lb)   SpO2 99%   BMI 23.99 kg/m²     Physical Exam  Vitals and nursing note reviewed.   Constitutional:       Appearance: Normal appearance. He is well-developed.   HENT:      Head: Normocephalic and atraumatic.      Right Ear: Tympanic membrane normal.      Left Ear: Tympanic membrane normal.      Nose: No rhinorrhea.      Mouth/Throat:      Pharynx: No posterior oropharyngeal erythema.   Eyes:      Pupils: Pupils are equal, round, and reactive to light.   Cardiovascular:      Rate and Rhythm: Normal rate and regular rhythm.      Pulses: Normal pulses.      Heart sounds: " Normal heart sounds. No murmur heard.  Pulmonary:      Effort: Pulmonary effort is normal.      Breath sounds: Normal breath sounds.   Abdominal:      General: Bowel sounds are normal. There is no distension.      Palpations: Abdomen is soft.   Musculoskeletal:         General: No tenderness.      Cervical back: Normal range of motion and neck supple.   Skin:     Capillary Refill: Capillary refill takes less than 2 seconds.      Findings: Bruising present.   Neurological:      Mental Status: He is alert and oriented to person, place, and time.   Psychiatric:         Mood and Affect: Mood normal.         Behavior: Behavior normal.          The following data was reviewed by: Ashley Cano MD on 03/10/2023:  Common labs    Common Labs 4/20/22 3/10/23 3/10/23 3/10/23 3/10/23     1132 1132 1132 1132   Glucose 115 (A)  101 (A)     BUN 18  15     Creatinine 1.30 (A)  1.26     Sodium 139  137     Potassium 4.4  4.4     Chloride 103  101     Calcium 9.2  9.3     Albumin   4.2     Total Bilirubin   0.8     Alkaline Phosphatase   77     AST (SGOT)   14     ALT (SGPT)   11     WBC  10.19      Hemoglobin  13.5      Hematocrit  39.5      Platelets  178      Total Cholesterol    153    Triglycerides    86    HDL Cholesterol    37 (A)    LDL Cholesterol     100    PSA     1.450   (A) Abnormal value                   Assessment and Plan   Diagnoses and all orders for this visit:    1. Medicare annual wellness visit, subsequent (Primary)  Comments:  discussed all recommendations  Orders:  -     Comprehensive Metabolic Panel  -     CBC Auto Differential  -     Lipid Panel    2. Mixed hyperlipidemia    3. Essential hypertension    4. Vitamin D deficiency  -     Vitamin D,25-Hydroxy    5. Encounter for screening for malignant neoplasm of prostate  -     PSA Screen      1. Mixed hyperlipidemia (Primary)    2. Essential hypertension  - The patient will monitor his blood pressure at home.    3. Vitamin D deficiency  - We will  obtain blood work.    4. Medicare annual wellness visit, subsequent  - We will obtain routine blood work.    5. Encounter for screening for malignant neoplasm of prostate  - We will obtain a PSA.       I spent 30 minutes caring for Sage on this date of service. This time includes time spent by me in the following activities:reviewing tests, obtaining and/or reviewing a separately obtained history, performing a medically appropriate examination and/or evaluation  and counseling and educating the patient/family/caregiver  Follow Up   Return in about 6 months (around 9/10/2023), or if symptoms worsen or fail to improve.  Patient was given instructions and counseling regarding his condition or for health maintenance advice. Please see specific information pulled into the AVS if appropriate.     Transcribed from ambient dictation for Ashley Cano MD by Elías Barron.  03/10/23   13:56 EST    Patient or patient representative verbalized consent to the visit recording.  I have personally performed the services described in this document as transcribed by the above individual, and it is both accurate and complete.  Ashley Cano MD  3/12/2023  20:26 EDT

## 2023-04-10 NOTE — PROGRESS NOTES
"Ms. Kauffman is a 64 yo F with PMHx of NIDDM, HLD, anxiety/depression, MYRIAM, anti-phospholipid antibodies, DVT, aortic thrombus, adrenal insufficiency s/p hemorrhage on hydrocortisone, and Ph- B cell ALL. She is s/p treatment with C1A/1B miniCVD and s/p C1A/B mini hyperCVAD. She is now being admitted for C2A of mini-hyperCVAD.     On admission, patient accompanied by  who states she has been doing fair at home. He is trying to get her to be more active. She has lost 5 pounds, so he is trying to get her to try different foods. She was previously a vegetarian, but has been agreeable to trying fish and chicken recently for more protein. She is able to drink protein shakes (Premier Protein) and he will bring those to the hospital as we do not carry that brand and that's what she prefers. Reviewed possible side effects of chemo and overall chemo plan this admission including LP. Patient agreeable. Obtaining repeat labs on admission for pharmacy to properly adjust chemo. Will start chemo tomorrow. Of note, she still has her bili drain and is currently on the IR outpatient schedule for 4/14 for "removal vs exchange." Will consult tomorrow to discuss timing of this appointment (inpatient vs outpatient).   " Rooming Tab(CC,VS,Pt Hx,Fall Screen)  Chief Complaint   Patient presents with   • bladder issues   • Weight Loss       Subjective   Pt here for dysuria- increased urination 5/night but minimal outpt   also with increased dizziness-  Worried could be an medication - on gabapentin and valium 2/day  Fell changing battery in smoke detector 4 months ago- fell to side- passed out 4 months ago- still with left rib pain- hasn't been able to lay on that side ever since. Never seen medical attention at the time      I have reviewed and updated his medications, medical history and problem list during today's office visit.     Patient Care Team:  Ashley Cano MD as PCP - General  Missy Domínguez MD as PCP - Claims Attributed    Problem List Tab  Medications Tab  Synopsis Tab  Chart Review Tab  Care Everywhere Tab  Immunizations Tab  Patient History Tab    Social History     Tobacco Use   • Smoking status: Former Smoker   • Smokeless tobacco: Never Used   • Tobacco comment: more than 50yrs   Substance Use Topics   • Alcohol use: No     Frequency: Never       Review of Systems   Constitutional: Negative for fatigue and fever.   HENT: Negative for congestion.    Respiratory: Negative for apnea, cough and wheezing.    Cardiovascular: Positive for chest pain.   Gastrointestinal: Negative for abdominal distention.   Genitourinary: Positive for dysuria and frequency.   Musculoskeletal: Positive for arthralgias.   Neurological: Negative for syncope.   Psychiatric/Behavioral: Negative for behavioral problems.       Objective     Rooming Tab(CC,VS,Pt Hx,Fall Screen)  /74 (BP Location: Left arm, Patient Position: Sitting, Cuff Size: Adult)   Pulse 76   Temp 97.7 °F (36.5 °C) (Oral)   Resp 16   Wt 73 kg (161 lb)   BMI 22.45 kg/m²     Body mass index is 22.45 kg/m².    Physical Exam   Constitutional: He is oriented to person, place, and time. He appears well-developed and well-nourished.   HENT:   Head: Normocephalic.    Eyes: EOM are normal. Pupils are equal, round, and reactive to light.   Neck: Normal range of motion.   Cardiovascular: Normal rate and regular rhythm.   Pulmonary/Chest: Effort normal and breath sounds normal. He exhibits tenderness.   Abdominal: Soft. Bowel sounds are normal.   Neurological: He is oriented to person, place, and time.   Nursing note and vitals reviewed.       Statin Choice Calculator  Data Reviewed:                   Assessment/Plan   Order Review Tab  Health Maintenance Tab  Patient Plan/Order Tab  Diagnoses and all orders for this visit:    1. Rib pain on left side (Primary)  -     XR Ribs 2 View Left; Future    2. Essential hypertension    3. Lower extremity edema    4. Acute cystitis without hematuria  Comments:   check abx  Orders:  -     POC Urinalysis Dipstick, Automated  -     Urine Culture - Urine, Urine, Clean Catch    5. Dizziness  Comments:   decrease gabapentin night only     Other orders  -     nitrofurantoin, macrocrystal-monohydrate, (MACROBID) 100 MG capsule; Take 1 capsule by mouth 2 (Two) Times a Day.  Dispense: 14 capsule; Refill: 0        Wrapup Tab  Return in about 6 months (around 2/23/2020), or if symptoms worsen or fail to improve.

## 2023-05-30 DIAGNOSIS — I10 ESSENTIAL HYPERTENSION: ICD-10-CM

## 2023-05-30 RX ORDER — LOSARTAN POTASSIUM 50 MG/1
50 TABLET ORAL DAILY
Qty: 90 TABLET | Refills: 1 | Status: SHIPPED | OUTPATIENT
Start: 2023-05-30

## 2023-07-08 NOTE — TELEPHONE ENCOUNTER
PT REQUESTING TO BE CALLED WITH X-RAY RESULTS HE HAD DONE ON Thursday. THANK YOU.   Krunal Rodriguez(Resident)

## 2023-08-30 RX ORDER — TAMSULOSIN HYDROCHLORIDE 0.4 MG/1
1 CAPSULE ORAL DAILY
Qty: 90 CAPSULE | Refills: 0 | Status: SHIPPED | OUTPATIENT
Start: 2023-08-30

## 2023-08-30 NOTE — TELEPHONE ENCOUNTER
Caller: TODD BAIN    Relationship: Emergency Contact    Best call back number: [phone]     Requested Prescriptions:   Requested Prescriptions     Pending Prescriptions Disp Refills    tamsulosin (FLOMAX) 0.4 MG capsule 24 hr capsule 90 capsule 1     Sig: Take 1 capsule by mouth Daily.        Pharmacy where request should be sent: [id number] - [place], [place] - [address] [facility] [address] [facility] [address] - [phone]  - [phone] FX     Last office visit with prescribing clinician: Visit date not found   Last telemedicine visit with prescribing clinician: Visit date not found   Next office visit with prescribing clinician: 9/11/2023     Does the patient have less than a 3 day supply:  [x] Yes  [] No    Juhi Moody Rep   08/30/23 14:12 EDT

## 2023-09-11 ENCOUNTER — LAB (OUTPATIENT)
Dept: FAMILY MEDICINE CLINIC | Facility: CLINIC | Age: 88
End: 2023-09-11
Payer: MEDICARE

## 2023-09-11 ENCOUNTER — OFFICE VISIT (OUTPATIENT)
Dept: FAMILY MEDICINE CLINIC | Facility: CLINIC | Age: 88
End: 2023-09-11
Payer: MEDICARE

## 2023-09-11 VITALS
DIASTOLIC BLOOD PRESSURE: 88 MMHG | SYSTOLIC BLOOD PRESSURE: 150 MMHG | WEIGHT: 168 LBS | BODY MASS INDEX: 23.52 KG/M2 | HEART RATE: 84 BPM | RESPIRATION RATE: 18 BRPM | OXYGEN SATURATION: 99 % | HEIGHT: 71 IN

## 2023-09-11 DIAGNOSIS — I10 ESSENTIAL HYPERTENSION: Primary | ICD-10-CM

## 2023-09-11 DIAGNOSIS — I10 ESSENTIAL HYPERTENSION: ICD-10-CM

## 2023-09-11 LAB
ALBUMIN SERPL-MCNC: 4.2 G/DL (ref 3.5–5.2)
ALBUMIN/GLOB SERPL: 1.7 G/DL
ALP SERPL-CCNC: 78 U/L (ref 39–117)
ALT SERPL W P-5'-P-CCNC: 14 U/L (ref 1–41)
ANION GAP SERPL CALCULATED.3IONS-SCNC: 11.1 MMOL/L (ref 5–15)
AST SERPL-CCNC: 15 U/L (ref 1–40)
BILIRUB SERPL-MCNC: 0.8 MG/DL (ref 0–1.2)
BILIRUB UR QL STRIP: NEGATIVE
BUN SERPL-MCNC: 12 MG/DL (ref 8–23)
BUN/CREAT SERPL: 10.7 (ref 7–25)
CALCIUM SPEC-SCNC: 9.3 MG/DL (ref 8.2–9.6)
CHLORIDE SERPL-SCNC: 96 MMOL/L (ref 98–107)
CLARITY UR: CLEAR
CO2 SERPL-SCNC: 25.9 MMOL/L (ref 22–29)
COLOR UR: YELLOW
CREAT SERPL-MCNC: 1.12 MG/DL (ref 0.76–1.27)
EGFRCR SERPLBLD CKD-EPI 2021: 61.3 ML/MIN/1.73
GLOBULIN UR ELPH-MCNC: 2.5 GM/DL
GLUCOSE SERPL-MCNC: 105 MG/DL (ref 65–99)
GLUCOSE UR STRIP-MCNC: NEGATIVE MG/DL
HGB UR QL STRIP.AUTO: NEGATIVE
KETONES UR QL STRIP: NEGATIVE
LEUKOCYTE ESTERASE UR QL STRIP.AUTO: NEGATIVE
NITRITE UR QL STRIP: NEGATIVE
PH UR STRIP.AUTO: 6.5 [PH] (ref 5–8)
POTASSIUM SERPL-SCNC: 4.2 MMOL/L (ref 3.5–5.2)
PROT SERPL-MCNC: 6.7 G/DL (ref 6–8.5)
PROT UR QL STRIP: NEGATIVE
SODIUM SERPL-SCNC: 133 MMOL/L (ref 136–145)
SP GR UR STRIP: 1.01 (ref 1–1.03)
UROBILINOGEN UR QL STRIP: NORMAL

## 2023-09-11 PROCEDURE — 99213 OFFICE O/P EST LOW 20 MIN: CPT | Performed by: NURSE PRACTITIONER

## 2023-09-11 PROCEDURE — 1160F RVW MEDS BY RX/DR IN RCRD: CPT | Performed by: NURSE PRACTITIONER

## 2023-09-11 PROCEDURE — 80053 COMPREHEN METABOLIC PANEL: CPT | Performed by: NURSE PRACTITIONER

## 2023-09-11 PROCEDURE — 81003 URINALYSIS AUTO W/O SCOPE: CPT | Performed by: NURSE PRACTITIONER

## 2023-09-11 PROCEDURE — 36415 COLL VENOUS BLD VENIPUNCTURE: CPT

## 2023-09-11 PROCEDURE — 1159F MED LIST DOCD IN RCRD: CPT | Performed by: NURSE PRACTITIONER

## 2023-09-11 NOTE — PROGRESS NOTES
"Chief Complaint  Establish Care    Subjective          Sage Flores presents to [facility]  History of Present Illness    Was a patient of Dr. Cano and is previously unknown to me  He presents today for follow up on HTN  His daughter in law (retired nurse) is with him, and tells me that he was a little confused yesterday, would like a urinalysis checked.    H/o allergies, a-fib, claudication, cad s/p mi, hld, htn, gerd, vit d def., iron def.    Current medicines are flomax, losartan, asa, colace, gabapentin, losartan, omeprazole, diazepam, azelastine    He tells me that he uses the valium only as needed, typically not more often than a couple of times/week    He does not monitor his BP at home    He is fasting for labs today, he did have a throat lozenge    Review of Systems   Constitutional:  Negative for appetite change, fatigue and fever.   Respiratory:  Negative for shortness of breath.    Cardiovascular:  Positive for leg swelling. Negative for chest pain and palpitations.        Endorses chronic mild LE edema   Neurological:  Positive for dizziness. Negative for weakness, light-headedness and headaches.        Endorses some occasional dizziness when getting out of bed, does not last long   Psychiatric/Behavioral:  The patient is nervous/anxious.         Endorses some mild intermittent anxiety       Objective   Vital Signs:  /88 (BP Location: Right arm)   Pulse 84   Resp 18   Ht 180.3 cm (70.98\")   Wt 76.2 kg (168 lb)   SpO2 99%   BMI 23.44 kg/m²     BP Readings from Last 3 Encounters:   09/11/23 150/88   03/10/23 148/84   09/07/22 130/80        Wt Readings from Last 3 Encounters:   09/11/23 76.2 kg (168 lb)   03/10/23 78 kg (172 lb)   09/07/22 78.3 kg (172 lb 9.6 oz)              Physical Exam  Vitals reviewed.   Constitutional:       Appearance: Normal appearance.   Neck:      Vascular: No carotid bruit.   Cardiovascular:      Rate and Rhythm: Normal rate and " regular rhythm.      Pulses: Normal pulses.      Heart sounds: Normal heart sounds.   Pulmonary:      Effort: Pulmonary effort is normal.      Breath sounds: Normal breath sounds.   Musculoskeletal:      Right lower leg: Edema present.      Left lower leg: Edema present.      Comments: Trace b/l le edema   Skin:     General: Skin is warm.   Neurological:      Mental Status: He is alert and oriented to person, place, and time.      Result Review :                 Assessment and Plan    Diagnoses and all orders for this visit:    1. Essential hypertension (Primary)  -     Comprehensive metabolic panel; Future  -     Urinalysis With Culture If Indicated - Urine, Clean Catch; Future       Body mass index is 23.44 kg/m².      Follow Up   Return in about 6 months (around 3/11/2024) for Annual physical, Medicare Wellness.  Patient was given instructions and counseling regarding his condition or for health maintenance advice. Please see specific information pulled into the AVS if appropriate.

## 2023-11-19 RX ORDER — TAMSULOSIN HYDROCHLORIDE 0.4 MG/1
1 CAPSULE ORAL DAILY
Qty: 90 CAPSULE | Refills: 0 | Status: SHIPPED | OUTPATIENT
Start: 2023-11-19

## 2023-12-01 ENCOUNTER — HOSPITAL ENCOUNTER (INPATIENT)
Facility: HOSPITAL | Age: 88
LOS: 10 days | Discharge: NURSING, REHAB OR LONG-TERM CARE | DRG: 321 | End: 2023-12-11
Attending: EMERGENCY MEDICINE | Admitting: INTERNAL MEDICINE
Payer: MEDICARE

## 2023-12-01 DIAGNOSIS — I44.2 HEART BLOCK AV THIRD DEGREE: ICD-10-CM

## 2023-12-01 DIAGNOSIS — I21.3 ST ELEVATION MYOCARDIAL INFARCTION (STEMI), UNSPECIFIED ARTERY: Primary | ICD-10-CM

## 2023-12-01 PROBLEM — I21.11 ACUTE ST ELEVATION MYOCARDIAL INFARCTION (STEMI) INVOLVING RIGHT CORONARY ARTERY: Status: ACTIVE | Noted: 2023-12-01

## 2023-12-01 LAB
ACT BLD: 190 SECONDS (ref 89–137)
ACT BLD: 223 SECONDS (ref 89–137)
ACT BLD: 228 SECONDS (ref 89–137)
ALBUMIN SERPL-MCNC: 3.7 G/DL (ref 3.5–5.2)
ALBUMIN/GLOB SERPL: 1.7 G/DL
ALP SERPL-CCNC: 83 U/L (ref 39–117)
ALT SERPL W P-5'-P-CCNC: 17 U/L (ref 1–41)
ANION GAP SERPL CALCULATED.3IONS-SCNC: 11 MMOL/L (ref 5–15)
ANION GAP SERPL CALCULATED.3IONS-SCNC: 12 MMOL/L (ref 5–15)
APTT PPP: 25.5 SECONDS (ref 24–31)
APTT PPP: 49.4 SECONDS (ref 24–31)
AST SERPL-CCNC: 21 U/L (ref 1–40)
BASOPHILS # BLD AUTO: 0.1 10*3/MM3 (ref 0–0.2)
BASOPHILS # BLD AUTO: 0.1 10*3/MM3 (ref 0–0.2)
BASOPHILS NFR BLD AUTO: 0.6 % (ref 0–1.5)
BASOPHILS NFR BLD AUTO: 0.6 % (ref 0–1.5)
BILIRUB SERPL-MCNC: 0.8 MG/DL (ref 0–1.2)
BUN SERPL-MCNC: 14 MG/DL (ref 8–23)
BUN SERPL-MCNC: 16 MG/DL (ref 8–23)
BUN/CREAT SERPL: 12.7 (ref 7–25)
BUN/CREAT SERPL: 15.7 (ref 7–25)
CALCIUM SPEC-SCNC: 8.3 MG/DL (ref 8.2–9.6)
CALCIUM SPEC-SCNC: 9.2 MG/DL (ref 8.2–9.6)
CHLORIDE SERPL-SCNC: 100 MMOL/L (ref 98–107)
CHLORIDE SERPL-SCNC: 97 MMOL/L (ref 98–107)
CO2 SERPL-SCNC: 19 MMOL/L (ref 22–29)
CO2 SERPL-SCNC: 21 MMOL/L (ref 22–29)
CREAT SERPL-MCNC: 1.02 MG/DL (ref 0.76–1.27)
CREAT SERPL-MCNC: 1.1 MG/DL (ref 0.76–1.27)
DEPRECATED RDW RBC AUTO: 47.3 FL (ref 37–54)
DEPRECATED RDW RBC AUTO: 48.1 FL (ref 37–54)
EGFRCR SERPLBLD CKD-EPI 2021: 62.6 ML/MIN/1.73
EGFRCR SERPLBLD CKD-EPI 2021: 68.5 ML/MIN/1.73
EOSINOPHIL # BLD AUTO: 0 10*3/MM3 (ref 0–0.4)
EOSINOPHIL # BLD AUTO: 0.1 10*3/MM3 (ref 0–0.4)
EOSINOPHIL NFR BLD AUTO: 0 % (ref 0.3–6.2)
EOSINOPHIL NFR BLD AUTO: 0.6 % (ref 0.3–6.2)
ERYTHROCYTE [DISTWIDTH] IN BLOOD BY AUTOMATED COUNT: 14.4 % (ref 12.3–15.4)
ERYTHROCYTE [DISTWIDTH] IN BLOOD BY AUTOMATED COUNT: 14.4 % (ref 12.3–15.4)
GLOBULIN UR ELPH-MCNC: 2.2 GM/DL
GLUCOSE BLDC GLUCOMTR-MCNC: 156 MG/DL (ref 70–105)
GLUCOSE SERPL-MCNC: 124 MG/DL (ref 65–99)
GLUCOSE SERPL-MCNC: 129 MG/DL (ref 65–99)
HCT VFR BLD AUTO: 34.6 % (ref 37.5–51)
HCT VFR BLD AUTO: 36.9 % (ref 37.5–51)
HGB BLD-MCNC: 11.4 G/DL (ref 13–17.7)
HGB BLD-MCNC: 12.6 G/DL (ref 13–17.7)
HOLD SPECIMEN: NORMAL
HOLD SPECIMEN: NORMAL
INR PPP: 1.08 (ref 0.93–1.1)
LYMPHOCYTES # BLD AUTO: 1.1 10*3/MM3 (ref 0.7–3.1)
LYMPHOCYTES # BLD AUTO: 2.2 10*3/MM3 (ref 0.7–3.1)
LYMPHOCYTES NFR BLD AUTO: 23.3 % (ref 19.6–45.3)
LYMPHOCYTES NFR BLD AUTO: 8.2 % (ref 19.6–45.3)
MCH RBC QN AUTO: 32 PG (ref 26.6–33)
MCH RBC QN AUTO: 32.5 PG (ref 26.6–33)
MCHC RBC AUTO-ENTMCNC: 33.1 G/DL (ref 31.5–35.7)
MCHC RBC AUTO-ENTMCNC: 34 G/DL (ref 31.5–35.7)
MCV RBC AUTO: 95.7 FL (ref 79–97)
MCV RBC AUTO: 96.6 FL (ref 79–97)
MONOCYTES # BLD AUTO: 0.8 10*3/MM3 (ref 0.1–0.9)
MONOCYTES # BLD AUTO: 1.1 10*3/MM3 (ref 0.1–0.9)
MONOCYTES NFR BLD AUTO: 8 % (ref 5–12)
MONOCYTES NFR BLD AUTO: 8.8 % (ref 5–12)
NEUTROPHILS NFR BLD AUTO: 11.3 10*3/MM3 (ref 1.7–7)
NEUTROPHILS NFR BLD AUTO: 6.4 10*3/MM3 (ref 1.7–7)
NEUTROPHILS NFR BLD AUTO: 66.7 % (ref 42.7–76)
NEUTROPHILS NFR BLD AUTO: 83.2 % (ref 42.7–76)
NRBC BLD AUTO-RTO: 0 /100 WBC (ref 0–0.2)
NRBC BLD AUTO-RTO: 0.1 /100 WBC (ref 0–0.2)
NT-PROBNP SERPL-MCNC: 2864 PG/ML (ref 0–1800)
PLATELET # BLD AUTO: 189 10*3/MM3 (ref 140–450)
PLATELET # BLD AUTO: 197 10*3/MM3 (ref 140–450)
PMV BLD AUTO: 9.6 FL (ref 6–12)
PMV BLD AUTO: 9.8 FL (ref 6–12)
POTASSIUM SERPL-SCNC: 4 MMOL/L (ref 3.5–5.2)
POTASSIUM SERPL-SCNC: 4.5 MMOL/L (ref 3.5–5.2)
PROT SERPL-MCNC: 5.9 G/DL (ref 6–8.5)
PROTHROMBIN TIME: 11.7 SECONDS (ref 9.6–11.7)
RBC # BLD AUTO: 3.58 10*6/MM3 (ref 4.14–5.8)
RBC # BLD AUTO: 3.86 10*6/MM3 (ref 4.14–5.8)
SODIUM SERPL-SCNC: 130 MMOL/L (ref 136–145)
SODIUM SERPL-SCNC: 130 MMOL/L (ref 136–145)
TROPONIN T SERPL HS-MCNC: 33 NG/L
WBC NRBC COR # BLD AUTO: 13.6 10*3/MM3 (ref 3.4–10.8)
WBC NRBC COR # BLD AUTO: 9.5 10*3/MM3 (ref 3.4–10.8)
WHOLE BLOOD HOLD COAG: NORMAL
WHOLE BLOOD HOLD SPECIMEN: NORMAL

## 2023-12-01 PROCEDURE — 85347 COAGULATION TIME ACTIVATED: CPT

## 2023-12-01 PROCEDURE — 85610 PROTHROMBIN TIME: CPT | Performed by: EMERGENCY MEDICINE

## 2023-12-01 PROCEDURE — 99223 1ST HOSP IP/OBS HIGH 75: CPT | Performed by: INTERNAL MEDICINE

## 2023-12-01 PROCEDURE — 25010000002 ATROPINE SULFATE 1 MG/10ML SOLUTION PREFILLED SYRINGE: Performed by: INTERNAL MEDICINE

## 2023-12-01 PROCEDURE — C1887 CATHETER, GUIDING: HCPCS | Performed by: INTERNAL MEDICINE

## 2023-12-01 PROCEDURE — C1769 GUIDE WIRE: HCPCS | Performed by: INTERNAL MEDICINE

## 2023-12-01 PROCEDURE — 25510000001 IOPAMIDOL PER 1 ML: Performed by: INTERNAL MEDICINE

## 2023-12-01 PROCEDURE — 027036Z DILATION OF CORONARY ARTERY, ONE ARTERY WITH THREE DRUG-ELUTING INTRALUMINAL DEVICES, PERCUTANEOUS APPROACH: ICD-10-PCS | Performed by: INTERNAL MEDICINE

## 2023-12-01 PROCEDURE — 85730 THROMBOPLASTIN TIME PARTIAL: CPT | Performed by: INTERNAL MEDICINE

## 2023-12-01 PROCEDURE — 25010000002 MIDAZOLAM PER 1 MG: Performed by: INTERNAL MEDICINE

## 2023-12-01 PROCEDURE — 25810000003 SODIUM CHLORIDE 0.9 % SOLUTION: Performed by: EMERGENCY MEDICINE

## 2023-12-01 PROCEDURE — 25010000002 FENTANYL CITRATE (PF) 50 MCG/ML SOLUTION: Performed by: INTERNAL MEDICINE

## 2023-12-01 PROCEDURE — 93455 CORONARY ART/GRFT ANGIO S&I: CPT | Performed by: INTERNAL MEDICINE

## 2023-12-01 PROCEDURE — 25810000003 SODIUM CHLORIDE 0.9 % SOLUTION: Performed by: INTERNAL MEDICINE

## 2023-12-01 PROCEDURE — 84484 ASSAY OF TROPONIN QUANT: CPT | Performed by: EMERGENCY MEDICINE

## 2023-12-01 PROCEDURE — 99152 MOD SED SAME PHYS/QHP 5/>YRS: CPT | Performed by: INTERNAL MEDICINE

## 2023-12-01 PROCEDURE — C1874 STENT, COATED/COV W/DEL SYS: HCPCS | Performed by: INTERNAL MEDICINE

## 2023-12-01 PROCEDURE — 85730 THROMBOPLASTIN TIME PARTIAL: CPT | Performed by: EMERGENCY MEDICINE

## 2023-12-01 PROCEDURE — 25010000002 DOPAMINE PER 40 MG: Performed by: EMERGENCY MEDICINE

## 2023-12-01 PROCEDURE — C1725 CATH, TRANSLUMIN NON-LASER: HCPCS | Performed by: INTERNAL MEDICINE

## 2023-12-01 PROCEDURE — B2111ZZ FLUOROSCOPY OF MULTIPLE CORONARY ARTERIES USING LOW OSMOLAR CONTRAST: ICD-10-PCS | Performed by: INTERNAL MEDICINE

## 2023-12-01 PROCEDURE — 25010000002 EPINEPHRINE 1 MG/10ML SOLUTION PREFILLED SYRINGE: Performed by: INTERNAL MEDICINE

## 2023-12-01 PROCEDURE — B2131ZZ FLUOROSCOPY OF MULTIPLE CORONARY ARTERY BYPASS GRAFTS USING LOW OSMOLAR CONTRAST: ICD-10-PCS | Performed by: INTERNAL MEDICINE

## 2023-12-01 PROCEDURE — 99285 EMERGENCY DEPT VISIT HI MDM: CPT

## 2023-12-01 PROCEDURE — C1757 CATH, THROMBECTOMY/EMBOLECT: HCPCS | Performed by: INTERNAL MEDICINE

## 2023-12-01 PROCEDURE — 85025 COMPLETE CBC W/AUTO DIFF WBC: CPT | Performed by: EMERGENCY MEDICINE

## 2023-12-01 PROCEDURE — 25010000002 HEPARIN (PORCINE) PER 1000 UNITS: Performed by: INTERNAL MEDICINE

## 2023-12-01 PROCEDURE — 87641 MR-STAPH DNA AMP PROBE: CPT

## 2023-12-01 PROCEDURE — 25010000002 PHENYLEPHRINE 10 MG/ML SOLUTION: Performed by: INTERNAL MEDICINE

## 2023-12-01 PROCEDURE — 93005 ELECTROCARDIOGRAM TRACING: CPT | Performed by: EMERGENCY MEDICINE

## 2023-12-01 PROCEDURE — 82948 REAGENT STRIP/BLOOD GLUCOSE: CPT

## 2023-12-01 PROCEDURE — C1894 INTRO/SHEATH, NON-LASER: HCPCS | Performed by: INTERNAL MEDICINE

## 2023-12-01 PROCEDURE — 80053 COMPREHEN METABOLIC PANEL: CPT | Performed by: EMERGENCY MEDICINE

## 2023-12-01 PROCEDURE — C9606 PERC D-E COR REVASC W AMI S: HCPCS | Performed by: INTERNAL MEDICINE

## 2023-12-01 PROCEDURE — 85025 COMPLETE CBC W/AUTO DIFF WBC: CPT

## 2023-12-01 PROCEDURE — 25010000002 DIPHENHYDRAMINE PER 50 MG: Performed by: INTERNAL MEDICINE

## 2023-12-01 PROCEDURE — 25010000002 DOPAMINE PER 40 MG: Performed by: INTERNAL MEDICINE

## 2023-12-01 PROCEDURE — 92941 PRQ TRLML REVSC TOT OCCL AMI: CPT | Performed by: INTERNAL MEDICINE

## 2023-12-01 PROCEDURE — 25010000002 ONDANSETRON PER 1 MG: Performed by: INTERNAL MEDICINE

## 2023-12-01 PROCEDURE — 4A023N7 MEASUREMENT OF CARDIAC SAMPLING AND PRESSURE, LEFT HEART, PERCUTANEOUS APPROACH: ICD-10-PCS | Performed by: INTERNAL MEDICINE

## 2023-12-01 PROCEDURE — 83880 ASSAY OF NATRIURETIC PEPTIDE: CPT | Performed by: EMERGENCY MEDICINE

## 2023-12-01 DEVICE — XIENCE SKYPOINT™ EVEROLIMUS ELUTING CORONARY STENT SYSTEM 3.50 MM X 15 MM / RAPID-EXCHANGE
Type: IMPLANTABLE DEVICE | Site: CORONARY | Status: FUNCTIONAL
Brand: XIENCE SKYPOINT™

## 2023-12-01 DEVICE — XIENCE SKYPOINT™ EVEROLIMUS ELUTING CORONARY STENT SYSTEM 3.00 MM X 12 MM / RAPID-EXCHANGE
Type: IMPLANTABLE DEVICE | Site: CORONARY | Status: FUNCTIONAL
Brand: XIENCE SKYPOINT™

## 2023-12-01 RX ORDER — ASPIRIN 325 MG
TABLET ORAL
Status: DISCONTINUED | OUTPATIENT
Start: 2023-12-01 | End: 2023-12-01 | Stop reason: HOSPADM

## 2023-12-01 RX ORDER — CLOPIDOGREL BISULFATE 75 MG/1
TABLET ORAL
Status: DISCONTINUED | OUTPATIENT
Start: 2023-12-01 | End: 2023-12-01 | Stop reason: HOSPADM

## 2023-12-01 RX ORDER — SODIUM CHLORIDE 0.9 % (FLUSH) 0.9 %
10 SYRINGE (ML) INJECTION EVERY 12 HOURS SCHEDULED
Status: DISCONTINUED | OUTPATIENT
Start: 2023-12-01 | End: 2023-12-11 | Stop reason: HOSPADM

## 2023-12-01 RX ORDER — CLOPIDOGREL BISULFATE 75 MG/1
75 TABLET ORAL DAILY
Status: DISCONTINUED | OUTPATIENT
Start: 2023-12-02 | End: 2023-12-11 | Stop reason: HOSPADM

## 2023-12-01 RX ORDER — EPINEPHRINE IN SOD CHLOR,ISO 1 MG/10 ML
SYRINGE (ML) INTRAVENOUS
Status: DISCONTINUED | OUTPATIENT
Start: 2023-12-01 | End: 2023-12-01 | Stop reason: HOSPADM

## 2023-12-01 RX ORDER — HEPARIN SODIUM 1000 [USP'U]/ML
INJECTION, SOLUTION INTRAVENOUS; SUBCUTANEOUS
Status: DISCONTINUED | OUTPATIENT
Start: 2023-12-01 | End: 2023-12-01 | Stop reason: HOSPADM

## 2023-12-01 RX ORDER — ACETAMINOPHEN 325 MG/1
650 TABLET ORAL EVERY 4 HOURS PRN
Status: DISCONTINUED | OUTPATIENT
Start: 2023-12-01 | End: 2023-12-11 | Stop reason: HOSPADM

## 2023-12-01 RX ORDER — ACETAMINOPHEN 325 MG/1
650 TABLET ORAL EVERY 4 HOURS PRN
Status: DISCONTINUED | OUTPATIENT
Start: 2023-12-01 | End: 2023-12-01 | Stop reason: SDUPTHER

## 2023-12-01 RX ORDER — FENTANYL CITRATE 50 UG/ML
INJECTION, SOLUTION INTRAMUSCULAR; INTRAVENOUS
Status: DISCONTINUED | OUTPATIENT
Start: 2023-12-01 | End: 2023-12-01 | Stop reason: HOSPADM

## 2023-12-01 RX ORDER — PHENYLEPHRINE HYDROCHLORIDE 10 MG/ML
INJECTION INTRAVENOUS
Status: DISCONTINUED | OUTPATIENT
Start: 2023-12-01 | End: 2023-12-01 | Stop reason: HOSPADM

## 2023-12-01 RX ORDER — HEPARIN SODIUM 10000 [USP'U]/100ML
12 INJECTION, SOLUTION INTRAVENOUS
Status: DISCONTINUED | OUTPATIENT
Start: 2023-12-01 | End: 2023-12-06

## 2023-12-01 RX ORDER — ASPIRIN 81 MG/1
81 TABLET, CHEWABLE ORAL DAILY
Status: DISCONTINUED | OUTPATIENT
Start: 2023-12-02 | End: 2023-12-11 | Stop reason: HOSPADM

## 2023-12-01 RX ORDER — POLYETHYLENE GLYCOL 3350 17 G/17G
17 POWDER, FOR SOLUTION ORAL DAILY PRN
Status: DISCONTINUED | OUTPATIENT
Start: 2023-12-01 | End: 2023-12-11 | Stop reason: HOSPADM

## 2023-12-01 RX ORDER — SODIUM CHLORIDE 0.9 % (FLUSH) 0.9 %
10 SYRINGE (ML) INJECTION AS NEEDED
Status: DISCONTINUED | OUTPATIENT
Start: 2023-12-01 | End: 2023-12-11 | Stop reason: HOSPADM

## 2023-12-01 RX ORDER — BISACODYL 5 MG/1
5 TABLET, DELAYED RELEASE ORAL DAILY PRN
Status: DISCONTINUED | OUTPATIENT
Start: 2023-12-01 | End: 2023-12-11 | Stop reason: HOSPADM

## 2023-12-01 RX ORDER — ONDANSETRON 2 MG/ML
4 INJECTION INTRAMUSCULAR; INTRAVENOUS EVERY 6 HOURS PRN
Status: DISCONTINUED | OUTPATIENT
Start: 2023-12-01 | End: 2023-12-11 | Stop reason: HOSPADM

## 2023-12-01 RX ORDER — ONDANSETRON 4 MG/1
4 TABLET, FILM COATED ORAL EVERY 6 HOURS PRN
Status: DISCONTINUED | OUTPATIENT
Start: 2023-12-01 | End: 2023-12-11 | Stop reason: HOSPADM

## 2023-12-01 RX ORDER — LIDOCAINE HYDROCHLORIDE 20 MG/ML
INJECTION, SOLUTION INFILTRATION; PERINEURAL
Status: DISCONTINUED | OUTPATIENT
Start: 2023-12-01 | End: 2023-12-01 | Stop reason: HOSPADM

## 2023-12-01 RX ORDER — SODIUM CHLORIDE 9 MG/ML
75 INJECTION, SOLUTION INTRAVENOUS CONTINUOUS
Status: DISCONTINUED | OUTPATIENT
Start: 2023-12-01 | End: 2023-12-06

## 2023-12-01 RX ORDER — ATROPINE SULFATE 0.1 MG/ML
INJECTION INTRAVENOUS
Status: DISCONTINUED | OUTPATIENT
Start: 2023-12-01 | End: 2023-12-01 | Stop reason: HOSPADM

## 2023-12-01 RX ORDER — AMOXICILLIN 250 MG
2 CAPSULE ORAL 2 TIMES DAILY PRN
Status: DISCONTINUED | OUTPATIENT
Start: 2023-12-01 | End: 2023-12-11 | Stop reason: HOSPADM

## 2023-12-01 RX ORDER — LIDOCAINE HYDROCHLORIDE 10 MG/ML
INJECTION, SOLUTION EPIDURAL; INFILTRATION; INTRACAUDAL; PERINEURAL
Status: DISPENSED
Start: 2023-12-01 | End: 2023-12-02

## 2023-12-01 RX ORDER — BISACODYL 10 MG
10 SUPPOSITORY, RECTAL RECTAL DAILY PRN
Status: DISCONTINUED | OUTPATIENT
Start: 2023-12-01 | End: 2023-12-11 | Stop reason: HOSPADM

## 2023-12-01 RX ORDER — ONDANSETRON 2 MG/ML
INJECTION INTRAMUSCULAR; INTRAVENOUS
Status: DISCONTINUED | OUTPATIENT
Start: 2023-12-01 | End: 2023-12-01 | Stop reason: HOSPADM

## 2023-12-01 RX ORDER — DIPHENHYDRAMINE HYDROCHLORIDE 50 MG/ML
INJECTION INTRAMUSCULAR; INTRAVENOUS
Status: DISCONTINUED | OUTPATIENT
Start: 2023-12-01 | End: 2023-12-01 | Stop reason: HOSPADM

## 2023-12-01 RX ORDER — MIDAZOLAM HYDROCHLORIDE 1 MG/ML
INJECTION INTRAMUSCULAR; INTRAVENOUS
Status: DISCONTINUED | OUTPATIENT
Start: 2023-12-01 | End: 2023-12-01 | Stop reason: HOSPADM

## 2023-12-01 RX ORDER — MELATONIN
1000 DAILY
Status: ON HOLD | COMMUNITY

## 2023-12-01 RX ORDER — SODIUM CHLORIDE 9 MG/ML
40 INJECTION, SOLUTION INTRAVENOUS AS NEEDED
Status: DISCONTINUED | OUTPATIENT
Start: 2023-12-01 | End: 2023-12-11 | Stop reason: HOSPADM

## 2023-12-01 RX ORDER — SENNOSIDES 8.6 MG
650 CAPSULE ORAL NIGHTLY
Status: ON HOLD | COMMUNITY

## 2023-12-01 RX ORDER — ACETAMINOPHEN 650 MG/1
650 SUPPOSITORY RECTAL EVERY 4 HOURS PRN
Status: DISCONTINUED | OUTPATIENT
Start: 2023-12-01 | End: 2023-12-11 | Stop reason: HOSPADM

## 2023-12-01 RX ORDER — DOPAMINE HYDROCHLORIDE 160 MG/100ML
2-20 INJECTION, SOLUTION INTRAVENOUS
Status: DISCONTINUED | OUTPATIENT
Start: 2023-12-01 | End: 2023-12-03

## 2023-12-01 RX ADMIN — DOPAMINE HYDROCHLORIDE 5 MCG/KG/MIN: 160 INJECTION, SOLUTION INTRAVENOUS at 22:47

## 2023-12-01 RX ADMIN — SODIUM CHLORIDE 1000 ML: 9 INJECTION, SOLUTION INTRAVENOUS at 16:15

## 2023-12-01 RX ADMIN — SODIUM CHLORIDE 75 ML/HR: 9 INJECTION, SOLUTION INTRAVENOUS at 22:47

## 2023-12-01 RX ADMIN — DOPAMINE HYDROCHLORIDE 5 MCG/KG/MIN: 160 INJECTION, SOLUTION INTRAVENOUS at 16:20

## 2023-12-01 NOTE — Clinical Note
First balloon inflation max pressure = 8 chalo. First balloon inflation duration = 5 seconds. Second inflation of balloon - Max pressure = 8 chalo. 2nd Inflation of balloon - Duration = 5 seconds. Third inflation of balloon - Max pressure = 8 chalo. 3rd Inflation of balloon - Duration = 5 seconds. Fourth inflation of balloon - Max pressure = 8 chalo. 4th Inflation of balloon - Duration = 5 seconds.

## 2023-12-01 NOTE — Clinical Note
First balloon inflation max pressure = 14 chalo. First balloon inflation duration = 20 seconds. Second inflation of balloon - Max pressure = 10 chalo. 2nd Inflation of balloon - Duration = 8 seconds. Third inflation of balloon - Max pressure = 10 chalo. 3rd Inflation of balloon - Duration = 12 seconds. Fourth inflation of balloon - Max pressure = 8 chalo. 4th Inflation of balloon - Duration = 12 seconds.

## 2023-12-01 NOTE — Clinical Note
First balloon inflation max pressure = 8 chalo. First balloon inflation duration = 10 seconds. Second inflation of balloon - Max pressure = 8 chalo. 2nd Inflation of balloon - Duration = 5 seconds. Third inflation of balloon - Max pressure = 8 chalo. 3rd Inflation of balloon - Duration = 10 seconds. Fourth inflation of balloon - Max pressure = 8 chalo. 4th Inflation of balloon - Duration = 5 seconds.

## 2023-12-01 NOTE — ED PROVIDER NOTES
Subjective   History of Present Illness  89-year-old male presents with chest pain.  He was reported to have syncopal episode.  He reports still having some pain.  He states symptoms started an hour ago.  He is acutely ill and additional history not obtained at this time.  He did tell the staff he wanted everything done.  Review of Systems    Past Medical History:   Diagnosis Date    Anxiety     Atrial fibrillation     Benign prostatic hyperplasia     CAD (coronary artery disease)     Hyperlipidemia     Hypertension     Myocardial infarction        Allergies   Allergen Reactions    Iodine Unknown (See Comments)     Pt unsure of reaction      Levofloxacin Unknown (See Comments)    Nitroglycerin Unknown (See Comments) and Other (See Comments)    Paroxetine Unknown (See Comments)    Penicillin G Unknown (See Comments)    Prednisone Unknown (See Comments)    Sucralfate Unknown (See Comments)    Diltiazem Hcl Hives    Metoprolol Other (See Comments)     Lowers heart rate     Pramipexole Dihydrochloride Unknown (See Comments)    Ropinirole Hcl Other (See Comments)       Past Surgical History:   Procedure Laterality Date    CARDIAC SURGERY      HERNIA REPAIR         Family History   Problem Relation Age of Onset    Heart disease Mother     Heart disease Father        Social History     Socioeconomic History    Marital status:    Tobacco Use    Smoking status: Former     Types: Cigarettes     Quit date: 1970     Years since quittin.2    Smokeless tobacco: Never    Tobacco comments:     more than 50yrs   Vaping Use    Vaping Use: Never used   Substance and Sexual Activity    Alcohol use: No    Drug use: No    Sexual activity: Not Currently     Prior to Admission medications    Medication Sig Start Date End Date Taking? Authorizing Provider   aspirin 81 MG chewable tablet Chew 1 tablet Daily.    Provider, MD Carlos   azelastine (ASTEPRO) 0.15 % solution nasal spray USE 2 SPRAYS IN EACH NOSTRIL TWICE  DAILY AS DIRECTED BY PROVIDER  Patient taking differently: 2 sprays into the nostril(s) as directed by provider 2 (Two) Times a Day. 10/27/20   Ashley Cano MD   diazePAM (VALIUM) 5 MG tablet TAKE 1 TABLET BY MOUTH TWICE DAILY  Patient taking differently: 1 (One) Time As Needed. 10/6/20   Ashley Cano MD   docusate sodium (COLACE) 250 MG capsule Take 1 capsule by mouth Daily.    Provider, MD Carlos   gabapentin (NEURONTIN) 100 MG capsule TAKE 1 CAPSULE BY MOUTH TWICE DAILY 11/17/22   Ashley Cano MD   losartan (COZAAR) 50 MG tablet TAKE 1 TABLET BY MOUTH DAILY 5/30/23   Ashley Cano MD   omeprazole (priLOSEC) 20 MG capsule Take 1 capsule by mouth Daily. 9/7/22   Ashley Cano MD   tamsulosin (FLOMAX) 0.4 MG capsule 24 hr capsule TAKE 1 CAPSULE BY MOUTH DAILY 11/19/23   Tricia Aldana, APRN           Objective   Physical Exam  89-year-old male awake alert.  He is acutely ill he is pale.  Neck supple chest clear cardiovascular and rhythm he has noted midline sternal scar abdomen soft nontender extremities without tenderness or edema  Procedures           ED Course                                             Medical Decision Making  Amount and/or Complexity of Data Reviewed  Labs: ordered.  ECG/medicine tests: ordered.    Risk  Prescription drug management.  Decision regarding hospitalization.    My EKG review and interpretation reveals evidence of acute inferior posterior MI with heart block rate of 39.  Patient was discussed with Dr. العلي.  He is currently in the Cath Lab they are arranging a table.  Patient was noted to be hypotensive mental status change.  He was started on dopamine and emergent pacemaker was attempted.  Right neck was prepped with ChloraPrep draped in sterile fashion using ultrasound guidance he was noted to have large right internal jugular vein which was entered easily.  Guidewire was placed however would only go short distance before it  would not pass any further.  Ultrasound did confirm guidewire was within the lumen of the vessel.  Attempt at repositioning was unsuccessful.  Cath Lab advised that they were ready and he was emergently transferred to Cath Lab for continued care.  Patient critical care time of 20 minutes.    Final diagnoses:   ST elevation myocardial infarction (STEMI), unspecified artery   Heart block AV third degree       ED Disposition  ED Disposition       ED Disposition   Decision to Admit    Condition   --    Comment   Level of Care: Critical Care [6]   Diagnosis: Acute ST elevation myocardial infarction (STEMI) involving right coronary artery [[id number]]   Certification: I Certify That Inpatient Hospital Services Are Medically Necessary For Greater Than 2 Midnights                 No follow-up provider specified.       Medication List      No changes were made to your prescriptions during this visit.            Petros Grimes MD  12/01/23 [id number]

## 2023-12-01 NOTE — Clinical Note
Second inflation of balloon - Max pressure = 12 chalo. 2nd Inflation of balloon - Duration = 7 seconds. Third inflation of balloon - Max pressure = 12 chalo. 3rd Inflation of balloon - Duration = 8 seconds. Fourth inflation of balloon - Max pressure = 12 chalo. 4th Inflation of balloon - Duration = 10 seconds.

## 2023-12-01 NOTE — Clinical Note
First balloon inflation max pressure = 6 chalo. First balloon inflation duration = 8 seconds. Second inflation of balloon - Max pressure = 6 chalo. 2nd Inflation of balloon - Duration = 8 seconds. Third inflation of balloon - Max pressure = 12 chalo. 3rd Inflation of balloon - Duration = 5 seconds. Fourth inflation of balloon - Max pressure = 12 chalo. 4th Inflation of balloon - Duration = 5 seconds.

## 2023-12-01 NOTE — Clinical Note
First balloon inflation max pressure = 14 chalo. First balloon inflation duration = 15 seconds. Second inflation of balloon - Max pressure = 14 chalo. 2nd Inflation of balloon - Duration = 15 seconds.

## 2023-12-01 NOTE — Clinical Note
Second inflation of balloon - Max pressure = 12 chalo. 2nd Inflation of balloon - Duration = 9 seconds.

## 2023-12-01 NOTE — Clinical Note
First balloon inflation max pressure = 12 chalo. First balloon inflation duration = 12 seconds. Second inflation of balloon - Max pressure = 12 chalo. 2nd Inflation of balloon - Duration = 12 seconds. Third inflation of balloon - Max pressure = 12 chalo. 3rd Inflation of balloon - Duration = 5 seconds. Fourth inflation of balloon - Max pressure = 12 chalo. 4th Inflation of balloon - Duration = 5 seconds.

## 2023-12-01 NOTE — CONSULTS
CARDIOLOGY CONSULT:    Sage Flores  9/7/1930  male  [id number]      Referring Provider: Intensivist  Reason for Consultation: Acute MI and bradycardia    Patient Care Team:  Tricia Aldana APRN as PCP - General (Family Medicine)  Missy Domínguez MD as Consulting Physician (Cardiology)    Chief complaint chest pain    Subjective .     History of present illness:  Sage Flores is a 89 y.o. male with history of coronary disease hypertension hyperlipidemia atrial fibrillation the past presented to the hospital with complaints of chest pain and had acute inferior wall MI.  Patient was having chest pains this afternoon and when he presented he had ST segment elevation with significant bradycardia and complete heart block.  He also has been having some shortness of breath.  No complaint of any PND orthopnea.  No palpitations dizziness syncope or swelling of the feet.  He says that he was taking his medicines regularly.  He lives by himself.  Patient was then transferred to the Cath Lab and had emergency cardiac catheterization and PCI followed by temporary pacemaker placement.    Review of Systems   Constitutional: Negative for fever and malaise/fatigue.   HENT:  Negative for ear pain and nosebleeds.    Eyes:  Negative for blurred vision and double vision.   Cardiovascular:  Positive for chest pain. Negative for dyspnea on exertion and palpitations.   Respiratory:  Positive for shortness of breath. Negative for cough.    Skin:  Negative for rash.   Musculoskeletal:  Negative for joint pain.   Gastrointestinal:  Negative for abdominal pain, nausea and vomiting.   Neurological:  Negative for focal weakness and headaches.   Psychiatric/Behavioral:  Negative for depression. The patient is not nervous/anxious.    All other systems reviewed and are negative.      History  Past Medical History:   Diagnosis Date    Anxiety 2014    Atrial fibrillation     Benign prostatic hyperplasia     CAD (coronary artery disease)  "    Hyperlipidemia     Hypertension     Myocardial infarction        Past Surgical History:   Procedure Laterality Date    CARDIAC SURGERY      HERNIA REPAIR         Family History   Problem Relation Age of Onset    Heart disease Mother     Heart disease Father        Social History     Tobacco Use    Smoking status: Former     Types: Cigarettes     Quit date: 1970     Years since quittin.2    Smokeless tobacco: Never    Tobacco comments:     more than 50yrs   Vaping Use    Vaping Use: Never used   Substance Use Topics    Alcohol use: No    Drug use: No        (Not in a hospital admission)      Allergies: Iodine, Levofloxacin, Nitroglycerin, Paroxetine, Penicillin g, Prednisone, Sucralfate, Diltiazem hcl, Metoprolol, Pramipexole dihydrochloride, and Ropinirole hcl    Scheduled Meds:lidocaine PF 1%, , ,       Continuous Infusions:DOPamine, 2-20 mcg/kg/min, Last Rate: 10 mcg/kg/min (23 1718)  sodium chloride, , Last Rate: 500 mL (23 1649)      PRN Meds:.  aspirin    Atropine Sulfate    clopidogrel    diphenhydrAMINE    EPINEPHrine    fentaNYL citrate (PF)    heparin (porcine)    iopamidol    lidocaine    lidocaine PF 1%    midazolam    ondansetron    phenylephrine    sodium chloride    Objective     VITAL SIGNS  Vitals:    23 1613 23 1632 23 1642 23 1749   BP: (!) 67/38 (!) 59/32 (!) 80/40 123/65   Pulse:  (!) 40 64 66   Resp: 27  20 14   Temp: 97.8 °F (36.6 °C)      TempSrc: Oral      SpO2: 100%  90% 94%   Weight:       Height:           Flowsheet Rows      Flowsheet Row First Filed Value   Admission Height 177.8 cm (70\") Documented at 2023 1603   Admission Weight 72.6 kg (160 lb) Documented at 2023 1603             TELEMETRY: Complete heart block with ST segment depression inferior leads    Physical Exam:  Constitutional:       Appearance: Well-developed.   Eyes:      General: No scleral icterus.     Conjunctiva/sclera: Conjunctivae normal.      Pupils: Pupils " are equal, round, and reactive to light.   HENT:      Head: Normocephalic and atraumatic.   Neck:      Vascular: No carotid bruit or JVD.   Pulmonary:      Effort: Pulmonary effort is normal.      Breath sounds: Normal breath sounds. No wheezing. No rales.   Cardiovascular:      Normal rate. Regular rhythm.      Murmurs: There is a systolic murmur.   Pulses:     Intact distal pulses.   Abdominal:      General: Bowel sounds are normal.      Palpations: Abdomen is soft.   Musculoskeletal: Normal range of motion.      Cervical back: Normal range of motion and neck supple. Skin:     General: Skin is warm and dry.      Findings: No rash.   Neurological:      Mental Status: Alert.      Comments: No focal deficits          Results Review:   I reviewed the patient's new clinical results.  Lab Results (last 24 hours)       Procedure Component Value Units Date/Time    POC Activated Clotting Time [[id number]]  (Abnormal) Collected: 12/01/23 1747    Specimen: Arterial Blood Updated: 12/01/23 1754     Activated Clotting Time  228 Seconds      Comment: Serial Number: [id number]:  [id number]       POC Activated Clotting Time [[id number]]  (Abnormal) Collected: 12/01/23 1703    Specimen: Venous Blood Updated: 12/01/23 1754     Activated Clotting Time  223 Seconds      Comment: Serial Number: [id number]:  [id number]       Protime-INR [[id number]]  (Normal) Collected: 12/01/23 1613    Specimen: Blood Updated: 12/01/23 1721     Protime 11.7 Seconds      INR 1.08    aPTT [[id number]]  (Normal) Collected: 12/01/23 1613    Specimen: Blood Updated: 12/01/23 1721     PTT 25.5 seconds     [facility] [[id number]] Collected: 12/01/23 1613    Specimen: Blood Updated: 12/01/23 1715    Narrative:      The following orders were created for panel order [place] [facility].  Procedure                               Abnormality         Status                     ---------                               -----------         ------                     Green Sera  (Gel)[[id number]]                                  Final result               Lavender Top[[id number]]                                     Final result               Gold Top - SST[[id number]]                                   Final result               Light Blue Top[[id number]]                                   Final result                 Please view results for these tests on the individual orders.    Lavender Top [[id number]] Collected: 12/01/23 1613    Specimen: Blood Updated: 12/01/23 1715     Extra Tube hold for add-on     Comment: Auto resulted       Gold Top - SST [[id number]] Collected: 12/01/23 1613    Specimen: Blood Updated: 12/01/23 1715     Extra Tube Hold for add-ons.     Comment: Auto resulted.       Light Blue Top [[id number]] Collected: 12/01/23 1613    Specimen: Blood Updated: 12/01/23 1715     Extra Tube Hold for add-ons.     Comment: Auto resulted       Comprehensive Metabolic Panel [[id number]]  (Abnormal) Collected: 12/01/23 1613    Specimen: Blood Updated: 12/01/23 1657     Glucose 124 mg/dL      BUN 16 mg/dL      Creatinine 1.02 mg/dL      Sodium 130 mmol/L      Potassium 4.0 mmol/L      Comment: Slight hemolysis detected by analyzer. Result may be falsely elevated.        Chloride 97 mmol/L      CO2 21.0 mmol/L      Calcium 9.2 mg/dL      Total Protein 5.9 g/dL      Albumin 3.7 g/dL      ALT (SGPT) 17 U/L      AST (SGOT) 21 U/L      Comment: Slight hemolysis detected by analyzer. Result may be falsely elevated.        Alkaline Phosphatase 83 U/L      Total Bilirubin 0.8 mg/dL      Globulin 2.2 gm/dL      A/G Ratio 1.7 g/dL      BUN/Creatinine Ratio 15.7     Anion Gap 12.0 mmol/L      eGFR 68.5 mL/min/1.73     Narrative:      GFR Normal >60  Chronic Kidney Disease <60  Kidney Failure <15    The GFR formula is only valid for adults with stable renal function between ages 18 and 70.    Green Top (Gel) [[id number]] Collected: 12/01/23 1613    Specimen: Blood Updated: 12/01/23 1657     Extra Tube HOLD     Single High Sensitivity Troponin T [[id number]]  (Abnormal) Collected: 12/01/23 1613    Specimen: Blood Updated: 12/01/23 1654     HS Troponin T 33 ng/L     Narrative:      High Sensitive Troponin T Reference Range:  <14.0 ng/L- Negative Female for AMI  <22.0 ng/L- Negative Male for AMI  >=14 - Abnormal Female indicating possible myocardial injury.  >=22 - Abnormal Male indicating possible myocardial injury.   Clinicians would have to utilize clinical acumen, EKG, Troponin, and serial changes to determine if it is an Acute Myocardial Infarction or myocardial injury due to an underlying chronic condition.         BNP [[id number]]  (Abnormal) Collected: 12/01/23 1613    Specimen: Blood Updated: 12/01/23 1654     proBNP 2,864.0 pg/mL     Narrative:      This assay is used as an aid in the diagnosis of individuals suspected of having heart failure. It can be used as an aid in the diagnosis of acute decompensated heart failure (ADHF) in patients presenting with signs and symptoms of ADHF to the emergency department (ED). In addition, NT-proBNP of <300 pg/mL indicates ADHF is not likely.    Age Range Result Interpretation  NT-proBNP Concentration (pg/mL:      <50             Positive            >450                   Gray                 300-450                    Negative             <300    50-75           Positive            >900                  Gray                300-900                  Negative            <300      >75             Positive            >1800                  Gray                300-1800                  Negative            <300    CBC & Differential [[id number]]  (Abnormal) Collected: 12/01/23 1613    Specimen: Blood Updated: 12/01/23 1630    Narrative:      The following orders were created for panel order CBC & Differential.  Procedure                               Abnormality         Status                     ---------                               -----------         ------                      CBC Auto Differential[[id number]]        Abnormal            Final result                 Please view results for these tests on the individual orders.    CBC Auto Differential [[id number]]  (Abnormal) Collected: 12/01/23 1613    Specimen: Blood Updated: 12/01/23 1630     WBC 9.50 10*3/mm3      RBC 3.86 10*6/mm3      Hemoglobin 12.6 g/dL      Hematocrit 36.9 %      MCV 95.7 fL      MCH 32.5 pg      MCHC 34.0 g/dL      RDW 14.4 %      RDW-SD 47.3 fl      MPV 9.8 fL      Platelets 197 10*3/mm3      Neutrophil % 66.7 %      Lymphocyte % 23.3 %      Monocyte % 8.8 %      Eosinophil % 0.6 %      Basophil % 0.6 %      Neutrophils, Absolute 6.40 10*3/mm3      Lymphocytes, Absolute 2.20 10*3/mm3      Monocytes, Absolute 0.80 10*3/mm3      Eosinophils, Absolute 0.10 10*3/mm3      Basophils, Absolute 0.10 10*3/mm3      nRBC 0.1 /100 WBC             Imaging Results (Last 24 Hours)       ** No results found for the last 24 hours. **            EKG      I personally viewed and interpreted the patient's EKG/Telemetry data:    ECHOCARDIOGRAM:         STRESS MYOVIEW:       CARDIAC CATHETERIZATION:    No results found for this or any previous visit.       OTHER:         MDM    STEMI   Patient presented with chest pain and had ST segment elevation in inferior leads concerning with acute inferior wall MI and also had a complete heart block with bradycardia.  He also has been having shortness of breath.  Patient underwent emergency cardiac catheterization followed by PCI and a temporary pacemaker placement.  Patient is currently on aspirin Plavix heparin  Patient had stent placement to the graft to the distal right coronary artery but has still slow flow and hence will be placed on heparin but no Integrilin because of his age and then will rehave the cardiac intervention again in 2 to 3 days.    Coronary disease  Patient had coronary bypass surgery with a LIMA to the LAD and a saphenous graft to the marginal branch diagonal branch PDA  and PLV branches  Patient has diffuse coronary disease    Atrial fibrillation  Patient has history of atrial fibrillation and will review his home medicines    Hypertension  Patient blood pressure actually lower side hence we will hold off his blood pressure medicines    Hyperlipidemia  Patient will be placed on atorvastatin  I discussed the patients findings and my recommendations with patient and nurse    Son العلي MD  12/01/23  18:06 EST

## 2023-12-01 NOTE — Clinical Note
First balloon inflation max pressure = 8 chalo. First balloon inflation duration = 6 seconds. Second inflation of balloon - Max pressure = 8 chalo. 2nd Inflation of balloon - Duration = 5 seconds. Third inflation of balloon - Max pressure = 8 chalo. 3rd Inflation of balloon - Duration = 5 seconds. Fourth inflation of balloon - Max pressure = 8 chalo. 4th Inflation of balloon - Duration = 5 seconds.

## 2023-12-01 NOTE — CONSULTS
Critical Care Consult Note   Sage Flores : 1930 MRN:[id number] LOS:0 ROOM: [facility]     Reason for admission: Acute ST elevation myocardial infarction (STEMI) involving right coronary artery     Assessment / Plan     STEMI / Complete heart block with bradycardia  ST elevation in inferior leads  Patient underwent emergent PCI followed by temporary pacemaker placement  PCI to RCA -- Continuing heparin due to slow flow post stent placement, NO INTEGRILIN due to age, PLAN TO GO BACK TO CATHLAB IN 2-3 DAYS  Echo pending in AM    CAD  Hx of CABG   Patient currently on aspirin, plavix, heparin  Patient now on statin at home, patient greater than 75    Atrial fibrillation  Patient on aspirin at home, no full coagulation     Level Of Support Discussed With: Patient  Code Status (Patient has no pulse and is not breathing): CPR (Attempt to Resuscitate)  Medical Interventions (Patient has pulse or is breathing): Full Support       Nutrition: Diet: Liquid Diets; Clear Liquid; Fluid Consistency: Thin (IDDSI 0) Patient isn't on Tube Feeding     DVT prophylaxis:  Mechanical DVT prophylaxis orders are present.     History of Present illness     A 89 y.o. old male patient with PMH of CAD, atrial fibrillation, presents to the hospital with complaints of chest pain and syncopal episode which started an hour prior to admission to ER. EKG showed evidence of acute inferior posterior MI with heart block. Patient was taken to Cathlab emergently. Patient was hypotensive with mental status changes. He was started on Dopamine prior to cathlab and a TVP was placed in cathlab. Patient underwent successful senting to RCA. Plans to go back to cathlab in the next 2-3 day.     ACP: CPR, Full Interventions.Patients son is listed as NOK to make decisions for the patient in the event he is unable.     Patient was seen and examined on 23 at 18:28 EST .    Subjective / Review of systems     Review of Systems   Constitutional:  Negative for  "chills and fever.   Respiratory:  Negative for chest tightness and shortness of breath.    Cardiovascular:  Positive for chest pain (States \"It hurts when i push on it.\").   Gastrointestinal:  Negative for abdominal pain, nausea and vomiting.   Genitourinary:  Negative for difficulty urinating.   Musculoskeletal:  Negative for arthralgias and myalgias.   Neurological:  Positive for syncope. Negative for dizziness and light-headedness.        Past Medical/Surgical/Social/Family History & Allergies     Past Medical History:   Diagnosis Date    Anxiety 2014    Atrial fibrillation     Benign prostatic hyperplasia     CAD (coronary artery disease)     Hyperlipidemia     Hypertension     Myocardial infarction       Past Surgical History:   Procedure Laterality Date    CARDIAC SURGERY      HERNIA REPAIR        Social History     Socioeconomic History    Marital status:    Tobacco Use    Smoking status: Former     Types: Cigarettes     Quit date: 1970     Years since quittin.2    Smokeless tobacco: Never    Tobacco comments:     more than 50yrs   Vaping Use    Vaping Use: Never used   Substance and Sexual Activity    Alcohol use: No    Drug use: No    Sexual activity: Not Currently      Family History   Problem Relation Age of Onset    Heart disease Mother     Heart disease Father       Allergies   Allergen Reactions    Iodine Unknown (See Comments)     Pt unsure of reaction      Levofloxacin Unknown (See Comments)    Nitroglycerin Unknown (See Comments) and Other (See Comments)    Paroxetine Unknown (See Comments)    Penicillin G Unknown (See Comments)    Prednisone Unknown (See Comments)    Sucralfate Unknown (See Comments)    Diltiazem Hcl Hives    Metoprolol Other (See Comments)     Lowers heart rate     Pramipexole Dihydrochloride Unknown (See Comments)    Ropinirole Hcl Other (See Comments)        Home Medications     Prior to Admission medications    Medication Sig Start Date End Date Taking? " Authorizing Provider   aspirin 81 MG chewable tablet Chew 1 tablet Daily.    Provider, MD Carlos   azelastine (ASTEPRO) 0.15 % solution nasal spray USE 2 SPRAYS IN EACH NOSTRIL TWICE DAILY AS DIRECTED BY PROVIDER  Patient taking differently: 2 sprays into the nostril(s) as directed by provider 2 (Two) Times a Day. 10/27/20   Ashley Cano MD   diazePAM (VALIUM) 5 MG tablet TAKE 1 TABLET BY MOUTH TWICE DAILY  Patient taking differently: 1 (One) Time As Needed. 10/6/20   Ashley Cano MD   docusate sodium (COLACE) 250 MG capsule Take 1 capsule by mouth Daily.    Provider, MD Carlos   gabapentin (NEURONTIN) 100 MG capsule TAKE 1 CAPSULE BY MOUTH TWICE DAILY 11/17/22   Ashley Cano MD   losartan (COZAAR) 50 MG tablet TAKE 1 TABLET BY MOUTH DAILY 5/30/23   Ashley Cano MD   omeprazole (priLOSEC) 20 MG capsule Take 1 capsule by mouth Daily. 9/7/22   Ashley Cano MD   tamsulosin (FLOMAX) 0.4 MG capsule 24 hr capsule TAKE 1 CAPSULE BY MOUTH DAILY 11/19/23   Tricia Aldana, APRN        Objective / Physical Exam     Vital signs:  Temp: 97.8 °F (36.6 °C)  BP: 123/65  Heart Rate: 66  Resp: 14  SpO2: 94 %  Weight: 72.6 kg (160 lb)    Admission Weight: Weight: 72.6 kg (160 lb)    Physical Exam  Vitals and nursing note reviewed.   Constitutional:       General: He is not in acute distress.     Appearance: He is ill-appearing.   HENT:      Head: Normocephalic.      Mouth/Throat:      Mouth: Mucous membranes are dry.      Pharynx: Oropharynx is clear.   Eyes:      Extraocular Movements: Extraocular movements intact.      Conjunctiva/sclera: Conjunctivae normal.      Pupils: Pupils are equal, round, and reactive to light.   Cardiovascular:      Rate and Rhythm: Regular rhythm. Tachycardia present.      Pulses: Normal pulses.      Heart sounds: Normal heart sounds.   Pulmonary:      Effort: Pulmonary effort is normal.      Breath sounds: Normal breath sounds.   Abdominal:       General: Bowel sounds are normal.      Palpations: Abdomen is soft.   Musculoskeletal:      Right lower leg: No edema.      Left lower leg: No edema.   Skin:     General: Skin is warm and dry.      Findings: No rash.   Neurological:      Mental Status: He is alert and oriented to person, place, and time.   Psychiatric:         Mood and Affect: Mood normal.         Behavior: Behavior normal.          Labs     Results from last 7 days   Lab Units 12/01/23  1613   WBC 10*3/mm3 9.50   HEMATOCRIT % 36.9*   PLATELETS 10*3/mm3 197      Results from last 7 days   Lab Units 12/01/23  1613   SODIUM mmol/L 130*   POTASSIUM mmol/L 4.0   CHLORIDE mmol/L 97*   CO2 mmol/L 21.0*   BUN mg/dL 16   CREATININE mg/dL 1.02        Imaging     No radiology results for the last day     Current Medications     Scheduled Meds:  [START ON 12/2/2023] aspirin, 81 mg, Oral, Daily  [START ON 12/2/2023] clopidogrel, 75 mg, Oral, Daily  lidocaine PF 1%, , ,   sodium chloride, 10 mL, Intravenous, Q12H         Continuous Infusions:  DOPamine, 2-20 mcg/kg/min, Last Rate: 10 mcg/kg/min (12/01/23 1718)  sodium chloride, 75 mL/hr         ROSA Kirkpatrick   Critical Care  12/01/23   18:28 EST

## 2023-12-01 NOTE — Clinical Note
Second inflation of balloon - Max pressure = 12 chalo. 2nd Inflation of balloon - Duration = 7 seconds. Third inflation of balloon - Max pressure = 12 chalo. 3rd Inflation of balloon - Duration = 7 seconds. Fourth inflation of balloon - Max pressure = 12 chaol. 4th Inflation of balloon - Duration = 6 seconds.

## 2023-12-01 NOTE — Clinical Note
Second inflation of balloon - Max pressure = 12 chalo. 2nd Inflation of balloon - Duration = 6 seconds.

## 2023-12-01 NOTE — ED NOTES
Pt arrived via EMS c/o chest pain, low heart rate and low blood pressure.  Pt immediately placed on monitor.  IV established.

## 2023-12-02 ENCOUNTER — APPOINTMENT (OUTPATIENT)
Dept: CARDIOLOGY | Facility: HOSPITAL | Age: 88
DRG: 321 | End: 2023-12-02
Payer: MEDICARE

## 2023-12-02 ENCOUNTER — APPOINTMENT (OUTPATIENT)
Dept: GENERAL RADIOLOGY | Facility: HOSPITAL | Age: 88
DRG: 321 | End: 2023-12-02
Payer: MEDICARE

## 2023-12-02 LAB
ACT BLD: 163 SECONDS (ref 89–137)
ANION GAP SERPL CALCULATED.3IONS-SCNC: 10 MMOL/L (ref 5–15)
APTT PPP: 90 SECONDS (ref 61–76.5)
BASOPHILS # BLD AUTO: 0.1 10*3/MM3 (ref 0–0.2)
BASOPHILS NFR BLD AUTO: 0.5 % (ref 0–1.5)
BH CV ECHO MEAS - ACS: 1.7 CM
BH CV ECHO MEAS - AO MAX PG: 6.1 MMHG
BH CV ECHO MEAS - AO MEAN PG: 3 MMHG
BH CV ECHO MEAS - AO V2 MAX: 123 CM/SEC
BH CV ECHO MEAS - AO V2 VTI: 23 CM
BH CV ECHO MEAS - AVA(I,D): 2.26 CM2
BH CV ECHO MEAS - EDV(CUBED): 125 ML
BH CV ECHO MEAS - EDV(MOD-SP4): 59.5 ML
BH CV ECHO MEAS - EF(MOD-SP4): 23.7 %
BH CV ECHO MEAS - ESV(CUBED): 103.8 ML
BH CV ECHO MEAS - ESV(MOD-SP4): 45.4 ML
BH CV ECHO MEAS - FS: 6 %
BH CV ECHO MEAS - IVS/LVPW: 0.78 CM
BH CV ECHO MEAS - IVSD: 0.7 CM
BH CV ECHO MEAS - LA DIMENSION: 4.6 CM
BH CV ECHO MEAS - LV MASS(C)D: 135.8 GRAMS
BH CV ECHO MEAS - LV MAX PG: 2.8 MMHG
BH CV ECHO MEAS - LV MEAN PG: 1 MMHG
BH CV ECHO MEAS - LV V1 MAX: 84.4 CM/SEC
BH CV ECHO MEAS - LV V1 VTI: 15 CM
BH CV ECHO MEAS - LVIDD: 5 CM
BH CV ECHO MEAS - LVIDS: 4.7 CM
BH CV ECHO MEAS - LVOT AREA: 3.5 CM2
BH CV ECHO MEAS - LVOT DIAM: 2.1 CM
BH CV ECHO MEAS - LVPWD: 0.9 CM
BH CV ECHO MEAS - MR MAX PG: 41.5 MMHG
BH CV ECHO MEAS - MR MAX VEL: 322 CM/SEC
BH CV ECHO MEAS - MV DEC SLOPE: 379 CM/SEC2
BH CV ECHO MEAS - MV DEC TIME: 0.18 SEC
BH CV ECHO MEAS - MV E MAX VEL: 90.7 CM/SEC
BH CV ECHO MEAS - MV MAX PG: 4.8 MMHG
BH CV ECHO MEAS - MV MEAN PG: 2 MMHG
BH CV ECHO MEAS - MV P1/2T: 82.7 MSEC
BH CV ECHO MEAS - MV V2 VTI: 28.9 CM
BH CV ECHO MEAS - MVA(P1/2T): 2.7 CM2
BH CV ECHO MEAS - MVA(VTI): 1.8 CM2
BH CV ECHO MEAS - PA ACC TIME: 0.09 SEC
BH CV ECHO MEAS - PA V2 MAX: 111 CM/SEC
BH CV ECHO MEAS - PI END-D VEL: 114 CM/SEC
BH CV ECHO MEAS - RAP SYSTOLE: 8 MMHG
BH CV ECHO MEAS - RV MAX PG: 1.21 MMHG
BH CV ECHO MEAS - RV V1 MAX: 55.1 CM/SEC
BH CV ECHO MEAS - RV V1 VTI: 8.6 CM
BH CV ECHO MEAS - RVDD: 2 CM
BH CV ECHO MEAS - RVSP: 30.8 MMHG
BH CV ECHO MEAS - SV(LVOT): 52 ML
BH CV ECHO MEAS - SV(MOD-SP4): 14.1 ML
BH CV ECHO MEAS - TR MAX PG: 22.8 MMHG
BH CV ECHO MEAS - TR MAX VEL: 239 CM/SEC
BH CV XLRA - TDI S': 7.5 CM/SEC
BUN SERPL-MCNC: 15 MG/DL (ref 8–23)
BUN/CREAT SERPL: 14.4 (ref 7–25)
CALCIUM SPEC-SCNC: 8.4 MG/DL (ref 8.2–9.6)
CHLORIDE SERPL-SCNC: 101 MMOL/L (ref 98–107)
CO2 SERPL-SCNC: 21 MMOL/L (ref 22–29)
CREAT SERPL-MCNC: 1.04 MG/DL (ref 0.76–1.27)
DEPRECATED RDW RBC AUTO: 50.3 FL (ref 37–54)
EGFRCR SERPLBLD CKD-EPI 2021: 66.9 ML/MIN/1.73
EOSINOPHIL # BLD AUTO: 0 10*3/MM3 (ref 0–0.4)
EOSINOPHIL NFR BLD AUTO: 0.1 % (ref 0.3–6.2)
ERYTHROCYTE [DISTWIDTH] IN BLOOD BY AUTOMATED COUNT: 14.5 % (ref 12.3–15.4)
GLUCOSE SERPL-MCNC: 120 MG/DL (ref 65–99)
HCT VFR BLD AUTO: 34.1 % (ref 37.5–51)
HGB BLD-MCNC: 11.6 G/DL (ref 13–17.7)
LEFT ATRIUM VOLUME INDEX: 30.3 ML/M2
LYMPHOCYTES # BLD AUTO: 1.3 10*3/MM3 (ref 0.7–3.1)
LYMPHOCYTES NFR BLD AUTO: 10.9 % (ref 19.6–45.3)
MCH RBC QN AUTO: 32.2 PG (ref 26.6–33)
MCHC RBC AUTO-ENTMCNC: 34 G/DL (ref 31.5–35.7)
MCV RBC AUTO: 94.6 FL (ref 79–97)
MONOCYTES # BLD AUTO: 1.2 10*3/MM3 (ref 0.1–0.9)
MONOCYTES NFR BLD AUTO: 9.9 % (ref 5–12)
MRSA DNA SPEC QL NAA+PROBE: NORMAL
NEUTROPHILS NFR BLD AUTO: 78.6 % (ref 42.7–76)
NEUTROPHILS NFR BLD AUTO: 9.4 10*3/MM3 (ref 1.7–7)
NRBC BLD AUTO-RTO: 0 /100 WBC (ref 0–0.2)
PLATELET # BLD AUTO: 184 10*3/MM3 (ref 140–450)
PMV BLD AUTO: 9.8 FL (ref 6–12)
POTASSIUM SERPL-SCNC: 4.5 MMOL/L (ref 3.5–5.2)
QT INTERVAL: 528 MS
QTC INTERVAL: 423 MS
RBC # BLD AUTO: 3.61 10*6/MM3 (ref 4.14–5.8)
SINUS: 3.3 CM
SODIUM SERPL-SCNC: 132 MMOL/L (ref 136–145)
WBC NRBC COR # BLD AUTO: 11.9 10*3/MM3 (ref 3.4–10.8)

## 2023-12-02 PROCEDURE — 25010000002 DOPAMINE PER 40 MG: Performed by: INTERNAL MEDICINE

## 2023-12-02 PROCEDURE — 85730 THROMBOPLASTIN TIME PARTIAL: CPT | Performed by: INTERNAL MEDICINE

## 2023-12-02 PROCEDURE — 93306 TTE W/DOPPLER COMPLETE: CPT

## 2023-12-02 PROCEDURE — 85347 COAGULATION TIME ACTIVATED: CPT

## 2023-12-02 PROCEDURE — 85025 COMPLETE CBC W/AUTO DIFF WBC: CPT

## 2023-12-02 PROCEDURE — 93306 TTE W/DOPPLER COMPLETE: CPT | Performed by: INTERNAL MEDICINE

## 2023-12-02 PROCEDURE — 80048 BASIC METABOLIC PNL TOTAL CA: CPT

## 2023-12-02 PROCEDURE — 99233 SBSQ HOSP IP/OBS HIGH 50: CPT | Performed by: INTERNAL MEDICINE

## 2023-12-02 PROCEDURE — 25810000003 SODIUM CHLORIDE 0.9 % SOLUTION: Performed by: INTERNAL MEDICINE

## 2023-12-02 PROCEDURE — 25010000002 ONDANSETRON PER 1 MG

## 2023-12-02 PROCEDURE — 71045 X-RAY EXAM CHEST 1 VIEW: CPT

## 2023-12-02 RX ORDER — ATORVASTATIN CALCIUM 10 MG/1
10 TABLET, FILM COATED ORAL NIGHTLY
Status: DISCONTINUED | OUTPATIENT
Start: 2023-12-02 | End: 2023-12-11 | Stop reason: HOSPADM

## 2023-12-02 RX ADMIN — DOPAMINE HYDROCHLORIDE 1.5 MCG/KG/MIN: 160 INJECTION, SOLUTION INTRAVENOUS at 18:33

## 2023-12-02 RX ADMIN — SODIUM CHLORIDE 75 ML/HR: 9 INJECTION, SOLUTION INTRAVENOUS at 18:28

## 2023-12-02 RX ADMIN — CLOPIDOGREL BISULFATE 75 MG: 75 TABLET ORAL at 09:12

## 2023-12-02 RX ADMIN — ATORVASTATIN CALCIUM 10 MG: 10 TABLET, FILM COATED ORAL at 20:12

## 2023-12-02 RX ADMIN — ONDANSETRON 4 MG: 2 INJECTION INTRAMUSCULAR; INTRAVENOUS at 09:30

## 2023-12-02 RX ADMIN — Medication 10 ML: at 09:12

## 2023-12-02 RX ADMIN — ASPIRIN 81 MG CHEWABLE TABLET 81 MG: 81 TABLET CHEWABLE at 09:11

## 2023-12-02 NOTE — PROGRESS NOTES
CARDIOLOGY PROGRESS NOTE:    Sage Flores  89 y.o.  male  9/7/1930  [id number]      Referring Provider: Intensivist    Reason for follow-up: STEMI     Patient Care Team:  Tricia Aldana APRN as PCP - General (Family Medicine)  Missy Domínguez MD as Consulting Physician (Cardiology)    Subjective .  Patient does not have any chest pain or shortness of breath today    Objective lying in bed comfortably     Review of Systems   Constitutional: Negative for fever and malaise/fatigue.   HENT:  Negative for ear pain and nosebleeds.    Eyes:  Negative for blurred vision and double vision.   Cardiovascular:  Negative for chest pain, dyspnea on exertion and palpitations.   Respiratory:  Negative for cough and shortness of breath.    Skin:  Negative for rash.   Musculoskeletal:  Negative for joint pain.   Gastrointestinal:  Negative for abdominal pain, nausea and vomiting.   Neurological:  Negative for focal weakness and headaches.   Psychiatric/Behavioral:  Negative for depression. The patient is not nervous/anxious.    All other systems reviewed and are negative.      Allergies: Iodine, Levofloxacin, Nitroglycerin, Paroxetine, Penicillin g, Prednisone, Sucralfate, Diltiazem hcl, Metoprolol, Pramipexole dihydrochloride, and Ropinirole hcl    Scheduled Meds:aspirin, 81 mg, Oral, Daily  clopidogrel, 75 mg, Oral, Daily  sodium chloride, 10 mL, Intravenous, Q12H      Continuous Infusions:DOPamine, 2-20 mcg/kg/min, Last Rate: 3 mcg/kg/min (12/02/23 1610)  heparin, 12 Units/kg/hr, Last Rate: 11 Units/kg/hr (12/02/23 0931)  sodium chloride, 75 mL/hr, Last Rate: 75 mL/hr (12/01/23 2247)      PRN Meds:.  acetaminophen **OR** acetaminophen    senna-docusate sodium **AND** polyethylene glycol **AND** bisacodyl **AND** bisacodyl    influenza vaccine    ondansetron **OR** ondansetron    sodium chloride    sodium chloride        VITAL SIGNS  Vitals:    12/02/23 1530 12/02/23 1545 12/02/23 1600 12/02/23 1603   BP:  122/71     BP  "Location:       Patient Position:       Pulse:  56     Resp: 21  21    Temp:       TempSrc:       SpO2:  96%     Weight:    73.5 kg (162 lb)   Height:    177.8 cm (70\")       Flowsheet Rows      Flowsheet Row First Filed Value   Admission Height 177.8 cm (70\") Documented at 12/01/2023 1603   Admission Weight 72.6 kg (160 lb) Documented at 12/01/2023 1603             TELEMETRY: Sinus rhythm with nonspecific ST segment abnormality    Physical Exam:  Constitutional:       Appearance: Well-developed.   Eyes:      General: No scleral icterus.     Conjunctiva/sclera: Conjunctivae normal.      Pupils: Pupils are equal, round, and reactive to light.   HENT:      Head: Normocephalic and atraumatic.   Neck:      Vascular: No carotid bruit or JVD.   Pulmonary:      Effort: Pulmonary effort is normal.      Breath sounds: Normal breath sounds. No wheezing. No rales.   Cardiovascular:      Normal rate. Regular rhythm.      Murmurs: There is a systolic murmur.   Pulses:     Intact distal pulses.   Abdominal:      General: Bowel sounds are normal.      Palpations: Abdomen is soft.   Musculoskeletal: Normal range of motion.      Cervical back: Normal range of motion and neck supple. Skin:     General: Skin is warm and dry.      Findings: No rash.   Neurological:      Mental Status: Alert.      Comments: No focal deficits          Results Review:   I reviewed the patient's new clinical results.  Lab Results (last 24 hours)       Procedure Component Value Units Date/Time    POC Activated Clotting Time [[id number]]  (Abnormal) Collected: 12/02/23 1200    Specimen: Arterial Blood Updated: 12/02/23 1205     Activated Clotting Time  163 Seconds      Comment: Serial Number: [id number]:  [id number]       Basic Metabolic Panel [[id number]]  (Abnormal) Collected: 12/02/23 0644    Specimen: Blood Updated: 12/02/23 0721     Glucose 120 mg/dL      BUN 15 mg/dL      Creatinine 1.04 mg/dL      Sodium 132 mmol/L      Potassium 4.5 mmol/L      " Chloride 101 mmol/L      CO2 21.0 mmol/L      Calcium 8.4 mg/dL      BUN/Creatinine Ratio 14.4     Anion Gap 10.0 mmol/L      eGFR 66.9 mL/min/1.73     Narrative:      GFR Normal >60  Chronic Kidney Disease <60  Kidney Failure <15    The GFR formula is only valid for adults with stable renal function between ages 18 and 70.    aPTT [[id number]]  (Abnormal) Collected: 12/02/23 0644    Specimen: Blood Updated: 12/02/23 0709     PTT 90.0 seconds     CBC & Differential [[id number]]  (Abnormal) Collected: 12/02/23 0644    Specimen: Blood Updated: 12/02/23 0656    Narrative:      The following orders were created for panel order CBC & Differential.  Procedure                               Abnormality         Status                     ---------                               -----------         ------                     CBC Auto Differential[[id number]]        Abnormal            Final result                 Please view results for these tests on the individual orders.    CBC Auto Differential [[id number]]  (Abnormal) Collected: 12/02/23 0644    Specimen: Blood Updated: 12/02/23 0656     WBC 11.90 10*3/mm3      RBC 3.61 10*6/mm3      Hemoglobin 11.6 g/dL      Hematocrit 34.1 %      MCV 94.6 fL      MCH 32.2 pg      MCHC 34.0 g/dL      RDW 14.5 %      RDW-SD 50.3 fl      MPV 9.8 fL      Platelets 184 10*3/mm3      Neutrophil % 78.6 %      Lymphocyte % 10.9 %      Monocyte % 9.9 %      Eosinophil % 0.1 %      Basophil % 0.5 %      Neutrophils, Absolute 9.40 10*3/mm3      Lymphocytes, Absolute 1.30 10*3/mm3      Monocytes, Absolute 1.20 10*3/mm3      Eosinophils, Absolute 0.00 10*3/mm3      Basophils, Absolute 0.10 10*3/mm3      nRBC 0.0 /100 WBC     MRSA Screen, PCR (Inpatient) - Swab, Nares [[id number]]  (Normal) Collected: 12/01/23 2301    Specimen: Swab from Nares Updated: 12/02/23 0231     MRSA PCR No MRSA Detected    Narrative:      The negative predictive value of this diagnostic test is high and should only be used to  consider de-escalating anti-MRSA therapy. A positive result may indicate colonization with MRSA and must be correlated clinically.    Basic Metabolic Panel [[id number]]  (Abnormal) Collected: 12/01/23 2302    Specimen: Blood Updated: 12/01/23 2348     Glucose 129 mg/dL      BUN 14 mg/dL      Creatinine 1.10 mg/dL      Sodium 130 mmol/L      Potassium 4.5 mmol/L      Chloride 100 mmol/L      CO2 19.0 mmol/L      Calcium 8.3 mg/dL      BUN/Creatinine Ratio 12.7     Anion Gap 11.0 mmol/L      eGFR 62.6 mL/min/1.73     Narrative:      GFR Normal >60  Chronic Kidney Disease <60  Kidney Failure <15    The GFR formula is only valid for adults with stable renal function between ages 18 and 70.    aPTT [[id number]]  (Abnormal) Collected: 12/01/23 2302    Specimen: Blood Updated: 12/01/23 2338     PTT 49.4 seconds     CBC & Differential [[id number]]  (Abnormal) Collected: 12/01/23 2302    Specimen: Blood Updated: 12/01/23 2324    Narrative:      The following orders were created for panel order CBC & Differential.  Procedure                               Abnormality         Status                     ---------                               -----------         ------                     CBC Auto Differential[[id number]]        Abnormal            Final result                 Please view results for these tests on the individual orders.    CBC Auto Differential [[id number]]  (Abnormal) Collected: 12/01/23 2302    Specimen: Blood Updated: 12/01/23 2324     WBC 13.60 10*3/mm3      RBC 3.58 10*6/mm3      Hemoglobin 11.4 g/dL      Hematocrit 34.6 %      MCV 96.6 fL      MCH 32.0 pg      MCHC 33.1 g/dL      RDW 14.4 %      RDW-SD 48.1 fl      MPV 9.6 fL      Platelets 189 10*3/mm3      Neutrophil % 83.2 %      Lymphocyte % 8.2 %      Monocyte % 8.0 %      Eosinophil % 0.0 %      Basophil % 0.6 %      Neutrophils, Absolute 11.30 10*3/mm3      Lymphocytes, Absolute 1.10 10*3/mm3      Monocytes, Absolute 1.10 10*3/mm3      Eosinophils,  Absolute 0.00 10*3/mm3      Basophils, Absolute 0.10 10*3/mm3      nRBC 0.0 /100 WBC     POC Activated Clotting Time [[id number]]  (Abnormal) Collected: 12/01/23 2011    Specimen: Arterial Blood Updated: 12/01/23 2035     Activated Clotting Time  190 Seconds      Comment: Serial Number: [id number]:  [id number]       POC Glucose Once [[id number]]  (Abnormal) Collected: 12/01/23 2031    Specimen: Blood Updated: 12/01/23 2032     Glucose 156 mg/dL      Comment: Serial Number: [id number]:  [id number]       POC Activated Clotting Time [[id number]]  (Abnormal) Collected: 12/01/23 1747    Specimen: Arterial Blood Updated: 12/01/23 1754     Activated Clotting Time  228 Seconds      Comment: Serial Number: [id number]:  [id number]       POC Activated Clotting Time [[id number]]  (Abnormal) Collected: 12/01/23 1703    Specimen: Venous Blood Updated: 12/01/23 1754     Activated Clotting Time  223 Seconds      Comment: Serial Number: [id number]:  [id number]       Protime-INR [[id number]]  (Normal) Collected: 12/01/23 1613    Specimen: Blood Updated: 12/01/23 1721     Protime 11.7 Seconds      INR 1.08    aPTT [[id number]]  (Normal) Collected: 12/01/23 1613    Specimen: Blood Updated: 12/01/23 1721     PTT 25.5 seconds     Willard Draw [[id number]] Collected: 12/01/23 1613    Specimen: Blood Updated: 12/01/23 1715    Narrative:      The following orders were created for panel order Willard Draw.  Procedure                               Abnormality         Status                     ---------                               -----------         ------                     Green Top (Gel)[[id number]]                                  Final result               Lavender Top[[id number]]                                     Final result               Gold Top - SST[[id number]]                                   Final result               Light Blue Top[[id number]]                                   Final result                 Please view results  for these tests on the individual orders.    Lavender Top [[id number]] Collected: 12/01/23 1613    Specimen: Blood Updated: 12/01/23 1715     Extra Tube hold for add-on     Comment: Auto resulted       Gold Top - SST [[id number]] Collected: 12/01/23 1613    Specimen: Blood Updated: 12/01/23 1715     Extra Tube Hold for add-ons.     Comment: Auto resulted.       Light Blue Top [[id number]] Collected: 12/01/23 1613    Specimen: Blood Updated: 12/01/23 1715     Extra Tube Hold for add-ons.     Comment: Auto resulted       Comprehensive Metabolic Panel [[id number]]  (Abnormal) Collected: 12/01/23 1613    Specimen: Blood Updated: 12/01/23 1657     Glucose 124 mg/dL      BUN 16 mg/dL      Creatinine 1.02 mg/dL      Sodium 130 mmol/L      Potassium 4.0 mmol/L      Comment: Slight hemolysis detected by analyzer. Result may be falsely elevated.        Chloride 97 mmol/L      CO2 21.0 mmol/L      Calcium 9.2 mg/dL      Total Protein 5.9 g/dL      Albumin 3.7 g/dL      ALT (SGPT) 17 U/L      AST (SGOT) 21 U/L      Comment: Slight hemolysis detected by analyzer. Result may be falsely elevated.        Alkaline Phosphatase 83 U/L      Total Bilirubin 0.8 mg/dL      Globulin 2.2 gm/dL      A/G Ratio 1.7 g/dL      BUN/Creatinine Ratio 15.7     Anion Gap 12.0 mmol/L      eGFR 68.5 mL/min/1.73     Narrative:      GFR Normal >60  Chronic Kidney Disease <60  Kidney Failure <15    The GFR formula is only valid for adults with stable renal function between ages 18 and 70.    Green Top (Gel) [[id number]] Collected: 12/01/23 1613    Specimen: Blood Updated: 12/01/23 1657     Extra Tube HOLD    Single High Sensitivity Troponin T [[id number]]  (Abnormal) Collected: 12/01/23 1613    Specimen: Blood Updated: 12/01/23 1654     HS Troponin T 33 ng/L     Narrative:      High Sensitive Troponin T Reference Range:  <14.0 ng/L- Negative Female for AMI  <22.0 ng/L- Negative Male for AMI  >=14 - Abnormal Female indicating possible myocardial injury.  >=22 -  Abnormal Male indicating possible myocardial injury.   Clinicians would have to utilize clinical acumen, EKG, Troponin, and serial changes to determine if it is an Acute Myocardial Infarction or myocardial injury due to an underlying chronic condition.         BNP [[id number]]  (Abnormal) Collected: 12/01/23 1613    Specimen: Blood Updated: 12/01/23 1654     proBNP 2,864.0 pg/mL     Narrative:      This assay is used as an aid in the diagnosis of individuals suspected of having heart failure. It can be used as an aid in the diagnosis of acute decompensated heart failure (ADHF) in patients presenting with signs and symptoms of ADHF to the emergency department (ED). In addition, NT-proBNP of <300 pg/mL indicates ADHF is not likely.    Age Range Result Interpretation  NT-proBNP Concentration (pg/mL:      <50             Positive            >450                   Gray                 300-450                    Negative             <300    50-75           Positive            >900                  Gray                300-900                  Negative            <300      >75             Positive            >1800                  Gray                300-1800                  Negative            <300            Imaging Results (Last 24 Hours)       Procedure Component Value Units Date/Time    XR Chest 1 View [[id number]] Collected: 12/02/23 1035     Updated: 12/02/23 1039    Narrative:      XR CHEST 1 VW    Date of Exam: 12/2/2023 10:20 AM EST    Indication: acute hypoxic respiratory failure    Comparison: 11/30/2020    Findings:  Patient is status post median sternotomy and CABG. Loop recorder projects over the left thorax. Multiple wires/leads project over the thorax. Curved lead or catheter projects over the right atrium, possibly external to the patient. Please correlate   clinically.    The lungs appear adequately aerated without consolidation or mass. No pleural effusion or pneumothorax is identified. The  cardiomediastinal silhouette and pulmonary vasculature appear within normal limits. No acute or suspicious osseous lesion is   identified. Cholecystectomy clips are seen.      Impression:      Impression:  1.No acute radiographic abnormality is identified.  2.Curved lead or catheter projects over the right atrium, possibly external to the patient. Please correlate clinically. Repeat radiograph after clearing the patient of external wires may be considered if indicated.    Electronically Signed: Gordon Lagos MD    12/2/2023 10:37 AM EST    Workstation ID: [id number]            EKG      I personally viewed and interpreted the patient's EKG/Telemetry data:    ECHOCARDIOGRAM:       STRESS MYOVIEW:       CARDIAC CATHETERIZATION:  Results for orders placed during the hospital encounter of 12/01/23    Cardiac Catheterization/Vascular Study       OTHER:         Assessment & Plan     STEMI   Patient presented with chest pain and had ST segment elevation in inferior leads concerning with acute inferior wall MI and also had a complete heart block with bradycardia.  He also has been having shortness of breath.  Patient underwent emergency cardiac catheterization followed by PCI and a temporary pacemaker placement.  Patient is currently on aspirin Plavix heparin  Patient had stent placement to the graft to the distal right coronary artery but has still slow flow and hence will be placed on heparin but no Integrilin because of his age and then will rehave the cardiac intervention again in 2 to 3 days.  Patient is chest pain-free and his EKG is also nonspecific ST segment abnormality at this time  Patient is having an echocardiogram performed.     Coronary disease  Patient had coronary bypass surgery with a LIMA to the LAD and a saphenous graft to the marginal branch diagonal branch PDA and PLV branches  Patient has diffuse coronary disease     Atrial fibrillation  Patient has history of atrial fibrillation and will review his  home medicines  Patient is currently in sinus rhythm and his beta-blockers were held because of bradycardia but his heart rate is better     Hypertension  Patient blood pressure actually lower side hence we will hold off his blood pressure medicines  Patient is also requiring dopamine for his blood pressure.     Hyperlipidemia  Patient will be placed on atorvastatin    I discussed the patients findings and my recommendations with patient and nurse    Son العلي MD  12/02/23  16:37 EST

## 2023-12-02 NOTE — H&P
Critical Care H&P   Sage Flores : 1930 MRN:[id number] LOS:0 ROOM: [facility]      Reason for admission: Acute ST elevation myocardial infarction (STEMI) involving right coronary artery      Assessment / Plan      STEMI / Complete heart block with bradycardia  ST elevation in inferior leads  Patient underwent emergent PCI followed by temporary pacemaker placement  PCI to RCA -- Continuing heparin due to slow flow post stent placement, NO INTEGRILIN due to age, PLAN TO GO BACK TO CATHLAB IN 2-3 DAYS  Echo pending in AM     CAD  Hx of CABG   Patient currently on aspirin, plavix, heparin  Patient now on statin at home, patient greater than 75     Atrial fibrillation  Patient on aspirin at home, no full coagulation      Level Of Support Discussed With: Patient  Code Status (Patient has no pulse and is not breathing): CPR (Attempt to Resuscitate)  Medical Interventions (Patient has pulse or is breathing): Full Support        Nutrition: Diet: Liquid Diets; Clear Liquid; Fluid Consistency: Thin (IDDSI 0) Patient isn't on Tube Feeding      DVT prophylaxis:  Mechanical DVT prophylaxis orders are present.      History of Present illness      A 89 y.o. old male patient with PMH of CAD, atrial fibrillation, presents to the hospital with complaints of chest pain and syncopal episode which started an hour prior to admission to ER. EKG showed evidence of acute inferior posterior MI with heart block. Patient was taken to Cathlab emergently. Patient was hypotensive with mental status changes. He was started on Dopamine prior to cathlab and a TVP was placed in cathlab. Patient underwent successful senting to RCA. Plans to go back to cathlab in the next 2-3 day.      ACP: CPR, Full Interventions.Patients son is listed as NOK to make decisions for the patient in the event he is unable.      Patient was seen and examined on 23 at 18:28 EST .     Subjective / Review of systems      Review of Systems   Constitutional:   "Negative for chills and fever.   Respiratory:  Negative for chest tightness and shortness of breath.    Cardiovascular:  Positive for chest pain (States \"It hurts when i push on it.\").   Gastrointestinal:  Negative for abdominal pain, nausea and vomiting.   Genitourinary:  Negative for difficulty urinating.   Musculoskeletal:  Negative for arthralgias and myalgias.   Neurological:  Positive for syncope. Negative for dizziness and light-headedness.         Past Medical/Surgical/Social/Family History & Allergies      Medical History        Past Medical History:   Diagnosis Date    Anxiety 2014    Atrial fibrillation      Benign prostatic hyperplasia      CAD (coronary artery disease)      Hyperlipidemia      Hypertension      Myocardial infarction           Surgical History         Past Surgical History:   Procedure Laterality Date    CARDIAC SURGERY        HERNIA REPAIR             Social History   Social History            Socioeconomic History    Marital status:    Tobacco Use    Smoking status: Former       Types: Cigarettes       Quit date: 1970       Years since quittin.2    Smokeless tobacco: Never    Tobacco comments:       more than 50yrs   Vaping Use    Vaping Use: Never used   Substance and Sexual Activity    Alcohol use: No    Drug use: No    Sexual activity: Not Currently               Family History   Problem Relation Age of Onset    Heart disease Mother      Heart disease Father              Allergies   Allergen Reactions    Iodine Unknown (See Comments)       Pt unsure of reaction       Levofloxacin Unknown (See Comments)    Nitroglycerin Unknown (See Comments) and Other (See Comments)    Paroxetine Unknown (See Comments)    Penicillin G Unknown (See Comments)    Prednisone Unknown (See Comments)    Sucralfate Unknown (See Comments)    Diltiazem Hcl Hives    Metoprolol Other (See Comments)       Lowers heart rate     Pramipexole Dihydrochloride Unknown (See Comments)    Ropinirole Hcl " Other (See Comments)         Home Medications              Prior to Admission medications    Medication Sig Start Date End Date Taking? Authorizing Provider   aspirin 81 MG chewable tablet Chew 1 tablet Daily.       Provider, MD Carlos   azelastine (ASTEPRO) 0.15 % solution nasal spray USE 2 SPRAYS IN EACH NOSTRIL TWICE DAILY AS DIRECTED BY PROVIDER  Patient taking differently: 2 sprays into the nostril(s) as directed by provider 2 (Two) Times a Day. 10/27/20     Ashley Cano MD   diazePAM (VALIUM) 5 MG tablet TAKE 1 TABLET BY MOUTH TWICE DAILY  Patient taking differently: 1 (One) Time As Needed. 10/6/20     Ashley Cano MD   docusate sodium (COLACE) 250 MG capsule Take 1 capsule by mouth Daily.       Provider, MD Carlos   gabapentin (NEURONTIN) 100 MG capsule TAKE 1 CAPSULE BY MOUTH TWICE DAILY 11/17/22     Ashley Cano MD   losartan (COZAAR) 50 MG tablet TAKE 1 TABLET BY MOUTH DAILY 5/30/23     Ashley Cano MD   omeprazole (priLOSEC) 20 MG capsule Take 1 capsule by mouth Daily. 9/7/22     Ashley Cano MD   tamsulosin (FLOMAX) 0.4 MG capsule 24 hr capsule TAKE 1 CAPSULE BY MOUTH DAILY 11/19/23     Tricia Aldana, APRN         Objective / Physical Exam      Vital signs:  Temp: 97.8 °F (36.6 °C)  BP: 123/65  Heart Rate: 66  Resp: 14  SpO2: 94 %  Weight: 72.6 kg (160 lb)     Admission Weight: Weight: 72.6 kg (160 lb)     Physical Exam  Vitals and nursing note reviewed.   Constitutional:       General: He is not in acute distress.     Appearance: He is ill-appearing.   HENT:      Head: Normocephalic.      Mouth/Throat:      Mouth: Mucous membranes are dry.      Pharynx: Oropharynx is clear.   Eyes:      Extraocular Movements: Extraocular movements intact.      Conjunctiva/sclera: Conjunctivae normal.      Pupils: Pupils are equal, round, and reactive to light.   Cardiovascular:      Rate and Rhythm: Regular rhythm. Tachycardia present.      Pulses: Normal  pulses.      Heart sounds: Normal heart sounds.   Pulmonary:      Effort: Pulmonary effort is normal.      Breath sounds: Normal breath sounds.   Abdominal:      General: Bowel sounds are normal.      Palpations: Abdomen is soft.   Musculoskeletal:      Right lower leg: No edema.      Left lower leg: No edema.   Skin:     General: Skin is warm and dry.      Findings: No rash.   Neurological:      Mental Status: He is alert and oriented to person, place, and time.   Psychiatric:         Mood and Affect: Mood normal.         Behavior: Behavior normal.            Labs           Results from last 7 days   Lab Units 12/01/23  1613   WBC 10*3/mm3 9.50   HEMATOCRIT % 36.9*   PLATELETS 10*3/mm3 197           Results from last 7 days   Lab Units 12/01/23  1613   SODIUM mmol/L 130*   POTASSIUM mmol/L 4.0   CHLORIDE mmol/L 97*   CO2 mmol/L 21.0*   BUN mg/dL 16   CREATININE mg/dL 1.02         Imaging      No radiology results for the last day      Current Medications      Scheduled Meds:  [START ON 12/2/2023] aspirin, 81 mg, Oral, Daily  [START ON 12/2/2023] clopidogrel, 75 mg, Oral, Daily  lidocaine PF 1%, , ,   sodium chloride, 10 mL, Intravenous, Q12H           Continuous Infusions:  DOPamine, 2-20 mcg/kg/min, Last Rate: 10 mcg/kg/min (12/01/23 1718)  sodium chloride, 75 mL/hr

## 2023-12-02 NOTE — PROGRESS NOTES
Critical Care Progress Note     Sage Flores : 1930 MRN:[id number] LOS:1  Rm: 2213/1     Principal Problem: Acute ST elevation myocardial infarction (STEMI) involving right coronary artery     Reason for follow up: All the medical problems listed below    Summary     A 89 y.o. old male patient with PMH of CAD, atrial fibrillation, presents to the hospital with complaints of chest pain and syncopal episode which started an hour prior to admission to ER. EKG showed evidence of acute inferior posterior MI with heart block. Patient was taken to Cath lab emergently. Patient was hypotensive with mental status changes. He was started on Dopamine prior to cathlab and a TVP was placed in cath lab. Patient underwent successful senting to RCA. Plans to go back to cathlab in the next 2-3 day.      Significant Events     23 : no acute events overnight, no chest pain. Remains on supplemental O2, CXR with no acute findings. Remains on Dopamine. C/o nausea without vomiting overnight but feels.      Assessment / Plan     STEMI / Complete heart block with bradycardia  MV CAD, H/o CABG  ST elevation in inferior leads  Patient underwent emergent PCI followed by temporary pacemaker placement  PCI to RCA -- Continuing heparin due to slow flow post stent placement, NO INTEGRILIN due to age  Staged procedure, plan to return to cath lab in 2-3 days  Echo pending     CAD  Hx of CABG   Patient currently on aspirin, plavix, heparin  Patient not on statin at home, patient greater than 75     Atrial fibrillation  Patient on aspirin at home, no full coagulation   Not on chronic rate or rhythm control       Disposition:  plan for home at discharge    Code status:   Level Of Support Discussed With: Patient  Code Status (Patient has no pulse and is not breathing): CPR (Attempt to Resuscitate)  Medical Interventions (Patient has pulse or is breathing): Full Support       Nutrition:   Diet: Liquid Diets; Clear Liquid; Fluid Consistency:  Thin (IDDSI 0)   Patient isn't on Tube Feeding     DVT prophylaxis:  Medical and mechanical DVT prophylaxis orders are present.     Subjective / Review of systems     Review of Systems   Respiratory:  Negative for cough and shortness of breath.    Cardiovascular:  Negative for chest pain and palpitations.   Gastrointestinal:  Positive for nausea. Negative for abdominal pain and vomiting.        Objective / Physical Exam     Vital signs:  Temp: 97.9 °F (36.6 °C)  BP: 122/74  Heart Rate: 69  Resp: 21  SpO2: 95 %  Weight: 73.7 kg (162 lb 7.7 oz)    Admission Weight: Weight: 72.6 kg (160 lb)  Current Weight: Weight: 73.7 kg (162 lb 7.7 oz)    Input/Output in last 24 hours:    Intake/Output Summary (Last 24 hours) at 12/2/2023 1114  Last data filed at 12/2/2023 0900  Gross per 24 hour   Intake 180 ml   Output 1025 ml   Net -845 ml      Net IO Since Admission: -845 mL [12/02/23 1114]     Physical Exam  Constitutional:       General: He is not in acute distress.     Appearance: Normal appearance.   HENT:      Head: Normocephalic and atraumatic.      Right Ear: External ear normal.      Left Ear: External ear normal.      Mouth/Throat:      Mouth: Mucous membranes are moist.   Eyes:      Conjunctiva/sclera: Conjunctivae normal.      Pupils: Pupils are equal, round, and reactive to light.   Cardiovascular:      Heart sounds: Normal heart sounds, S1 normal and S2 normal. No murmur heard.     Comments: Irregularly irregular, afib with controlled ventricular rate  Pulmonary:      Breath sounds: Normal breath sounds. No wheezing or rhonchi.   Abdominal:      General: Bowel sounds are normal. There is no distension.      Palpations: Abdomen is soft.      Tenderness: There is no abdominal tenderness.   Musculoskeletal:      Right lower leg: Edema present.      Left lower leg: Edema present.      Comments: Right femoral sheath site secure, no hematoma or ecchymosis    Neurological:      General: No focal deficit present.       Mental Status: He is alert and oriented to person, place, and time.          Radiology and Labs     Results from last 7 days   Lab Units 12/02/23  0644 12/01/23 2302 12/01/23  1613   WBC 10*3/mm3 11.90* 13.60* 9.50   HEMOGLOBIN g/dL 11.6* 11.4* 12.6*   HEMATOCRIT % 34.1* 34.6* 36.9*   PLATELETS 10*3/mm3 184 189 197      Results from last 7 days   Lab Units 12/02/23 0644 12/01/23 2302 12/01/23  1613   PROTIME Seconds  --   --  11.7   INR   --   --  1.08   APTT seconds 90.0* 49.4* 25.5      Results from last 7 days   Lab Units 12/02/23 0644 12/01/23 2302 12/01/23  1613   SODIUM mmol/L 132* 130* 130*   POTASSIUM mmol/L 4.5 4.5 4.0   CHLORIDE mmol/L 101 100 97*   CO2 mmol/L 21.0* 19.0* 21.0*   BUN mg/dL 15 14 16   CREATININE mg/dL 1.04 1.10 1.02   GLUCOSE mg/dL 120* 129* 124*      Results from last 7 days   Lab Units 12/01/23  1613   ALK PHOS U/L 83   AST (SGOT) U/L 21   ALT (SGPT) U/L 17             Current medications     Scheduled Meds:   aspirin, 81 mg, Oral, Daily  clopidogrel, 75 mg, Oral, Daily  sodium chloride, 10 mL, Intravenous, Q12H        Continuous Infusions:   DOPamine, 2-20 mcg/kg/min, Last Rate: 4.5 mcg/kg/min (12/02/23 0924)  heparin, 12 Units/kg/hr, Last Rate: 11 Units/kg/hr (12/02/23 0931)  sodium chloride, 75 mL/hr, Last Rate: 75 mL/hr (12/01/23 2247)          Plan discussed with RN. Reviewed all other data in the last 24 hours, including but not limited to vitals, labs, microbiology, imaging and pertinent notes from other providers.       ROSA Stinson   Critical Care  12/02/23   11:14 EST

## 2023-12-03 LAB
ANION GAP SERPL CALCULATED.3IONS-SCNC: 8 MMOL/L (ref 5–15)
APTT PPP: 51 SECONDS (ref 61–76.5)
APTT PPP: 57.7 SECONDS (ref 61–76.5)
APTT PPP: 61.5 SECONDS (ref 61–76.5)
APTT PPP: 81.2 SECONDS (ref 61–76.5)
BASOPHILS # BLD AUTO: 0 10*3/MM3 (ref 0–0.2)
BASOPHILS NFR BLD AUTO: 0.2 % (ref 0–1.5)
BUN SERPL-MCNC: 17 MG/DL (ref 8–23)
BUN/CREAT SERPL: 16.8 (ref 7–25)
CALCIUM SPEC-SCNC: 8.1 MG/DL (ref 8.2–9.6)
CHLORIDE SERPL-SCNC: 103 MMOL/L (ref 98–107)
CO2 SERPL-SCNC: 22 MMOL/L (ref 22–29)
CREAT SERPL-MCNC: 1.01 MG/DL (ref 0.76–1.27)
DEPRECATED RDW RBC AUTO: 49 FL (ref 37–54)
EGFRCR SERPLBLD CKD-EPI 2021: 69.3 ML/MIN/1.73
EOSINOPHIL # BLD AUTO: 0 10*3/MM3 (ref 0–0.4)
EOSINOPHIL NFR BLD AUTO: 0.1 % (ref 0.3–6.2)
ERYTHROCYTE [DISTWIDTH] IN BLOOD BY AUTOMATED COUNT: 14.7 % (ref 12.3–15.4)
GLUCOSE SERPL-MCNC: 105 MG/DL (ref 65–99)
HCT VFR BLD AUTO: 32.3 % (ref 37.5–51)
HGB BLD-MCNC: 10.9 G/DL (ref 13–17.7)
LYMPHOCYTES # BLD AUTO: 1.1 10*3/MM3 (ref 0.7–3.1)
LYMPHOCYTES NFR BLD AUTO: 9 % (ref 19.6–45.3)
MCH RBC QN AUTO: 32.7 PG (ref 26.6–33)
MCHC RBC AUTO-ENTMCNC: 33.9 G/DL (ref 31.5–35.7)
MCV RBC AUTO: 96.6 FL (ref 79–97)
MONOCYTES # BLD AUTO: 1.3 10*3/MM3 (ref 0.1–0.9)
MONOCYTES NFR BLD AUTO: 10.6 % (ref 5–12)
NEUTROPHILS NFR BLD AUTO: 80.1 % (ref 42.7–76)
NEUTROPHILS NFR BLD AUTO: 9.9 10*3/MM3 (ref 1.7–7)
NRBC BLD AUTO-RTO: 0.1 /100 WBC (ref 0–0.2)
PLATELET # BLD AUTO: 148 10*3/MM3 (ref 140–450)
PMV BLD AUTO: 9.6 FL (ref 6–12)
POTASSIUM SERPL-SCNC: 4.2 MMOL/L (ref 3.5–5.2)
RBC # BLD AUTO: 3.34 10*6/MM3 (ref 4.14–5.8)
SODIUM SERPL-SCNC: 133 MMOL/L (ref 136–145)
WBC NRBC COR # BLD AUTO: 12.4 10*3/MM3 (ref 3.4–10.8)

## 2023-12-03 PROCEDURE — 25010000002 HEPARIN (PORCINE) 25000-0.45 UT/250ML-% SOLUTION: Performed by: INTERNAL MEDICINE

## 2023-12-03 PROCEDURE — 25010000002 ONDANSETRON PER 1 MG

## 2023-12-03 PROCEDURE — 85025 COMPLETE CBC W/AUTO DIFF WBC: CPT | Performed by: NURSE PRACTITIONER

## 2023-12-03 PROCEDURE — 85730 THROMBOPLASTIN TIME PARTIAL: CPT | Performed by: INTERNAL MEDICINE

## 2023-12-03 PROCEDURE — 80048 BASIC METABOLIC PNL TOTAL CA: CPT | Performed by: NURSE PRACTITIONER

## 2023-12-03 PROCEDURE — 25810000003 SODIUM CHLORIDE 0.9 % SOLUTION: Performed by: INTERNAL MEDICINE

## 2023-12-03 PROCEDURE — 85730 THROMBOPLASTIN TIME PARTIAL: CPT | Performed by: STUDENT IN AN ORGANIZED HEALTH CARE EDUCATION/TRAINING PROGRAM

## 2023-12-03 PROCEDURE — 99233 SBSQ HOSP IP/OBS HIGH 50: CPT | Performed by: INTERNAL MEDICINE

## 2023-12-03 RX ORDER — CARVEDILOL 3.12 MG/1
3.12 TABLET ORAL 2 TIMES DAILY WITH MEALS
Status: DISCONTINUED | OUTPATIENT
Start: 2023-12-03 | End: 2023-12-06

## 2023-12-03 RX ORDER — GUAIFENESIN 200 MG/10ML
200 LIQUID ORAL EVERY 4 HOURS PRN
Status: DISCONTINUED | OUTPATIENT
Start: 2023-12-03 | End: 2023-12-11 | Stop reason: HOSPADM

## 2023-12-03 RX ORDER — ECHINACEA PURPUREA EXTRACT 125 MG
1 TABLET ORAL AS NEEDED
Status: DISCONTINUED | OUTPATIENT
Start: 2023-12-03 | End: 2023-12-11 | Stop reason: HOSPADM

## 2023-12-03 RX ADMIN — ATORVASTATIN CALCIUM 10 MG: 10 TABLET, FILM COATED ORAL at 20:24

## 2023-12-03 RX ADMIN — ASPIRIN 81 MG CHEWABLE TABLET 81 MG: 81 TABLET CHEWABLE at 08:57

## 2023-12-03 RX ADMIN — GUAIFENESIN 200 MG: 200 SOLUTION ORAL at 18:22

## 2023-12-03 RX ADMIN — SODIUM CHLORIDE 75 ML/HR: 9 INJECTION, SOLUTION INTRAVENOUS at 06:12

## 2023-12-03 RX ADMIN — HEPARIN SODIUM 9 UNITS/KG/HR: 10000 INJECTION, SOLUTION INTRAVENOUS at 12:50

## 2023-12-03 RX ADMIN — ONDANSETRON 4 MG: 2 INJECTION INTRAMUSCULAR; INTRAVENOUS at 22:08

## 2023-12-03 RX ADMIN — SALINE NASAL SPRAY 1 SPRAY: 1.5 SOLUTION NASAL at 22:22

## 2023-12-03 RX ADMIN — CLOPIDOGREL BISULFATE 75 MG: 75 TABLET ORAL at 08:57

## 2023-12-03 RX ADMIN — CARVEDILOL 3.12 MG: 3.12 TABLET, FILM COATED ORAL at 18:22

## 2023-12-03 NOTE — PROGRESS NOTES
CARDIOLOGY PROGRESS NOTE:    Sage Flores  89 y.o.  male  9/7/1930  [id number]      Referring Provider: Intensivist    Reason for follow-up: STEMI     Patient Care Team:  Tricia Aldana APRN as PCP - General (Family Medicine)  Missy Domínguez MD as Consulting Physician (Cardiology)    Subjective .  Patient does not have any chest pain or shortness of breath today    Objective lying in bed comfortably     Review of Systems   Constitutional: Negative for fever and malaise/fatigue.   HENT:  Negative for ear pain and nosebleeds.    Eyes:  Negative for blurred vision and double vision.   Cardiovascular:  Negative for chest pain, dyspnea on exertion and palpitations.   Respiratory:  Negative for cough and shortness of breath.    Skin:  Negative for rash.   Musculoskeletal:  Negative for joint pain.   Gastrointestinal:  Negative for abdominal pain, nausea and vomiting.   Neurological:  Negative for focal weakness and headaches.   Psychiatric/Behavioral:  Negative for depression. The patient is not nervous/anxious.    All other systems reviewed and are negative.      Allergies: Iodine, Levofloxacin, Nitroglycerin, Paroxetine, Penicillin g, Prednisone, Sucralfate, Diltiazem hcl, Metoprolol, Pramipexole dihydrochloride, and Ropinirole hcl    Scheduled Meds:aspirin, 81 mg, Oral, Daily  atorvastatin, 10 mg, Oral, Nightly  clopidogrel, 75 mg, Oral, Daily  sodium chloride, 10 mL, Intravenous, Q12H      Continuous Infusions:heparin, 12 Units/kg/hr, Last Rate: 10 Units/kg/hr (12/03/23 1427)  sodium chloride, 75 mL/hr, Last Rate: 75 mL/hr (12/03/23 0612)      PRN Meds:.  acetaminophen **OR** acetaminophen    senna-docusate sodium **AND** polyethylene glycol **AND** bisacodyl **AND** bisacodyl    influenza vaccine    ondansetron **OR** ondansetron    sodium chloride    sodium chloride        VITAL SIGNS  Vitals:    12/03/23 1300 12/03/23 1400 12/03/23 1500 12/03/23 1600   BP: 138/74 148/80 131/83 136/79   BP Location:      "  Patient Position:    Lying   Pulse: 69 75 71 68   Resp:    22   Temp:    97.6 °F (36.4 °C)   TempSrc:    Oral   SpO2:  92%  95%   Weight:       Height:           Flowsheet Rows      Flowsheet Row First Filed Value   Admission Height 177.8 cm (70\") Documented at 12/01/2023 1603   Admission Weight 72.6 kg (160 lb) Documented at 12/01/2023 1603             TELEMETRY: Sinus rhythm with nonspecific ST segment abnormality    Physical Exam:  Constitutional:       Appearance: Well-developed.   Eyes:      General: No scleral icterus.     Conjunctiva/sclera: Conjunctivae normal.      Pupils: Pupils are equal, round, and reactive to light.   HENT:      Head: Normocephalic and atraumatic.   Neck:      Vascular: No carotid bruit or JVD.   Pulmonary:      Effort: Pulmonary effort is normal.      Breath sounds: Normal breath sounds. No wheezing. No rales.   Cardiovascular:      Normal rate. Regular rhythm.   Pulses:     Intact distal pulses.   Abdominal:      General: Bowel sounds are normal.      Palpations: Abdomen is soft.   Musculoskeletal: Normal range of motion.      Cervical back: Normal range of motion and neck supple. Skin:     General: Skin is warm and dry.      Findings: No rash.   Neurological:      Mental Status: Alert.      Comments: No focal deficits          Results Review:   I reviewed the patient's new clinical results.  Lab Results (last 24 hours)       Procedure Component Value Units Date/Time    aPTT [[id number]]  (Abnormal) Collected: 12/03/23 1359    Specimen: Blood Updated: 12/03/23 1418     PTT 51.0 seconds     Basic Metabolic Panel [[id number]]  (Abnormal) Collected: 12/03/23 0657    Specimen: Blood Updated: 12/03/23 0735     Glucose 105 mg/dL      BUN 17 mg/dL      Creatinine 1.01 mg/dL      Sodium 133 mmol/L      Potassium 4.2 mmol/L      Chloride 103 mmol/L      CO2 22.0 mmol/L      Calcium 8.1 mg/dL      BUN/Creatinine Ratio 16.8     Anion Gap 8.0 mmol/L      eGFR 69.3 mL/min/1.73     Narrative:      " GFR Normal >60  Chronic Kidney Disease <60  Kidney Failure <15    The GFR formula is only valid for adults with stable renal function between ages 18 and 70.    aPTT [[id number]]  (Normal) Collected: 12/03/23 0657    Specimen: Blood Updated: 12/03/23 0721     PTT 61.5 seconds     CBC & Differential [[id number]]  (Abnormal) Collected: 12/03/23 0657    Specimen: Blood Updated: 12/03/23 0709    Narrative:      The following orders were created for panel order CBC & Differential.  Procedure                               Abnormality         Status                     ---------                               -----------         ------                     CBC Auto Differential[[id number]]        Abnormal            Final result                 Please view results for these tests on the individual orders.    CBC Auto Differential [[id number]]  (Abnormal) Collected: 12/03/23 0657    Specimen: Blood Updated: 12/03/23 0709     WBC 12.40 10*3/mm3      RBC 3.34 10*6/mm3      Hemoglobin 10.9 g/dL      Hematocrit 32.3 %      MCV 96.6 fL      MCH 32.7 pg      MCHC 33.9 g/dL      RDW 14.7 %      RDW-SD 49.0 fl      MPV 9.6 fL      Platelets 148 10*3/mm3      Neutrophil % 80.1 %      Lymphocyte % 9.0 %      Monocyte % 10.6 %      Eosinophil % 0.1 %      Basophil % 0.2 %      Neutrophils, Absolute 9.90 10*3/mm3      Lymphocytes, Absolute 1.10 10*3/mm3      Monocytes, Absolute 1.30 10*3/mm3      Eosinophils, Absolute 0.00 10*3/mm3      Basophils, Absolute 0.00 10*3/mm3      nRBC 0.1 /100 WBC     aPTT [[id number]]  (Abnormal) Collected: 12/03/23 0012    Specimen: Blood Updated: 12/03/23 0031     PTT 81.2 seconds             Imaging Results (Last 24 Hours)       ** No results found for the last 24 hours. **            EKG      I personally viewed and interpreted the patient's EKG/Telemetry data:    ECHOCARDIOGRAM:  Results for orders placed during the hospital encounter of 12/01/23    Adult Transthoracic Echo Complete W/ Cont if Necessary Per  Protocol    Interpretation Summary    Left ventricular ejection fraction appears to be 36 - 40%.    The left atrial cavity is moderately dilated.    Estimated right ventricular systolic pressure from tricuspid regurgitation is normal (<35 mmHg).       STRESS MYOVIEW:       CARDIAC CATHETERIZATION:  Results for orders placed during the hospital encounter of 12/01/23    Cardiac Catheterization/Vascular Study       OTHER:         Assessment & Plan     STEMI   Patient presented with chest pain and had ST segment elevation in inferior leads concerning with acute inferior wall MI and also had a complete heart block with bradycardia.  He also has been having shortness of breath.  Patient underwent emergency cardiac catheterization followed by PCI and a temporary pacemaker placement.  Patient is currently on aspirin Plavix heparin  Patient had stent placement to the graft to the distal right coronary artery but has still slow flow and hence will be placed on heparin but no Integrilin because of his age   Patient is chest pain-free and his EKG is also nonspecific ST segment abnormality at this time  Patient will be managed with conservative medical therapy only at this time.  Patient had an echocardiogram which showed LV dysfunction  Since patient blood pressure was low he was on dopamine but since it has been stopped we will place him on low-dose beta-blockers and watch his blood pressure     Coronary disease  Patient had coronary bypass surgery with a LIMA to the LAD and a saphenous graft to the marginal branch diagonal branch PDA and PLV branches  Patient has diffuse coronary disease and hence he will be managed with conservative medical therapy with aspirin Plavix beta-blockers and statins.     Atrial fibrillation  Patient has history of atrial fibrillation and will review his home medicines  Patient is currently in sinus rhythm and his beta-blockers were held because of bradycardia but his heart rate is better and hence  will restart his beta-blockers.     Hypertension  Patient blood pressure actually lower side hence we will hold off his blood pressure medicines  Patient is also requiring dopamine for his blood pressure.  Patient is off his dopamine and his blood pressure is stable and will be started on beta-blockers.     Hyperlipidemia  Patient will be placed on atorvastatin    I discussed the patients findings and my recommendations with patient and nurse    Son العلي MD  12/03/23  17:06 EST

## 2023-12-03 NOTE — CONSULTS
[facility] Medicine Services  Consult Note    Patient Name: Sage Flores  : 1930  MRN: [id number]  Primary Care Physician:  Tricia Aldana APRN  Referring Physician: LEONORA Fraga  Date of admission: 2023  Date and Time of Care: 12/3/23  at 3:40 pm     Inpatient Hospitalist Consult  Consult performed by: Sacha Xiong MD  Consult ordered by: Crystal Andrea APRN          Reason for Consult/ Chief Complaint: medical management     Consult Requested By: Dr Drew     Subjective:     History of Present Illness:   Per the documentation by Umer Drew , dated 23  A 89 y.o. old male patient with PMH of CAD, atrial fibrillation, presents to the hospital with complaints of chest pain and syncopal episode which started an hour prior to admission to ER. EKG showed evidence of acute inferior posterior MI with heart block. Patient was taken to Cathlab emergently. Patient was hypotensive with mental status changes. He was started on Dopamine prior to cathlab and a TVP was placed in cathlab. Patient underwent successful senting to [facility]. Plans to go back to cathlab in the next 2-3 day.        Review of Systems:   Review of Systems   Constitutional:  Positive for fatigue. Negative for diaphoresis and fever.   Respiratory:  Negative for cough, chest tightness and shortness of breath.    Cardiovascular:  Negative for chest pain.   Gastrointestinal:  Negative for abdominal distention.       Personal History:     Past Medical History:   Diagnosis Date    Anxiety 2014    Atrial fibrillation     Benign prostatic hyperplasia     CAD (coronary artery disease)     Hyperlipidemia     Hypertension     Myocardial infarction        Past Surgical History:   Procedure Laterality Date    CARDIAC SURGERY      HERNIA REPAIR         Family History: family history includes Heart disease in his father and mother. Otherwise pertinent FHx was reviewed and not pertinent to current issue.    Social  History:  reports that he quit smoking about 53 years ago. His smoking use included cigarettes. He has never used smokeless tobacco. He reports that he does not drink alcohol and does not use drugs.    Home Medications:   acetaminophen, aspirin, azelastine, cholecalciferol, docusate sodium, gabapentin, losartan, omeprazole, and tamsulosin    Allergies:  Allergies   Allergen Reactions    Iodine Unknown (See Comments)     Pt unsure of reaction      Levofloxacin Unknown (See Comments)    Nitroglycerin Unknown (See Comments) and Other (See Comments)    Paroxetine Unknown (See Comments)    Penicillin G Unknown (See Comments)    Prednisone Unknown (See Comments)    Sucralfate Unknown (See Comments)    Diltiazem Hcl Hives    Metoprolol Other (See Comments)     Lowers heart rate     Pramipexole Dihydrochloride Unknown (See Comments)    Ropinirole Hcl Other (See Comments)         Objective:     Vital Signs  Temp:  [97.5 °F (36.4 °C)-97.9 °F (36.6 °C)] 97.9 °F (36.6 °C)  Heart Rate:  [53-59] 57  Resp:  [20-28] 25  BP: ()/(50-74) 117/74  Flow (L/min):  [2] 2   Body mass index is 23.79 kg/m².    Physical Exam  Physical Exam  HENT:      Head: Atraumatic.      Mouth/Throat:      Mouth: Mucous membranes are moist.   Eyes:      Pupils: Pupils are equal, round, and reactive to light.   Cardiovascular:      Rate and Rhythm: Normal rate and regular rhythm.      Pulses: Normal pulses.      Heart sounds: Normal heart sounds.   Pulmonary:      Effort: Pulmonary effort is normal.      Breath sounds: Normal breath sounds.   Abdominal:      Palpations: Abdomen is soft.   Musculoskeletal:         General: No swelling. Normal range of motion.      Cervical back: Normal range of motion.   Skin:     General: Skin is warm.   Neurological:      General: No focal deficit present.      Mental Status: He is alert and oriented to person, place, and time.   Psychiatric:         Mood and Affect: Mood normal.         Scheduled Meds   aspirin, 81  mg, Oral, Daily  atorvastatin, 10 mg, Oral, Nightly  clopidogrel, 75 mg, Oral, Daily  sodium chloride, 10 mL, Intravenous, Q12H       PRN Meds     acetaminophen **OR** acetaminophen    senna-docusate sodium **AND** polyethylene glycol **AND** bisacodyl **AND** bisacodyl    influenza vaccine    ondansetron **OR** ondansetron    sodium chloride    sodium chloride   Infusions  heparin, 12 Units/kg/hr, Last Rate: 10 Units/kg/hr (12/03/23 1427)  sodium chloride, 75 mL/hr, Last Rate: 75 mL/hr (12/03/23 0612)          Diagnostic Data    Results from last 7 days   Lab Units 12/03/23  0657 12/01/23  2302 12/01/23  1613   WBC 10*3/mm3 12.40*   < > 9.50   HEMOGLOBIN g/dL 10.9*   < > 12.6*   HEMATOCRIT % 32.3*   < > 36.9*   PLATELETS 10*3/mm3 148   < > 197   GLUCOSE mg/dL 105*   < > 124*   CREATININE mg/dL 1.01   < > 1.02   BUN mg/dL 17   < > 16   SODIUM mmol/L 133*   < > 130*   POTASSIUM mmol/L 4.2   < > 4.0   AST (SGOT) U/L  --   --  21   ALT (SGPT) U/L  --   --  17   ALK PHOS U/L  --   --  83   BILIRUBIN mg/dL  --   --  0.8   ANION GAP mmol/L 8.0   < > 12.0    < > = values in this interval not displayed.       XR Chest 1 View    Result Date: 12/2/2023  Impression: 1.No acute radiographic abnormality is identified. 2.Curved lead or catheter projects over the right atrium, possibly external to the patient. Please correlate clinically. Repeat radiograph after clearing the patient of external wires may be considered if indicated. Electronically Signed: Gordon Lagos MD  12/2/2023 10:37 AM EST  Workstation ID: [id number]       I reviewed the patient's new clinical results.    Assessment/Plan:     Active and Resolved Problems  STEMI  Heart  block with bradycardia  Status post PCI to R[facility]  Hx of  CABG  -Status post emergent PCI then placement of temporary pacemaker which was discontinued  - On heparin drip was not given Integrilin because of history  - Staged procedure to come in 2 to 3 days per cardiology  - Cardiology team  "following the patient  -Echocardiogram \"EF of 6 to 40% right adrenal dilatation  Continue aspirin and Plavix    Paroxysmal  A-fib  -Stable sinus rhythm with bradycardic, beta-blockers were put on hold    History of hypertension  -currently blood pressure stable  -Required dopamine drip previously which was discontinued    Hyperlipidemia   continue statin    Hyponatremia mild  -Continue gentle IV fluid hydration    Leukocytosis  - Likely reactive we will continue to monitor  -Urinalysis  - Chest x-ray unremarkable    PT /OT        DVT prophylaxis:  Medical and mechanical DVT prophylaxis orders are present.     Code status is   Code Status and Medical Interventions:   Ordered at: 12/01/23 1826     Level Of Support Discussed With:    Patient     Code Status (Patient has no pulse and is not breathing):    CPR (Attempt to Resuscitate)     Medical Interventions (Patient has pulse or is breathing):    Full Support       Plan for disposition:SNF vs Home in 3-4 days, requires staged PCI    Time: 33 minutes        Signature: Electronically signed by Sacha Xiong MD, 12/03/23, 15:39 EST.  [facility] Hospitalist Team   "

## 2023-12-03 NOTE — PROGRESS NOTES
Critical Care Progress Note     Sage Flores : 1930 MRN:[id number] LOS:2  Rm: 2213/1     Principal Problem: Acute ST elevation myocardial infarction (STEMI) involving right coronary artery     Reason for follow up: All the medical problems listed below    Summary     A 89 y.o. old male patient with PMH of CAD, atrial fibrillation, presents to the hospital with complaints of chest pain and syncopal episode which started an hour prior to admission to ER. EKG showed evidence of acute inferior posterior MI with heart block. Patient was taken to Cath lab emergently. Patient was hypotensive with mental status changes. He was started on Dopamine prior to cathlab and a TVP was placed in cath lab. Patient underwent successful senting to [facility]. Plans to go back to cathlab in the next 2-3 day.      Significant Events     23 : The patient denies chest pain overnight.  He denies shortness of air but states he has started coughing up some phlegm.  He remains on O2 at 4 L.  Sitting on the side of bed eating breakfast.  Hemodynamically stable off pressors.  Transvenous pacer was discontinued, having asymptomatic bradycardia in the 50s.  Stable for transfer out of the ICU.    Assessment / Plan     STEMI / Complete heart block with bradycardia  MV CAD, H/o CABG  ST elevation in inferior leads  Patient underwent emergent PCI followed by temporary pacemaker placement--TVP discontinued  PCI to R[facility] -- Continuing heparin due to slow flow post stent placement, NO INTEGRILIN due to age  No current plans to return to Cath Lab this admission  Heparin drip continued per cardiology  Echo 2023: EF 36 to 40%, left atrial cavity moderately dilated     CAD  Hx of CABG   Patient currently on aspirin, plavix, heparin  Low-dose statin added per cardiology     Atrial fibrillation  Patient on aspirin at home, no full coagulation   Not on chronic rate or rhythm control       Disposition:  plan for home at discharge.  PT/OT evaluation  pending for recommendations    Code status:   Level Of Support Discussed With: Patient  Code Status (Patient has no pulse and is not breathing): CPR (Attempt to Resuscitate)  Medical Interventions (Patient has pulse or is breathing): Full Support       Nutrition:   Diet: Regular/House Diet; Texture: Regular Texture (IDDSI 7); Fluid Consistency: Thin (IDDSI 0)   Patient isn't on Tube Feeding     DVT prophylaxis:  Medical and mechanical DVT prophylaxis orders are present.     Subjective / Review of systems     Review of Systems   Respiratory:  Positive for cough. Negative for shortness of breath.    Cardiovascular:  Negative for chest pain and palpitations.   Gastrointestinal:  Negative for abdominal pain, nausea and vomiting.        Objective / Physical Exam     Vital signs:  Temp: 97.6 °F (36.4 °C)  BP: 117/74  Heart Rate: 57  Resp: 25  SpO2: 99 %  Weight: 75.2 kg (165 lb 12.6 oz)    Admission Weight: Weight: 72.6 kg (160 lb)  Current Weight: Weight: 75.2 kg (165 lb 12.6 oz)    Input/Output in last 24 hours:    Intake/Output Summary (Last 24 hours) at 12/3/2023 1224  Last data filed at 12/3/2023 0600  Gross per 24 hour   Intake 2233 ml   Output 400 ml   Net 1833 ml      Net IO Since Admission: 928 mL [12/03/23 1224]     Physical Exam  Constitutional:       General: He is not in acute distress.     Appearance: Normal appearance.   HENT:      Head: Normocephalic and atraumatic.   Eyes:      Conjunctiva/sclera: Conjunctivae normal.      Pupils: Pupils are equal, round, and reactive to light.   Cardiovascular:      Heart sounds: Normal heart sounds, S1 normal and S2 normal. No murmur heard.     Comments: Irregularly irregular, afib with slow ventricular rate  Pulmonary:      Breath sounds: Normal breath sounds. No wheezing or rhonchi.   Abdominal:      General: Bowel sounds are normal. There is no distension.      Palpations: Abdomen is soft.      Tenderness: There is no abdominal tenderness.   Musculoskeletal:       Right lower leg: Edema present.      Left lower leg: Edema present.      Comments: Right femoral sheath site--no hematoma or ecchymosis   BLE with chronic stasis changes   Neurological:      General: No focal deficit present.      Mental Status: He is alert and oriented to person, place, and time.          Radiology and Labs     Results from last 7 days   Lab Units 12/03/23  0657 12/02/23  0644 12/01/23  2302 12/01/23  1613   WBC 10*3/mm3 12.40* 11.90* 13.60* 9.50   HEMOGLOBIN g/dL 10.9* 11.6* 11.4* 12.6*   HEMATOCRIT % 32.3* 34.1* 34.6* 36.9*   PLATELETS 10*3/mm3 148 184 189 197      Results from last 7 days   Lab Units 12/03/23  0657 12/03/23  0012 12/02/23 0644 12/01/23  2302 12/01/23  1613   PROTIME Seconds  --   --   --   --  11.7   INR   --   --   --   --  1.08   APTT seconds 61.5 81.2* 90.0* 49.4* 25.5      Results from last 7 days   Lab Units 12/03/23  0657 12/02/23  0644 12/01/23  2302 12/01/23  1613   SODIUM mmol/L 133* 132* 130* 130*   POTASSIUM mmol/L 4.2 4.5 4.5 4.0   CHLORIDE mmol/L 103 101 100 97*   CO2 mmol/L 22.0 21.0* 19.0* 21.0*   BUN mg/dL 17 15 14 16   CREATININE mg/dL 1.01 1.04 1.10 1.02   GLUCOSE mg/dL 105* 120* 129* 124*      Results from last 7 days   Lab Units 12/01/23  1613   ALK PHOS U/L 83   AST (SGOT) U/L 21   ALT (SGPT) U/L 17             Current medications     Scheduled Meds:   aspirin, 81 mg, Oral, Daily  atorvastatin, 10 mg, Oral, Nightly  clopidogrel, 75 mg, Oral, Daily  sodium chloride, 10 mL, Intravenous, Q12H        Continuous Infusions:   DOPamine, 2-20 mcg/kg/min, Last Rate: Stopped (12/03/23 0658)  heparin, 12 Units/kg/hr, Last Rate: 9 Units/kg/hr (12/03/23 0039)  sodium chloride, 75 mL/hr, Last Rate: 75 mL/hr (12/03/23 0612)          Plan discussed with RN. Reviewed all other data in the last 24 hours, including but not limited to vitals, labs, microbiology, imaging and pertinent notes from other providers.       Crystal Andrea, APRN   Critical Care  12/03/23   12:24 EST

## 2023-12-04 LAB
ANION GAP SERPL CALCULATED.3IONS-SCNC: 12 MMOL/L (ref 5–15)
APTT PPP: 50.5 SECONDS (ref 61–76.5)
APTT PPP: 62.7 SECONDS (ref 61–76.5)
APTT PPP: 63.1 SECONDS (ref 61–76.5)
APTT PPP: 75.5 SECONDS (ref 61–76.5)
BASOPHILS # BLD AUTO: 0.1 10*3/MM3 (ref 0–0.2)
BASOPHILS NFR BLD AUTO: 0.4 % (ref 0–1.5)
BUN SERPL-MCNC: 21 MG/DL (ref 8–23)
BUN/CREAT SERPL: 23.6 (ref 7–25)
CALCIUM SPEC-SCNC: 8.3 MG/DL (ref 8.2–9.6)
CHLORIDE SERPL-SCNC: 104 MMOL/L (ref 98–107)
CO2 SERPL-SCNC: 19 MMOL/L (ref 22–29)
CREAT SERPL-MCNC: 0.89 MG/DL (ref 0.76–1.27)
DEPRECATED RDW RBC AUTO: 51.2 FL (ref 37–54)
EGFRCR SERPLBLD CKD-EPI 2021: 79.9 ML/MIN/1.73
EOSINOPHIL # BLD AUTO: 0 10*3/MM3 (ref 0–0.4)
EOSINOPHIL NFR BLD AUTO: 0.2 % (ref 0.3–6.2)
ERYTHROCYTE [DISTWIDTH] IN BLOOD BY AUTOMATED COUNT: 14.7 % (ref 12.3–15.4)
GEN 5 2HR TROPONIN T REFLEX: 3951 NG/L
GLUCOSE SERPL-MCNC: 112 MG/DL (ref 65–99)
HCT VFR BLD AUTO: 32.9 % (ref 37.5–51)
HGB BLD-MCNC: 11.2 G/DL (ref 13–17.7)
LYMPHOCYTES # BLD AUTO: 1.2 10*3/MM3 (ref 0.7–3.1)
LYMPHOCYTES NFR BLD AUTO: 8.1 % (ref 19.6–45.3)
MCH RBC QN AUTO: 32.4 PG (ref 26.6–33)
MCHC RBC AUTO-ENTMCNC: 34.1 G/DL (ref 31.5–35.7)
MCV RBC AUTO: 95 FL (ref 79–97)
MONOCYTES # BLD AUTO: 1.4 10*3/MM3 (ref 0.1–0.9)
MONOCYTES NFR BLD AUTO: 9.7 % (ref 5–12)
NEUTROPHILS NFR BLD AUTO: 11.8 10*3/MM3 (ref 1.7–7)
NEUTROPHILS NFR BLD AUTO: 81.6 % (ref 42.7–76)
NRBC BLD AUTO-RTO: 0 /100 WBC (ref 0–0.2)
PLATELET # BLD AUTO: 170 10*3/MM3 (ref 140–450)
PMV BLD AUTO: 10.5 FL (ref 6–12)
POTASSIUM SERPL-SCNC: 4.1 MMOL/L (ref 3.5–5.2)
RBC # BLD AUTO: 3.47 10*6/MM3 (ref 4.14–5.8)
SODIUM SERPL-SCNC: 135 MMOL/L (ref 136–145)
TROPONIN T DELTA: -23 NG/L
TROPONIN T SERPL HS-MCNC: 3974 NG/L
WBC NRBC COR # BLD AUTO: 14.5 10*3/MM3 (ref 3.4–10.8)

## 2023-12-04 PROCEDURE — 92610 EVALUATE SWALLOWING FUNCTION: CPT

## 2023-12-04 PROCEDURE — 85025 COMPLETE CBC W/AUTO DIFF WBC: CPT | Performed by: NURSE PRACTITIONER

## 2023-12-04 PROCEDURE — 85730 THROMBOPLASTIN TIME PARTIAL: CPT | Performed by: INTERNAL MEDICINE

## 2023-12-04 PROCEDURE — 93005 ELECTROCARDIOGRAM TRACING: CPT | Performed by: INTERNAL MEDICINE

## 2023-12-04 PROCEDURE — 25010000002 ONDANSETRON PER 1 MG

## 2023-12-04 PROCEDURE — 85730 THROMBOPLASTIN TIME PARTIAL: CPT | Performed by: STUDENT IN AN ORGANIZED HEALTH CARE EDUCATION/TRAINING PROGRAM

## 2023-12-04 PROCEDURE — 93010 ELECTROCARDIOGRAM REPORT: CPT | Performed by: INTERNAL MEDICINE

## 2023-12-04 PROCEDURE — 25810000003 SODIUM CHLORIDE 0.9 % SOLUTION: Performed by: INTERNAL MEDICINE

## 2023-12-04 PROCEDURE — 97166 OT EVAL MOD COMPLEX 45 MIN: CPT

## 2023-12-04 PROCEDURE — 80048 BASIC METABOLIC PNL TOTAL CA: CPT | Performed by: NURSE PRACTITIONER

## 2023-12-04 PROCEDURE — 97162 PT EVAL MOD COMPLEX 30 MIN: CPT

## 2023-12-04 PROCEDURE — 84484 ASSAY OF TROPONIN QUANT: CPT | Performed by: INTERNAL MEDICINE

## 2023-12-04 PROCEDURE — 99233 SBSQ HOSP IP/OBS HIGH 50: CPT | Performed by: INTERNAL MEDICINE

## 2023-12-04 RX ORDER — OXYMETAZOLINE HYDROCHLORIDE 0.05 G/100ML
2 SPRAY NASAL 2 TIMES DAILY
Status: DISPENSED | OUTPATIENT
Start: 2023-12-04 | End: 2023-12-07

## 2023-12-04 RX ADMIN — ASPIRIN 81 MG CHEWABLE TABLET 81 MG: 81 TABLET CHEWABLE at 08:22

## 2023-12-04 RX ADMIN — Medication 10 ML: at 08:22

## 2023-12-04 RX ADMIN — ATORVASTATIN CALCIUM 10 MG: 10 TABLET, FILM COATED ORAL at 21:00

## 2023-12-04 RX ADMIN — NASAL DECONGESTANT 2 SPRAY: 0.05 SPRAY NASAL at 12:44

## 2023-12-04 RX ADMIN — SODIUM CHLORIDE 75 ML/HR: 9 INJECTION, SOLUTION INTRAVENOUS at 15:01

## 2023-12-04 RX ADMIN — CARVEDILOL 3.12 MG: 3.12 TABLET, FILM COATED ORAL at 08:22

## 2023-12-04 RX ADMIN — Medication 10 ML: at 21:00

## 2023-12-04 RX ADMIN — CARVEDILOL 3.12 MG: 3.12 TABLET, FILM COATED ORAL at 17:11

## 2023-12-04 RX ADMIN — NASAL DECONGESTANT 2 SPRAY: 0.05 SPRAY NASAL at 21:00

## 2023-12-04 RX ADMIN — GUAIFENESIN 200 MG: 200 SOLUTION ORAL at 12:44

## 2023-12-04 RX ADMIN — ONDANSETRON 4 MG: 2 INJECTION INTRAMUSCULAR; INTRAVENOUS at 14:57

## 2023-12-04 RX ADMIN — CLOPIDOGREL BISULFATE 75 MG: 75 TABLET ORAL at 08:22

## 2023-12-04 NOTE — DISCHARGE PLACEMENT REQUEST
"Vikki Flores \"Tristan\" (89 y.o. Male)       Date of Birth   09/07/1930    Social Security Number       Address   [address] [place] [address]    Home Phone   [phone]    MRN   [id number]       Latter-day   Other    Marital Status                               Admission Date   12/1/23    Admission Type   Emergency    Admitting Provider       Attending Provider   Sacha Xiong MD    Department, Room/Bed   [facility] CARDIOVASCULAR CARE UNIT, 2213/1       Discharge Date       Discharge Disposition       Discharge Destination                                 Attending Provider: Sacha Xiong MD    Allergies: Iodine, Levofloxacin, Nitroglycerin, Paroxetine, Penicillin G, Prednisone, Sucralfate, Diltiazem Hcl, Metoprolol, Pramipexole Dihydrochloride, Ropinirole Hcl    Isolation: None   Infection: None   Code Status: CPR    Ht: 177.8 cm (70\")   Wt: 74.8 kg (164 lb 14.5 oz)    Admission Cmt: None   Principal Problem: Acute ST elevation myocardial infarction (STEMI) involving right coronary artery [I21.11]                   Active Insurance as of 12/1/2023       Primary Coverage       Payor Plan Insurance Group Employer/Plan Group    MEDICARE MEDICARE A & B        Payor Plan Address Payor Plan Phone Number Payor Plan Fax Number Effective Dates    [address] [phone]  9/1/1995 - None Entered    formerly [facility] [id number]         Subscriber Name Subscriber Birth Date Member ID       VIKKI FLORES 9/7/1930 [id number]               Secondary Coverage       Payor Plan Insurance Group Employer/Plan Group    BANKERS FIDELITY BANKERS FIDELITY PLAN F       Payor Plan Address Payor Plan Phone Number Payor Plan Fax Number Effective Dates    [address] [phone]  2/1/2012 - None Entered    [place] [address]         Subscriber Name Subscriber Birth Date Member ID       VIKKI FLORES 9/7/1930 [id number]                     Emergency Contacts        (Rel.) Home Phone Work Phone Mobile " Phone    TODD BAIN -- -- [phone]    GREGORIA FLORES (Son) [phone] -- --                 History & Physical        Umer Drew DO at 23 1820              Critical Care H&P   Sage Flores : 1930 MRN:[id number] LOS:0 ROOM: [facility]      Reason for admission: Acute ST elevation myocardial infarction (STEMI) involving right coronary artery      Assessment / Plan      STEMI / Complete heart block with bradycardia  ST elevation in inferior leads  Patient underwent emergent PCI followed by temporary pacemaker placement  PCI to RCA -- Continuing heparin due to slow flow post stent placement, NO INTEGRILIN due to age, PLAN TO GO BACK TO CATHLAB IN 2-3 DAYS  Echo pending in AM     CAD  Hx of CABG   Patient currently on aspirin, plavix, heparin  Patient now on statin at home, patient greater than 75     Atrial fibrillation  Patient on aspirin at home, no full coagulation      Level Of Support Discussed With: Patient  Code Status (Patient has no pulse and is not breathing): CPR (Attempt to Resuscitate)  Medical Interventions (Patient has pulse or is breathing): Full Support        Nutrition: Diet: Liquid Diets; Clear Liquid; Fluid Consistency: Thin (IDDSI 0) Patient isn't on Tube Feeding      DVT prophylaxis:  Mechanical DVT prophylaxis orders are present.      History of Present illness      A 89 y.o. old male patient with PMH of CAD, atrial fibrillation, presents to the hospital with complaints of chest pain and syncopal episode which started an hour prior to admission to ER. EKG showed evidence of acute inferior posterior MI with heart block. Patient was taken to Cathlab emergently. Patient was hypotensive with mental status changes. He was started on Dopamine prior to cathlab and a TVP was placed in cathlab. Patient underwent successful senting to [facility]. Plans to go back to cathlab in the next 2-3 day.      ACP: CPR, Full Interventions.Patients son is listed as NOK to make decisions for the  "patient in the event he is unable.      Patient was seen and examined on 23 at 18:28 EST .     Subjective / Review of systems      Review of Systems   Constitutional:  Negative for chills and fever.   Respiratory:  Negative for chest tightness and shortness of breath.    Cardiovascular:  Positive for chest pain (States \"It hurts when i push on it.\").   Gastrointestinal:  Negative for abdominal pain, nausea and vomiting.   Genitourinary:  Negative for difficulty urinating.   Musculoskeletal:  Negative for arthralgias and myalgias.   Neurological:  Positive for syncope. Negative for dizziness and light-headedness.         Past Medical/Surgical/Social/Family History & Allergies      Medical History        Past Medical History:   Diagnosis Date    Anxiety 2014    Atrial fibrillation      Benign prostatic hyperplasia      CAD (coronary artery disease)      Hyperlipidemia      Hypertension      Myocardial infarction           Surgical History         Past Surgical History:   Procedure Laterality Date    CARDIAC SURGERY        HERNIA REPAIR             Social History   Social History            Socioeconomic History    Marital status:    Tobacco Use    Smoking status: Former       Types: Cigarettes       Quit date: 1970       Years since quittin.2    Smokeless tobacco: Never    Tobacco comments:       more than 50yrs   Vaping Use    Vaping Use: Never used   Substance and Sexual Activity    Alcohol use: No    Drug use: No    Sexual activity: Not Currently               Family History   Problem Relation Age of Onset    Heart disease Mother      Heart disease Father              Allergies   Allergen Reactions    Iodine Unknown (See Comments)       Pt unsure of reaction       Levofloxacin Unknown (See Comments)    Nitroglycerin Unknown (See Comments) and Other (See Comments)    Paroxetine Unknown (See Comments)    Penicillin G Unknown (See Comments)    Prednisone Unknown (See Comments)    Sucralfate " Unknown (See Comments)    Diltiazem Hcl Hives    Metoprolol Other (See Comments)       Lowers heart rate     Pramipexole Dihydrochloride Unknown (See Comments)    Ropinirole Hcl Other (See Comments)         Home Medications              Prior to Admission medications    Medication Sig Start Date End Date Taking? Authorizing Provider   aspirin 81 MG chewable tablet Chew 1 tablet Daily.       ProviderCarlos MD   azelastine (ASTEPRO) 0.15 % solution nasal spray USE 2 SPRAYS IN EACH NOSTRIL TWICE DAILY AS DIRECTED BY PROVIDER  Patient taking differently: 2 sprays into the nostril(s) as directed by provider 2 (Two) Times a Day. 10/27/20     Ashley Cano MD   diazePAM (VALIUM) 5 MG tablet TAKE 1 TABLET BY MOUTH TWICE DAILY  Patient taking differently: 1 (One) Time As Needed. 10/6/20     Ashley Cano MD   docusate sodium (COLACE) 250 MG capsule Take 1 capsule by mouth Daily.       ProviderCarlos MD   gabapentin (NEURONTIN) 100 MG capsule TAKE 1 CAPSULE BY MOUTH TWICE DAILY 11/17/22     Ashley Cano MD   losartan (COZAAR) 50 MG tablet TAKE 1 TABLET BY MOUTH DAILY 5/30/23     Ashley Cano MD   omeprazole (priLOSEC) 20 MG capsule Take 1 capsule by mouth Daily. 9/7/22     Ashley Cano MD   tamsulosin (FLOMAX) 0.4 MG capsule 24 hr capsule TAKE 1 CAPSULE BY MOUTH DAILY 11/19/23     Tricia Aldana, APRN         Objective / Physical Exam      Vital signs:  Temp: 97.8 °F (36.6 °C)  BP: 123/65  Heart Rate: 66  Resp: 14  SpO2: 94 %  Weight: 72.6 kg (160 lb)     Admission Weight: Weight: 72.6 kg (160 lb)     Physical Exam  Vitals and nursing note reviewed.   Constitutional:       General: He is not in acute distress.     Appearance: He is ill-appearing.   HENT:      Head: Normocephalic.      Mouth/Throat:      Mouth: Mucous membranes are dry.      Pharynx: Oropharynx is clear.   Eyes:      Extraocular Movements: Extraocular movements intact.      Conjunctiva/sclera:  Conjunctivae normal.      Pupils: Pupils are equal, round, and reactive to light.   Cardiovascular:      Rate and Rhythm: Regular rhythm. Tachycardia present.      Pulses: Normal pulses.      Heart sounds: Normal heart sounds.   Pulmonary:      Effort: Pulmonary effort is normal.      Breath sounds: Normal breath sounds.   Abdominal:      General: Bowel sounds are normal.      Palpations: Abdomen is soft.   Musculoskeletal:      Right lower leg: No edema.      Left lower leg: No edema.   Skin:     General: Skin is warm and dry.      Findings: No rash.   Neurological:      Mental Status: He is alert and oriented to person, place, and time.   Psychiatric:         Mood and Affect: Mood normal.         Behavior: Behavior normal.            Labs           Results from last 7 days   Lab Units 12/01/23  1613   WBC 10*3/mm3 9.50   HEMATOCRIT % 36.9*   PLATELETS 10*3/mm3 197           Results from last 7 days   Lab Units 12/01/23  1613   SODIUM mmol/L 130*   POTASSIUM mmol/L 4.0   CHLORIDE mmol/L 97*   CO2 mmol/L 21.0*   BUN mg/dL 16   CREATININE mg/dL 1.02         Imaging      No radiology results for the last day      Current Medications      Scheduled Meds:  [START ON 12/2/2023] aspirin, 81 mg, Oral, Daily  [START ON 12/2/2023] clopidogrel, 75 mg, Oral, Daily  lidocaine PF 1%, , ,   sodium chloride, 10 mL, Intravenous, Q12H           Continuous Infusions:  DOPamine, 2-20 mcg/kg/min, Last Rate: 10 mcg/kg/min (12/01/23 1718)  sodium chloride, 75 mL/hr                Electronically signed by Umer Drew DO at 12/02/23 6218     RAY Eddy RN  SIPS/ICU   O: [phone]  C: [phone]  [email or web address]

## 2023-12-04 NOTE — PLAN OF CARE
Goal Outcome Evaluation:  Plan of Care Reviewed With: patient           Outcome Evaluation: A 89 y.o. old male patient with PMH of CAD, atrial fibrillation, presents to the hospital with complaints of chest pain and syncopal episode which started an hour prior to admission to ER. EKG showed evidence of acute inferior posterior MI with heart block. Patient was taken to Cathlab emergently. Patient underwent successful senting to [facility] with plan to return to cath lab early this week. At baseline, pt lives alone and is IND with ADLs and IADLs, reporting sponge baths. He uses a rollator and reports no falls recently. He does not drive. Family checks in frequently. This date, pt oriented x4. He requires Mod A for supine to sit EOB, then Max A for lower body dressing sitting at EOB. Min Ax2 required to come to standing, with weight displaced through the heels, requiring assistance to bring weight forward. Mod Ax2 for ambulation a short distance prior to sitting in chair. OT feels pt is far from baseline, and unsafe to return home alone. OT recommends SNF at [place], with possible transition to intermediate for pt safety. Will follow.      Anticipated Discharge Disposition (OT): skilled nursing facility

## 2023-12-04 NOTE — THERAPY EVALUATION
Acute Care - Speech Language Pathology   Swallow Initial Evaluation  Reji     Patient Name: Sage Flores  : 1930  MRN: [id number]  Today's Date: 2023               Admit Date: 2023    Visit Dx:     ICD-10-CM ICD-9-CM   1. ST elevation myocardial infarction (STEMI), unspecified artery  I21.3 410.90   2. Heart block AV third degree  I44.2 426.0     Patient Active Problem List   Diagnosis    Allergic rhinitis    Atrial fibrillation    Claudication    Coronary heart disease    Edema    Extremity pain    History of left heart catheterization (LHC)    Hyperlipidemia    Essential hypertension    Inguinal hernia, right    Laceration    Lower extremity edema    Myocardial infarction    Need for other prophylactic vaccination and inoculation against single diseases    Presence of other cardiac implants and grafts    Status post coronary artery bypass graft    Status post percutaneous transluminal coronary angioplasty    Viral URI    Rib pain on left side    Dizziness    Acute cystitis without hematuria    Status post placement of implantable loop recorder    Lab test negative for COVID-19 virus    Cellulitis of left leg    Iron deficiency anemia    Encounter for support and coordination of transition of care    Hematoma of right lower extremity    Encounter for screening for malignant neoplasm of prostate     Medicare annual wellness visit, subsequent    Vitamin D deficiency    Hyponatremia    Gastroesophageal reflux disease without esophagitis    Acute ST elevation myocardial infarction (STEMI) involving right coronary artery     Past Medical History:   Diagnosis Date    Anxiety 2014    Atrial fibrillation     Benign prostatic hyperplasia     CAD (coronary artery disease)     Hyperlipidemia     Hypertension     Myocardial infarction      Past Surgical History:   Procedure Laterality Date    CARDIAC CATHETERIZATION N/A 2023    Procedure: Left Heart Cath;  Surgeon: Son العلي MD;  Location:   [facility];  Service: Cardiovascular;  Laterality: N/A;    CARDIAC CATHETERIZATION N/A 12/1/2023    Procedure: Coronary angiography;  Surgeon: Son العلي MD;  Location: [facility] CATH INVASIVE LOCATION;  Service: Cardiovascular;  Laterality: N/A;    CARDIAC CATHETERIZATION N/A 12/1/2023    Procedure: Percutaneous Coronary Intervention;  Surgeon: Son العلي MD;  Location: [facility] CATH INVASIVE LOCATION;  Service: Cardiovascular;  Laterality: N/A;    CARDIAC ELECTROPHYSIOLOGY PROCEDURE N/A 12/1/2023    Procedure: Temporary Pacemaker;  Surgeon: Son العلي MD;  Location: [facility] CATH INVASIVE LOCATION;  Service: Cardiovascular;  Laterality: N/A;    CARDIAC SURGERY      HERNIA REPAIR         SLP Recommendation and Plan  SLP Swallowing Diagnosis: mild, oral dysphagia, functional pharyngeal phase (12/04/23 1400)  SLP Diet Recommendation: mechanical ground textures, thin liquids (12/04/23 1400)  Recommended Precautions and Strategies: upright posture during/after eating, small bites of food and sips of liquid, general aspiration precautions (12/04/23 1400)  SLP Rec. for Method of Medication Administration: as tolerated (12/04/23 1400)     Monitor for Signs of Aspiration: yes, notify SLP if any concerns (12/04/23 1400)  Recommended Diagnostics: reassess via clinical swallow evaluation (12/04/23 1400)  Swallow Criteria for Skilled Therapeutic Interventions Met: demonstrates skilled criteria (12/04/23 1400)  Anticipated Discharge Disposition (SLP): unknown (12/04/23 1400)  Rehab Potential/Prognosis, Swallowing: good, to achieve stated therapy goals (12/04/23 1400)  Therapy Frequency (Swallow): PRN (12/04/23 1400)  Predicted Duration Therapy Intervention (Days): until discharge (12/04/23 1400)  Oral Care Recommendations: Oral Care before breakfast, after meals and PRN (12/04/23 1400)                                      Oral Care Recommendations: Oral Care before breakfast, after meals and PRN (12/04/23  1400)           SWALLOW EVALUATION (last 72 hours)       SLP Adult Swallow Evaluation       Row Name 12/04/23 1400       Rehab Evaluation    Document Type evaluation  -EC    Subjective Information complains of  decreased appetite  -EC    Patient Observations alert;cooperative;agree to therapy  -EC    Patient Effort good  -EC    Symptoms Noted During/After Treatment none  -EC       General Information    Patient Profile Reviewed yes  -EC    Pertinent History Of Current Problem A 89 y.o. old male patient with PMH of CAD, atrial fibrillation, presents to the hospital with complaints of chest pain and syncopal episode which started an hour prior to admission to ER. EKG showed evidence of acute inferior posterior MI with heart block. Patient was taken to Cathlab emergently. Patient underwent successful senting to [facility] with plan to return to cath lab early this week. At baseline, pt lives alone and is IND with ADLs and IADLs.  -EC    Current Method of Nutrition regular textures;thin liquids  -EC    Precautions/Limitations, Vision WFL with corrective lenses;for purposes of eval  -EC    Precautions/Limitations, Hearing WFL;for purposes of eval  -EC    Prior Level of Function-Communication WFL  -EC    Prior Level of Function-Swallowing regular textures;thin liquids  -EC    Plans/Goals Discussed with patient  -EC       Pain Scale: Numbers Pre/Post-Treatment    Pretreatment Pain Rating 0/10 - no pain  -EC    Posttreatment Pain Rating 0/10 - no pain  -EC       Oral Motor Structure and Function    Dentition Assessment upper dentures/partial in place;lower dentures/partial in place  -EC    Secretion Management WNL/WFL  -EC    Mucosal Quality moist, healthy  -EC       Oral Musculature and Cranial Nerve Assessment    Oral Motor General Assessment WFL  -EC       General Eating/Swallowing Observations    Respiratory Support Currently in Use room air  -EC    Eating/Swallowing Skills self-fed;fed by SLP;appropriate self-feeding skills  "observed  -EC    Positioning During Eating upright 90 degree;upright in chair  -EC    Utensils Used spoon;straw  -EC    Consistencies Trialed chopped;mechanical ground textures;mixed consistency;pureed;thin liquids  -EC       Respiratory    Respiratory Status WFL;during swallowing/eating  -EC       Clinical Swallow Eval    Clinical Swallow Evaluation Summary Bedside swallow evaluation completed this date. Pt awake, alert and sitting up in chair upon entry. Pt reports difficulty swallowing yesterday though attributes it to \"drainage,\" and is also reporting a poor appetite from being in the hospital. Pt reports consuming soft foods at baseline w/thin liquids. Pt denies hx of dysphagia. Upper and lower dentures in place and OME is WFL. Pt consumes trials of thin water via straw, puree applesauce and mechanical soft mixed consistency peaches for evaluation. Pt declines peanut butter crackers stating they are too hard. Mastication prolonged but functional for peaches. Pt adequately clears oral cavity between bites. Oral transit WFL for puree and thin liquids. No overt clinical s/s of aspiration are demonstrated at anytime. Recommend initiation of a mechanical ground diet w/thin liquids. ST to continue to follow for diet tolerance w/further recommendations as indicated.  -EC       SLP Evaluation Clinical Impression    SLP Swallowing Diagnosis mild;oral dysphagia;functional pharyngeal phase  -EC    Functional Impact risk of malnutrition;risk of aspiration/pneumonia  -EC    Rehab Potential/Prognosis, Swallowing good, to achieve stated therapy goals  -EC    Swallow Criteria for Skilled Therapeutic Interventions Met demonstrates skilled criteria  -EC       SLP Treatment Clinical Impressions    Care Plan Review evaluation/treatment results reviewed;patient/other agree to care plan  -EC       Recommendations    Therapy Frequency (Swallow) PRN  -EC    Predicted Duration Therapy Intervention (Days) until discharge  -EC    SLP " Diet Recommendation mechanical ground textures;thin liquids  -EC    Recommended Diagnostics reassess via clinical swallow evaluation  -EC    Recommended Precautions and Strategies upright posture during/after eating;small bites of food and sips of liquid;general aspiration precautions  -EC    Oral Care Recommendations Oral Care before breakfast, after meals and PRN  -EC    SLP Rec. for Method of Medication Administration as tolerated  -EC    Monitor for Signs of Aspiration yes;notify SLP if any concerns  -EC    Anticipated Discharge Disposition (SLP) unknown  -EC       Swallow Goals (SLP)    Swallow LTGs Swallow Long Term Goal (free text)  -EC    Swallow STGs diet tolerance goal selection (SLP)  -EC    Diet Tolerance Goal Selection (SLP) Swallow Short Term Goal 1  -EC       (LTG) Swallow    (LTG) Swallow Pt will demonstrate acceptance of least restrictive PO diet with use of safe swallow strategies and no s/s of aspiration  -EC    Vega (Swallow Long Term Goal) with minimal cues (75-90% accuracy)  -EC    Time Frame (Swallow Long Term Goal) by discharge  -EC    Progress/Outcomes (Swallow Long Term Goal) new goal  -EC       (STG) Swallow 1    (STG) Swallow 1 The patient will participate in a follow up assessment to determine safety and adequacy of recommended diet, independent use of safe swallow compensations, and additional goals/recommendations to follow  -EC    Vega (Swallow Short Term Goal 1) with minimal cues (75-90% accuracy)  -EC    Time Frame (Swallow Short Term Goal 1) 1 week  -EC    Progress/Outcomes (Swallow Short Term Goal 1) new goal  -EC              User Key  (r) = Recorded By, (t) = Taken By, (c) = Cosigned By      Initials Name Effective Dates    EC Richa Monahan 06/16/21 -                     EDUCATION  The patient has been educated in the following areas:   Dysphagia (Swallowing Impairment).        SLP GOALS       Row Name 12/04/23 1400             (LTG) Swallow    (LTG) Swallow  Pt will demonstrate acceptance of least restrictive PO diet with use of safe swallow strategies and no s/s of aspiration  -EC      [place] (Swallow Long Term Goal) with minimal cues (75-90% accuracy)  -EC      Time Frame (Swallow Long Term Goal) by discharge  -EC      Progress/Outcomes (Swallow Long Term Goal) new goal  -EC         (STG) Swallow 1    (STG) Swallow 1 The patient will participate in a follow up assessment to determine safety and adequacy of recommended diet, independent use of safe swallow compensations, and additional goals/recommendations to follow  -EC      [place] (Swallow Short Term Goal 1) with minimal cues (75-90% accuracy)  -EC      Time Frame (Swallow Short Term Goal 1) 1 week  -EC      Progress/Outcomes (Swallow Short Term Goal 1) new goal  -EC                User Key  (r) = Recorded By, (t) = Taken By, (c) = Cosigned By      Initials Name Provider Type    EC Richa Monahan Speech and Language Pathologist                       Time Calculation:                Richa Monahan  12/4/2023

## 2023-12-04 NOTE — PLAN OF CARE
"Goal Outcome Evaluation:                   Bedside swallow evaluation completed this date. Pt awake, alert and sitting up in chair upon entry. Pt reports difficulty swallowing yesterday though attributes it to \"drainage,\" and is also reporting a poor appetite from being in the hospital. Pt reports consuming soft foods at baseline w/thin liquids. Pt denies hx of dysphagia. Upper and lower dentures in place and OME is WFL. Pt consumes trials of thin water via straw, puree applesauce and mechanical soft mixed consistency peaches for evaluation. Pt declines peanut butter crackers stating they are too hard. Mastication prolonged but functional for peaches. Pt adequately clears oral cavity between bites. Oral transit WFL for puree and thin liquids. No overt clinical s/s of aspiration are demonstrated at anytime.     Recommend initiation of a mechanical ground diet w/thin liquids. ST to continue to follow for diet tolerance w/further recommendations as indicated.    "

## 2023-12-04 NOTE — CASE MANAGEMENT/SOCIAL WORK
Continued Stay Note   Reji     Patient Name: Sage Flores  MRN: [id number]  Today's Date: 12/4/2023    Admit Date: 12/1/2023    [facility]   Discharge Plan       Row Name 12/04/23 1539       Plan    Plan Return home with [facility] , pending.    Plan Comments CM spoke with patient at bedside concerning PT/OT recommendation for SNF.  Patient chooses [facility] , referral sent, pending.  Obi Tatum notified.  Patient refuses SNF.            Expected Discharge Date and Time       Expected Discharge Date Expected Discharge Time    Dec 5, 2023               RAY Eddy RN  SIPS/ICU   O: [phone]  C: [phone]  [email or web address]

## 2023-12-04 NOTE — PLAN OF CARE
Goal Outcome Evaluation:  Plan of Care Reviewed With: patient   Pt presents as a 89 y/o M admitted to [facility] on 12.1.23 with complaints of chest pain and syncopal episode.  EKG showed evidence of acute inferior posterior MI with heart block. Patient was taken to Cath lab emergently. Patient underwent successful senting to [facility] with plan to return to cath lab early this week. Past Medical Hx: CAD, hernia. Pt A and O x 4. At baseline, pt lives alone in [facility] [facility] with 1 WILLEM and uses a rollator for household mobility. He does not drive or get mail . His daughter checks on him frequently. This date, pt requiring mod assist of 2 for transfers and for 5 feet of pivoting steps from bed over to recliner with rolling walker. Pt had significant posterior lean in standing and needed additional time and effort to weight bear through feet appropriately in standing. Patient is a high risk of injurious falls and unsafe to return to prior living environment at this time.  Pt is far below his baseline for all areas of mobility. PT will follow pt with PT recommendation of Skilled Nursing Facility.                Anticipated Discharge Disposition (PT): skilled nursing facility

## 2023-12-04 NOTE — THERAPY EVALUATION
Patient Name: Sage Flores  : 1930    MRN: [id number]                              Today's Date: 2023       Admit Date: 2023    Visit Dx:     ICD-10-CM ICD-9-CM   1. ST elevation myocardial infarction (STEMI), unspecified artery  I21.3 410.90   2. Heart block AV third degree  I44.2 426.0     Patient Active Problem List   Diagnosis    Allergic rhinitis    Atrial fibrillation    Claudication    Coronary heart disease    Edema    Extremity pain    History of left heart catheterization (LHC)    Hyperlipidemia    Essential hypertension    Inguinal hernia, right    Laceration    Lower extremity edema    Myocardial infarction    Need for other prophylactic vaccination and inoculation against single diseases    Presence of other cardiac implants and grafts    Status post coronary artery bypass graft    Status post percutaneous transluminal coronary angioplasty    Viral URI    Rib pain on left side    Dizziness    Acute cystitis without hematuria    Status post placement of implantable loop recorder    Lab test negative for COVID-19 virus    Cellulitis of left leg    Iron deficiency anemia    Encounter for support and coordination of transition of care    Hematoma of right lower extremity    Encounter for screening for malignant neoplasm of prostate     Medicare annual wellness visit, subsequent    Vitamin D deficiency    Hyponatremia    Gastroesophageal reflux disease without esophagitis    Acute ST elevation myocardial infarction (STEMI) involving right coronary artery     Past Medical History:   Diagnosis Date    Anxiety 2014    Atrial fibrillation     Benign prostatic hyperplasia     CAD (coronary artery disease)     Hyperlipidemia     Hypertension     Myocardial infarction      Past Surgical History:   Procedure Laterality Date    CARDIAC CATHETERIZATION N/A 2023    Procedure: Left Heart Cath;  Surgeon: Son العلي MD;  Location: [facility] CATH INVASIVE LOCATION;  Service: Cardiovascular;   Laterality: N/A;    CARDIAC CATHETERIZATION N/A 12/1/2023    Procedure: Coronary angiography;  Surgeon: Son العلي MD;  Location: [facility] CATH INVASIVE LOCATION;  Service: Cardiovascular;  Laterality: N/A;    CARDIAC CATHETERIZATION N/A 12/1/2023    Procedure: Percutaneous Coronary Intervention;  Surgeon: Son العلي MD;  Location: [facility] CATH INVASIVE LOCATION;  Service: Cardiovascular;  Laterality: N/A;    CARDIAC ELECTROPHYSIOLOGY PROCEDURE N/A 12/1/2023    Procedure: Temporary Pacemaker;  Surgeon: Son العلي MD;  Location: [facility] CATH INVASIVE LOCATION;  Service: Cardiovascular;  Laterality: N/A;    CARDIAC SURGERY      HERNIA REPAIR        General Information       Row Name 12/04/23 0913          OT Time and Intention    Document Type evaluation  -LS     Mode of Treatment occupational therapy  -       Row Name 12/04/23 0913          General Information    Patient Profile Reviewed yes  -LS     Prior Level of Function independent:;ADL's;all household mobility  Uses RW  -LS     Existing Precautions/Restrictions fall  -LS     Barriers to Rehab previous functional deficit  -LS       Row Name 12/04/23 0913          Living Environment    People in Home alone  Family checks in daily  -LS       Row Name 12/04/23 0913          Home Main Entrance    Number of Stairs, Main Entrance one  -LS       Row Name 12/04/23 0913          Stairs Within Home, Primary    Number of Stairs, Within Home, Primary none  -LS       Row Name 12/04/23 0913          Cognition    Orientation Status (Cognition) oriented x 4  -LS       Row Name 12/04/23 0913          Safety Issues, Functional Mobility    Safety Issues Affecting Function (Mobility) insight into deficits/self-awareness;judgment;safety precaution awareness;awareness of need for assistance  -LS     Impairments Affecting Function (Mobility) balance;strength;endurance/activity tolerance;postural/trunk control  -LS               User Key  (r) = Recorded By, (t) = Taken By, (c)  = Cosigned By      Initials Name Provider Type     Jared Becker OT Occupational Therapist                     Mobility/ADL's       Row Name 12/04/23 0914          Bed Mobility    Bed Mobility supine-sit  -LS     Supine-Sit [place] (Bed Mobility) moderate assist (50% patient effort)  -     Assistive Device (Bed Mobility) bed rails;head of bed elevated  -       Row Name 12/04/23 0914          Transfers    Transfers sit-stand transfer;bed-chair transfer  -       Row Name 12/04/23 0914          Bed-Chair Transfer    Bed-Chair [place] (Transfers) moderate assist (50% patient effort);2 person assist  -LS     Assistive Device (Bed-Chair Transfers) walker, front-wheeled  -LS       Row Name 12/04/23 0914          Sit-Stand Transfer    Sit-Stand [place] (Transfers) minimum assist (75% patient effort);2 person assist  -LS     Assistive Device (Sit-Stand Transfers) walker, front-wheeled  -LS       Row Name 12/04/23 0914          Functional Mobility    Functional Mobility- Ind. Level moderate assist (50% patient effort);2 person assist required  -LS     Functional Mobility- Device walker, front-wheeled  -LS       Row Name 12/04/23 0914          Activities of Daily Living    BADL Assessment/Intervention lower body dressing  -       Row Name 12/04/23 0914          Lower Body Dressing Assessment/Training    [place] Level (Lower Body Dressing) maximum assist (25% patient effort)  -               User Key  (r) = Recorded By, (t) = Taken By, (c) = Cosigned By      Initials Name Provider Type     Jared Becker OT Occupational Therapist                   Obj/Interventions       Row Name 12/04/23 0915          Sensory Assessment (Somatosensory)    Sensory Assessment (Somatosensory) sensation intact  -       Row Name 12/04/23 0915          Range of Motion Comprehensive    Comment, General Range of Motion BUEs WFL  -       Row Name 12/04/23 0915          Strength Comprehensive (MMT)    Comment,  General Manual Muscle Testing (MMT) Assessment BUEs grossly 3/5  -       Row Name 12/04/23 0915          Balance    Balance Assessment sitting static balance;sitting dynamic balance;standing static balance;standing dynamic balance  -LS     Static Sitting Balance standby assist  -LS     Dynamic Sitting Balance contact guard  -LS     Position, Sitting Balance sitting edge of bed  -LS     Static Standing Balance moderate assist;2-person assist  -LS     Dynamic Standing Balance moderate assist;2-person assist  -LS     Position/Device Used, Standing Balance walker, front-wheeled  -LS     Comment, Balance Posterior LOB in standing with weight on heels  -LS               User Key  (r) = Recorded By, (t) = Taken By, (c) = Cosigned By      Initials Name Provider Type     Jared Becker OT Occupational Therapist                   Goals/Plan       Row Name 12/04/23 1114          Bed Mobility Goal 1 (OT)    Activity/Assistive Device (Bed Mobility Goal 1, OT) bed mobility activities, all  -LS     [place] Level/Cues Needed (Bed Mobility Goal 1, OT) contact guard required  -LS     Time Frame (Bed Mobility Goal 1, OT) long term goal (LTG);2 weeks  -       Row Name 12/04/23 1114          Transfer Goal 1 (OT)    Activity/Assistive Device (Transfer Goal 1, OT) transfers, all;walker, rolling  -LS     [place] Level/Cues Needed (Transfer Goal 1, OT) contact guard required  -LS     Time Frame (Transfer Goal 1, OT) long term goal (LTG);2 weeks  -       Row Name 12/04/23 1114          Dressing Goal 1 (OT)    Activity/Device (Dressing Goal 1, OT) dressing skills, all  -LS     [place]/Cues Needed (Dressing Goal 1, OT) minimum assist (75% or more patient effort)  -LS     Time Frame (Dressing Goal 1, OT) long term goal (LTG);2 weeks  -       Row Name 12/04/23 1114          Toileting Goal 1 (OT)    Activity/Device (Toileting Goal 1, OT) toileting skills, all  -LS     [place] Level/Cues Needed (Toileting Goal 1, OT)  "minimum assist (75% or more patient effort)  -LS     Time Frame (Toileting Goal 1, OT) long term goal (LTG);2 weeks  -LS       Row Name 12/04/23 1114          Therapy Assessment/Plan (OT)    Planned Therapy Interventions (OT) activity tolerance training;BADL retraining;functional balance retraining;IADL retraining;occupation/activity based interventions;ROM/therapeutic exercise;strengthening exercise;transfer/mobility retraining;patient/caregiver education/training;neuromuscular control/coordination retraining  -LS               User Key  (r) = Recorded By, (t) = Taken By, (c) = Cosigned By      Initials Name Provider Type    LS Jared Becker, CASS Occupational Therapist                   Clinical Impression       Row Name 12/04/23 0916          Pain Assessment    Pretreatment Pain Rating 6/10  -LS     Posttreatment Pain Rating 6/10  -LS     Pain Location - chest  -LS     Pre/Posttreatment Pain Comment Describes \"gas pain\"  -LS     Pain Intervention(s) Repositioned  -LS       Row Name 12/04/23 0913          Plan of Care Review    Plan of Care Reviewed With patient  -LS     Outcome Evaluation A 89 y.o. old male patient with PMH of CAD, atrial fibrillation, presents to the hospital with complaints of chest pain and syncopal episode which started an hour prior to admission to ER. EKG showed evidence of acute inferior posterior MI with heart block. Patient was taken to Cathlab emergently. Patient underwent successful senting to [facility] with plan to return to cath lab early this week. At baseline, pt lives alone and is IND with ADLs and IADLs, reporting sponge baths. He uses a rollator and reports no falls recently. He does not drive. Family checks in frequently. This date, pt oriented x4. He requires Mod A for supine to sit EOB, then Max A for lower body dressing sitting at EOB. Min Ax2 required to come to standing, with weight displaced through the heels, requiring assistance to bring weight forward. Mod Ax2 for ambulation a " short distance prior to sitting in chair. OT feels pt is far from baseline, and unsafe to return home alone. OT recommends SNF at [place], with possible transition to long-term for pt safety. Will follow.  -       Row Name 12/04/23 0916          Therapy Assessment/Plan (OT)    Rehab Potential (OT) good, to achieve stated therapy goals  -     Criteria for Skilled Therapeutic Interventions Met (OT) yes;skilled treatment is necessary  -LS     Therapy Frequency (OT) 3 times/wk  -LS     Predicted Duration of Therapy Intervention (OT) until dc  -       Row Name 12/04/23 0916          Therapy Plan Review/Discharge Plan (OT)    Anticipated Discharge Disposition (OT) skilled nursing facility  -       Row Name 12/04/23 0916          Vital Signs    Pre Systolic BP Rehab 146  -LS     Pre Treatment Diastolic BP 87  -LS     Pre SpO2 (%) 95  -LS     O2 Delivery Pre Treatment room air  -LS     Post SpO2 (%) 94  -LS     O2 Delivery Post Treatment room air  -LS     Pre Patient Position Supine  -LS     Intra Patient Position Standing  -LS     Post Patient Position Sitting  -       Row Name 12/04/23 0916          Positioning and Restraints    Post Treatment Position chair  -LS     In Chair notified nsg;reclined;call light within reach;encouraged to call for assist;exit alarm on  -LS               User Key  (r) = Recorded By, (t) = Taken By, (c) = Cosigned By      Initials Name Provider Type    LS Jared Becker, CASS Occupational Therapist                   Outcome Measures       Row Name 12/04/23 1112          How much help from another is currently needed...    Putting on and taking off regular lower body clothing? 2  -LS     Bathing (including washing, rinsing, and drying) 2  -LS     Toileting (which includes using toilet bed pan or urinal) 2  -LS     Putting on and taking off regular upper body clothing 2  -LS     Taking care of personal grooming (such as brushing teeth) 2  -LS     Eating meals 4  -LS     AM-PAC 6 Clicks Score (OT) 14   -       Row Name 12/04/23 1115          Functional Assessment    Outcome Measure Options AM-PAC 6 Clicks Daily Activity (OT)  -               User Key  (r) = Recorded By, (t) = Taken By, (c) = Cosigned By      Initials Name Provider Type    LS Jared Becker OT Occupational Therapist                    Occupational Therapy Education       Title: PT OT SLP Therapies (In Progress)       Topic: Occupational Therapy (Done)       Point: ADL training (Done)       Description:   Instruct learner(s) on proper safety adaptation and remediation techniques during self care or transfers.   Instruct in proper use of assistive devices.                  Learning Progress Summary             Patient Acceptance, E,TB, VU by  at 12/4/2023 1115                                         User Key       Initials Effective Dates Name Provider Type Discipline     09/22/22 -  Jared Becker OT Occupational Therapist OT                  OT Recommendation and Plan  Planned Therapy Interventions (OT): activity tolerance training, BADL retraining, functional balance retraining, IADL retraining, occupation/activity based interventions, ROM/therapeutic exercise, strengthening exercise, transfer/mobility retraining, patient/caregiver education/training, neuromuscular control/coordination retraining  Therapy Frequency (OT): 3 times/wk  Plan of Care Review  Plan of Care Reviewed With: patient  Outcome Evaluation: A 89 y.o. old male patient with PMH of CAD, atrial fibrillation, presents to the hospital with complaints of chest pain and syncopal episode which started an hour prior to admission to ER. EKG showed evidence of acute inferior posterior MI with heart block. Patient was taken to Cathlab emergently. Patient underwent successful senting to [facility] with plan to return to cath lab early this week. At baseline, pt lives alone and is IND with ADLs and IADLs, reporting sponge baths. He uses a rollator and reports no falls recently. He does not  drive. Family checks in frequently. This date, pt oriented x4. He requires Mod A for supine to sit EOB, then Max A for lower body dressing sitting at EOB. Min Ax2 required to come to standing, with weight displaced through the heels, requiring assistance to bring weight forward. Mod Ax2 for ambulation a short distance prior to sitting in chair. OT feels pt is far from baseline, and unsafe to return home alone. OT recommends SNF at [place], with possible transition to [facility] for pt safety. Will follow.     Time Calculation:         Time Calculation- OT       Row Name 12/04/23 1116             Time Calculation- OT    OT Start Time 0830  -      OT Stop Time 0852  -      OT Time Calculation (min) 22 min  -LS      OT Received On 12/04/23  -      OT - Next Appointment 12/06/23  -      OT Goal Re-Cert Due Date 12/18/23  -         Untimed Charges    OT Eval/Re-eval Minutes 22  -LS         Total Minutes    Untimed Charges Total Minutes 22  -LS       Total Minutes 22  -LS                User Key  (r) = Recorded By, (t) = Taken By, (c) = Cosigned By      Initials Name Provider Type    Jared Lopez OT Occupational Therapist                  Therapy Charges for Today       Code Description Service Date Service Provider Modifiers Qty    10213322940 HC OT EVAL MOD COMPLEXITY 4 12/4/2023 Jared Becker OT GO 1                 Jared Becker OT  12/4/2023

## 2023-12-04 NOTE — PROGRESS NOTES
"Enter Query Response Below      Query Response: The graft stenosis is progression of the disease and atherosclerosis in the previous CABG                     If applicable, please update the problem list.   Patient: Sage Flores \"Tristan\"        : 1930  Account: [id number]           Admit Date:         How to Respond to this query:       a. Click New Note     b. Answer query within the yellow box.                c. Update the Problem List, if applicable.      If you have any questions about this query contact me at: [email or web address]     :    Patient with history of previous Cabg admitted on  with a STEMI.   Per Cardiology consult note \"Patient had coronary bypass surgery with a LIMA to the LAD and a saphenous graft to the marginal branch diagonal branch PDA and PLV branches\"   Per heart cath report \"SVG to the distal RCA is occluded but is the culprit lesion\"  \"Pre-Stenosis: 100%\"  \" Post-Stenosis: Diffuse disease\"  \"Status post PCI with PTCA, stent placement on Pronto catheter atherectomy to the graft to the distal right coronary artery. Continue medical therapy and reimaging in 2 days to assess if the graft still patent.\"    Please clarify the graft stenosis as one or more of the following:     Complication of previous CABG  Progress of disease/atherosclerosis in previous CABG  Other - specify__________  Unable to determine     By submitting this query, we are merely seeking further clarification of documentation to accurately reflect all conditions that you are monitoring, evaluating, treating or that extend the hospitalization or utilize additional resources of care. Please utilize your independent clinical judgment when addressing the question(s) above.     This query and your response, once completed, will be entered into the legal medical record.    Sincerely,  Kasey Maldonado RN  Clinical Documentation Integrity Program     "

## 2023-12-04 NOTE — THERAPY EVALUATION
Patient Name: Sage Flores  : 1930    MRN: [id number]                              Today's Date: 2023       Admit Date: 2023    Visit Dx:     ICD-10-CM ICD-9-CM   1. ST elevation myocardial infarction (STEMI), unspecified artery  I21.3 410.90   2. Heart block AV third degree  I44.2 426.0     Patient Active Problem List   Diagnosis    Allergic rhinitis    Atrial fibrillation    Claudication    Coronary heart disease    Edema    Extremity pain    History of left heart catheterization (LHC)    Hyperlipidemia    Essential hypertension    Inguinal hernia, right    Laceration    Lower extremity edema    Myocardial infarction    Need for other prophylactic vaccination and inoculation against single diseases    Presence of other cardiac implants and grafts    Status post coronary artery bypass graft    Status post percutaneous transluminal coronary angioplasty    Viral URI    Rib pain on left side    Dizziness    Acute cystitis without hematuria    Status post placement of implantable loop recorder    Lab test negative for COVID-19 virus    Cellulitis of left leg    Iron deficiency anemia    Encounter for support and coordination of transition of care    Hematoma of right lower extremity    Encounter for screening for malignant neoplasm of prostate     Medicare annual wellness visit, subsequent    Vitamin D deficiency    Hyponatremia    Gastroesophageal reflux disease without esophagitis    Acute ST elevation myocardial infarction (STEMI) involving right coronary artery     Past Medical History:   Diagnosis Date    Anxiety 2014    Atrial fibrillation     Benign prostatic hyperplasia     CAD (coronary artery disease)     Hyperlipidemia     Hypertension     Myocardial infarction      Past Surgical History:   Procedure Laterality Date    CARDIAC CATHETERIZATION N/A 2023    Procedure: Left Heart Cath;  Surgeon: Son العلي MD;  Location: [facility] CATH INVASIVE LOCATION;  Service: Cardiovascular;   Laterality: N/A;    CARDIAC CATHETERIZATION N/A 12/1/2023    Procedure: Coronary angiography;  Surgeon: Son العلي MD;  Location: [facility] CATH INVASIVE LOCATION;  Service: Cardiovascular;  Laterality: N/A;    CARDIAC CATHETERIZATION N/A 12/1/2023    Procedure: Percutaneous Coronary Intervention;  Surgeon: Son العلي MD;  Location: [facility] CATH INVASIVE LOCATION;  Service: Cardiovascular;  Laterality: N/A;    CARDIAC ELECTROPHYSIOLOGY PROCEDURE N/A 12/1/2023    Procedure: Temporary Pacemaker;  Surgeon: Son العلي MD;  Location: [facility] CATH INVASIVE LOCATION;  Service: Cardiovascular;  Laterality: N/A;    CARDIAC SURGERY      HERNIA REPAIR        General Information       Row Name 12/04/23 1429          Physical Therapy Time and Intention    Document Type evaluation  -BR     Mode of Treatment physical therapy  -BR       Row Name 12/04/23 1448 12/04/23 1429       General Information    Patient Profile Reviewed -- yes  -BR    Prior Level of Function -- independent:;all household mobility;gait;transfer  Pt uses a rollator.  -BR    Existing Precautions/Restrictions cardiac  -BR fall  -BR    Barriers to Rehab -- previous functional deficit  -BR      Row Name 12/04/23 1429          Living Environment    People in Home alone  -BR       Row Name 12/04/23 1429          Home Main Entrance    Number of Stairs, Main Entrance one  Pt reports having a steep driveway. He does not get his own mail.  -BR       Row Name 12/04/23 1429          Cognition    Orientation Status (Cognition) oriented x 4  -BR       Row Name 12/04/23 1429          Safety Issues, Functional Mobility    Impairments Affecting Function (Mobility) balance;strength;endurance/activity tolerance;postural/trunk control;range of motion (ROM)  -BR               User Key  (r) = Recorded By, (t) = Taken By, (c) = Cosigned By      Initials Name Provider Type    BR Trudy Mccarty, PT Physical Therapist                   Mobility       Row Name 12/04/23 1433           Bed Mobility    Bed Mobility supine-sit  -BR     Supine-Sit [place] (Bed Mobility) moderate assist (50% patient effort);1 person assist  -BR     Assistive Device (Bed Mobility) bed rails;head of bed elevated  -BR       Row Name 12/04/23 1435          Bed-Chair Transfer    Bed-Chair [place] (Transfers) moderate assist (50% patient effort);2 person assist;verbal cues  -BR     Assistive Device (Bed-Chair Transfers) walker, front-wheeled  -BR       Row Name 12/04/23 1435          Sit-Stand Transfer    Sit-Stand [place] (Transfers) minimum assist (75% patient effort);2 person assist;verbal cues  -BR     Assistive Device (Sit-Stand Transfers) walker, front-wheeled  -BR       Row Name 12/04/23 1435          Gait/Stairs (Locomotion)    [place] Level (Gait) moderate assist (50% patient effort);2 person assist  -BR     Assistive Device (Gait) walker, front-wheeled  -BR     Distance in Feet (Gait) 5  -BR     Deviations/Abnormal Patterns (Gait) cristel decreased;other (see comments);stride length decreased  posterior lean  -BR     Bilateral Gait Deviations heel strike decreased;forward flexed posture;weight shift ability decreased  -BR               User Key  (r) = Recorded By, (t) = Taken By, (c) = Cosigned By      Initials Name Provider Type    BR Trudy Mccarty PT Physical Therapist                   Obj/Interventions       Row Name 12/04/23 1437          Range of Motion Comprehensive    General Range of Motion bilateral lower extremity ROM WFL  -BR       Row Name 12/04/23 1437          Strength Comprehensive (MMT)    Comment, General Manual Muscle Testing (MMT) Assessment BLE strength grossly 3/5  -BR       Row Name 12/04/23 1437          Balance    Balance Assessment sitting static balance;sitting dynamic balance;standing static balance  -BR     Static Sitting Balance standby assist  -BR     Dynamic Sitting Balance contact guard  -BR     Position, Sitting Balance sitting edge of bed  -BR      Static Standing Balance moderate assist;2-person assist  -BR     Dynamic Standing Balance moderate assist;2-person assist  -BR     Position/Device Used, Standing Balance walker, rolling;supported  -BR     Comment, Balance Posterior loss of balance in standing  -BR       Row Name 12/04/23 1437          Sensory Assessment (Somatosensory)    Sensory Assessment (Somatosensory) LE sensation intact  -BR               User Key  (r) = Recorded By, (t) = Taken By, (c) = Cosigned By      Initials Name Provider Type    BR Trudy Mccarty, PT Physical Therapist                   Goals/Plan       Row Name 12/04/23 1448          Bed Mobility Goal 1 (PT)    Activity/Assistive Device (Bed Mobility Goal 1, PT) bed mobility activities, all  -BR     [place] Level/Cues Needed (Bed Mobility Goal 1, PT) supervision required  -BR     Time Frame (Bed Mobility Goal 1, PT) long term goal (LTG);2 weeks  -BR       Row Name 12/04/23 1448          Transfer Goal 1 (PT)    Activity/Assistive Device (Transfer Goal 1, PT) transfers, all  -BR     [place] Level/Cues Needed (Transfer Goal 1, PT) supervision required  -BR     Time Frame (Transfer Goal 1, PT) long term goal (LTG);2 weeks  -BR       Row Name 12/04/23 1448          Gait Training Goal 1 (PT)    Activity/Assistive Device (Gait Training Goal 1, PT) gait (walking locomotion);assistive device use  -BR     [place] Level (Gait Training Goal 1, PT) contact guard required  -BR     Distance (Gait Training Goal 1, PT) 65  -BR     Time Frame (Gait Training Goal 1, PT) long term goal (LTG);2 weeks  -BR       Row Name 12/04/23 1448          Stairs Goal 1 (PT)    Activity/Assistive Device (Stairs Goal 1, PT) stairs, all skills  -BR     [place] Level/Cues Needed (Stairs Goal 1, PT) contact guard required  -BR     Number of Stairs (Stairs Goal 1, PT) 1  -BR     Time Frame (Stairs Goal 1, PT) long term goal (LTG);2 weeks  -BR       Row Name 12/04/23 8211          Therapy  Assessment/Plan (PT)    Planned Therapy Interventions (PT) balance training;bed mobility training;gait training;neuromuscular re-education;patient/family education;transfer training;ROM (range of motion);strengthening;stair training  -BR               User Key  (r) = Recorded By, (t) = Taken By, (c) = Cosigned By      Initials Name Provider Type    BR Trudy Mccarty, PT Physical Therapist                   Clinical Impression       Row Name 12/04/23 1439          Pain    Pretreatment Pain Rating 6/10  -BR     Posttreatment Pain Rating 6/10  -BR     Pain Location generalized  -BR     Pain Location - chest  -BR       Row Name 12/04/23 1439          Plan of Care Review    Plan of Care Reviewed With patient  -BR     Outcome Evaluation Pt presents as a 89 y/o M admitted to [facility] on 12.1.23 with complaints of chest pain and syncopal episode.  EKG showed evidence of acute inferior posterior MI with heart block. Patient was taken to Cath lab emergently. Patient underwent successful senting to [facility] with plan to return to cath lab early this week. Past Medical Hx: CAD, hernia. Pt A and O x 4. At baseline, pt lives alone in [facility] with 1 WILLEM and uses a rollator for household mobility. He does not drive or get mail . His daughter checks on him frequently. This date, pt requiring mod assist of 2 for transfers and for 5 feet of pivoting steps from bed over to recliner with rolling walker. Pt had significant posterior lean in standing and needed additional time and effort to weight bear through feet appropriately in standing. Patient is a high risk of injurious falls and unsafe to return to prior living environment at this time.  Pt is far below his baseline for all areas of mobility. PT will follow pt with PT recommendation of Skilled Nursing Facility.  -BR       Row Name 12/04/23 1448 12/04/23 1439       Therapy Assessment/Plan (PT)    Rehab Potential (PT) good, to achieve stated therapy goals  -BR good, to achieve stated therapy  goals  -BR    Criteria for Skilled Interventions Met (PT) yes;meets criteria;skilled treatment is necessary  -BR yes;meets criteria;skilled treatment is necessary  -BR    Therapy Frequency (PT) -- 5 times/wk  -BR    Predicted Duration of Therapy Intervention (PT) -- until D/C  -BR      Row Name 12/04/23 1439          Vital Signs    Pre Systolic BP Rehab 146  -BR     Pre Treatment Diastolic BP 87  -BR     Post Systolic BP Rehab 158  -BR     Post Treatment Diastolic BP 85  -BR     Pretreatment Heart Rate (beats/min) 63  -BR     Pre SpO2 (%) 95  -BR     O2 Delivery Pre Treatment room air  -BR     Post SpO2 (%) 94  -BR     O2 Delivery Post Treatment room air  -BR     Pre Patient Position Supine  -BR     Intra Patient Position Standing  -BR     Post Patient Position Sitting  -BR       Row Name 12/04/23 1439          Positioning and Restraints    Pre-Treatment Position in bed  -BR     Post Treatment Position chair  -BR     In Chair notified nsg;reclined;call light within reach;encouraged to call for assist;exit alarm on  -BR               User Key  (r) = Recorded By, (t) = Taken By, (c) = Cosigned By      Initials Name Provider Type    BR Trudy Mccarty, PT Physical Therapist                   Outcome Measures       Row Name 12/04/23 1449          How much help from another person do you currently need...    Turning from your back to your side while in flat bed without using bedrails? 2  -BR     Moving from lying on back to sitting on the side of a flat bed without bedrails? 2  -BR     Moving to and from a bed to a chair (including a wheelchair)? 2  -BR     Standing up from a chair using your arms (e.g., wheelchair, bedside chair)? 2  -BR     Climbing 3-5 steps with a railing? 1  -BR     To walk in hospital room? 2  -BR     AM-PAC 6 Clicks Score (PT) 11  -BR     Highest Level of Mobility Goal 4 --> Transfer to chair/commode  -BR       Row Name 12/04/23 1449 12/04/23 1115       Functional Assessment    Outcome Measure  Options AM-PAC 6 Clicks Basic Mobility (PT)  -BR AM-PAC 6 Clicks Daily Activity (OT)  -LS              User Key  (r) = Recorded By, (t) = Taken By, (c) = Cosigned By      Initials Name Provider Type    BR Trudy Mccarty, PT Physical Therapist    Jared Lopez OT Occupational Therapist                                 Physical Therapy Education       Title: PT OT SLP Therapies (Done)       Topic: Physical Therapy (Done)       Point: Mobility training (Done)       Learning Progress Summary             Patient Acceptance, E,D, VU,DU by BR at 12/4/2023 1449                         Point: Home exercise program (Done)       Learning Progress Summary             Patient Acceptance, E,D, VU,DU by BR at 12/4/2023 1449                         Point: Body mechanics (Done)       Learning Progress Summary             Patient Acceptance, E,D, VU,DU by BR at 12/4/2023 1449                         Point: Precautions (Done)       Learning Progress Summary             Patient Acceptance, E,D, VU,DU by BR at 12/4/2023 1449                                         User Key       Initials Effective Dates Name Provider Type Discipline     02/01/22 -  Trudy Mccarty, PT Physical Therapist PT                  PT Recommendation and Plan  Planned Therapy Interventions (PT): balance training, bed mobility training, gait training, neuromuscular re-education, patient/family education, transfer training, ROM (range of motion), strengthening, stair training  Plan of Care Reviewed With: patient  Outcome Evaluation: Pt presents as a 89 y/o M admitted to [facility] on 12.1.23 with complaints of chest pain and syncopal episode.  EKG showed evidence of acute inferior posterior MI with heart block. Patient was taken to Cath lab emergently. Patient underwent successful senting to [facility] with plan to return to cath lab early this week. Past Medical Hx: CAD, hernia. Pt A and O x 4. At baseline, pt lives alone in [facility] with 1 WILLEM and uses a rollator for  household mobility. He does not drive or get mail . His daughter checks on him frequently. This date, pt requiring mod assist of 2 for transfers and for 5 feet of pivoting steps from bed over to recliner with rolling walker. Pt had significant posterior lean in standing and needed additional time and effort to weight bear through feet appropriately in standing. Patient is a high risk of injurious falls and unsafe to return to prior living environment at this time.  Pt is far below his baseline for all areas of mobility. PT will follow pt with PT recommendation of Skilled Nursing Facility.     Time Calculation:         PT Charges       Row Name 12/04/23 1450             Time Calculation    Start Time 0829  -BR      Stop Time 0854  -BR      Time Calculation (min) 25 min  -BR      PT Received On 12/04/23  -BR      PT - Next Appointment 12/05/23  -BR      PT Goal Re-Cert Due Date 12/18/23  -BR         Time Calculation- PT    Total Timed Code Minutes- PT 0 minute(s)  -BR                User Key  (r) = Recorded By, (t) = Taken By, (c) = Cosigned By      Initials Name Provider Type    BR Trudy Mccarty PT Physical Therapist                  Therapy Charges for Today       Code Description Service Date Service Provider Modifiers Qty    58570592461 HC PT EVAL MOD COMPLEXITY 4 12/4/2023 Trudy Mccarty, PT GP 1            PT G-Codes  Outcome Measure Options: AM-PAC 6 Clicks Basic Mobility (PT)  AM-PAC 6 Clicks Score (PT): 11  AM-PAC 6 Clicks Score (OT): 14  PT Discharge Summary  Anticipated Discharge Disposition (PT): skilled nursing facility    Trudy Mccarty PT  12/4/2023

## 2023-12-04 NOTE — PROGRESS NOTES
[facility] MEDICINE SERVICE  DAILY PROGRESS NOTE    NAME: Sage Flores  : 1930  MRN: [id number]      LOS: 3 days     PROVIDER OF SERVICE: Sacha Xiong MD    Chief Complaint: Acute ST elevation myocardial infarction (STEMI) involving right coronary artery    Subjective:     Interval History:  History taken from: patient  Patient Complaints: Postnasal drip and occasional cough  Patient Denies: Any chest pain    Review of Systems:   Review of Systems   Constitutional:  Negative for chills, fatigue and fever.   HENT:  Positive for congestion. Negative for drooling.    Respiratory:  Negative for shortness of breath and wheezing.        Objective:     Vital Signs  Temp:  [97.5 °F (36.4 °C)-98.4 °F (36.9 °C)] 97.5 °F (36.4 °C)  Heart Rate:  [56-69] 59  Resp:  [22] 22  BP: (119-169)/(54-96) 140/82   Body mass index is 23.66 kg/m².    Physical Exam  Physical Exam  Physical Exam  HENT:      Head: Atraumatic.      Mouth/Throat:      Mouth: Mucous membranes are moist.   Eyes:      Pupils: Pupils are equal, round, and reactive to light.   Cardiovascular:      Rate and Rhythm: Normal rate and regular rhythm.      Pulses: Normal pulses.      Heart sounds: Normal heart sounds.   Pulmonary:      Effort: Pulmonary effort is normal.      Breath sounds: Normal breath sounds.   Abdominal:      Palpations: Abdomen is soft.   Musculoskeletal:         General: No swelling. Normal range of motion.      Cervical back: Normal range of motion.   Skin:     General: Skin is warm.   Neurological:      General: No focal deficit present.      Mental Status: He is alert and oriented to person, place, and time.   Psychiatric:         Mood and Affect: Mood normal.   Scheduled Meds   aspirin, 81 mg, Oral, Daily  atorvastatin, 10 mg, Oral, Nightly  carvedilol, 3.125 mg, Oral, BID With Meals  clopidogrel, 75 mg, Oral, Daily  oxymetazoline, 2 spray, Each Nare, BID  sodium chloride, 10 mL, Intravenous, Q12H       PRN Meds      "acetaminophen **OR** acetaminophen    senna-docusate sodium **AND** polyethylene glycol **AND** bisacodyl **AND** bisacodyl    guaifenesin    influenza vaccine    ondansetron **OR** ondansetron    sodium chloride    sodium chloride    sodium chloride   Infusions  heparin, 12 Units/kg/hr, Last Rate: 12 Units/kg/hr (12/04/23 1111)  sodium chloride, 75 mL/hr, Last Rate: 75 mL/hr (12/04/23 1501)          Diagnostic Data    Results from last 7 days   Lab Units 12/04/23  0421 12/01/23  2302 12/01/23  1613   WBC 10*3/mm3 14.50*   < > 9.50   HEMOGLOBIN g/dL 11.2*   < > 12.6*   HEMATOCRIT % 32.9*   < > 36.9*   PLATELETS 10*3/mm3 170   < > 197   GLUCOSE mg/dL 112*   < > 124*   CREATININE mg/dL 0.89   < > 1.02   BUN mg/dL 21   < > 16   SODIUM mmol/L 135*   < > 130*   POTASSIUM mmol/L 4.1   < > 4.0   AST (SGOT) U/L  --   --  21   ALT (SGPT) U/L  --   --  17   ALK PHOS U/L  --   --  83   BILIRUBIN mg/dL  --   --  0.8   ANION GAP mmol/L 12.0   < > 12.0    < > = values in this interval not displayed.       No radiology results for the last day      I reviewed the patient's new clinical results.    Assessment/Plan:     Active and Resolved Problems  Active and Resolved Problems  STEMI  Heart  block with bradycardia  Status post PCI to [facility]  Hx of  CABG  -Status post emergent PCI then placement of temporary pacemaker which was discontinued  - On heparin drip was not given Integrilin because of history  - conservative management   - Cardiology team following the patient  -Echocardiogram \"EF of 6 to 40% right adrenal dilatation  Continue aspirin and Plavix     Paroxysmal  A-fib  -Stable sinus rhythm with bradycardic, beta-blockers were put on hold     History of hypertension  -currently blood pressure stable  -Required dopamine drip previously which was discontinued     Hyperlipidemia   continue statin     Hyponatremia mild  -Continue gentle IV fluid hydration     Leukocytosis  - Likely reactive we will continue to " monitor  -Urinalysis  - Chest x-ray unremarkable     PT /OT          DVT prophylaxis:  Medical and mechanical DVT prophylaxis orders are present.     Code status is   Code Status and Medical Interventions:   Ordered at: 12/01/23 1826     Level Of Support Discussed With:    Patient     Code Status (Patient has no pulse and is not breathing):    CPR (Attempt to Resuscitate)     Medical Interventions (Patient has pulse or is breathing):    Full Support       Plan for disposition:SNF in 2-3 days    Time: 32 minutes    Signature: Electronically signed by Sacha Xiong MD, 12/04/23, 15:55 EST.  [facility] Hospitalist Team

## 2023-12-04 NOTE — PROGRESS NOTES
Referring Provider: Sacha Xiong MD    Reason for follow-up:  Inferior STEMI  Status post CABG     Patient Care Team:  Tricia Aldana APRN as PCP - General (Family Medicine)  Missy Domínguez MD as Consulting Physician (Cardiology)    Subjective .      ROS    The patient has been without any chest discomfort ,shortness of breath, palpitations, dizziness or syncope.  Denies having any headache ,abdominal pain ,nausea, vomiting , diarrhea constipation, loss of weight or loss of appetite.  Denies having any excessive bruising ,hematuria or blood in the stool.    Review of all systems negative except as indicated    History  Past Medical History:   Diagnosis Date    Anxiety 2014    Atrial fibrillation     Benign prostatic hyperplasia     CAD (coronary artery disease)     Hyperlipidemia     Hypertension     Myocardial infarction        Past Surgical History:   Procedure Laterality Date    CARDIAC SURGERY      HERNIA REPAIR         Family History   Problem Relation Age of Onset    Heart disease Mother     Heart disease Father        Social History     Tobacco Use    Smoking status: Former     Types: Cigarettes     Quit date: 1970     Years since quittin.2    Smokeless tobacco: Never    Tobacco comments:     more than 50yrs   Vaping Use    Vaping Use: Never used   Substance Use Topics    Alcohol use: No    Drug use: No        Medications Prior to Admission   Medication Sig Dispense Refill Last Dose    acetaminophen (TYLENOL) 650 MG 8 hr tablet Take 1 tablet by mouth Every Night.       aspirin 81 MG chewable tablet Chew 1 tablet Daily.       azelastine (ASTEPRO) 0.15 % solution nasal spray USE 2 SPRAYS IN EACH NOSTRIL TWICE DAILY AS DIRECTED BY PROVIDER 30 mL 3     Cholecalciferol 25 MCG (1000 UT) tablet Take 1 tablet by mouth Daily.       docusate sodium (COLACE) 250 MG capsule Take 1 capsule by mouth Daily.       gabapentin (NEURONTIN) 100 MG capsule TAKE 1 CAPSULE BY MOUTH TWICE DAILY 180 capsule 1   "   losartan (COZAAR) 50 MG tablet TAKE 1 TABLET BY MOUTH DAILY 90 tablet 1     omeprazole (priLOSEC) 20 MG capsule Take 1 capsule by mouth Daily. 90 capsule 3     tamsulosin (FLOMAX) 0.4 MG capsule 24 hr capsule TAKE 1 CAPSULE BY MOUTH DAILY 90 capsule 0        Allergies  Iodine, Levofloxacin, Nitroglycerin, Paroxetine, Penicillin g, Prednisone, Sucralfate, Diltiazem hcl, Metoprolol, Pramipexole dihydrochloride, and Ropinirole hcl    Scheduled Meds:aspirin, 81 mg, Oral, Daily  atorvastatin, 10 mg, Oral, Nightly  carvedilol, 3.125 mg, Oral, BID With Meals  clopidogrel, 75 mg, Oral, Daily  sodium chloride, 10 mL, Intravenous, Q12H      Continuous Infusions:heparin, 12 Units/kg/hr, Last Rate: 11 Units/kg/hr (12/03/23 2135)  sodium chloride, 75 mL/hr, Last Rate: 75 mL/hr (12/03/23 0612)      PRN Meds:.  acetaminophen **OR** acetaminophen    senna-docusate sodium **AND** polyethylene glycol **AND** bisacodyl **AND** bisacodyl    guaifenesin    influenza vaccine    ondansetron **OR** ondansetron    sodium chloride    sodium chloride    sodium chloride    Objective     VITAL SIGNS  Vitals:    12/04/23 0400 12/04/23 0415 12/04/23 0500 12/04/23 0515   BP: 144/76 151/77 142/79 150/80   Pulse: 59 61 58 57   Resp:       Temp:  97.6 °F (36.4 °C)     TempSrc:  Oral     SpO2: 93% 94% 93% 93%   Weight:    74.8 kg (164 lb 14.5 oz)   Height:           Flowsheet Rows      Flowsheet Row First Filed Value   Admission Height 177.8 cm (70\") Documented at 12/01/2023 1603   Admission Weight 72.6 kg (160 lb) Documented at 12/01/2023 1603              Intake/Output Summary (Last 24 hours) at 12/4/2023 0548  Last data filed at 12/3/2023 2243  Gross per 24 hour   Intake 2738 ml   Output 150 ml   Net 2588 ml        TELEMETRY: Probable junctional rhythm    Physical Exam:  The patient is alert, oriented and in no distress.  Vital signs as noted above.  Head and neck revealed no carotid bruits or jugular venous distention.  No thyromegaly or " lymphadenopathy is present  Lungs clear.  No wheezing.  Breath sounds are normal bilaterally.  Heart normal first and second heart sounds.  No murmur. No precordial rub is present.  No gallop is present.  Abdomen soft and nontender.  No organomegaly is present.  Extremities with good peripheral pulses without any pedal edema.  Cardiac cath site looks normal.  Skin warm and dry.  Musculoskeletal system is grossly normal  CNS grossly normal    Reviewed and updated.    Results Review:   I reviewed the patient's new clinical results.  Lab Results (last 24 hours)       Procedure Component Value Units Date/Time    Basic Metabolic Panel [[id number]]  (Abnormal) Collected: 12/04/23 0421    Specimen: Blood Updated: 12/04/23 0536     Glucose 112 mg/dL      BUN 21 mg/dL      Creatinine 0.89 mg/dL      Sodium 135 mmol/L      Potassium 4.1 mmol/L      Chloride 104 mmol/L      CO2 19.0 mmol/L      Calcium 8.3 mg/dL      BUN/Creatinine Ratio 23.6     Anion Gap 12.0 mmol/L      eGFR 79.9 mL/min/1.73     Narrative:      GFR Normal >60  Chronic Kidney Disease <60  Kidney Failure <15    The GFR formula is only valid for adults with stable renal function between ages 18 and 70.    aPTT [[id number]]  (Normal) Collected: 12/04/23 0421    Specimen: Blood Updated: 12/04/23 0520     PTT 62.7 seconds     CBC & Differential [[id number]]  (Abnormal) Collected: 12/04/23 0421    Specimen: Blood Updated: 12/04/23 0514    Narrative:      The following orders were created for panel order CBC & Differential.  Procedure                               Abnormality         Status                     ---------                               -----------         ------                     CBC Auto Differential[[id number]]        Abnormal            Final result                 Please view results for these tests on the individual orders.    CBC Auto Differential [[id number]]  (Abnormal) Collected: 12/04/23 0421    Specimen: Blood Updated: 12/04/23 0514     WBC  14.50 10*3/mm3      RBC 3.47 10*6/mm3      Hemoglobin 11.2 g/dL      Hematocrit 32.9 %      MCV 95.0 fL      MCH 32.4 pg      MCHC 34.1 g/dL      RDW 14.7 %      RDW-SD 51.2 fl      MPV 10.5 fL      Platelets 170 10*3/mm3      Neutrophil % 81.6 %      Lymphocyte % 8.1 %      Monocyte % 9.7 %      Eosinophil % 0.2 %      Basophil % 0.4 %      Neutrophils, Absolute 11.80 10*3/mm3      Lymphocytes, Absolute 1.20 10*3/mm3      Monocytes, Absolute 1.40 10*3/mm3      Eosinophils, Absolute 0.00 10*3/mm3      Basophils, Absolute 0.10 10*3/mm3      nRBC 0.0 /100 WBC     aPTT [[id number]]  (Abnormal) Collected: 12/03/23 2021    Specimen: Blood Updated: 12/03/23 2038     PTT 57.7 seconds     aPTT [[id number]]  (Abnormal) Collected: 12/03/23 1359    Specimen: Blood Updated: 12/03/23 1418     PTT 51.0 seconds     Basic Metabolic Panel [[id number]]  (Abnormal) Collected: 12/03/23 0657    Specimen: Blood Updated: 12/03/23 0735     Glucose 105 mg/dL      BUN 17 mg/dL      Creatinine 1.01 mg/dL      Sodium 133 mmol/L      Potassium 4.2 mmol/L      Chloride 103 mmol/L      CO2 22.0 mmol/L      Calcium 8.1 mg/dL      BUN/Creatinine Ratio 16.8     Anion Gap 8.0 mmol/L      eGFR 69.3 mL/min/1.73     Narrative:      GFR Normal >60  Chronic Kidney Disease <60  Kidney Failure <15    The GFR formula is only valid for adults with stable renal function between ages 18 and 70.    aPTT [[id number]]  (Normal) Collected: 12/03/23 0657    Specimen: Blood Updated: 12/03/23 0721     PTT 61.5 seconds     CBC & Differential [[id number]]  (Abnormal) Collected: 12/03/23 0657    Specimen: Blood Updated: 12/03/23 0709    Narrative:      The following orders were created for panel order CBC & Differential.  Procedure                               Abnormality         Status                     ---------                               -----------         ------                     CBC Auto Differential[[id number]]        Abnormal            Final result                  Please view results for these tests on the individual orders.    CBC Auto Differential [[id number]]  (Abnormal) Collected: 12/03/23 0657    Specimen: Blood Updated: 12/03/23 0709     WBC 12.40 10*3/mm3      RBC 3.34 10*6/mm3      Hemoglobin 10.9 g/dL      Hematocrit 32.3 %      MCV 96.6 fL      MCH 32.7 pg      MCHC 33.9 g/dL      RDW 14.7 %      RDW-SD 49.0 fl      MPV 9.6 fL      Platelets 148 10*3/mm3      Neutrophil % 80.1 %      Lymphocyte % 9.0 %      Monocyte % 10.6 %      Eosinophil % 0.1 %      Basophil % 0.2 %      Neutrophils, Absolute 9.90 10*3/mm3      Lymphocytes, Absolute 1.10 10*3/mm3      Monocytes, Absolute 1.30 10*3/mm3      Eosinophils, Absolute 0.00 10*3/mm3      Basophils, Absolute 0.00 10*3/mm3      nRBC 0.1 /100 WBC             Imaging Results (Last 24 Hours)       ** No results found for the last 24 hours. **        LAB RESULTS (LAST 7 DAYS)    CBC  Results from last 7 days   Lab Units 12/04/23 0421 12/03/23 0657 12/02/23 0644 12/01/23 2302 12/01/23  1613   WBC 10*3/mm3 14.50* 12.40* 11.90* 13.60* 9.50   RBC 10*6/mm3 3.47* 3.34* 3.61* 3.58* 3.86*   HEMOGLOBIN g/dL 11.2* 10.9* 11.6* 11.4* 12.6*   HEMATOCRIT % 32.9* 32.3* 34.1* 34.6* 36.9*   MCV fL 95.0 96.6 94.6 96.6 95.7   PLATELETS 10*3/mm3 170 148 184 189 197       BMP  Results from last 7 days   Lab Units 12/04/23 0421 12/03/23 0657 12/02/23 0644 12/01/23 2302 12/01/23  1613   SODIUM mmol/L 135* 133* 132* 130* 130*   POTASSIUM mmol/L 4.1 4.2 4.5 4.5 4.0   CHLORIDE mmol/L 104 103 101 100 97*   CO2 mmol/L 19.0* 22.0 21.0* 19.0* 21.0*   BUN mg/dL 21 17 15 14 16   CREATININE mg/dL 0.89 1.01 1.04 1.10 1.02   GLUCOSE mg/dL 112* 105* 120* 129* 124*       CMP   Results from last 7 days   Lab Units 12/04/23  0421 12/03/23  0657 12/02/23  0644 12/01/23  2302 12/01/23  1613   SODIUM mmol/L 135* 133* 132* 130* 130*   POTASSIUM mmol/L 4.1 4.2 4.5 4.5 4.0   CHLORIDE mmol/L 104 103 101 100 97*   CO2 mmol/L 19.0* 22.0 21.0* 19.0* 21.0*   BUN  mg/dL 21 17 15 14 16   CREATININE mg/dL 0.89 1.01 1.04 1.10 1.02   GLUCOSE mg/dL 112* 105* 120* 129* 124*   ALBUMIN g/dL  --   --   --   --  3.7   BILIRUBIN mg/dL  --   --   --   --  0.8   ALK PHOS U/L  --   --   --   --  83   AST (SGOT) U/L  --   --   --   --  21   ALT (SGPT) U/L  --   --   --   --  17         BNP        TROPONIN  Results from last 7 days   Lab Units 12/01/23  1613   HSTROP T ng/L 33*       CoAg  Results from last 7 days   Lab Units 12/04/23  0421 12/03/23 2021 12/03/23  1359 12/03/23  0657 12/03/23  0012 12/02/23  0644 12/01/23  2302 12/01/23  1613   INR   --   --   --   --   --   --   --  1.08   APTT seconds 62.7 57.7* 51.0* 61.5 81.2* 90.0* 49.4* 25.5       Creatinine Clearance  Estimated Creatinine Clearance: 54.9 mL/min (by C-G formula based on SCr of 0.89 mg/dL).    ABG        Radiology  XR Chest 1 View    Result Date: 12/2/2023  Impression: 1.No acute radiographic abnormality is identified. 2.Curved lead or catheter projects over the right atrium, possibly external to the patient. Please correlate clinically. Repeat radiograph after clearing the patient of external wires may be considered if indicated. Electronically Signed: Gordon Lagos MD  12/2/2023 10:37 AM EST  Workstation ID: [id number]         EKG            I personally viewed and interpreted the patient's EKG/Telemetry data:    ECHOCARDIOGRAM:    Results for orders placed during the hospital encounter of 12/01/23    Adult Transthoracic Echo Complete W/ Cont if Necessary Per Protocol    Interpretation Summary    Left ventricular ejection fraction appears to be 36 - 40%.    The left atrial cavity is moderately dilated.    Estimated right ventricular systolic pressure from tricuspid regurgitation is normal (<35 mmHg).          STRESS TEST        Cardiolite (Tc-99m sestamibi) stress test    CARDIAC CATHETERIZATION  Results for orders placed during the hospital encounter of 12/01/23    Cardiac Catheterization/Vascular  Study                OTHER:         Assessment & Plan     Principal Problem:    Acute ST elevation myocardial infarction (STEMI) involving right coronary artery    //////////////////////////  History  =============  - Inferior STEMI 12/4/2023.    - Status post PCI with PTCA, stent placement on Pronto catheter atherectomy to the graft to the distal right coronary artery.-12/3/2023-Dr. العلي    Cardiac catheterization 12/3/2023-Dr. العلي    Left Main %: 20 to 30%   Proximal LAD %: 100%  Mid/Distal LAD %: 100%  LCX %: Proximal 80% OM1 100%  Ramus:   RCA %: 100% after the PDA.  Lima %: LIMA to LAD was patent  SVG(s) %: SVG to the marginal branch is patent but has some 30 to 40% disease.  SVG to the diagonal branch is occluded.  SVG to the PDA is occluded.  SVG to the distal RCA is occluded but is the culprit lesion    -Past history of syncope-no further episodes.     -status post loop recorder placement.  [employer] LINQ 12/29/2017      - status post CABG October 2001. cardiac catheterization 12/26/2014 revealed 60% distal circumflex and total LAD and right coronary arteries.  Lima to LAD was patent ( lima coming off the left vertebral artery).  SVG to diagonal   marginal and PDA were patent.  SVG to left ventricular branch was totally occluded (chronic)     - atrial fibrillation -has converted to sinus rhythm and maintaining sinus rhythm.  Recently patient was noted to have atrial dysrhythmia on the monitor with loop recorder.     - sinus bradycardia-asymptomatic     - status post acute inferior myocardial infarction prior to surgery requiring acute stent placement to right coronary artery ) complicated by ventricular fibrillation)    Echocardiogram 12/1/2023    Left ventricular ejection fraction appears to be 36 - 40%.    The left atrial cavity is moderately dilated.    Estimated right ventricular systolic pressure from tricuspid regurgitation is normal (<35 mmHg).      -Dyslipidemia and hypertension     - lower  extremity weakness.   Arterial Doppler study of the lower extremity is normal. -  improved .   arterial Doppler study of the lower extremity was normal     - status post cholecystectomy     - allergy to penicillin iodine and metoprolol (rash).  Intolerance to atenolol due to bradycardia.  Allergy to Levaquin and penicillin.  Intolerance to prednisone Nitropatch IV nitroglycerin and prednisone.  =================    Plan  ==============  - Inferior STEMI 12/4/2023.    - Status post PCI with PTCA, stent placement on Pronto catheter atherectomy to the graft to the distal right coronary artery.-12/3/2023-Dr. العلي    Elevated troponin level probably due to reperfusion.    History of complete heart block.  Doing better.  Patient has junctional rhythm.  Hemodynamically stable.  Patient had temporary pacemaker which was removed.    Patient is on beta-blocker.  Low-dose Coreg.    Continue intravenous heparin.  Observe for toxic effects of high risk medication.    Echocardiogram 12/1/2023    Left ventricular ejection fraction appears to be 36 - 40%.    The left atrial cavity is moderately dilated.    Estimated right ventricular systolic pressure from tricuspid regurgitation is normal (<35 mmHg).    Have discussed with Dr. العلي.  Hold off on any further cardiac catheterization at this time.  Patient's family apparently was appraised by Dr. العلي of the diffuse disease.    Medications were reviewed and updated.  Patient is on aspirin atorvastatin Plavix low-dose Coreg and intravenous heparin.    Reviewed and updated-12/4/2023    Further plan will depend on patient's progress.  //////////////////////////////////////         Missy Domínguez MD  12/04/23  05:48 EST

## 2023-12-04 NOTE — SIGNIFICANT NOTE
Discharge Planning Assessment  [facility]     Patient Name: Sage Flores  MRN: [id number]  Today's Date: 12/4/2023    Admit Date: 12/1/2023    Plan: PT/OT recommend SNF- list given to patient. No precert required. PASRR required - pending.   Discharge Needs Assessment       Row Name 12/04/23 1252       Living Environment    People in Home alone    Current Living Arrangements home    Potentially Unsafe Housing Conditions none    In the past 12 months has the electric, gas, oil, or water company threatened to shut off services in your home? No    Primary Care Provided by self    Provides Primary Care For no one    Family Caregiver if Needed child(laney), adult    Family Caregiver Names Alicja - daughter    Quality of Family Relationships helpful;involved;supportive    Able to Return to Prior Arrangements yes       Resource/Environmental Concerns    Resource/Environmental Concerns none    Transportation Concerns none       Transition Planning    Patient/Family Anticipates Transition to inpatient rehabilitation facility    Patient/Family Anticipated Services at Transition skilled nursing    Transportation Anticipated family or friend will provide       Discharge Needs Assessment    Readmission Within the Last 30 Days no previous admission in last 30 days    Equipment Currently Used at Home walker, rolling;cane, straight;commode;grab bar    Concerns to be Addressed discharge planning    Equipment Needed After Discharge none    Discharge Facility/Level of Care Needs nursing facility, skilled    Provided Post Acute Provider List? Yes    Post Acute Provider List Inpatient Rehab    Delivered To Patient    Method of Delivery In person                   Discharge Plan       Row Name 12/04/23 8096       Plan    Plan PT/OT recommend SNF- list given to patient. No precert required. PASRR required - pending.    Patient/Family in Agreement with Plan yes    Plan Comments Patient lives at home alone. Patient's daughter, Alicja will  transport at discharge.  Patient uses [employer]W for all ADLs, he has neighbors, daughter, granddaughter, and son that check on him frequently ans assist with meals, housework, errands, and shopping. Patient performs independent self care. PCP and pharmacy confirmed. Agreeable to [facility].  Denies financial assistance needs for medication and/or food. PT/OT recommend SNF.  List given to patient to review. No precert required.  PASRR required - secure chat request sent to W - pending. DC Barriers: heparin drip, IVF, Cardiology plans for possible further intervention                 Expected Discharge Date and Time       Expected Discharge Date Expected Discharge Time    Dec 5, 2023            Demographic Summary       Row Name 12/04/23 1249       General Information    Admission Type inpatient    Arrived From emergency department    Required Notices Provided Important Message from Medicare    Referral Source admission list    Reason for Consult discharge planning    Preferred Language English                   Functional Status       Row Name 12/04/23 1251       Functional Status    Usual Activity Tolerance moderate    Current Activity Tolerance moderate       Functional Status, IADL    Medications assistive person    Meal Preparation assistive person    Housekeeping assistive equipment and person    Laundry assistive equipment and person    Shopping assistive equipment and person             RAY Eddy RN  SIPS/ICU   O: [phone]  C: [phone]  [email or web address]

## 2023-12-05 LAB
ANION GAP SERPL CALCULATED.3IONS-SCNC: 12 MMOL/L (ref 5–15)
APTT PPP: 67.3 SECONDS (ref 61–76.5)
BASOPHILS # BLD AUTO: 0 10*3/MM3 (ref 0–0.2)
BASOPHILS NFR BLD AUTO: 0.4 % (ref 0–1.5)
BUN SERPL-MCNC: 25 MG/DL (ref 8–23)
BUN/CREAT SERPL: 26.9 (ref 7–25)
CALCIUM SPEC-SCNC: 8.1 MG/DL (ref 8.2–9.6)
CHLORIDE SERPL-SCNC: 107 MMOL/L (ref 98–107)
CO2 SERPL-SCNC: 17 MMOL/L (ref 22–29)
CREAT SERPL-MCNC: 0.93 MG/DL (ref 0.76–1.27)
DEPRECATED RDW RBC AUTO: 52.1 FL (ref 37–54)
EGFRCR SERPLBLD CKD-EPI 2021: 76.6 ML/MIN/1.73
EOSINOPHIL # BLD AUTO: 0 10*3/MM3 (ref 0–0.4)
EOSINOPHIL NFR BLD AUTO: 0.3 % (ref 0.3–6.2)
ERYTHROCYTE [DISTWIDTH] IN BLOOD BY AUTOMATED COUNT: 14.7 % (ref 12.3–15.4)
GEN 5 2HR TROPONIN T REFLEX: 3399 NG/L
GLUCOSE SERPL-MCNC: 109 MG/DL (ref 65–99)
HCT VFR BLD AUTO: 32.5 % (ref 37.5–51)
HGB BLD-MCNC: 11 G/DL (ref 13–17.7)
LYMPHOCYTES # BLD AUTO: 1.4 10*3/MM3 (ref 0.7–3.1)
LYMPHOCYTES NFR BLD AUTO: 10.8 % (ref 19.6–45.3)
MAGNESIUM SERPL-MCNC: 1.6 MG/DL (ref 1.7–2.3)
MCH RBC QN AUTO: 32.5 PG (ref 26.6–33)
MCHC RBC AUTO-ENTMCNC: 34 G/DL (ref 31.5–35.7)
MCV RBC AUTO: 95.6 FL (ref 79–97)
MONOCYTES # BLD AUTO: 1 10*3/MM3 (ref 0.1–0.9)
MONOCYTES NFR BLD AUTO: 8.1 % (ref 5–12)
NEUTROPHILS NFR BLD AUTO: 10.2 10*3/MM3 (ref 1.7–7)
NEUTROPHILS NFR BLD AUTO: 80.4 % (ref 42.7–76)
NRBC BLD AUTO-RTO: 0.1 /100 WBC (ref 0–0.2)
PLATELET # BLD AUTO: 179 10*3/MM3 (ref 140–450)
PMV BLD AUTO: 10 FL (ref 6–12)
POTASSIUM SERPL-SCNC: 4 MMOL/L (ref 3.5–5.2)
RBC # BLD AUTO: 3.4 10*6/MM3 (ref 4.14–5.8)
SODIUM SERPL-SCNC: 136 MMOL/L (ref 136–145)
TROPONIN T DELTA: -171 NG/L
TROPONIN T SERPL HS-MCNC: 3570 NG/L
TSH SERPL DL<=0.05 MIU/L-ACNC: 2.9 UIU/ML (ref 0.27–4.2)
WBC NRBC COR # BLD AUTO: 12.6 10*3/MM3 (ref 3.4–10.8)

## 2023-12-05 PROCEDURE — 85025 COMPLETE CBC W/AUTO DIFF WBC: CPT | Performed by: NURSE PRACTITIONER

## 2023-12-05 PROCEDURE — 97116 GAIT TRAINING THERAPY: CPT

## 2023-12-05 PROCEDURE — 94799 UNLISTED PULMONARY SVC/PX: CPT

## 2023-12-05 PROCEDURE — 94640 AIRWAY INHALATION TREATMENT: CPT

## 2023-12-05 PROCEDURE — 25010000002 ONDANSETRON PER 1 MG

## 2023-12-05 PROCEDURE — 83735 ASSAY OF MAGNESIUM: CPT | Performed by: INTERNAL MEDICINE

## 2023-12-05 PROCEDURE — 97110 THERAPEUTIC EXERCISES: CPT

## 2023-12-05 PROCEDURE — 84443 ASSAY THYROID STIM HORMONE: CPT | Performed by: INTERNAL MEDICINE

## 2023-12-05 PROCEDURE — 25810000003 SODIUM CHLORIDE 0.9 % SOLUTION: Performed by: INTERNAL MEDICINE

## 2023-12-05 PROCEDURE — 84484 ASSAY OF TROPONIN QUANT: CPT | Performed by: INTERNAL MEDICINE

## 2023-12-05 PROCEDURE — 92526 ORAL FUNCTION THERAPY: CPT

## 2023-12-05 PROCEDURE — 80048 BASIC METABOLIC PNL TOTAL CA: CPT | Performed by: NURSE PRACTITIONER

## 2023-12-05 PROCEDURE — 99233 SBSQ HOSP IP/OBS HIGH 50: CPT | Performed by: INTERNAL MEDICINE

## 2023-12-05 PROCEDURE — 25010000002 HEPARIN (PORCINE) 25000-0.45 UT/250ML-% SOLUTION: Performed by: INTERNAL MEDICINE

## 2023-12-05 PROCEDURE — 85730 THROMBOPLASTIN TIME PARTIAL: CPT | Performed by: STUDENT IN AN ORGANIZED HEALTH CARE EDUCATION/TRAINING PROGRAM

## 2023-12-05 RX ORDER — IPRATROPIUM BROMIDE AND ALBUTEROL SULFATE 2.5; .5 MG/3ML; MG/3ML
3 SOLUTION RESPIRATORY (INHALATION) EVERY 4 HOURS PRN
Status: DISCONTINUED | OUTPATIENT
Start: 2023-12-05 | End: 2023-12-11 | Stop reason: HOSPADM

## 2023-12-05 RX ADMIN — HEPARIN SODIUM 12 UNITS/KG/HR: 10000 INJECTION, SOLUTION INTRAVENOUS at 00:32

## 2023-12-05 RX ADMIN — IPRATROPIUM BROMIDE AND ALBUTEROL SULFATE 3 ML: .5; 3 SOLUTION RESPIRATORY (INHALATION) at 21:48

## 2023-12-05 RX ADMIN — ONDANSETRON 4 MG: 2 INJECTION INTRAMUSCULAR; INTRAVENOUS at 17:45

## 2023-12-05 RX ADMIN — CLOPIDOGREL BISULFATE 75 MG: 75 TABLET ORAL at 09:02

## 2023-12-05 RX ADMIN — ATORVASTATIN CALCIUM 10 MG: 10 TABLET, FILM COATED ORAL at 21:15

## 2023-12-05 RX ADMIN — ASPIRIN 81 MG CHEWABLE TABLET 81 MG: 81 TABLET CHEWABLE at 09:02

## 2023-12-05 RX ADMIN — SODIUM CHLORIDE 75 ML/HR: 9 INJECTION, SOLUTION INTRAVENOUS at 15:30

## 2023-12-05 RX ADMIN — CARVEDILOL 3.12 MG: 3.12 TABLET, FILM COATED ORAL at 17:18

## 2023-12-05 RX ADMIN — Medication 10 ML: at 09:03

## 2023-12-05 RX ADMIN — Medication 10 ML: at 21:16

## 2023-12-05 RX ADMIN — NASAL DECONGESTANT 2 SPRAY: 0.05 SPRAY NASAL at 21:20

## 2023-12-05 RX ADMIN — GUAIFENESIN 200 MG: 200 SOLUTION ORAL at 21:21

## 2023-12-05 RX ADMIN — CARVEDILOL 3.12 MG: 3.12 TABLET, FILM COATED ORAL at 09:02

## 2023-12-05 NOTE — PLAN OF CARE
Goal Outcome Evaluation:      Skilled ST intervention conducted this date targeting acute on chronic dysphagia during admission for acute ST elevation myocardial infarction (STEMI).  Pt alert, oriented, and pleasant sitting upright in chair with meal tray present upon entry.  Patient with family at bedside. Pt on room air during session. Pt currently prescribed a Mechanical Soft and Thin liquids     Treatment narrative/results:  Patient observed self-feeding meatloaf x7 and coke by straw x2. Pt with upper and lower denture plates in place. Pt took appropriate sized bites and sips throughout meal assessment. Patient demonstrated clinically efficient mastication and adequately clears oral cavity between bites. Pt with complete labial seal/pull from straw with no anterior loss noted at any time. No clinical s/s of aspiration observed at any time. Patient and family endorse eating softer foods at home without difficulty. ST offered trials of peanut butter crackers for potential diet upgrade but pt refused stating current diet is closest to what he has been eating at home.     No further skilled ST warranted at this time d/t patient being at what seems his L/R diet with no clinical s/s of aspiration. ST to sign off. Please re-consult if any new issues or concerns arise.               Anticipated Discharge Disposition (SLP): (P) unknown

## 2023-12-05 NOTE — PLAN OF CARE
"Goal Outcome Evaluation:   Assessment: Sage Flores presents with functional mobility impairments which indicate the need for skilled intervention. Tolerating session today without incident. Will continue to follow and progress as tolerated. Patient did well in session today. Patient was able to complete exercises to begin session and able to ambulate 100 ft with chair follow and CGAx1 with rolling walker. Patients respiratory rate increased while ambulating to 38 and showed flexed posture, decreased heel strike, endurance, and wide base of support. Therapy is still recommending skilled nursing facility upon discharge.    Plan/Recommendations:   Moderate Intensity Therapy recommended post-acute care. This is recommended as therapy feels the patient would require 3-4 days per week and wouldn't tolerate \"3 hour daily\" rehab intensity. SNF would be the preferred choice. If the patient does not agree to SNF, arrange HH or OP depending on home bound status. If patient is medically complex, consider LTACH.. Pt requires rolling walker at discharge.     Pt desires home at discharge. Pt not cooperative; unagreeable to therapeutic recommendations and plan of care.                   "

## 2023-12-05 NOTE — PROGRESS NOTES
"Referring Provider: Magdiel Lao DO    Reason for follow-up:  Inferior STEMI  Status post CABG     Patient Care Team:  Tricia Aldana APRN as PCP - General (Family Medicine)  Missy Domínguez MD as Consulting Physician (Cardiology)    Subjective .      ROS  Does not feel well.  \"Wants to go to his wife who has passed away in the past\".    However, denies having any chest pain or shortness of breath.    The patient has been without any chest discomfort ,shortness of breath, palpitations, dizziness or syncope.  Denies having any headache ,abdominal pain ,nausea, vomiting , diarrhea constipation, loss of weight or loss of appetite.  Denies having any excessive bruising ,hematuria or blood in the stool.    Review of all systems negative except as indicated    History  Past Medical History:   Diagnosis Date    Anxiety 2014    Atrial fibrillation     Benign prostatic hyperplasia     CAD (coronary artery disease)     Hyperlipidemia     Hypertension     Myocardial infarction        Past Surgical History:   Procedure Laterality Date    CARDIAC CATHETERIZATION N/A 12/1/2023    Procedure: Left Heart Cath;  Surgeon: Son العلي MD;  Location: [facility] CATH INVASIVE LOCATION;  Service: Cardiovascular;  Laterality: N/A;    CARDIAC CATHETERIZATION N/A 12/1/2023    Procedure: Coronary angiography;  Surgeon: Son العلي MD;  Location: [facility] CATH INVASIVE LOCATION;  Service: Cardiovascular;  Laterality: N/A;    CARDIAC CATHETERIZATION N/A 12/1/2023    Procedure: Percutaneous Coronary Intervention;  Surgeon: Son العلي MD;  Location: [facility] CATH INVASIVE LOCATION;  Service: Cardiovascular;  Laterality: N/A;    CARDIAC ELECTROPHYSIOLOGY PROCEDURE N/A 12/1/2023    Procedure: Temporary Pacemaker;  Surgeon: Son العلي MD;  Location: [facility] CATH INVASIVE LOCATION;  Service: Cardiovascular;  Laterality: N/A;    CARDIAC SURGERY      HERNIA REPAIR         Family History   Problem Relation Age of Onset    Heart disease " Mother     Heart disease Father        Social History     Tobacco Use    Smoking status: Former     Types: Cigarettes     Quit date: 1970     Years since quittin.2    Smokeless tobacco: Never    Tobacco comments:     more than 50yrs   Vaping Use    Vaping Use: Never used   Substance Use Topics    Alcohol use: No    Drug use: No        Medications Prior to Admission   Medication Sig Dispense Refill Last Dose    acetaminophen (TYLENOL) 650 MG 8 hr tablet Take 1 tablet by mouth Every Night.       aspirin 81 MG chewable tablet Chew 1 tablet Daily.       azelastine (ASTEPRO) 0.15 % solution nasal spray USE 2 SPRAYS IN EACH NOSTRIL TWICE DAILY AS DIRECTED BY PROVIDER 30 mL 3     Cholecalciferol 25 MCG (1000 UT) tablet Take 1 tablet by mouth Daily.       docusate sodium (COLACE) 250 MG capsule Take 1 capsule by mouth Daily.       gabapentin (NEURONTIN) 100 MG capsule TAKE 1 CAPSULE BY MOUTH TWICE DAILY 180 capsule 1     losartan (COZAAR) 50 MG tablet TAKE 1 TABLET BY MOUTH DAILY 90 tablet 1     omeprazole (priLOSEC) 20 MG capsule Take 1 capsule by mouth Daily. 90 capsule 3     tamsulosin (FLOMAX) 0.4 MG capsule 24 hr capsule TAKE 1 CAPSULE BY MOUTH DAILY 90 capsule 0        Allergies  Iodine, Levofloxacin, Nitroglycerin, Paroxetine, Penicillin g, Prednisone, Sucralfate, Diltiazem hcl, Metoprolol, Pramipexole dihydrochloride, and Ropinirole hcl    Scheduled Meds:aspirin, 81 mg, Oral, Daily  atorvastatin, 10 mg, Oral, Nightly  carvedilol, 3.125 mg, Oral, BID With Meals  clopidogrel, 75 mg, Oral, Daily  oxymetazoline, 2 spray, Each Nare, BID  sodium chloride, 10 mL, Intravenous, Q12H      Continuous Infusions:heparin, 12 Units/kg/hr, Last Rate: 12 Units/kg/hr (23 0032)  sodium chloride, 75 mL/hr, Last Rate: 75 mL/hr (23 1501)      PRN Meds:.  acetaminophen **OR** acetaminophen    senna-docusate sodium **AND** polyethylene glycol **AND** bisacodyl **AND** bisacodyl    guaifenesin    influenza vaccine     "ondansetron **OR** ondansetron    sodium chloride    sodium chloride    sodium chloride    Objective     VITAL SIGNS  Vitals:    12/05/23 0302 12/05/23 0330 12/05/23 0400 12/05/23 0500   BP: 135/79  145/79 149/79   Pulse: 53  52 52   Resp:       Temp: 99.1 °F (37.3 °C)      TempSrc: Axillary      SpO2: 96%  95% 96%   Weight:  76.5 kg (168 lb 10.4 oz)     Height:           Flowsheet Rows      Flowsheet Row First Filed Value   Admission Height 177.8 cm (70\") Documented at 12/01/2023 1603   Admission Weight 72.6 kg (160 lb) Documented at 12/01/2023 1603              Intake/Output Summary (Last 24 hours) at 12/5/2023 0528  Last data filed at 12/5/2023 0330  Gross per 24 hour   Intake 2597 ml   Output 240 ml   Net 2357 ml        TELEMETRY: Probable junctional rhythm    Physical Exam:  The patient is alert, oriented and in no distress.  Vital signs as noted above.  Head and neck revealed no carotid bruits or jugular venous distention.  No thyromegaly or lymphadenopathy is present  Lungs clear.  No wheezing.  Breath sounds are normal bilaterally.  Heart normal first and second heart sounds.  No murmur. No precordial rub is present.  No gallop is present.  Abdomen soft and nontender.  No organomegaly is present.  Extremities with good peripheral pulses without any pedal edema.  Cardiac cath site looks normal.  Skin warm and dry.  Musculoskeletal system is grossly normal  CNS grossly normal    Reviewed and updated.    Results Review:   I reviewed the patient's new clinical results.  Lab Results (last 24 hours)       Procedure Component Value Units Date/Time    aPTT [[id number]]  (Normal) Collected: 12/04/23 2329    Specimen: Blood Updated: 12/04/23 2351     PTT 75.5 seconds     aPTT [[id number]]  (Normal) Collected: 12/04/23 1728    Specimen: Blood Updated: 12/04/23 1801     PTT 63.1 seconds     aPTT [[id number]]  (Abnormal) Collected: 12/04/23 1034    Specimen: Blood Updated: 12/04/23 1057     PTT 50.5 seconds     High " Sensitivity Troponin T 2Hr [[id number]]  (Abnormal) Collected: 12/04/23 0722    Specimen: Blood Updated: 12/04/23 0757     HS Troponin T 3,951 ng/L      Troponin T Delta -23 ng/L     Narrative:      High Sensitive Troponin T Reference Range:  <14.0 ng/L- Negative Female for AMI  <22.0 ng/L- Negative Male for AMI  >=14 - Abnormal Female indicating possible myocardial injury.  >=22 - Abnormal Male indicating possible myocardial injury.   Clinicians would have to utilize clinical acumen, EKG, Troponin, and serial changes to determine if it is an Acute Myocardial Infarction or myocardial injury due to an underlying chronic condition.         High Sensitivity Troponin T [[id number]]  (Abnormal) Collected: 12/04/23 0421    Specimen: Blood Updated: 12/04/23 0706     HS Troponin T 3,974 ng/L     Narrative:      High Sensitive Troponin T Reference Range:  <14.0 ng/L- Negative Female for AMI  <22.0 ng/L- Negative Male for AMI  >=14 - Abnormal Female indicating possible myocardial injury.  >=22 - Abnormal Male indicating possible myocardial injury.   Clinicians would have to utilize clinical acumen, EKG, Troponin, and serial changes to determine if it is an Acute Myocardial Infarction or myocardial injury due to an underlying chronic condition.         Basic Metabolic Panel [[id number]]  (Abnormal) Collected: 12/04/23 0421    Specimen: Blood Updated: 12/04/23 0536     Glucose 112 mg/dL      BUN 21 mg/dL      Creatinine 0.89 mg/dL      Sodium 135 mmol/L      Potassium 4.1 mmol/L      Chloride 104 mmol/L      CO2 19.0 mmol/L      Calcium 8.3 mg/dL      BUN/Creatinine Ratio 23.6     Anion Gap 12.0 mmol/L      eGFR 79.9 mL/min/1.73     Narrative:      GFR Normal >60  Chronic Kidney Disease <60  Kidney Failure <15    The GFR formula is only valid for adults with stable renal function between ages 18 and 70.            Imaging Results (Last 24 Hours)       ** No results found for the last 24 hours. **        LAB RESULTS (LAST 7  DAYS)    CBC  Results from last 7 days   Lab Units 12/04/23 0421 12/03/23  0657 12/02/23  0644 12/01/23 2302 12/01/23  1613   WBC 10*3/mm3 14.50* 12.40* 11.90* 13.60* 9.50   RBC 10*6/mm3 3.47* 3.34* 3.61* 3.58* 3.86*   HEMOGLOBIN g/dL 11.2* 10.9* 11.6* 11.4* 12.6*   HEMATOCRIT % 32.9* 32.3* 34.1* 34.6* 36.9*   MCV fL 95.0 96.6 94.6 96.6 95.7   PLATELETS 10*3/mm3 170 148 184 189 197       BMP  Results from last 7 days   Lab Units 12/04/23 0421 12/03/23 0657 12/02/23 0644 12/01/23 2302 12/01/23  1613   SODIUM mmol/L 135* 133* 132* 130* 130*   POTASSIUM mmol/L 4.1 4.2 4.5 4.5 4.0   CHLORIDE mmol/L 104 103 101 100 97*   CO2 mmol/L 19.0* 22.0 21.0* 19.0* 21.0*   BUN mg/dL 21 17 15 14 16   CREATININE mg/dL 0.89 1.01 1.04 1.10 1.02   GLUCOSE mg/dL 112* 105* 120* 129* 124*       CMP   Results from last 7 days   Lab Units 12/04/23 0421 12/03/23 0657 12/02/23 0644 12/01/23 2302 12/01/23  1613   SODIUM mmol/L 135* 133* 132* 130* 130*   POTASSIUM mmol/L 4.1 4.2 4.5 4.5 4.0   CHLORIDE mmol/L 104 103 101 100 97*   CO2 mmol/L 19.0* 22.0 21.0* 19.0* 21.0*   BUN mg/dL 21 17 15 14 16   CREATININE mg/dL 0.89 1.01 1.04 1.10 1.02   GLUCOSE mg/dL 112* 105* 120* 129* 124*   ALBUMIN g/dL  --   --   --   --  3.7   BILIRUBIN mg/dL  --   --   --   --  0.8   ALK PHOS U/L  --   --   --   --  83   AST (SGOT) U/L  --   --   --   --  21   ALT (SGPT) U/L  --   --   --   --  17         BNP        TROPONIN  Results from last 7 days   Lab Units 12/04/23  0722   HSTROP T ng/L 3,951*       CoAg  Results from last 7 days   Lab Units 12/04/23  2329 12/04/23  1728 12/04/23  1034 12/04/23  0421 12/03/23  2021 12/03/23  1359 12/03/23  0657 12/01/23  2302 12/01/23  1613   INR   --   --   --   --   --   --   --   --  1.08   APTT seconds 75.5 63.1 50.5* 62.7 57.7* 51.0* 61.5   < > 25.5    < > = values in this interval not displayed.       Creatinine Clearance  Estimated Creatinine Clearance: 56.1 mL/min (by C-G formula based on SCr of 0.89  mg/dL).    ABG        Radiology  No radiology results for the last day        EKG            I personally viewed and interpreted the patient's EKG/Telemetry data:    ECHOCARDIOGRAM:    Results for orders placed during the hospital encounter of 12/01/23    Adult Transthoracic Echo Complete W/ Cont if Necessary Per Protocol    Interpretation Summary    Left ventricular ejection fraction appears to be 36 - 40%.    The left atrial cavity is moderately dilated.    Estimated right ventricular systolic pressure from tricuspid regurgitation is normal (<35 mmHg).          STRESS TEST        Cardiolite (Tc-99m sestamibi) stress test    CARDIAC CATHETERIZATION  Results for orders placed during the hospital encounter of 12/01/23    Cardiac Catheterization/Vascular Study                OTHER:         Assessment & Plan     Principal Problem:    Acute ST elevation myocardial infarction (STEMI) involving right coronary artery    //////////////////////////  History  =============  - Inferior STEMI 12/4/2023.    - Status post PCI with PTCA, stent placement on Pronto catheter atherectomy to the graft to the distal right coronary artery.-12/3/2023-Dr. العلي    Cardiac catheterization 12/3/2023-Dr. العلي    Left Main %: 20 to 30%   Proximal LAD %: 100%  Mid/Distal LAD %: 100%  LCX %: Proximal 80% OM1 100%  Ramus:   RCA %: 100% after the PDA.  Lima %: LIMA to LAD was patent  SVG(s) %: SVG to the marginal branch is patent but has some 30 to 40% disease.  SVG to the diagonal branch is occluded.  SVG to the PDA is occluded.  SVG to the distal RCA is occluded but is the culprit lesion    -Past history of syncope-no further episodes.     -status post loop recorder placement.  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 cardiac catheterization 12/26/2014 revealed 60% distal circumflex and total LAD and right coronary arteries.  Lima to LAD was patent ( lima coming off the left vertebral artery).  SVG to diagonal    marginal and PDA were patent.  SVG to left ventricular branch was totally occluded (chronic)     - atrial fibrillation -has converted to sinus rhythm and maintaining sinus rhythm.  Recently patient was noted to have atrial dysrhythmia on the monitor with loop recorder.     - sinus bradycardia-asymptomatic     - status post acute inferior myocardial infarction prior to surgery requiring acute stent placement to right coronary artery ) complicated by ventricular fibrillation)    Echocardiogram 12/1/2023    Left ventricular ejection fraction appears to be 36 - 40%.    The left atrial cavity is moderately dilated.    Estimated right ventricular systolic pressure from tricuspid regurgitation is normal (<35 mmHg).      -Dyslipidemia and hypertension     - lower extremity weakness.   Arterial Doppler study of the lower extremity is normal. -  improved .   arterial Doppler study of the lower extremity was normal     - status post cholecystectomy     - allergy to penicillin iodine and metoprolol (rash).  Intolerance to atenolol due to bradycardia.  Allergy to Levaquin and penicillin.  Intolerance to prednisone Nitropatch IV nitroglycerin and prednisone.  =================    Plan  ==============  - Inferior STEMI 12/4/2023.    - Status post PCI with PTCA, stent placement on Pronto catheter atherectomy to the graft to the distal right coronary artery.-12/3/2023-Dr. العلي    Elevated troponin level probably due to reperfusion.  At bedtime troponin 3974-12/4/2023  3951-12/5/2023-trending down.    History of complete heart block.  Doing better.  Patient has junctional rhythm.  Hemodynamically stable.  Patient had temporary pacemaker which was removed.    Patient is on beta-blocker.  Low-dose Coreg.    Continue intravenous heparin.  Observe for toxic effects of high risk medication.    Echocardiogram 12/1/2023    Left ventricular ejection fraction appears to be 36 - 40%.    The left atrial cavity is moderately dilated.     Estimated right ventricular systolic pressure from tricuspid regurgitation is normal (<35 mmHg).    Have discussed with Dr. العلي.  Hold off on any further cardiac catheterization at this time.  Patient's family apparently was appraised by Dr. العلي of the diffuse disease.    Medications were reviewed and updated.  Patient is on aspirin atorvastatin Plavix low-dose Coreg and intravenous heparin.    Continue intravenous heparin today.  Observe for toxic effects of high risk medication.    Palliative care discussion as per primary service.    Reviewed and updated-12/5/2023    Further plan will depend on patient's progress.  //////////////////////////////////////         Missy Domínguez MD  12/05/23  05:28 EST

## 2023-12-05 NOTE — PROGRESS NOTES
[facility] MEDICINE SERVICE  DAILY PROGRESS NOTE    NAME: Sage Flores  : 1930  MRN: [id number]      LOS: 4 days     PROVIDER OF SERVICE: Magdiel Lao DO    Chief Complaint: Acute ST elevation myocardial infarction (STEMI) involving right coronary artery    Subjective:     Interval History:  The patient is doing well today. Daughter in law and family friend are present in the room today. No nausea or vomiting. No light headedness or dizziness. Patient is weak after his MI. No diarrhea or constipation. Patient is able to follow commands without issue. Bowels are working well. No recurrence of his chest pain. Breathing is good as well. No other issues over the course of the night.   Review of Systems:   Review of Systems   Constitutional:  Negative for chills, fatigue and fever.   HENT:  Positive for congestion. Negative for drooling.    Respiratory:  Negative for shortness of breath and wheezing.        Objective:     Vital Signs  Temp:  [97.3 °F (36.3 °C)-99.1 °F (37.3 °C)] 97.3 °F (36.3 °C)  Heart Rate:  [50-61] 50  Resp:  [24] 24  BP: (126-162)/(70-95) 143/79   Body mass index is 24.2 kg/m².    Physical Exam  Physical Exam  Physical Exam  HENT:      Head: Atraumatic.      Mouth/Throat:      Mouth: Mucous membranes are moist.   Eyes:      Pupils: Pupils are equal, round, and reactive to light.   Cardiovascular:      Rate and Rhythm: Normal rate and regular rhythm.      Pulses: Normal pulses.      Heart sounds: Normal heart sounds.   Pulmonary:      Effort: Pulmonary effort is normal.      Breath sounds: Normal breath sounds.   Abdominal:      Palpations: Abdomen is soft.   Musculoskeletal:         General: No swelling. Normal range of motion.      Cervical back: Normal range of motion.   Skin:     General: Skin is warm.   Neurological:      General: No focal deficit present.      Mental Status: He is alert and oriented to person, place, and time.   Psychiatric:         Mood and Affect: Mood  "normal.   Scheduled Meds   aspirin, 81 mg, Oral, Daily  atorvastatin, 10 mg, Oral, Nightly  carvedilol, 3.125 mg, Oral, BID With Meals  clopidogrel, 75 mg, Oral, Daily  oxymetazoline, 2 spray, Each Nare, BID  sodium chloride, 10 mL, Intravenous, Q12H       PRN Meds     acetaminophen **OR** acetaminophen    senna-docusate sodium **AND** polyethylene glycol **AND** bisacodyl **AND** bisacodyl    guaifenesin    influenza vaccine    ondansetron **OR** ondansetron    sodium chloride    sodium chloride    sodium chloride   Infusions  heparin, 12 Units/kg/hr, Last Rate: 12 Units/kg/hr (12/05/23 0032)  sodium chloride, 75 mL/hr, Last Rate: 75 mL/hr (12/04/23 1501)          Diagnostic Data    Results from last 7 days   Lab Units 12/05/23  0614 12/01/23  2302 12/01/23  1613   WBC 10*3/mm3 12.60*   < > 9.50   HEMOGLOBIN g/dL 11.0*   < > 12.6*   HEMATOCRIT % 32.5*   < > 36.9*   PLATELETS 10*3/mm3 179   < > 197   GLUCOSE mg/dL 109*   < > 124*   CREATININE mg/dL 0.93   < > 1.02   BUN mg/dL 25*   < > 16   SODIUM mmol/L 136   < > 130*   POTASSIUM mmol/L 4.0   < > 4.0   AST (SGOT) U/L  --   --  21   ALT (SGPT) U/L  --   --  17   ALK PHOS U/L  --   --  83   BILIRUBIN mg/dL  --   --  0.8   ANION GAP mmol/L 12.0   < > 12.0    < > = values in this interval not displayed.       No radiology results for the last day      I reviewed the patient's new clinical results.    Assessment/Plan:     Active and Resolved Problems  Active and Resolved Problems  STEMI  Heart  block with bradycardia  Status post PCI to RCA  Hx of  CABG  -Status post emergent PCI then placement of temporary pacemaker which was discontinued  - On heparin drip Cardiology primary management.   - conservative management   - Cardiology team following the patient  -Echocardiogram \"EF of 6 to 40% right dilatation  Continue aspirin and Plavix     Paroxysmal  A-fib  -Stable sinus rhythm with bradycardic, beta-blockers were put on hold     History of hypertension  -currently " blood pressure stable  -Required dopamine drip previously which was discontinued    Patients BP is under good control currently.      Hyperlipidemia   continue statin     Hyponatremia mild - not clinically significant.      Leukocytosis  - Likely reactive we will continue to monitor  -Urinalysis  - Chest x-ray unremarkable     PT /OT consults appreciated. SNIF           DVT prophylaxis:  Medical and mechanical DVT prophylaxis orders are present.     Code status is   Code Status and Medical Interventions:   Ordered at: 12/01/23 4921     Level Of Support Discussed With:    Patient     Code Status (Patient has no pulse and is not breathing):    CPR (Attempt to Resuscitate)     Medical Interventions (Patient has pulse or is breathing):    Full Support       Plan for disposition:SNF in 2-3 days. Patient lives alone with neighbors close by. He will need placement for now.     Time: 32 minutes    Signature: Electronically signed by Magdiel Lao DO, 12/05/23, 12:20 EST.  [facility] Hospitalist Team

## 2023-12-05 NOTE — THERAPY TREATMENT NOTE
"Subjective: Pt agreeable to therapeutic plan of care. Patient willing to move with therapy.    Objective:     Bed mobility - N/A or Not attempted. Patient sitting in chair upon entry.  Transfers - CGA, Assist x 2, and with rolling walker  Ambulation - 100 feet CGA and with rolling walker    Therapeutic Exercise - 10 Reps Bilaterally AROM supported sitting / chair    Vitals: WNL    Pain: 0 VAS   Location: N/A  Intervention for pain: Repositioned, Increased Activity, and Therapeutic Presence    Education: Provided education on the importance of mobility in the acute care setting, Verbal/Tactile Cues, Transfer Training, Gait Training, and Energy conservation strategies    Assessment: Sage Flores presents with functional mobility impairments which indicate the need for skilled intervention. Tolerating session today without incident. Will continue to follow and progress as tolerated. Patient did well in session today. Patient was able to complete exercises to begin session and able to ambulate 100 ft with chair follow and CGAx1 with rolling walker. Patients respiratory rate increased while ambulating to 38 and showed flexed posture, decreased heel strike, endurance, and wide base of support. Therapy is still recommending skilled nursing facility upon discharge.    Plan/Recommendations:   Moderate Intensity Therapy recommended post-acute care. This is recommended as therapy feels the patient would require 3-4 days per week and wouldn't tolerate \"3 hour daily\" rehab intensity. SNF would be the preferred choice. If the patient does not agree to SNF, arrange HH or OP depending on home bound status. If patient is medically complex, consider LTACH.. Pt requires rolling walker at discharge.     Pt desires home at discharge. Pt not cooperative; unagreeable to therapeutic recommendations and plan of care.         Basic Mobility 6-click:  Rollin = Total, A lot = 2, A little = 3; 4 = None  Supine>Sit:   1 = Total, A " lot = 2, A little = 3; 4 = None   Sit>Stand with arms:  1 = Total, A lot = 2, A little = 3; 4 = None  Bed>Chair:   1 = Total, A lot = 2, A little = 3; 4 = None  Ambulate in room:  1 = Total, A lot = 2, A little = 3; 4 = None  3-5 Steps with railin = Total, A lot = 2, A little = 3; 4 = None  Score: 17    Modified [place]: 0 = No Symptoms    Post-Tx Position: Up in Chair, Alarms activated, and Call light and personal items within reach  PPE: gloves

## 2023-12-05 NOTE — SIGNIFICANT NOTE
Continued Stay Note   Reji     Patient Name: Sage Flores  MRN: [id number]  Today's Date: 12/5/2023    Admit Date: 12/1/2023    Plan: (P) Return home with [facility], accepted, pending PCP to follow.   Discharge Plan       Row Name 12/05/23 1147       Plan    Plan Return home with [facility], accepted, pending PCP to follow. Patient declines SNF    Patient/Family in Agreement with Plan yes (P)     Plan Comments Return home with [facility] HH, accepted per denise Tatum, pending PCP to follow.  Patient refuses SNF.  PASRR approved if SNF.  DC Barriers:  cardiac monitoring, heparin gtt (P)                  Expected Discharge Date and Time       Expected Discharge Date Expected Discharge Time    Dec 7, 2023               RAY Eddy RN  SIPS/ICU   O: [phone]  C: [phone]  [email or web address]

## 2023-12-05 NOTE — THERAPY DISCHARGE NOTE
Acute Care - Speech Language Pathology   Swallow Treatment Note/Discharge  [facility]     Patient Name: Sage Flores  : 1930  MRN: [id number]  Today's Date: 2023               Admit Date: 2023    Visit Dx:    ICD-10-CM ICD-9-CM   1. ST elevation myocardial infarction (STEMI), unspecified artery  I21.3 410.90   2. Heart block AV third degree  I44.2 426.0     Patient Active Problem List   Diagnosis    Allergic rhinitis    Atrial fibrillation    Claudication    Coronary heart disease    Edema    Extremity pain    History of left heart catheterization (LHC)    Hyperlipidemia    Essential hypertension    Inguinal hernia, right    Laceration    Lower extremity edema    Myocardial infarction    Need for other prophylactic vaccination and inoculation against single diseases    Presence of other cardiac implants and grafts    Status post coronary artery bypass graft    Status post percutaneous transluminal coronary angioplasty    Viral URI    Rib pain on left side    Dizziness    Acute cystitis without hematuria    Status post placement of implantable loop recorder    Lab test negative for COVID-19 virus    Cellulitis of left leg    Iron deficiency anemia    Encounter for support and coordination of transition of care    Hematoma of right lower extremity    Encounter for screening for malignant neoplasm of prostate     Medicare annual wellness visit, subsequent    Vitamin D deficiency    Hyponatremia    Gastroesophageal reflux disease without esophagitis    Acute ST elevation myocardial infarction (STEMI) involving right coronary artery     Past Medical History:   Diagnosis Date    Anxiety 2014    Atrial fibrillation     Benign prostatic hyperplasia     CAD (coronary artery disease)     Hyperlipidemia     Hypertension     Myocardial infarction      Past Surgical History:   Procedure Laterality Date    CARDIAC CATHETERIZATION N/A 2023    Procedure: Left Heart Cath;  Surgeon: Son العلي MD;  Location:  [facility] CATH INVASIVE LOCATION;  Service: Cardiovascular;  Laterality: N/A;    CARDIAC CATHETERIZATION N/A 12/1/2023    Procedure: Coronary angiography;  Surgeon: Son العلي MD;  Location: [facility] CATH INVASIVE LOCATION;  Service: Cardiovascular;  Laterality: N/A;    CARDIAC CATHETERIZATION N/A 12/1/2023    Procedure: Percutaneous Coronary Intervention;  Surgeon: Son العلي MD;  Location: [facility] CATH INVASIVE LOCATION;  Service: Cardiovascular;  Laterality: N/A;    CARDIAC ELECTROPHYSIOLOGY PROCEDURE N/A 12/1/2023    Procedure: Temporary Pacemaker;  Surgeon: Son العلي MD;  Location: [facility] CATH INVASIVE LOCATION;  Service: Cardiovascular;  Laterality: N/A;    CARDIAC SURGERY      HERNIA REPAIR         Skilled ST intervention conducted this date targeting acute on chronic dysphagia during admission for acute ST elevation myocardial infarction (STEMI).  Pt alert, oriented, and pleasant sitting upright in chair with meal tray present upon entry.  Patient with family at bedside. Pt on room air during session. Pt currently prescribed a Mechanical Soft and Thin liquids     Treatment narrative/results:  Patient observed self-feeding meatloaf x7 and coke by straw x2. Pt with upper and lower denture plates in place. Pt took appropriate sized bites and sips throughout meal assessment. Patient demonstrated clinically efficient mastication and adequately clears oral cavity between bites. Pt with complete labial seal/pull from straw with no anterior loss noted at any time. No clinical s/s of aspiration observed at any time. Patient and family endorse eating softer foods at home without difficulty. ST offered trials of peanut butter crackers for potential diet upgrade but pt refused stating current diet is closest to what he has been eating at home.     No further skilled ST warranted at this time d/t patient being at what seems his L/R diet with no clinical s/s of aspiration. ST to sign off. Please re-consult if any  "new issues or concerns arise.            SLP Recommendation and Plan     SLP Diet Recommendation: (P) mechanical ground textures, thin liquids (12/05/23 1300)     Monitor for Signs of Aspiration: (P) yes, notify SLP if any concerns (12/05/23 1300)  Recommended Diagnostics: (P) No further SLP services recommended (12/05/23 1300)     Anticipated Discharge Disposition (SLP): (P) unknown (12/05/23 1300)     Therapy Frequency (Swallow): (P) PRN (12/05/23 1300)  Predicted Duration Therapy Intervention (Days): (P) until discharge (12/05/23 1300)     Daily Summary of Progress (SLP): (P) progress toward functional goals as expected (12/05/23 1300)     Anticipated Discharge Disposition (SLP): (P) unknown (12/05/23 1300)                 Treatment Assessment (SLP): (P) toleration of diet, no clinical signs of, aspiration (12/05/23 1300)     Plan for Continued Treatment (SLP): (P) treatment no longer indicated as all goals met (12/05/23 1300)         SWALLOW EVALUATION (last 72 hours)       SLP Adult Swallow Evaluation       Row Name 12/05/23 1300       Rehab Evaluation    Document Type therapy note (daily note) (P)   -    Subjective Information no complaints (P)   -    Patient Observations alert;cooperative (P)   -    Patient Effort good (P)   -    Symptoms Noted During/After Treatment none (P)   -       General Information    Patient Profile Reviewed yes (P)   -    Pertinent History Of Current Problem Per H&P, \"A 89 y.o. old male patient with PMH of CAD, atrial fibrillation, presents to the hospital with complaints of chest pain and syncopal episode which started an hour prior to admission to ER. EKG showed evidence of acute inferior posterior MI with heart block. Patient was taken to Cathlab emergently. Patient was hypotensive with mental status changes. He was started on Dopamine prior to cathlab and a TVP was placed in cathlab. Patient underwent successful senting to RCA. Plans to go back to cathlab in the next " "2-3 day.\"    XR CHEST 12/2  Impression:  1.No acute radiographic abnormality is identified.  2.Curved lead or catheter projects over the right atrium, possibly external to the patient. Please correlate clinically. Repeat radiograph after clearing the patient of external wires may be considered if indicated.      ST consulted 12/4 d/t patient reports of difficulty swallowing foods except thin liquids and pills. Initial evaluation with recs of mechanical ground diet with thin liquids.       (P)   -KP    Current Method of Nutrition mechanical ground textures;thin liquids (P)   -KP    Precautions/Limitations, Vision WFL with corrective lenses;for purposes of eval (P)   -KP    Precautions/Limitations, Hearing WFL;for purposes of eval (P)   -KP    Prior Level of Function-Communication WFL (P)   -KP    Prior Level of Function-Swallowing regular textures;thin liquids (P)   -KP    Plans/Goals Discussed with patient and family (P)   -KP    Barriers to Rehab none identified (P)   -KP       Pain Scale: Numbers Pre/Post-Treatment    Pretreatment Pain Rating --    Posttreatment Pain Rating --       Oral Motor Structure and Function    Dentition Assessment upper dentures/partial in place;lower dentures/partial in place (P)   -KP    Secretion Management --    Mucosal Quality --       Oral Musculature and Cranial Nerve Assessment    Oral Motor General Assessment --       General Eating/Swallowing Observations    Respiratory Support Currently in Use room air (P)   -KP    Eating/Swallowing Skills self-fed (P)   -KP    Positioning During Eating upright 90 degree;upright in chair (P)   -KP    Utensils Used spoon;straw (P)   -KP    Consistencies Trialed mechanical ground textures;thin liquids (P)   -KP       Respiratory    Respiratory Status --       Clinical Swallow Eval    Oral Prep Phase WFL (P)   -KP    Oral Transit WFL (P)   -KP    Oral Residue WFL (P)   -KP    Pharyngeal Phase no overt signs/symptoms of pharyngeal impairment (P)   " -    Clinical Swallow Evaluation Summary --       SLP Evaluation Clinical Impression    SLP Swallowing Diagnosis --    Functional Impact --    Rehab Potential/Prognosis, Swallowing --    Swallow Criteria for Skilled Therapeutic Interventions Met --       SLP Treatment Clinical Impressions    Treatment Assessment (SLP) toleration of diet;no clinical signs of;aspiration (P)   -    Daily Summary of Progress (SLP) progress toward functional goals as expected (P)   -    Plan for Continued Treatment (SLP) treatment no longer indicated as all goals met (P)   -    Care Plan Review care plan/treatment goals reviewed;patient/other agree to care plan (P)   -    Care Plan Review, Other Participant(s) family (P)   -       Recommendations    Therapy Frequency (Swallow) PRN (P)   -    Predicted Duration Therapy Intervention (Days) until discharge (P)   -    SLP Diet Recommendation mechanical ground textures;thin liquids (P)   -    Recommended Diagnostics No further SLP services recommended (P)   -    Recommended Precautions and Strategies upright posture during/after eating;small bites of food and sips of liquid;general aspiration precautions (P)   -    Oral Care Recommendations Oral Care before breakfast, after meals and PRN (P)   -    SLP Rec. for Method of Medication Administration meds whole;meds crushed;with thin liquids;as tolerated (P)   -    Monitor for Signs of Aspiration yes;notify SLP if any concerns (P)   -    Anticipated Discharge Disposition (SLP) unknown (P)   -       Swallow Goals (SLP)    Swallow LTGs Swallow Long Term Goal (free text) (P)   -    Swallow STGs diet tolerance goal selection (SLP) (P)   -    Diet Tolerance Goal Selection (SLP) Swallow Short Term Goal 1 (P)   -       (LTG) Swallow    (LTG) Swallow Pt will demonstrate acceptance of least restrictive PO diet with use of safe swallow strategies and no s/s of aspiration (P)   -    [place] (Swallow Long Term Goal)  with minimal cues (75-90% accuracy) (P)   -KP    Time Frame (Swallow Long Term Goal) by discharge (P)   -KP    Progress/Outcomes (Swallow Long Term Goal) goal met (P)   -KP       (STG) Swallow 1    (STG) Swallow 1 The patient will participate in a follow up assessment to determine safety and adequacy of recommended diet, independent use of safe swallow compensations, and additional goals/recommendations to follow (P)   -KP    Apache (Swallow Short Term Goal 1) with minimal cues (75-90% accuracy) (P)   -KP    Time Frame (Swallow Short Term Goal 1) 1 week (P)   -KP    Progress/Outcomes (Swallow Short Term Goal 1) goal met (P)   -KP              User Key  (r) = Recorded By, (t) = Taken By, (c) = Cosigned By      Initials Name Effective Dates    EC Richa Monahan 06/16/21 -      Ana María Salazar, Speech Therapy Student 08/23/23 -                     EDUCATION  The patient has been educated in the following areas:   Safe swallow strategies, ST POC .         SLP GOALS       Row Name 12/05/23 1300       (LTG) Swallow    (LTG) Swallow Pt will demonstrate acceptance of least restrictive PO diet with use of safe swallow strategies and no s/s of aspiration (P)   -KP    Ap[place] (Swallow Long Term Goal) with minimal cues (75-90% accuracy) (P)   -KP    Time Frame (Swallow Long Term Goal) by discharge (P)   -KP    Progress/Outcomes (Swallow Long Term Goal) goal met (P)   -KP       (STG) Swallow 1    (STG) Swallow 1 The patient will participate in a follow up assessment to determine safety and adequacy of recommended diet, independent use of safe swallow compensations, and additional goals/recommendations to follow (P)   -KP    [place] (Swallow Short Term Goal 1) with minimal cues (75-90% accuracy) (P)   -KP    Time Frame (Swallow Short Term Goal 1) 1 week (P)   -KP    Progress/Outcomes (Swallow Short Term Goal 1) goal met (P)   -KP              User Key  (r) = Recorded By, (t) = Taken By, (c) = Cosigned By       Initials Name Provider Type    Richa Springer Speech and Language Pathologist    Ana María Dee, Speech Therapy Student SLP Student                         Time Calculation:                SLP Discharge Summary  Anticipated Discharge Disposition (SLP): (P) unknown    Ana María Salazar Speech Therapy Student  12/5/2023

## 2023-12-06 ENCOUNTER — APPOINTMENT (OUTPATIENT)
Dept: GENERAL RADIOLOGY | Facility: HOSPITAL | Age: 88
DRG: 321 | End: 2023-12-06
Payer: MEDICARE

## 2023-12-06 ENCOUNTER — APPOINTMENT (OUTPATIENT)
Dept: ULTRASOUND IMAGING | Facility: HOSPITAL | Age: 88
DRG: 321 | End: 2023-12-06
Payer: MEDICARE

## 2023-12-06 LAB
ANION GAP SERPL CALCULATED.3IONS-SCNC: 10 MMOL/L (ref 5–15)
APTT PPP: 73.5 SECONDS (ref 61–76.5)
BACTERIA UR QL AUTO: ABNORMAL /HPF
BILIRUB UR QL STRIP: ABNORMAL
BUN SERPL-MCNC: 29 MG/DL (ref 8–23)
BUN/CREAT SERPL: 27.9 (ref 7–25)
CALCIUM SPEC-SCNC: 8.4 MG/DL (ref 8.2–9.6)
CHLORIDE SERPL-SCNC: 108 MMOL/L (ref 98–107)
CLARITY UR: CLEAR
CO2 SERPL-SCNC: 19 MMOL/L (ref 22–29)
COLOR UR: ABNORMAL
CREAT SERPL-MCNC: 1.04 MG/DL (ref 0.76–1.27)
D-LACTATE SERPL-SCNC: 1.5 MMOL/L (ref 0.5–2)
DEPRECATED RDW RBC AUTO: 52.5 FL (ref 37–54)
EGFRCR SERPLBLD CKD-EPI 2021: 66.9 ML/MIN/1.73
ERYTHROCYTE [DISTWIDTH] IN BLOOD BY AUTOMATED COUNT: 14.8 % (ref 12.3–15.4)
GLUCOSE SERPL-MCNC: 110 MG/DL (ref 65–99)
GLUCOSE UR STRIP-MCNC: ABNORMAL MG/DL
HCT VFR BLD AUTO: 31.5 % (ref 37.5–51)
HGB BLD-MCNC: 10.7 G/DL (ref 13–17.7)
HGB UR QL STRIP.AUTO: NEGATIVE
HYALINE CASTS UR QL AUTO: ABNORMAL /LPF
KETONES UR QL STRIP: ABNORMAL
LEUKOCYTE ESTERASE UR QL STRIP.AUTO: ABNORMAL
MAGNESIUM SERPL-MCNC: 1.6 MG/DL (ref 1.7–2.3)
MCH RBC QN AUTO: 32.3 PG (ref 26.6–33)
MCHC RBC AUTO-ENTMCNC: 33.9 G/DL (ref 31.5–35.7)
MCV RBC AUTO: 95.3 FL (ref 79–97)
NITRITE UR QL STRIP: NEGATIVE
PH UR STRIP.AUTO: 5.5 [PH] (ref 5–8)
PLATELET # BLD AUTO: 187 10*3/MM3 (ref 140–450)
PMV BLD AUTO: 10.4 FL (ref 6–12)
POTASSIUM SERPL-SCNC: 3.9 MMOL/L (ref 3.5–5.2)
PROT UR QL STRIP: ABNORMAL
RBC # BLD AUTO: 3.3 10*6/MM3 (ref 4.14–5.8)
RBC # UR STRIP: ABNORMAL /HPF
REF LAB TEST METHOD: ABNORMAL
SODIUM SERPL-SCNC: 137 MMOL/L (ref 136–145)
SP GR UR STRIP: 1.03 (ref 1–1.03)
SQUAMOUS #/AREA URNS HPF: ABNORMAL /HPF
UROBILINOGEN UR QL STRIP: ABNORMAL
WBC # UR STRIP: ABNORMAL /HPF
WBC NRBC COR # BLD AUTO: 11.8 10*3/MM3 (ref 3.4–10.8)

## 2023-12-06 PROCEDURE — 99233 SBSQ HOSP IP/OBS HIGH 50: CPT | Performed by: INTERNAL MEDICINE

## 2023-12-06 PROCEDURE — 83735 ASSAY OF MAGNESIUM: CPT | Performed by: INTERNAL MEDICINE

## 2023-12-06 PROCEDURE — 87040 BLOOD CULTURE FOR BACTERIA: CPT | Performed by: INTERNAL MEDICINE

## 2023-12-06 PROCEDURE — 25010000002 ONDANSETRON PER 1 MG

## 2023-12-06 PROCEDURE — 25010000002 CEFTRIAXONE PER 250 MG: Performed by: INTERNAL MEDICINE

## 2023-12-06 PROCEDURE — 25010000002 DOPAMINE PER 40 MG: Performed by: INTERNAL MEDICINE

## 2023-12-06 PROCEDURE — 93005 ELECTROCARDIOGRAM TRACING: CPT | Performed by: INTERNAL MEDICINE

## 2023-12-06 PROCEDURE — 80048 BASIC METABOLIC PNL TOTAL CA: CPT | Performed by: INTERNAL MEDICINE

## 2023-12-06 PROCEDURE — 85027 COMPLETE CBC AUTOMATED: CPT | Performed by: INTERNAL MEDICINE

## 2023-12-06 PROCEDURE — 25010000002 HEPARIN (PORCINE) 25000-0.45 UT/250ML-% SOLUTION: Performed by: INTERNAL MEDICINE

## 2023-12-06 PROCEDURE — 87150 DNA/RNA AMPLIFIED PROBE: CPT | Performed by: INTERNAL MEDICINE

## 2023-12-06 PROCEDURE — 25810000003 SODIUM CHLORIDE 0.9 % SOLUTION: Performed by: INTERNAL MEDICINE

## 2023-12-06 PROCEDURE — 93010 ELECTROCARDIOGRAM REPORT: CPT | Performed by: INTERNAL MEDICINE

## 2023-12-06 PROCEDURE — 81001 URINALYSIS AUTO W/SCOPE: CPT | Performed by: INTERNAL MEDICINE

## 2023-12-06 PROCEDURE — 85730 THROMBOPLASTIN TIME PARTIAL: CPT | Performed by: INTERNAL MEDICINE

## 2023-12-06 PROCEDURE — 83605 ASSAY OF LACTIC ACID: CPT | Performed by: INTERNAL MEDICINE

## 2023-12-06 PROCEDURE — 87147 CULTURE TYPE IMMUNOLOGIC: CPT | Performed by: INTERNAL MEDICINE

## 2023-12-06 PROCEDURE — 25010000002 MAGNESIUM SULFATE 2 GM/50ML SOLUTION: Performed by: INTERNAL MEDICINE

## 2023-12-06 PROCEDURE — 87086 URINE CULTURE/COLONY COUNT: CPT | Performed by: INTERNAL MEDICINE

## 2023-12-06 PROCEDURE — 76775 US EXAM ABDO BACK WALL LIM: CPT

## 2023-12-06 PROCEDURE — 71045 X-RAY EXAM CHEST 1 VIEW: CPT

## 2023-12-06 RX ORDER — PROCHLORPERAZINE EDISYLATE 5 MG/ML
5 INJECTION INTRAMUSCULAR; INTRAVENOUS EVERY 6 HOURS PRN
Status: DISPENSED | OUTPATIENT
Start: 2023-12-06 | End: 2023-12-09

## 2023-12-06 RX ORDER — MAGNESIUM SULFATE HEPTAHYDRATE 40 MG/ML
2 INJECTION, SOLUTION INTRAVENOUS ONCE
Status: COMPLETED | OUTPATIENT
Start: 2023-12-06 | End: 2023-12-06

## 2023-12-06 RX ORDER — DOPAMINE HYDROCHLORIDE 160 MG/100ML
2-20 INJECTION, SOLUTION INTRAVENOUS
Status: DISCONTINUED | OUTPATIENT
Start: 2023-12-06 | End: 2023-12-07

## 2023-12-06 RX ADMIN — ATORVASTATIN CALCIUM 10 MG: 10 TABLET, FILM COATED ORAL at 21:15

## 2023-12-06 RX ADMIN — Medication 10 ML: at 11:27

## 2023-12-06 RX ADMIN — HEPARIN SODIUM 12 UNITS/KG/HR: 10000 INJECTION, SOLUTION INTRAVENOUS at 00:50

## 2023-12-06 RX ADMIN — MAGNESIUM SULFATE HEPTAHYDRATE 2 G: 2 INJECTION, SOLUTION INTRAVENOUS at 11:26

## 2023-12-06 RX ADMIN — CEFTRIAXONE 1000 MG: 1 INJECTION, POWDER, FOR SOLUTION INTRAMUSCULAR; INTRAVENOUS at 21:14

## 2023-12-06 RX ADMIN — CARVEDILOL 3.12 MG: 3.12 TABLET, FILM COATED ORAL at 09:17

## 2023-12-06 RX ADMIN — NASAL DECONGESTANT 2 SPRAY: 0.05 SPRAY NASAL at 21:15

## 2023-12-06 RX ADMIN — DOPAMINE HYDROCHLORIDE 2 MCG/KG/MIN: 160 INJECTION, SOLUTION INTRAVENOUS at 19:37

## 2023-12-06 RX ADMIN — ONDANSETRON 4 MG: 2 INJECTION INTRAMUSCULAR; INTRAVENOUS at 19:17

## 2023-12-06 RX ADMIN — ONDANSETRON 4 MG: 2 INJECTION INTRAMUSCULAR; INTRAVENOUS at 06:39

## 2023-12-06 RX ADMIN — GUAIFENESIN 200 MG: 200 SOLUTION ORAL at 06:39

## 2023-12-06 RX ADMIN — CLOPIDOGREL BISULFATE 75 MG: 75 TABLET ORAL at 09:17

## 2023-12-06 RX ADMIN — ASPIRIN 81 MG CHEWABLE TABLET 81 MG: 81 TABLET CHEWABLE at 09:16

## 2023-12-06 RX ADMIN — SODIUM CHLORIDE 75 ML/HR: 9 INJECTION, SOLUTION INTRAVENOUS at 04:13

## 2023-12-06 NOTE — PROGRESS NOTES
The patient feels ok currently. He is not eating much and has some depression today. The patient had some nausea but no vomiting. The patient denies any pain. No chest pain or SOB. No fevers, chills, sweats. Patient was able to ambulate with a walker. He has not been drinking much. Urine output was 250 over the course of the night. The patient has no diarrhea or constipation. Last bowel movement was today. The patient appears in NAD currently. Case discussed with cardiology in detail. They are requesting a palliative care consult for goals of care discussion.     [facility] MEDICINE SERVICE  DAILY PROGRESS NOTE    NAME: Sage Flores  : 1930  MRN: [id number]      LOS: 5 days     PROVIDER OF SERVICE: Magdiel Lao DO    Chief Complaint: Acute ST elevation myocardial infarction (STEMI) involving right coronary artery    Subjective:     Interval History:  The patient is doing well today. Daughter in law and family friend are present in the room today. No nausea or vomiting. No light headedness or dizziness. Patient is weak after his MI. No diarrhea or constipation. Patient is able to follow commands without issue. Bowels are working well. No recurrence of his chest pain. Breathing is good as well. No other issues over the course of the night.   Review of Systems:   Review of Systems   Constitutional:  Negative for chills, fatigue and fever.   HENT:  Positive for congestion. Negative for drooling.    Respiratory:  Negative for shortness of breath and wheezing.        Objective:     Vital Signs  Temp:  [97.3 °F (36.3 °C)-98.2 °F (36.8 °C)] 97.3 °F (36.3 °C)  Heart Rate:  [45-51] 46  Resp:  [22-29] 24  BP: (106-153)/(59-80) 142/75   Body mass index is 25.34 kg/m².    Physical Exam  Physical Exam  Physical Exam  HENT:      Head: Atraumatic.      Mouth/Throat:      Mouth: Mucous membranes are moist.   Eyes:      Pupils: Pupils are equal, round, and reactive to light.   Cardiovascular:      Rate and Rhythm:  bradycardic with regular rhythm     Pulses: Normal pulses.      Heart sounds: Normal heart sounds.   Pulmonary:      Effort: Pulmonary effort is normal.      Breath sounds: Normal breath sounds.   Abdominal:      Palpations: Abdomen is soft, nontender. NABS,  No G/R/R  Musculoskeletal:         General: No swelling. Normal range of motion.      Cervical back: Normal range of motion.   Skin:     General: Skin is warm.   Neurological:      General: No focal deficit present.      Mental Status: He is alert and oriented to person, place, and time.   Psychiatric:         Mood and Affect: Mood normal.   Scheduled Meds   aspirin, 81 mg, Oral, Daily  atorvastatin, 10 mg, Oral, Nightly  carvedilol, 3.125 mg, Oral, BID With Meals  clopidogrel, 75 mg, Oral, Daily  magnesium sulfate, 2 g, Intravenous, Once  oxymetazoline, 2 spray, Each Nare, BID  sodium chloride, 10 mL, Intravenous, Q12H       PRN Meds     acetaminophen **OR** acetaminophen    senna-docusate sodium **AND** polyethylene glycol **AND** bisacodyl **AND** bisacodyl    guaifenesin    influenza vaccine    ipratropium-albuterol    ondansetron **OR** ondansetron    sodium chloride    sodium chloride    sodium chloride   Infusions  sodium chloride, 75 mL/hr, Last Rate: 75 mL/hr (12/06/23 0413)          Diagnostic Data    Results from last 7 days   Lab Units 12/06/23  0802 12/05/23  0614 12/01/23  2302 12/01/23  1613   WBC 10*3/mm3 11.80* 12.60*   < > 9.50   HEMOGLOBIN g/dL 10.7* 11.0*   < > 12.6*   HEMATOCRIT % 31.5* 32.5*   < > 36.9*   PLATELETS 10*3/mm3 187 179   < > 197   GLUCOSE mg/dL  --  109*   < > 124*   CREATININE mg/dL  --  0.93   < > 1.02   BUN mg/dL  --  25*   < > 16   SODIUM mmol/L  --  136   < > 130*   POTASSIUM mmol/L  --  4.0   < > 4.0   AST (SGOT) U/L  --   --   --  21   ALT (SGPT) U/L  --   --   --  17   ALK PHOS U/L  --   --   --  83   BILIRUBIN mg/dL  --   --   --  0.8   ANION GAP mmol/L  --  12.0   < > 12.0    < > = values in this interval not  "displayed.       No radiology results for the last day      I reviewed the patient's new clinical results.    Assessment/Plan:     Active and Resolved Problems  Active and Resolved Problems  STEMI  Heart  block with bradycardia  Status post PCI to RCA  Hx of  CABG  -Status post emergent PCI then placement of temporary pacemaker which was discontinued  - Patient now off heparin drip.   - conservative management   - Cardiology team following the patient  -Echocardiogram \"EF of 6 to 40% right dilatation  Continue aspirin and Plavix     Paroxysmal  A-fib  -Stable sinus rhythm with bradycardic, . EKG has been ordered. Discussed with cardiology. He does not require a pacemaker currently.      History of hypertension  -currently blood pressure stable  -Required dopamine drip previously which was discontinued    Patients BP is under good control currently.      Hyperlipidemia   continue statin     Hyponatremia mild - not clinically significant.      Leukocytosis  - Likely reactive we will continue to monitor  -Urinalysis  - Chest x-ray unremarkable    WBC is improving slowly.    PT /OT consults appreciated. SNIF   Goals of care discussion as per palliative care.     Replete magnesium with goal greater than 2.0 and K greater than 4.0           DVT prophylaxis:  Mechanical DVT prophylaxis orders are present.     Code status is   Code Status and Medical Interventions:   Ordered at: 12/01/23 2223     Level Of Support Discussed With:    Patient     Code Status (Patient has no pulse and is not breathing):    CPR (Attempt to Resuscitate)     Medical Interventions (Patient has pulse or is breathing):    Full Support       Plan for disposition:SNF in 2-3 days. Patient lives alone with neighbors close by. He will need placement for now.     Time: 32 minutes    Signature: Electronically signed by Magdiel Lao DO, 12/06/23, 10:30 EST.  [facility] Hospitalist Team   "

## 2023-12-06 NOTE — PROGRESS NOTES
Referring Provider: Magdiel Lao DO    Reason for follow-up:  Inferior STEMI  Status post CABG  Junctional rhythm     Patient Care Team:  Tricia Aldana APRN as PCP - General (Family Medicine)  Missy Domínguez MD as Consulting Physician (Cardiology)    Subjective .      ROS  No specific symptoms were offered.  Denies having any chest pain.  Does not feel well.    History  Past Medical History:   Diagnosis Date    Anxiety 2014    Atrial fibrillation     Benign prostatic hyperplasia     CAD (coronary artery disease)     Hyperlipidemia     Hypertension     Myocardial infarction        Past Surgical History:   Procedure Laterality Date    CARDIAC CATHETERIZATION N/A 2023    Procedure: Left Heart Cath;  Surgeon: Son العلي MD;  Location: [facility] CATH INVASIVE LOCATION;  Service: Cardiovascular;  Laterality: N/A;    CARDIAC CATHETERIZATION N/A 2023    Procedure: Coronary angiography;  Surgeon: Son العلي MD;  Location: [facility] CATH INVASIVE LOCATION;  Service: Cardiovascular;  Laterality: N/A;    CARDIAC CATHETERIZATION N/A 2023    Procedure: Percutaneous Coronary Intervention;  Surgeon: Son العلي MD;  Location: [facility] CATH INVASIVE LOCATION;  Service: Cardiovascular;  Laterality: N/A;    CARDIAC ELECTROPHYSIOLOGY PROCEDURE N/A 2023    Procedure: Temporary Pacemaker;  Surgeon: Son العلي MD;  Location: [facility] CATH INVASIVE LOCATION;  Service: Cardiovascular;  Laterality: N/A;    CARDIAC SURGERY      HERNIA REPAIR         Family History   Problem Relation Age of Onset    Heart disease Mother     Heart disease Father        Social History     Tobacco Use    Smoking status: Former     Types: Cigarettes     Quit date: 1970     Years since quittin.2    Smokeless tobacco: Never    Tobacco comments:     more than 50yrs   Vaping Use    Vaping Use: Never used   Substance Use Topics    Alcohol use: No    Drug use: No        Medications Prior to Admission   Medication Sig  Dispense Refill Last Dose    acetaminophen (TYLENOL) 650 MG 8 hr tablet Take 1 tablet by mouth Every Night.       aspirin 81 MG chewable tablet Chew 1 tablet Daily.       azelastine (ASTEPRO) 0.15 % solution nasal spray USE 2 SPRAYS IN EACH NOSTRIL TWICE DAILY AS DIRECTED BY PROVIDER 30 mL 3     Cholecalciferol 25 MCG (1000 UT) tablet Take 1 tablet by mouth Daily.       docusate sodium (COLACE) 250 MG capsule Take 1 capsule by mouth Daily.       gabapentin (NEURONTIN) 100 MG capsule TAKE 1 CAPSULE BY MOUTH TWICE DAILY 180 capsule 1     losartan (COZAAR) 50 MG tablet TAKE 1 TABLET BY MOUTH DAILY 90 tablet 1     omeprazole (priLOSEC) 20 MG capsule Take 1 capsule by mouth Daily. 90 capsule 3     tamsulosin (FLOMAX) 0.4 MG capsule 24 hr capsule TAKE 1 CAPSULE BY MOUTH DAILY 90 capsule 0        Allergies  Iodine, Levofloxacin, Nitroglycerin, Paroxetine, Penicillin g, Prednisone, Sucralfate, Diltiazem hcl, Metoprolol, Pramipexole dihydrochloride, and Ropinirole hcl    Scheduled Meds:aspirin, 81 mg, Oral, Daily  atorvastatin, 10 mg, Oral, Nightly  carvedilol, 3.125 mg, Oral, BID With Meals  clopidogrel, 75 mg, Oral, Daily  oxymetazoline, 2 spray, Each Nare, BID  sodium chloride, 10 mL, Intravenous, Q12H      Continuous Infusions:heparin, 12 Units/kg/hr, Last Rate: 12 Units/kg/hr (12/06/23 0050)  sodium chloride, 75 mL/hr, Last Rate: 75 mL/hr (12/06/23 0413)      PRN Meds:.  acetaminophen **OR** acetaminophen    senna-docusate sodium **AND** polyethylene glycol **AND** bisacodyl **AND** bisacodyl    guaifenesin    influenza vaccine    ipratropium-albuterol    ondansetron **OR** ondansetron    sodium chloride    sodium chloride    sodium chloride    Objective     VITAL SIGNS  Vitals:    12/06/23 0009 12/06/23 0359 12/06/23 0400 12/06/23 0517   BP: 144/75 143/74 143/74    BP Location: Left arm Left arm     Patient Position: Lying Lying     Pulse: (!) 47 (!) 45 (!) 46    Resp: 22 22     Temp: 97.6 °F (36.4 °C) 98.2 °F (36.8  "°C)     TempSrc: Oral Oral     SpO2: 96% 96% 97%    Weight:    80.1 kg (176 lb 9.4 oz)   Height:           Flowsheet Rows      Flowsheet Row First Filed Value   Admission Height 177.8 cm (70\") Documented at 12/01/2023 1603   Admission Weight 72.6 kg (160 lb) Documented at 12/01/2023 1603              Intake/Output Summary (Last 24 hours) at 12/6/2023 0639  Last data filed at 12/6/2023 0100  Gross per 24 hour   Intake 1877.89 ml   Output 0 ml   Net 1877.89 ml        TELEMETRY: Probable junctional rhythm    Physical Exam:  The patient is alert, oriented and in no distress.  Vital signs as noted above.  Head and neck revealed no carotid bruits or jugular venous distention.  No thyromegaly or lymphadenopathy is present  Lungs clear.  No wheezing.  Breath sounds are normal bilaterally.  Heart normal first and second heart sounds.  No murmur. No precordial rub is present.  No gallop is present.  Abdomen soft and nontender.  No organomegaly is present.  Extremities with good peripheral pulses without any pedal edema.  Cardiac cath site looks normal.  Skin warm and dry.  Musculoskeletal system is grossly normal  CNS grossly normal    Reviewed and updated    Results Review:   I reviewed the patient's new clinical results.  Lab Results (last 24 hours)       Procedure Component Value Units Date/Time    High Sensitivity Troponin T 2Hr [[id number]]  (Abnormal) Collected: 12/05/23 0831    Specimen: Blood Updated: 12/05/23 0910     HS Troponin T 3,399 ng/L      Troponin T Delta -171 ng/L     Narrative:      High Sensitive Troponin T Reference Range:  <14.0 ng/L- Negative Female for AMI  <22.0 ng/L- Negative Male for AMI  >=14 - Abnormal Female indicating possible myocardial injury.  >=22 - Abnormal Male indicating possible myocardial injury.   Clinicians would have to utilize clinical acumen, EKG, Troponin, and serial changes to determine if it is an Acute Myocardial Infarction or myocardial injury due to an underlying chronic " condition.         High Sensitivity Troponin T [[id number]]  (Abnormal) Collected: 12/05/23 0614    Specimen: Blood Updated: 12/05/23 0826     HS Troponin T 3,570 ng/L     Narrative:      High Sensitive Troponin T Reference Range:  <14.0 ng/L- Negative Female for AMI  <22.0 ng/L- Negative Male for AMI  >=14 - Abnormal Female indicating possible myocardial injury.  >=22 - Abnormal Male indicating possible myocardial injury.   Clinicians would have to utilize clinical acumen, EKG, Troponin, and serial changes to determine if it is an Acute Myocardial Infarction or myocardial injury due to an underlying chronic condition.         Basic Metabolic Panel [[id number]]  (Abnormal) Collected: 12/05/23 0614    Specimen: Blood Updated: 12/05/23 0659     Glucose 109 mg/dL      BUN 25 mg/dL      Creatinine 0.93 mg/dL      Sodium 136 mmol/L      Potassium 4.0 mmol/L      Chloride 107 mmol/L      CO2 17.0 mmol/L      Calcium 8.1 mg/dL      BUN/Creatinine Ratio 26.9     Anion Gap 12.0 mmol/L      eGFR 76.6 mL/min/1.73     Narrative:      GFR Normal >60  Chronic Kidney Disease <60  Kidney Failure <15    The GFR formula is only valid for adults with stable renal function between ages 18 and 70.    TSH [[id number]]  (Normal) Collected: 12/05/23 0614    Specimen: Blood Updated: 12/05/23 0659     TSH 2.900 uIU/mL     Magnesium [[id number]]  (Abnormal) Collected: 12/05/23 0614    Specimen: Blood Updated: 12/05/23 0659     Magnesium 1.6 mg/dL             Imaging Results (Last 24 Hours)       ** No results found for the last 24 hours. **        LAB RESULTS (LAST 7 DAYS)    CBC  Results from last 7 days   Lab Units 12/05/23 0614 12/04/23  0421 12/03/23  0657 12/02/23  0644 12/01/23  2302 12/01/23  1613   WBC 10*3/mm3 12.60* 14.50* 12.40* 11.90* 13.60* 9.50   RBC 10*6/mm3 3.40* 3.47* 3.34* 3.61* 3.58* 3.86*   HEMOGLOBIN g/dL 11.0* 11.2* 10.9* 11.6* 11.4* 12.6*   HEMATOCRIT % 32.5* 32.9* 32.3* 34.1* 34.6* 36.9*   MCV fL 95.6 95.0 96.6 94.6 96.6  95.7   PLATELETS 10*3/mm3 179 170 148 184 189 197       BMP  Results from last 7 days   Lab Units 12/05/23  0614 12/04/23 0421 12/03/23 0657 12/02/23 0644 12/01/23 2302 12/01/23  1613   SODIUM mmol/L 136 135* 133* 132* 130* 130*   POTASSIUM mmol/L 4.0 4.1 4.2 4.5 4.5 4.0   CHLORIDE mmol/L 107 104 103 101 100 97*   CO2 mmol/L 17.0* 19.0* 22.0 21.0* 19.0* 21.0*   BUN mg/dL 25* 21 17 15 14 16   CREATININE mg/dL 0.93 0.89 1.01 1.04 1.10 1.02   GLUCOSE mg/dL 109* 112* 105* 120* 129* 124*   MAGNESIUM mg/dL 1.6*  --   --   --   --   --        CMP   Results from last 7 days   Lab Units 12/05/23  0614 12/04/23 0421 12/03/23 0657 12/02/23 0644 12/01/23 2302 12/01/23  1613   SODIUM mmol/L 136 135* 133* 132* 130* 130*   POTASSIUM mmol/L 4.0 4.1 4.2 4.5 4.5 4.0   CHLORIDE mmol/L 107 104 103 101 100 97*   CO2 mmol/L 17.0* 19.0* 22.0 21.0* 19.0* 21.0*   BUN mg/dL 25* 21 17 15 14 16   CREATININE mg/dL 0.93 0.89 1.01 1.04 1.10 1.02   GLUCOSE mg/dL 109* 112* 105* 120* 129* 124*   ALBUMIN g/dL  --   --   --   --   --  3.7   BILIRUBIN mg/dL  --   --   --   --   --  0.8   ALK PHOS U/L  --   --   --   --   --  83   AST (SGOT) U/L  --   --   --   --   --  21   ALT (SGPT) U/L  --   --   --   --   --  17         BNP        TROPONIN  Results from last 7 days   Lab Units 12/05/23  0831   HSTROP T ng/L 3,399*       CoAg  Results from last 7 days   Lab Units 12/05/23  0614 12/04/23  2329 12/04/23  1728 12/04/23  1034 12/04/23  0421 12/03/23  2021 12/03/23  1359 12/01/23  2302 12/01/23  1613   INR   --   --   --   --   --   --   --   --  1.08   APTT seconds 67.3 75.5 63.1 50.5* 62.7 57.7* 51.0*   < > 25.5    < > = values in this interval not displayed.       Creatinine Clearance  Estimated Creatinine Clearance: 56.2 mL/min (by C-G formula based on SCr of 0.93 mg/dL).    ABG        Radiology  No radiology results for the last day        EKG            I personally viewed and interpreted the patient's EKG/Telemetry  data:    ECHOCARDIOGRAM:    Results for orders placed during the hospital encounter of 12/01/23    Adult Transthoracic Echo Complete W/ Cont if Necessary Per Protocol    Interpretation Summary    Left ventricular ejection fraction appears to be 36 - 40%.    The left atrial cavity is moderately dilated.    Estimated right ventricular systolic pressure from tricuspid regurgitation is normal (<35 mmHg).          STRESS TEST        Cardiolite (Tc-99m sestamibi) stress test    CARDIAC CATHETERIZATION  Results for orders placed during the hospital encounter of 12/01/23    Cardiac Catheterization/Vascular Study                OTHER:         Assessment & Plan     Principal Problem:    Acute ST elevation myocardial infarction (STEMI) involving right coronary artery    //////////////////////////  History  =============  - Inferior STEMI 12/4/2023.    - Status post PCI with PTCA, stent placement on Pronto catheter atherectomy to the graft to the distal right coronary artery.-12/3/2023-Dr. العلي    Cardiac catheterization 12/3/2023-Dr. العلي    Left Main %: 20 to 30%   Proximal LAD %: 100%  Mid/Distal LAD %: 100%  LCX %: Proximal 80% OM1 100%  Ramus:   RCA %: 100% after the PDA.  Lima %: LIMA to LAD was patent  SVG(s) %: SVG to the marginal branch is patent but has some 30 to 40% disease.  SVG to the diagonal branch is occluded.  SVG to the PDA is occluded.  SVG to the distal RCA is occluded but is the culprit lesion    -Past history of syncope-no further episodes.     -status post loop recorder placement.  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 cardiac catheterization 12/26/2014 revealed 60% distal circumflex and total LAD and right coronary arteries.  Lima to LAD was patent ( lima coming off the left vertebral artery).  SVG to diagonal   marginal and PDA were patent.  SVG to left ventricular branch was totally occluded (chronic)     - atrial fibrillation -has converted to sinus rhythm and  maintaining sinus rhythm.  Recently patient was noted to have atrial dysrhythmia on the monitor with loop recorder.     - sinus bradycardia-asymptomatic     - status post acute inferior myocardial infarction prior to surgery requiring acute stent placement to right coronary artery ) complicated by ventricular fibrillation)    Echocardiogram 12/1/2023    Left ventricular ejection fraction appears to be 36 - 40%.    The left atrial cavity is moderately dilated.    Estimated right ventricular systolic pressure from tricuspid regurgitation is normal (<35 mmHg).      -Dyslipidemia and hypertension     - lower extremity weakness.   Arterial Doppler study of the lower extremity is normal. -  improved .   arterial Doppler study of the lower extremity was normal     - status post cholecystectomy     - allergy to penicillin iodine and metoprolol (rash).  Intolerance to atenolol due to bradycardia.  Allergy to Levaquin and penicillin.  Intolerance to prednisone Nitropatch IV nitroglycerin and prednisone.  =================    Plan  ==============   Inferior STEMI 12/4/2023.    Status post PCI with PTCA, stent placement on Pronto catheter atherectomy to the graft to the distal right coronary artery.-12/3/2023-Dr. العلي    Elevated troponin level probably due to reperfusion.  At bedtime troponin 3974-12/4/2023  3951-12/5/2023-trending down.    Hypomagnesemia  Magnesium-12/6/2023-1.6  IV magnesium supplements.    History of complete heart block.  Doing better.  Patient has junctional rhythm.  Hemodynamically stable.  Patient had temporary pacemaker which was removed.    Consider permanent pacemaker implantation if patient has hemodynamic instability depending on patient's progress and patient's intentions and family's decisions..    Patient is on beta-blocker Coreg.    Patient was on IV heparin.  Discontinued today.    Echocardiogram 12/1/2023    Left ventricular ejection fraction appears to be 36 - 40%.    The left atrial  cavity is moderately dilated.    Estimated right ventricular systolic pressure from tricuspid regurgitation is normal (<35 mmHg).    Have discussed with attending nurse and Dr. Lao regarding CODE STATUS etc.  Appreciated /palliative care note.  Patient family prefers DNR DNI status.  They are not ready for hospice but probable palliative care.    Medications were reviewed and updated.  Patient is on aspirin atorvastatin Plavix low-dose Coreg and intravenous heparin.    Reviewed and updated-12/6/2023.    Further plan will depend on patient's progress.      //////////////////////////////////////         Missy Domínguez MD  12/06/23  06:39 EST

## 2023-12-06 NOTE — CASE MANAGEMENT/SOCIAL WORK
Continued Stay Note   Reji     Patient Name: Sage Flores  MRN: [id number]  Today's Date: 12/6/2023    Admit Date: 12/1/2023    Plan: Return home with [facility], accepted, pending PCP to follow. Patient declines SNF.  Palliative following.   Discharge Plan       Row Name 12/06/23 1703       Plan    Plan Return home with [facility], accepted, pending PCP to follow. Patient declines SNF.  Palliative following.    Plan Comments Palliative following. [facility] consulted - pt states he is not ready for Hospice yet. Patient and family declining any further cardiac procedures.  Patient/family want to be DNR/DNI and go home with HH.  DC Barriers:  cardiac monitoring.                 Expected Discharge Date and Time       Expected Discharge Date Expected Discharge Time    Dec 7, 2023               RAY Eddy RN  [facility]/ICU   O: [phone]  C: [phone]  [email or web address]

## 2023-12-06 NOTE — CONSULTS
Palliative Care Social Work Progress Note    Code Status:full code    Goals of Care: Full Treatment    Narrative: Palliative care team  met with patient at bedside to discuss goals of care. Patient shared he lives at home alone with extensive family and neighbors checking on him throughout the day. He states he does not want to go to a rehab facility and would rather go home with HH. Patient discussed death multiple times and states that due to his age he would not like to be resuscitated but would like his daughter Alicja to make that final decision. Daughter contacted and she states her and her brother have discussed patients code status and they would like patient to be a DNR/DNI. Education provided on hospice and palliative care and family requested referral to [facility] as they dont think patient is ready for hospice yet. RN notified about code status request and will notify MD. [facility] referral made and Rey notified. Emotional support provided.     Plan: [facility] referral. Palliative care team following.          Shabnam Salamanca

## 2023-12-06 NOTE — SIGNIFICANT NOTE
"   12/06/23 1402   OTHER   Discipline physical therapist   Rehab Time/Intention   Session Not Performed patient/family declined, not feeling well;other (see comments)  (Pt's HR is 42. Pt's family reports pt is having a \"bad day\" and wants pt to rest.)   Recommendation   PT - Next Appointment 12/07/23       "

## 2023-12-07 ENCOUNTER — APPOINTMENT (OUTPATIENT)
Dept: CT IMAGING | Facility: HOSPITAL | Age: 88
DRG: 321 | End: 2023-12-07
Payer: MEDICARE

## 2023-12-07 LAB
ALBUMIN SERPL-MCNC: 3.4 G/DL (ref 3.5–5.2)
ALBUMIN/GLOB SERPL: 1.5 G/DL
ALP SERPL-CCNC: 86 U/L (ref 39–117)
ALT SERPL W P-5'-P-CCNC: 23 U/L (ref 1–41)
ANION GAP SERPL CALCULATED.3IONS-SCNC: 12 MMOL/L (ref 5–15)
AST SERPL-CCNC: 17 U/L (ref 1–40)
BACTERIA BLD CULT: ABNORMAL
BACTERIA SPEC AEROBE CULT: NO GROWTH
BACTERIA UR QL AUTO: ABNORMAL /HPF
BASOPHILS # BLD AUTO: 0.1 10*3/MM3 (ref 0–0.2)
BASOPHILS NFR BLD AUTO: 0.4 % (ref 0–1.5)
BILIRUB SERPL-MCNC: 0.5 MG/DL (ref 0–1.2)
BILIRUB UR QL STRIP: NEGATIVE
BOTTLE TYPE: ABNORMAL
BUN SERPL-MCNC: 28 MG/DL (ref 8–23)
BUN/CREAT SERPL: 31.8 (ref 7–25)
CALCIUM SPEC-SCNC: 8.3 MG/DL (ref 8.2–9.6)
CHLORIDE SERPL-SCNC: 108 MMOL/L (ref 98–107)
CLARITY UR: CLEAR
CO2 SERPL-SCNC: 18 MMOL/L (ref 22–29)
COLOR UR: YELLOW
CREAT SERPL-MCNC: 0.88 MG/DL (ref 0.76–1.27)
CREAT UR-MCNC: 26.5 MG/DL
D-LACTATE SERPL-SCNC: 1.5 MMOL/L (ref 0.5–2)
DEPRECATED RDW RBC AUTO: 49.9 FL (ref 37–54)
EGFRCR SERPLBLD CKD-EPI 2021: 80.2 ML/MIN/1.73
EOSINOPHIL # BLD AUTO: 0.1 10*3/MM3 (ref 0–0.4)
EOSINOPHIL NFR BLD AUTO: 0.6 % (ref 0.3–6.2)
ERYTHROCYTE [DISTWIDTH] IN BLOOD BY AUTOMATED COUNT: 14.5 % (ref 12.3–15.4)
GLOBULIN UR ELPH-MCNC: 2.2 GM/DL
GLUCOSE SERPL-MCNC: 123 MG/DL (ref 65–99)
GLUCOSE UR STRIP-MCNC: NEGATIVE MG/DL
HCT VFR BLD AUTO: 35.4 % (ref 37.5–51)
HGB BLD-MCNC: 12 G/DL (ref 13–17.7)
HGB UR QL STRIP.AUTO: NEGATIVE
HOLD SPECIMEN: NORMAL
HYALINE CASTS UR QL AUTO: ABNORMAL /LPF
KETONES UR QL STRIP: NEGATIVE
LEUKOCYTE ESTERASE UR QL STRIP.AUTO: ABNORMAL
LYMPHOCYTES # BLD AUTO: 1.6 10*3/MM3 (ref 0.7–3.1)
LYMPHOCYTES NFR BLD AUTO: 10.1 % (ref 19.6–45.3)
MAGNESIUM SERPL-MCNC: 2.3 MG/DL (ref 1.7–2.3)
MCH RBC QN AUTO: 32.2 PG (ref 26.6–33)
MCHC RBC AUTO-ENTMCNC: 33.9 G/DL (ref 31.5–35.7)
MCV RBC AUTO: 94.8 FL (ref 79–97)
MONOCYTES # BLD AUTO: 1.4 10*3/MM3 (ref 0.1–0.9)
MONOCYTES NFR BLD AUTO: 8.8 % (ref 5–12)
NEUTROPHILS NFR BLD AUTO: 12.8 10*3/MM3 (ref 1.7–7)
NEUTROPHILS NFR BLD AUTO: 80.1 % (ref 42.7–76)
NITRITE UR QL STRIP: NEGATIVE
NRBC BLD AUTO-RTO: 0.1 /100 WBC (ref 0–0.2)
NT-PROBNP SERPL-MCNC: ABNORMAL PG/ML (ref 0–1800)
PH UR STRIP.AUTO: <=5 [PH] (ref 5–8)
PLATELET # BLD AUTO: 228 10*3/MM3 (ref 140–450)
PMV BLD AUTO: 10.1 FL (ref 6–12)
POTASSIUM SERPL-SCNC: 3.8 MMOL/L (ref 3.5–5.2)
PROT ?TM UR-MCNC: 5.8 MG/DL
PROT SERPL-MCNC: 5.6 G/DL (ref 6–8.5)
PROT UR QL STRIP: NEGATIVE
QT INTERVAL: 442 MS
QT INTERVAL: 494 MS
QTC INTERVAL: 433 MS
QTC INTERVAL: 451 MS
RBC # BLD AUTO: 3.73 10*6/MM3 (ref 4.14–5.8)
RBC # UR STRIP: ABNORMAL /HPF
REF LAB TEST METHOD: ABNORMAL
SODIUM SERPL-SCNC: 138 MMOL/L (ref 136–145)
SODIUM UR-SCNC: 126 MMOL/L
SP GR UR STRIP: 1.01 (ref 1–1.03)
SQUAMOUS #/AREA URNS HPF: ABNORMAL /HPF
UROBILINOGEN UR QL STRIP: ABNORMAL
WBC # UR STRIP: ABNORMAL /HPF
WBC NRBC COR # BLD AUTO: 16 10*3/MM3 (ref 3.4–10.8)

## 2023-12-07 PROCEDURE — 25810000003 SODIUM CHLORIDE 0.9 % SOLUTION: Performed by: NURSE PRACTITIONER

## 2023-12-07 PROCEDURE — 94799 UNLISTED PULMONARY SVC/PX: CPT

## 2023-12-07 PROCEDURE — 97116 GAIT TRAINING THERAPY: CPT

## 2023-12-07 PROCEDURE — 99233 SBSQ HOSP IP/OBS HIGH 50: CPT | Performed by: INTERNAL MEDICINE

## 2023-12-07 PROCEDURE — 83735 ASSAY OF MAGNESIUM: CPT | Performed by: INTERNAL MEDICINE

## 2023-12-07 PROCEDURE — 80053 COMPREHEN METABOLIC PANEL: CPT | Performed by: NURSE PRACTITIONER

## 2023-12-07 PROCEDURE — 85025 COMPLETE CBC W/AUTO DIFF WBC: CPT | Performed by: NURSE PRACTITIONER

## 2023-12-07 PROCEDURE — 84300 ASSAY OF URINE SODIUM: CPT | Performed by: INTERNAL MEDICINE

## 2023-12-07 PROCEDURE — 97535 SELF CARE MNGMENT TRAINING: CPT

## 2023-12-07 PROCEDURE — 71250 CT THORAX DX C-: CPT

## 2023-12-07 PROCEDURE — 83880 ASSAY OF NATRIURETIC PEPTIDE: CPT | Performed by: INTERNAL MEDICINE

## 2023-12-07 PROCEDURE — 97530 THERAPEUTIC ACTIVITIES: CPT

## 2023-12-07 PROCEDURE — 94761 N-INVAS EAR/PLS OXIMETRY MLT: CPT

## 2023-12-07 PROCEDURE — 94664 DEMO&/EVAL PT USE INHALER: CPT

## 2023-12-07 PROCEDURE — 83605 ASSAY OF LACTIC ACID: CPT | Performed by: INTERNAL MEDICINE

## 2023-12-07 PROCEDURE — 97110 THERAPEUTIC EXERCISES: CPT

## 2023-12-07 PROCEDURE — 84156 ASSAY OF PROTEIN URINE: CPT | Performed by: INTERNAL MEDICINE

## 2023-12-07 PROCEDURE — 82570 ASSAY OF URINE CREATININE: CPT | Performed by: INTERNAL MEDICINE

## 2023-12-07 PROCEDURE — 81001 URINALYSIS AUTO W/SCOPE: CPT | Performed by: STUDENT IN AN ORGANIZED HEALTH CARE EDUCATION/TRAINING PROGRAM

## 2023-12-07 PROCEDURE — 25010000002 CEFTRIAXONE PER 250 MG: Performed by: INTERNAL MEDICINE

## 2023-12-07 RX ORDER — BUMETANIDE 1 MG/1
1 TABLET ORAL
Status: DISCONTINUED | OUTPATIENT
Start: 2023-12-07 | End: 2023-12-08

## 2023-12-07 RX ADMIN — GUAIFENESIN 200 MG: 200 SOLUTION ORAL at 08:41

## 2023-12-07 RX ADMIN — CEFTRIAXONE 1000 MG: 1 INJECTION, POWDER, FOR SOLUTION INTRAMUSCULAR; INTRAVENOUS at 21:21

## 2023-12-07 RX ADMIN — BUMETANIDE 1 MG: 1 TABLET ORAL at 10:58

## 2023-12-07 RX ADMIN — GUAIFENESIN 200 MG: 200 SOLUTION ORAL at 02:58

## 2023-12-07 RX ADMIN — ATORVASTATIN CALCIUM 10 MG: 10 TABLET, FILM COATED ORAL at 21:25

## 2023-12-07 RX ADMIN — BUMETANIDE 1 MG: 1 TABLET ORAL at 18:42

## 2023-12-07 RX ADMIN — IPRATROPIUM BROMIDE AND ALBUTEROL SULFATE 3 ML: .5; 3 SOLUTION RESPIRATORY (INHALATION) at 08:16

## 2023-12-07 RX ADMIN — CLOPIDOGREL BISULFATE 75 MG: 75 TABLET ORAL at 08:41

## 2023-12-07 RX ADMIN — SODIUM CHLORIDE 250 ML: 0.9 INJECTION, SOLUTION INTRAVENOUS at 02:00

## 2023-12-07 RX ADMIN — Medication 10 ML: at 09:04

## 2023-12-07 RX ADMIN — Medication 10 ML: at 21:25

## 2023-12-07 RX ADMIN — ASPIRIN 81 MG CHEWABLE TABLET 81 MG: 81 TABLET CHEWABLE at 08:41

## 2023-12-07 NOTE — PROGRESS NOTES
The patient still looks weak currently. He is not eating very much. The patients urine output still remains low. Nephrology has been consulted. The patient does have some SOB. Cough with some sputum production. No nausea or vomiting. No light headedness or dizziness. Patient states that his bowels are working daily. The patient appears in NAD currently. He does not appear to be progressing well. The patient had a positive urine yesterday but follow up lab looks clean. The patients Xray does not show any pneumonia. The patients pulse rate still running in the 40's WBC is now up to 16    HENT:      Head: Atraumatic.      Mouth/Throat:      Mouth: Mucous membranes are moist.   Eyes:      Pupils: Pupils are equal, round, and reactive to light.   Cardiovascular:      Rate and Rhythm: bradycardic with regular rhythm     Pulses: Normal pulses.      Heart sounds: Normal heart sounds.   Pulmonary:      Effort: Pulmonary effort is normal.      Breath sounds: Normal breath sounds.   Abdominal:      Palpations: Abdomen is soft, nontender. NABS,  No G/R/R  Musculoskeletal:         General: No swelling. Normal range of motion.      Cervical back: Normal range of motion.   Skin:     General: Skin is warm.   Neurological:      General: No focal deficit present.      Mental Status: He is alert and oriented to person, place, and time.   Psychiatric:         Mood and Affect: Mood normal.   Scheduled Meds   aspirin, 81 mg, Oral, Daily  atorvastatin, 10 mg, Oral, Nightly  carvedilol, 3.125 mg, Oral, BID With Meals  clopidogrel, 75 mg, Oral, Daily  magnesium sulfate, 2 g, Intravenous, Once  oxymetazoline, 2 spray, Each Nare, BID  sodium chloride, 10 mL, Intravenous, Q12H      The patient feels ok currently. He is not eating much and has some depression today. The patient had some nausea but no vomiting. The patient denies any pain. No chest pain or SOB. No fevers, chills, sweats. Patient was able to ambulate with a walker. He has not  been drinking much. Urine output was 250 over the course of the night. The patient has no diarrhea or constipation. Last bowel movement was today. The patient appears in NAD currently. Case discussed with cardiology in detail. They are requesting a palliative care consult for goals of care discussion.      [facility] MEDICINE SERVICE  DAILY PROGRESS NOTE     NAME: Sage Flores  : 1930  MRN: [id number]       LOS: 5 days      PROVIDER OF SERVICE: Magdiel Lao DO     Chief Complaint: Acute ST elevation myocardial infarction (STEMI) involving right coronary artery     Subjective:      Interval History:  The patient is doing well today. Daughter in law and family friend are present in the room today. No nausea or vomiting. No light headedness or dizziness. Patient is weak after his MI. No diarrhea or constipation. Patient is able to follow commands without issue. Bowels are working well. No recurrence of his chest pain. Breathing is good as well. No other issues over the course of the night.   Review of Systems:   Review of Systems   Constitutional:  Negative for chills, fatigue and fever.   HENT:  Positive for congestion. Negative for drooling.    Respiratory:  Negative for shortness of breath and wheezing.          Objective:      Vital Signs  Temp:  [97.3 °F (36.3 °C)-98.2 °F (36.8 °C)] 97.3 °F (36.3 °C)  Heart Rate:  [45-51] 46  Resp:  [22-29] 24  BP: (106-153)/(59-80) 142/75   Body mass index is 25.34 kg/m².     Physical Exam  Physical Exam  Physical Exam  HENT:      Head: Atraumatic.      Mouth/Throat:      Mouth: Mucous membranes are moist.   Eyes:      Pupils: Pupils are equal, round, and reactive to light.   Cardiovascular:      Rate and Rhythm: bradycardic with regular rhythm     Pulses: Normal pulses.      Heart sounds: Normal heart sounds.   Pulmonary:      Effort: Pulmonary effort is normal.      Breath sounds: Normal breath sounds.   Abdominal:      Palpations: Abdomen is soft, nontender.  NABS,  No G/R/R  Musculoskeletal:         General: No swelling. Normal range of motion.      Cervical back: Normal range of motion.   Skin:     General: Skin is warm.   Neurological:      General: No focal deficit present.      Mental Status: He is alert and oriented to person, place, and time.   Psychiatric:         Mood and Affect: Mood normal.   Scheduled Meds   aspirin, 81 mg, Oral, Daily  atorvastatin, 10 mg, Oral, Nightly  carvedilol, 3.125 mg, Oral, BID With Meals  clopidogrel, 75 mg, Oral, Daily  magnesium sulfate, 2 g, Intravenous, Once  oxymetazoline, 2 spray, Each Nare, BID  sodium chloride, 10 mL, Intravenous, Q12H        PRN Meds     acetaminophen **OR** acetaminophen    senna-docusate sodium **AND** polyethylene glycol **AND** bisacodyl **AND** bisacodyl    guaifenesin    influenza vaccine    ipratropium-albuterol    ondansetron **OR** ondansetron    sodium chloride    sodium chloride    sodium chloride   Infusions  sodium chloride, 75 mL/hr, Last Rate: 75 mL/hr (12/06/23 0413)                      Diagnostic Data            Results from last 7 days   Lab Units 12/06/23  0802 12/05/23  0614 12/01/23  2302 12/01/23  1613   WBC 10*3/mm3 11.80* 12.60*   < > 9.50   HEMOGLOBIN g/dL 10.7* 11.0*   < > 12.6*   HEMATOCRIT % 31.5* 32.5*   < > 36.9*   PLATELETS 10*3/mm3 187 179   < > 197   GLUCOSE mg/dL  --  109*   < > 124*   CREATININE mg/dL  --  0.93   < > 1.02   BUN mg/dL  --  25*   < > 16   SODIUM mmol/L  --  136   < > 130*   POTASSIUM mmol/L  --  4.0   < > 4.0   AST (SGOT) U/L  --   --   --  21   ALT (SGPT) U/L  --   --   --  17   ALK PHOS U/L  --   --   --  83   BILIRUBIN mg/dL  --   --   --  0.8   ANION GAP mmol/L  --  12.0   < > 12.0    < > = values in this interval not displayed.         No radiology results for the last day        I reviewed the patient's new clinical results.     Assessment/Plan:      Active and Resolved Problems  Active and Resolved Problems  STEMI  Heart  block with  "bradycardia  Status post PCI to RCA  Hx of  CABG  -Status post emergent PCI then placement of temporary pacemaker which was discontinued  - Patient now off heparin drip.   - conservative management   - Cardiology team following the patient  -Echocardiogram \"EF of 6 to 40% right dilatation  Continue aspirin and Plavix     Paroxysmal  A-fib  -Stable sinus rhythm with bradycardic, . EKG has been ordered. Discussed with cardiology. He does not require a pacemaker currently.     The patient is now off his beta blocker. Slightly better from heart rate standpoint. Patient is still very sluggish.      History of hypertension  -currently blood pressure stable  -Required dopamine drip previously which was discontinued     Nephrology consulted.      Hyperlipidemia   continue statin     Hyponatremia mild - not clinically significant.      Leukocytosis  - Likely reactive we will continue to monitor  -Urinalysis  - Chest x-ray unremarkable     Patients WBC is currently elevated to 16.0 today. The patient does not have any fever. UA with positive yesterday slightly but negative today. CXR does not show any infiltrates. The patient does not have any lines. No abdominal pain. Just not eating much.  No diarrhea. Will obtain a CT of the chest as aspiration is possible. Non contrast study. If negative then will consult ID. The patient remains on rocephin.      PT /OT consults appreciated. SNIF Discussed with patient that he will need rehab prior to going home. He is not strong enough to take care of himself.     Labs have been ordered for AM.     Goals of care discussion as per palliative care.      Replete magnesium with goal greater than 2.0 and K greater than 4.0            "

## 2023-12-07 NOTE — CONSULTS
INITIAL CONSULT NOTE      Patient Name: Sage Flores  : 1930  MRN: [id number]  Primary Care Physician: Tricia Aldana APRN  Date of admission: 2023    Patient Care Team:  Tricia Aldana APRN as PCP - General (Family Medicine)  Missy Domínguez MD as Consulting Physician (Cardiology)        Reason for Consult:       Decreased urine output and acidosis  Subjective   History of Present Illness:   Chief Complaint:   Chief Complaint   Patient presents with    Chest Pain    Syncope     HISTORY:  Sage Flores is a 89 y.o. male with past medical history of coronary artery disease, congestive heart failure, hypertension, hyperlipidemia, atrial fibrillation, who presents with mainly presented with acute inferior wall MI and had cardiac cath done with angioplasty.  Got more short of breath overnight transferred to ICU.  Patient was on beta-blocker that was stopped yesterday as blood pressures are low side patient did receive to 50 cc of IV bolus.  On low-dose IV fluids at 30 cc an hour at this time blood pressure is stable even on the high side.  Still complaining of slight shortness of breath.  Swelling of both lower extremity noted          Review of systems:  All other review of system unremarkable  Constitutional: No fever, no chills, no lethargy, no weakness.  HEENT:  No headache, otalgia, itchy eyes, nasal discharge or sore throat.  Cardiac:  No chest pain, dyspnea, orthopnea or PND.  Chest:              No cough, phlegm or wheezing.  Abdomen:  No abdominal pain, nausea or vomiting.  Neuro:  No focal weakness, abnormal movements orseizure like activity.  :   No hematuria, no pyuria, no dysuria, no flank pain.  ROS was otherwise negative except as mentioned in the [place].       Personal History:     Past Medical History:   Past Medical History:   Diagnosis Date    Anxiety     Atrial fibrillation     Benign prostatic hyperplasia     CAD (coronary artery disease)     Hyperlipidemia      Hypertension     Myocardial infarction        Surgical History:      Past Surgical History:   Procedure Laterality Date    CARDIAC CATHETERIZATION N/A 12/1/2023    Procedure: Left Heart Cath;  Surgeon: Son العلي MD;  Location: [facility] CATH INVASIVE LOCATION;  Service: Cardiovascular;  Laterality: N/A;    CARDIAC CATHETERIZATION N/A 12/1/2023    Procedure: Coronary angiography;  Surgeon: Son العلي MD;  Location: [facility] CATH INVASIVE LOCATION;  Service: Cardiovascular;  Laterality: N/A;    CARDIAC CATHETERIZATION N/A 12/1/2023    Procedure: Percutaneous Coronary Intervention;  Surgeon: Son العلي MD;  Location: [facility] CATH INVASIVE LOCATION;  Service: Cardiovascular;  Laterality: N/A;    CARDIAC ELECTROPHYSIOLOGY PROCEDURE N/A 12/1/2023    Procedure: Temporary Pacemaker;  Surgeon: Son العلي MD;  Location: [facility] CATH INVASIVE LOCATION;  Service: Cardiovascular;  Laterality: N/A;    CARDIAC SURGERY      HERNIA REPAIR         Family History: family history includes Heart disease in his father and mother. Otherwise pertinent FHx was reviewed and unremarkable.     Social History:  reports that he quit smoking about 53 years ago. His smoking use included cigarettes. He has never used smokeless tobacco. He reports that he does not drink alcohol and does not use drugs.    Medications:  Prior to Admission medications    Medication Sig Start Date End Date Taking? Authorizing Provider   acetaminophen (TYLENOL) 650 MG 8 hr tablet Take 1 tablet by mouth Every Night.    ProviderCarlos MD   aspirin 81 MG chewable tablet Chew 1 tablet Daily.    ProviderCarlos MD   azelastine (ASTEPRO) 0.15 % solution nasal spray USE 2 SPRAYS IN EACH NOSTRIL TWICE DAILY AS DIRECTED BY PROVIDER 10/27/20   Ashley Cano MD   Cholecalciferol 25 MCG (1000 UT) tablet Take 1 tablet by mouth Daily.    ProviderCralos MD   docusate sodium (COLACE) 250 MG capsule Take 1 capsule by mouth Daily.    Provider,  MD Carlos   gabapentin (NEURONTIN) 100 MG capsule TAKE 1 CAPSULE BY MOUTH TWICE DAILY 11/17/22   Ashley Cano MD   losartan (COZAAR) 50 MG tablet TAKE 1 TABLET BY MOUTH DAILY 5/30/23   Ashley Cano MD   omeprazole (priLOSEC) 20 MG capsule Take 1 capsule by mouth Daily. 9/7/22   Ashley Cano MD   tamsulosin (FLOMAX) 0.4 MG capsule 24 hr capsule TAKE 1 CAPSULE BY MOUTH DAILY 11/19/23   Tricia Aldana, APRN     Scheduled Meds:aspirin, 81 mg, Oral, Daily  atorvastatin, 10 mg, Oral, Nightly  cefTRIAXone, 1,000 mg, Intravenous, Q24H  clopidogrel, 75 mg, Oral, Daily  sodium chloride, 10 mL, Intravenous, Q12H      Continuous Infusions:DOPamine, 2-20 mcg/kg/min, Last Rate: 7.5 mcg/kg/min (12/06/23 2005)      PRN Meds:  acetaminophen **OR** acetaminophen    senna-docusate sodium **AND** polyethylene glycol **AND** bisacodyl **AND** bisacodyl    guaifenesin    influenza vaccine    ipratropium-albuterol    ondansetron **OR** ondansetron    prochlorperazine    sodium chloride    sodium chloride    sodium chloride  Allergies:    Allergies   Allergen Reactions    Iodine Unknown (See Comments)     Pt unsure of reaction      Levofloxacin Unknown (See Comments)    Nitroglycerin Unknown (See Comments) and Other (See Comments)    Paroxetine Unknown (See Comments)    Penicillin G Unknown (See Comments)    Prednisone Unknown (See Comments)    Sucralfate Unknown (See Comments)    Diltiazem Hcl Hives    Metoprolol Other (See Comments)     Lowers heart rate     Pramipexole Dihydrochloride Unknown (See Comments)    Ropinirole Hcl Other (See Comments)       Objective   Exam:     Vital Signs  Temp:  [97.3 °F (36.3 °C)-98.4 °F (36.9 °C)] 97.4 °F (36.3 °C)  Heart Rate:  [40-57] 55  Resp:  [21-28] 24  BP: (107-172)/(69-94) 153/88  SpO2:  [96 %-100 %] 98 %  on  Flow (L/min):  [3] 3;   Device (Oxygen Therapy): nasal cannula  Body mass index is 25.24 kg/m².  EXAM  General:  elderly white  male in no acute  distress.    Head:      Normocephalic and atraumatic.    Eyes:      PERRL/EOM intact, conjunctivae and sclerae clear without nystagmus.    Neck:      No masses, thyromegaly,  trachea central   Lungs:    Mild crackles bilaterally to auscultation.    Heart:      Regular rate and rhythm, no murmur no gallop  Abd:        Soft, nontender, not distended, bowel sounds positive, no shifting dullness.  Msk:        No deformity or scoliosis noted of thoracic or lumbar spine.    Pulses:   Pulses normal in all 4 extremities.    Extremities:        No cyanosis or clubbing--+ edema.    Neuro:    No focal deficits.   alert oriented x3  Skin:       Intact without lesions or rashes.    Psych:    Alert and cooperative; normal mood and affect; normal attention span       Results Review:  I have personally reviewed most recent Data :  [facility]  CBC    Results from last 7 days   Lab Units 12/07/23  0111 12/06/23  0802 12/05/23  0614 12/04/23  0421 12/03/23  0657 12/02/23  0644 12/01/23  2302   WBC 10*3/mm3 16.00* 11.80* 12.60* 14.50* 12.40* 11.90* 13.60*   HEMOGLOBIN g/dL 12.0* 10.7* 11.0* 11.2* 10.9* 11.6* 11.4*   PLATELETS 10*3/mm3 228 187 179 170 148 184 189     CMP   Results from last 7 days   Lab Units 12/07/23  0111 12/06/23  0802 12/05/23  0614 12/04/23  0421 12/03/23  0657 12/02/23  0644 12/01/23  2302 12/01/23  1613   SODIUM mmol/L 138 137 136 135* 133* 132* 130* 130*   POTASSIUM mmol/L 3.8 3.9 4.0 4.1 4.2 4.5 4.5 4.0   CHLORIDE mmol/L 108* 108* 107 104 103 101 100 97*   CO2 mmol/L 18.0* 19.0* 17.0* 19.0* 22.0 21.0* 19.0* 21.0*   BUN mg/dL 28* 29* 25* 21 17 15 14 16   CREATININE mg/dL 0.88 1.04 0.93 0.89 1.01 1.04 1.10 1.02   GLUCOSE mg/dL 123* 110* 109* 112* 105* 120* 129* 124*   ALBUMIN g/dL 3.4*  --   --   --   --   --   --  3.7   BILIRUBIN mg/dL 0.5  --   --   --   --   --   --  0.8   ALK PHOS U/L 86  --   --   --   --   --   --  83   AST (SGOT) U/L 17  --   --   --   --   --   --  21   ALT (SGPT) U/L  23  --   --   --   --   --   --  17     ABG      XR Chest 1 View    Result Date: 12/6/2023  Impression: Stable chronic findings without acute process. Electronically Signed: Eric Lopez MD  12/6/2023 7:39 PM EST  Workstation ID: [id number]    US Renal Bilateral    Result Date: 12/6/2023  Impression: 1. Mild right-sided pelvic caliectasis. 2. Diffuse urinary bladder wall thickening may be due to nondistention. Muscular hypertrophy or cystitis are also possible. Electronically Signed: Kp Lynch MD  12/6/2023 12:58 PM EST  Workstation ID: [id number]    XR Chest 1 View    Result Date: 12/2/2023  Impression: 1.No acute radiographic abnormality is identified. 2.Curved lead or catheter projects over the right atrium, possibly external to the patient. Please correlate clinically. Repeat radiograph after clearing the patient of external wires may be considered if indicated. Electronically Signed: Gordon Lagos MD  12/2/2023 10:37 AM EST  Workstation ID: [id number]     Results for orders placed during the hospital encounter of 12/01/23    Adult Transthoracic Echo Complete W/ Cont if Necessary Per Protocol    Interpretation Summary    Left ventricular ejection fraction appears to be 36 - 40%.    The left atrial cavity is moderately dilated.    Estimated right ventricular systolic pressure from tricuspid regurgitation is normal (<35 mmHg).        Assessment & Plan   Assessment and Plan:         Acute ST elevation myocardial infarction (STEMI) involving right coronary artery    ASSESSMENT:  Acute kidney injury at risk  Congestive heart failure with ejection fraction of 36%  Coronary artery disease s/p STEMI and PCI temporary pacemaker insertion 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  Atrial fibrillation  Peripheral edema  History of hypertension          PLAN :     At this time blood pressure is on the high side will reintroduce diuretics  Follow-up with a urine output and volume status  Presence of mild acidosis with normal potassium  and also indicate some volume overload  Acute coronary syndrome with increased troponin  Check repeat BNP level  Follow-up with complete urine studies  Follow-up with cardiology again  Low-dose angiotensin receptor blocker will be started tomorrow if blood pressure is still on the acceptable range  Thank you for letting me parties      Bear Lopez MD  [facility]  12/7/2023  09:44 EST

## 2023-12-07 NOTE — CONSULTS
Palliative Care Social Work Progress Note    Code Status:do not resuscitate    Goals of Care: Limited Additonal Intervention     Narrative: Palliative care  met with family to provide additional education on Pallitus and hospice care. Family would still like to proceed with Pallitus at this time so patient can work with therapy with Home Health. They affirmed patient is now a DNR/DNI. Lara with [facility] to meet with family again today and provide education. Emotional support provided.    Plan: Pallitus referral. Palliative care team following          Shabnam Salamanca

## 2023-12-07 NOTE — PROGRESS NOTES
Called by nursing staff related to the patient having SOB and decreased urine output. The patient was found to have a UTI on labs. The patient feels weak and is not eating much. No nausea or vomiting. Just does not want to eat. Stat portable chest Xray. Rocephin 1g IV Qday. Please the night time hospitalist with the results.

## 2023-12-07 NOTE — PLAN OF CARE
"Goal Outcome Evaluation:   Assessment: Sage Flores presents with functional mobility impairments which indicate the need for skilled intervention. Tolerating session today without incident. Will continue to follow and progress as tolerated. Patient was able to ambulate in room with minimal assist x2. While ambulating, patient expressed he felt his vision was blurry so patient sat in chair. BP was measured and WNL, patient reports the blurriness never went away. Patient finished in chair with alarm activated and family in room.    Plan/Recommendations:   Moderate Intensity Therapy recommended post-acute care. This is recommended as therapy feels the patient would require 3-4 days per week and wouldn't tolerate \"3 hour daily\" rehab intensity. SNF would be the preferred choice. If the patient does not agree to SNF, arrange HH or OP depending on home bound status. If patient is medically complex, consider LTACH.. Pt requires rolling walker at discharge.     Pt desires Home at discharge. Pt not agreeable to therapeutic recommendations and plan of care.                       "

## 2023-12-07 NOTE — PLAN OF CARE
"Assessment: Sage Flores presents with ADL impairments affecting function including balance, endurance / activity tolerance, and strength. Demonstrated functioning below baseline abilities indicate the need for continued skilled intervention while inpatient. Pt c/o blurry vision with functional mobility this date, thus activity was ceased. BP taken and was WNL. In chair, pt completed grooming and hygiene tasks, but reported blurry vision persisted. Family speaking with Hospice and Palliative care team is following for d/c plans. Tolerating session today without incident. Will continue to follow and progress as tolerated.      Plan/Recommendations:   Moderate Intensity Therapy recommended post-acute care. This is recommended as therapy feels the patient would require 3-4 days per week and wouldn't tolerate \"3 hour daily\" rehab intensity. SNF would be the preferred choice. If the patient does not agree to SNF, arrange HH or OP depending on home bound status. If patient is medically complex, consider LTACH.. Pt requires no DME at discharge.      Pt desires Home with Home Health and Home with family assist at discharge. Pt cooperative; agreeable to therapeutic recommendations and plan of care.   "

## 2023-12-07 NOTE — PROGRESS NOTES
Referring Provider: Magdiel Lao DO    Reason for follow-up:  Inferior STEMI  Status post CABG  Junctional rhythm     Patient Care Team:  Tricia Aldana APRN as PCP - General (Family Medicine)  Missy Domínguez MD as Consulting Physician (Cardiology)    Subjective .      ROS  No specific symptoms were offered.  Denies having any chest pain.    History  Past Medical History:   Diagnosis Date    Anxiety 2014    Atrial fibrillation     Benign prostatic hyperplasia     CAD (coronary artery disease)     Hyperlipidemia     Hypertension     Myocardial infarction        Past Surgical History:   Procedure Laterality Date    CARDIAC CATHETERIZATION N/A 2023    Procedure: Left Heart Cath;  Surgeon: Son العلي MD;  Location: [facility] CATH INVASIVE LOCATION;  Service: Cardiovascular;  Laterality: N/A;    CARDIAC CATHETERIZATION N/A 2023    Procedure: Coronary angiography;  Surgeon: Son العلي MD;  Location: [facility] CATH INVASIVE LOCATION;  Service: Cardiovascular;  Laterality: N/A;    CARDIAC CATHETERIZATION N/A 2023    Procedure: Percutaneous Coronary Intervention;  Surgeon: Son العلي MD;  Location: [facility] CATH INVASIVE LOCATION;  Service: Cardiovascular;  Laterality: N/A;    CARDIAC ELECTROPHYSIOLOGY PROCEDURE N/A 2023    Procedure: Temporary Pacemaker;  Surgeon: Son العلي MD;  Location: [facility] CATH INVASIVE LOCATION;  Service: Cardiovascular;  Laterality: N/A;    CARDIAC SURGERY      HERNIA REPAIR         Family History   Problem Relation Age of Onset    Heart disease Mother     Heart disease Father        Social History     Tobacco Use    Smoking status: Former     Types: Cigarettes     Quit date: 1970     Years since quittin.3    Smokeless tobacco: Never    Tobacco comments:     more than 50yrs   Vaping Use    Vaping Use: Never used   Substance Use Topics    Alcohol use: No    Drug use: No        Medications Prior to Admission   Medication Sig Dispense Refill Last Dose     acetaminophen (TYLENOL) 650 MG 8 hr tablet Take 1 tablet by mouth Every Night.       aspirin 81 MG chewable tablet Chew 1 tablet Daily.       azelastine (ASTEPRO) 0.15 % solution nasal spray USE 2 SPRAYS IN EACH NOSTRIL TWICE DAILY AS DIRECTED BY PROVIDER 30 mL 3     Cholecalciferol 25 MCG (1000 UT) tablet Take 1 tablet by mouth Daily.       docusate sodium (COLACE) 250 MG capsule Take 1 capsule by mouth Daily.       gabapentin (NEURONTIN) 100 MG capsule TAKE 1 CAPSULE BY MOUTH TWICE DAILY 180 capsule 1     losartan (COZAAR) 50 MG tablet TAKE 1 TABLET BY MOUTH DAILY 90 tablet 1     omeprazole (priLOSEC) 20 MG capsule Take 1 capsule by mouth Daily. 90 capsule 3     tamsulosin (FLOMAX) 0.4 MG capsule 24 hr capsule TAKE 1 CAPSULE BY MOUTH DAILY 90 capsule 0        Allergies  Iodine, Levofloxacin, Nitroglycerin, Paroxetine, Penicillin g, Prednisone, Sucralfate, Diltiazem hcl, Metoprolol, Pramipexole dihydrochloride, and Ropinirole hcl    Scheduled Meds:aspirin, 81 mg, Oral, Daily  atorvastatin, 10 mg, Oral, Nightly  cefTRIAXone, 1,000 mg, Intravenous, Q24H  clopidogrel, 75 mg, Oral, Daily  oxymetazoline, 2 spray, Each Nare, BID  sodium chloride, 10 mL, Intravenous, Q12H      Continuous Infusions:DOPamine, 2-20 mcg/kg/min, Last Rate: 7.5 mcg/kg/min (12/06/23 2005)      PRN Meds:.  acetaminophen **OR** acetaminophen    senna-docusate sodium **AND** polyethylene glycol **AND** bisacodyl **AND** bisacodyl    guaifenesin    influenza vaccine    ipratropium-albuterol    ondansetron **OR** ondansetron    prochlorperazine    sodium chloride    sodium chloride    sodium chloride    Objective     VITAL SIGNS  Vitals:    12/07/23 0100 12/07/23 0132 12/07/23 0400 12/07/23 0425   BP: 172/80   163/81   BP Location:    Right arm   Patient Position:    Lying   Pulse: (!) 45 53     Resp:    27   Temp:    98.4 °F (36.9 °C)   TempSrc:    Oral   SpO2: 97% 97%     Weight:   79.8 kg (175 lb 14.8 oz)    Height:           Flowsheet Rows   "    Flowsheet Row First Filed Value   Admission Height 177.8 cm (70\") Documented at 12/01/2023 1603   Admission Weight 72.6 kg (160 lb) Documented at 12/01/2023 1603              Intake/Output Summary (Last 24 hours) at 12/7/2023 0630  Last data filed at 12/7/2023 0200  Gross per 24 hour   Intake 1037 ml   Output 400 ml   Net 637 ml        TELEMETRY: Probable junctional rhythm    Physical Exam:  The patient is alert, oriented and in no distress.  Vital signs as noted above.  Head and neck revealed no carotid bruits or jugular venous distention.  No thyromegaly or lymphadenopathy is present  Lungs clear.  No wheezing.  Breath sounds are normal bilaterally.  Heart normal first and second heart sounds.  No murmur. No precordial rub is present.  No gallop is present.  Abdomen soft and nontender.  No organomegaly is present.  Extremities with good peripheral pulses without any pedal edema.  Cardiac cath site looks normal.  Skin warm and dry.  Musculoskeletal system is grossly normal  CNS grossly normal    Reviewed and updated.    Results Review:   I reviewed the patient's new clinical results.  Lab Results (last 24 hours)       Procedure Component Value Units Date/Time    Lactic Acid, Plasma [[id number]]  (Normal) Collected: 12/07/23 0425    Specimen: Blood Updated: 12/07/23 0524     Lactate 1.5 mmol/L     Extra Tubes [[id number]] Collected: 12/07/23 0111    Specimen: Blood, Venous Line Updated: 12/07/23 0515    Narrative:      The following orders were created for panel order Extra Tubes.  Procedure                               Abnormality         Status                     ---------                               -----------         ------                     Patel Top[[id number]]                                         Final result                 Please view results for these tests on the individual orders.    Gray Top [[id number]] Collected: 12/07/23 0111    Specimen: Blood Updated: 12/07/23 0515     Extra Tube Hold for " add-ons.     Comment: Auto resulted.       Magnesium [[id number]]  (Normal) Collected: 12/07/23 0425    Specimen: Blood Updated: 12/07/23 0515     Magnesium 2.3 mg/dL     Comprehensive Metabolic Panel [[id number]]  (Abnormal) Collected: 12/07/23 0111    Specimen: Blood Updated: 12/07/23 0149     Glucose 123 mg/dL      BUN 28 mg/dL      Creatinine 0.88 mg/dL      Sodium 138 mmol/L      Potassium 3.8 mmol/L      Chloride 108 mmol/L      CO2 18.0 mmol/L      Calcium 8.3 mg/dL      Total Protein 5.6 g/dL      Albumin 3.4 g/dL      ALT (SGPT) 23 U/L      AST (SGOT) 17 U/L      Alkaline Phosphatase 86 U/L      Total Bilirubin 0.5 mg/dL      Globulin 2.2 gm/dL      A/G Ratio 1.5 g/dL      BUN/Creatinine Ratio 31.8     Anion Gap 12.0 mmol/L      eGFR 80.2 mL/min/1.73     Narrative:      GFR Normal >60  Chronic Kidney Disease <60  Kidney Failure <15    The GFR formula is only valid for adults with stable renal function between ages 18 and 70.    CBC & Differential [[id number]]  (Abnormal) Collected: 12/07/23 0111    Specimen: Blood Updated: 12/07/23 0126    Narrative:      The following orders were created for panel order CBC & Differential.  Procedure                               Abnormality         Status                     ---------                               -----------         ------                     CBC Auto Differential[[id number]]        Abnormal            Final result                 Please view results for these tests on the individual orders.    CBC Auto Differential [[id number]]  (Abnormal) Collected: 12/07/23 0111    Specimen: Blood Updated: 12/07/23 0126     WBC 16.00 10*3/mm3      RBC 3.73 10*6/mm3      Hemoglobin 12.0 g/dL      Hematocrit 35.4 %      MCV 94.8 fL      MCH 32.2 pg      MCHC 33.9 g/dL      RDW 14.5 %      RDW-SD 49.9 fl      MPV 10.1 fL      Platelets 228 10*3/mm3      Neutrophil % 80.1 %      Lymphocyte % 10.1 %      Monocyte % 8.8 %      Eosinophil % 0.6 %      Basophil % 0.4 %       Neutrophils, Absolute 12.80 10*3/mm3      Lymphocytes, Absolute 1.60 10*3/mm3      Monocytes, Absolute 1.40 10*3/mm3      Eosinophils, Absolute 0.10 10*3/mm3      Basophils, Absolute 0.10 10*3/mm3      nRBC 0.1 /100 WBC     Lactic Acid, Plasma [[id number]]  (Normal) Collected: 12/06/23 2029    Specimen: Blood Updated: 12/06/23 2059     Lactate 1.5 mmol/L     Blood Culture - Blood, Arm, Left [[id number]] Collected: 12/06/23 2028    Specimen: Blood from Arm, Left Updated: 12/06/23 2036    Blood Culture - Blood, Hand, Right [[id number]] Collected: 12/06/23 2028    Specimen: Blood from Hand, Right Updated: 12/06/23 2036    Magnesium [[id number]]  (Abnormal) Collected: 12/06/23 0802    Specimen: Blood Updated: 12/06/23 1351     Magnesium 1.6 mg/dL     Basic Metabolic Panel [[id number]]  (Abnormal) Collected: 12/06/23 0802    Specimen: Blood Updated: 12/06/23 1351     Glucose 110 mg/dL      BUN 29 mg/dL      Creatinine 1.04 mg/dL      Sodium 137 mmol/L      Potassium 3.9 mmol/L      Chloride 108 mmol/L      CO2 19.0 mmol/L      Calcium 8.4 mg/dL      BUN/Creatinine Ratio 27.9     Anion Gap 10.0 mmol/L      eGFR 66.9 mL/min/1.73     Narrative:      GFR Normal >60  Chronic Kidney Disease <60  Kidney Failure <15    The GFR formula is only valid for adults with stable renal function between ages 18 and 70.    Urinalysis, Microscopic Only - Urine, Clean Catch [[id number]]  (Abnormal) Collected: 12/06/23 1223    Specimen: Urine, Clean Catch Updated: 12/06/23 1247     RBC, UA 0-2 /HPF      WBC, UA 21-50 /HPF      Bacteria, UA None Seen /HPF      Squamous Epithelial Cells, UA 0-2 /HPF      Hyaline Casts, UA 0-2 /LPF      Methodology Manual Light Microscopy    Urine Culture - Urine, Urine, Clean Catch [[id number]] Collected: 12/06/23 1223    Specimen: Urine, Clean Catch Updated: 12/06/23 1247    Urinalysis With Culture If Indicated - Urine, Clean Catch [[id number]]  (Abnormal) Collected: 12/06/23 1223    Specimen: Urine, Clean Catch  Updated: 12/06/23 1239     Color, UA Dark Yellow     Appearance, UA Clear     pH, UA 5.5     Specific Gravity, UA 1.027     Glucose,  mg/dL (Trace)     Ketones, UA 15 mg/dL (1+)     Bilirubin, UA Small (1+)     Comment: Confirmation testing is unavailable.  A serum bilirubin is recommended for further assessment.        Blood, UA Negative     Protein, UA 30 mg/dL (1+)     Leuk Esterase, UA Small (1+)     Nitrite, UA Negative     Urobilinogen, UA 1.0 E.U./dL    Narrative:      In absence of clinical symptoms, the presence of pyuria, bacteria, and/or nitrites on the urinalysis result does not correlate with infection.    aPTT [[id number]]  (Normal) Collected: 12/06/23 0802    Specimen: Blood Updated: 12/06/23 0848     PTT 73.5 seconds     CBC (No Diff) [[id number]]  (Abnormal) Collected: 12/06/23 0802    Specimen: Blood Updated: 12/06/23 0843     WBC 11.80 10*3/mm3      RBC 3.30 10*6/mm3      Hemoglobin 10.7 g/dL      Hematocrit 31.5 %      MCV 95.3 fL      MCH 32.3 pg      MCHC 33.9 g/dL      RDW 14.8 %      RDW-SD 52.5 fl      MPV 10.4 fL      Platelets 187 10*3/mm3             Imaging Results (Last 24 Hours)       Procedure Component Value Units Date/Time    XR Chest 1 View [[id number]] Collected: 12/06/23 1937     Updated: 12/06/23 1941    Narrative:      XR CHEST 1 VW    Date of Exam: 12/6/2023 7:26 PM EST    Indication: shortness of breath    Comparison: 12/2/2023    Findings:  Postsurgical changes of prior sternotomy. The heart is mildly enlarged, stable. Cardiac recorder noted. Minimal left basilar atelectasis/scarring similar to the prior study. Negative for pneumothorax. No significant effusion. Osseous structures grossly   intact.      Impression:      Impression:  Stable chronic findings without acute process.    Electronically Signed: Eric Lopez MD    12/6/2023 7:39 PM EST    Workstation ID: [id number]     Renal Bilateral [[id number]] Collected: 12/06/23 1256     Updated: 12/06/23 1300    Narrative:       US RENAL BILATERAL    Date of Exam: 12/6/2023 12:00 PM EST    Indication: Lack of urine output.    Comparison: No comparisons available.    Technique: Grayscale and color Doppler ultrasound evaluation of the kidneys and urinary bladder was performed.      Findings:  There is mild pelvic caliectasis involving the right kidney. No shadowing calculi are identified. Left kidney collecting system is decompressed. Right kidney measures 9.1 x 4.1 x 5.3 cm, the left 8.7 x 4 x 4.1 cm. Urinary bladder is decompressed which   limits evaluation. Urinary bladder wall is diffusely thickened but may be accentuated by nondistention and measures up to 9 mm in thickness.      Impression:      Impression:    1. Mild right-sided pelvic caliectasis.  2. Diffuse urinary bladder wall thickening may be due to nondistention. Muscular hypertrophy or cystitis are also possible.        Electronically Signed: Kp Lynch MD    12/6/2023 12:58 PM EST    Workstation ID: [id number]        LAB RESULTS (LAST 7 DAYS)    CBC  Results from last 7 days   Lab Units 12/07/23  0111 12/06/23  0802 12/05/23  0614 12/04/23  0421 12/03/23  0657 12/02/23  0644 12/01/23  2302   WBC 10*3/mm3 16.00* 11.80* 12.60* 14.50* 12.40* 11.90* 13.60*   RBC 10*6/mm3 3.73* 3.30* 3.40* 3.47* 3.34* 3.61* 3.58*   HEMOGLOBIN g/dL 12.0* 10.7* 11.0* 11.2* 10.9* 11.6* 11.4*   HEMATOCRIT % 35.4* 31.5* 32.5* 32.9* 32.3* 34.1* 34.6*   MCV fL 94.8 95.3 95.6 95.0 96.6 94.6 96.6   PLATELETS 10*3/mm3 228 187 179 170 148 184 189       BMP  Results from last 7 days   Lab Units 12/07/23  0425 12/07/23  0111 12/06/23  0802 12/05/23  0614 12/04/23  0421 12/03/23  0657 12/02/23  0644 12/01/23  2302   SODIUM mmol/L  --  138 137 136 135* 133* 132* 130*   POTASSIUM mmol/L  --  3.8 3.9 4.0 4.1 4.2 4.5 4.5   CHLORIDE mmol/L  --  108* 108* 107 104 103 101 100   CO2 mmol/L  --  18.0* 19.0* 17.0* 19.0* 22.0 21.0* 19.0*   BUN mg/dL  --  28* 29* 25* 21 17 15 14   CREATININE mg/dL  --  0.88 1.04 0.93  0.89 1.01 1.04 1.10   GLUCOSE mg/dL  --  123* 110* 109* 112* 105* 120* 129*   MAGNESIUM mg/dL 2.3  --  1.6* 1.6*  --   --   --   --        CMP   Results from last 7 days   Lab Units 12/07/23  0111 12/06/23  0802 12/05/23  0614 12/04/23  0421 12/03/23  0657 12/02/23 0644 12/01/23 2302 12/01/23  1613   SODIUM mmol/L 138 137 136 135* 133* 132* 130* 130*   POTASSIUM mmol/L 3.8 3.9 4.0 4.1 4.2 4.5 4.5 4.0   CHLORIDE mmol/L 108* 108* 107 104 103 101 100 97*   CO2 mmol/L 18.0* 19.0* 17.0* 19.0* 22.0 21.0* 19.0* 21.0*   BUN mg/dL 28* 29* 25* 21 17 15 14 16   CREATININE mg/dL 0.88 1.04 0.93 0.89 1.01 1.04 1.10 1.02   GLUCOSE mg/dL 123* 110* 109* 112* 105* 120* 129* 124*   ALBUMIN g/dL 3.4*  --   --   --   --   --   --  3.7   BILIRUBIN mg/dL 0.5  --   --   --   --   --   --  0.8   ALK PHOS U/L 86  --   --   --   --   --   --  83   AST (SGOT) U/L 17  --   --   --   --   --   --  21   ALT (SGPT) U/L 23  --   --   --   --   --   --  17         BNP        TROPONIN  Results from last 7 days   Lab Units 12/05/23  0831   HSTROP T ng/L 3,399*       CoAg  Results from last 7 days   Lab Units 12/06/23  0802 12/05/23  0614 12/04/23 2329 12/04/23  1728 12/04/23  1034 12/04/23  0421 12/03/23 2021 12/01/23  2302 12/01/23  1613   INR   --   --   --   --   --   --   --   --  1.08   APTT seconds 73.5 67.3 75.5 63.1 50.5* 62.7 57.7*   < > 25.5    < > = values in this interval not displayed.       Creatinine Clearance  Estimated Creatinine Clearance: 59.2 mL/min (by C-G formula based on SCr of 0.88 mg/dL).    ABG        Radiology  XR Chest 1 View    Result Date: 12/6/2023  Impression: Stable chronic findings without acute process. Electronically Signed: Eric Lopez MD  12/6/2023 7:39 PM EST  Workstation ID: [id number]    US Renal Bilateral    Result Date: 12/6/2023  Impression: 1. Mild right-sided pelvic caliectasis. 2. Diffuse urinary bladder wall thickening may be due to nondistention. Muscular hypertrophy or cystitis are also possible.  Electronically Signed: Kp Lynch MD  12/6/2023 12:58 PM EST  Workstation ID: [id number]         EKG                  I personally viewed and interpreted the patient's EKG/Telemetry data:    ECHOCARDIOGRAM:    Results for orders placed during the hospital encounter of 12/01/23    Adult Transthoracic Echo Complete W/ Cont if Necessary Per Protocol    Interpretation Summary    Left ventricular ejection fraction appears to be 36 - 40%.    The left atrial cavity is moderately dilated.    Estimated right ventricular systolic pressure from tricuspid regurgitation is normal (<35 mmHg).          STRESS TEST        Cardiolite (Tc-99m sestamibi) stress test    CARDIAC CATHETERIZATION  Results for orders placed during the hospital encounter of 12/01/23    Cardiac Catheterization/Vascular Study                OTHER:         Assessment & Plan     Principal Problem:    Acute ST elevation myocardial infarction (STEMI) involving right coronary artery    //////////////////////////  History  =============  - Inferior STEMI 12/4/2023.    - Status post PCI with PTCA, stent placement on Pronto catheter atherectomy to the graft to the distal right coronary artery.-12/3/2023-Dr. العلي    Cardiac catheterization 12/3/2023-Dr. العلي    Left Main %: 20 to 30%   Proximal LAD %: 100%  Mid/Distal LAD %: 100%  LCX %: Proximal 80% OM1 100%  Ramus:   RCA %: 100% after the PDA.  Lima %: LIMA to LAD was patent  SVG(s) %: SVG to the marginal branch is patent but has some 30 to 40% disease.  SVG to the diagonal branch is occluded.  SVG to the PDA is occluded.  SVG to the distal RCA is occluded but is the culprit lesion    -Past history of syncope-no further episodes.     -status post loop recorder placement.  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 cardiac catheterization 12/26/2014 revealed 60% distal circumflex and total LAD and right coronary arteries.  Lima to LAD was patent ( lima coming off the left vertebral artery).   SVG to diagonal   marginal and PDA were patent.  SVG to left ventricular branch was totally occluded (chronic)     - atrial fibrillation -has converted to sinus rhythm and maintaining sinus rhythm.  Recently patient was noted to have atrial dysrhythmia on the monitor with loop recorder.     - sinus bradycardia-asymptomatic     - status post acute inferior myocardial infarction prior to surgery requiring acute stent placement to right coronary artery ) complicated by ventricular fibrillation)    Echocardiogram 12/1/2023    Left ventricular ejection fraction appears to be 36 - 40%.    The left atrial cavity is moderately dilated.    Estimated right ventricular systolic pressure from tricuspid regurgitation is normal (<35 mmHg).      -Dyslipidemia and hypertension     - lower extremity weakness.   Arterial Doppler study of the lower extremity is normal. -  improved .   arterial Doppler study of the lower extremity was normal     - status post cholecystectomy     - allergy to penicillin iodine and metoprolol (rash).  Intolerance to atenolol due to bradycardia.  Allergy to Levaquin and penicillin.  Intolerance to prednisone Nitropatch IV nitroglycerin and prednisone.  =================    Plan  ==============   Inferior STEMI 12/4/2023.    Status post PCI with PTCA, stent placement on Pronto catheter atherectomy to the graft to the distal right coronary artery.-12/3/2023-Dr. العلي    Junctional bradycardia.  Patient was started on intravenous dopamine.  Blood pressure stable at this time.    Past history of complete heart block.  Patient had temporary pacemaker which was removed.    Consider permanent pacemaker implantation if patient has hemodynamic instability depending on patient's progress and patient's intentions and family's decisions..    Consideration will be given for EP consultation.    Hypomagnesemia-better at 2.3.    Patient is off beta-blocker Coreg.    Patient is off IV heparin.    Echocardiogram  12/1/2023    Left ventricular ejection fraction appears to be 36 - 40%.    The left atrial cavity is moderately dilated.    Estimated right ventricular systolic pressure from tricuspid regurgitation is normal (<35 mmHg).    Have discussed with attending nurse and Dr. Lao regarding CODE STATUS etc.  Appreciated /palliative care note.  Patient family prefers DNR DNI status.  They are not ready for hospice but probable palliative care.    Medications were reviewed and updated.  Patient is on aspirin atorvastatin Plavix.    Reviewed and updated-12/7/2023  Further plan will depend on patient's progress.  //////////////////////////////////////         Missy Domínguez MD  12/07/23  06:30 EST

## 2023-12-07 NOTE — THERAPY TREATMENT NOTE
"Subjective: Pt agreeable to therapeutic plan of care.  Cognition: oriented to Person, Place, Time, and Situation    Objective:     Bed Mobility: Min-A, Mod-A, and Assist x 2 supine to sit EOB  Functional Transfers: Min-A, Assist x 2, and with rolling walker     Balance: supported, static, dynamic, and standing Min-A and with rolling walker  Functional Ambulation: Min-A, Assist x 2, and with rolling walker - Functional ambulation stopped with pt complaints of blurry vision.    Grooming: Supervision  ADL Position: supported sitting  ADL Comments: Patient provided setup for facial hygiene and hair care which he performed without further assistance.       Vitals: Bradycardic: HR between 47-58    Pain: 8 VAS  Location: Chest pain secondary to frequent coughing  Interventions for pain: Repositioned and Increased Activity  Education: Provided education on the importance of mobility in the acute care setting, Verbal/Tactile Cues, ADL training, and Transfer Training      Assessment: Sage Flores presents with ADL impairments affecting function including balance, endurance / activity tolerance, and strength. Demonstrated functioning below baseline abilities indicate the need for continued skilled intervention while inpatient. Pt c/o blurry vision with functional mobility this date, thus activity was ceased. BP taken and was WNL. In chair, pt completed grooming and hygiene tasks, but reported blurry vision persisted. Family speaking with Hospice and Palliative care team is following for d/c plans. Tolerating session today without incident. Will continue to follow and progress as tolerated.     Plan/Recommendations:   Moderate Intensity Therapy recommended post-acute care. This is recommended as therapy feels the patient would require 3-4 days per week and wouldn't tolerate \"3 hour daily\" rehab intensity. SNF would be the preferred choice. If the patient does not agree to SNF, arrange HH or OP depending on home bound status. " If patient is medically complex, consider LTACH.. Pt requires no DME at discharge.     Pt desires Home with Home Health and Home with family assist at discharge. Pt cooperative; agreeable to therapeutic recommendations and plan of care.     Modified Zeferino: N/A = No pre-op stroke/TIA    Post-Tx Position: Up in Chair, Alarms activated, and Call light and personal items within reach  PPE: gloves

## 2023-12-07 NOTE — THERAPY TREATMENT NOTE
"Subjective: Pt agreeable to therapeutic plan of care. Patient willing to move with therapy.    Objective:     Bed mobility - Min-A, Mod-A, and Assist x 2  Transfers - Min-A, Assist x 2, and with rolling walker  Ambulation - 20 feet Min-A, Assist x 2, and with rolling walker    Therapeutic Exercise - 10 Reps Bilaterally AROM unsupported sitting / EOB    Vitals: Bradycardic HR between 47-58    Pain: 8 VAS   Location: Chest pain after coughing all morning  Intervention for pain: Repositioned, Increased Activity, and Therapeutic Presence    Education: Provided education on the importance of mobility in the acute care setting, Verbal/Tactile Cues, Transfer Training, Gait Training, and Energy conservation strategies    Assessment: Sage Flores presents with functional mobility impairments which indicate the need for skilled intervention. Tolerating session today without incident. Will continue to follow and progress as tolerated. Patient was able to ambulate in room with minimal assist x2. While ambulating, patient expressed he felt his vision was blurry so patient sat in chair. BP was measured and WNL, patient reports the blurriness never went away. Patient finished in chair with alarm activated and family in room.    Plan/Recommendations:   Moderate Intensity Therapy recommended post-acute care. This is recommended as therapy feels the patient would require 3-4 days per week and wouldn't tolerate \"3 hour daily\" rehab intensity. SNF would be the preferred choice. If the patient does not agree to SNF, arrange HH or OP depending on home bound status. If patient is medically complex, consider LTACH.. Pt requires rolling walker at discharge.     Pt desires Home at discharge. Pt not agreeable to therapeutic recommendations and plan of care.         Basic Mobility 6-click:  Rollin = Total, A lot = 2, A little = 3; 4 = None  Supine>Sit:   1 = Total, A lot = 2, A little = 3; 4 = None   Sit>Stand with arms:  1 = " Total, A lot = 2, A little = 3; 4 = None  Bed>Chair:   1 = Total, A lot = 2, A little = 3; 4 = None  Ambulate in room:  1 = Total, A lot = 2, A little = 3; 4 = None  3-5 Steps with railin = Total, A lot = 2, A little = 3; 4 = None  Score: 16    Modified [place]: 0 = No Symptoms    Post-Tx Position: Up in Chair, Alarms activated, and Call light and personal items within reach  PPE: gloves

## 2023-12-08 ENCOUNTER — DOCUMENTATION (OUTPATIENT)
Dept: HOME HEALTH SERVICES | Facility: HOME HEALTHCARE | Age: 88
End: 2023-12-08
Payer: MEDICARE

## 2023-12-08 LAB
ALBUMIN SERPL-MCNC: 2.8 G/DL (ref 3.5–5.2)
ALBUMIN/GLOB SERPL: 1.3 G/DL
ALP SERPL-CCNC: 78 U/L (ref 39–117)
ALT SERPL W P-5'-P-CCNC: 19 U/L (ref 1–41)
ANION GAP SERPL CALCULATED.3IONS-SCNC: 12 MMOL/L (ref 5–15)
AST SERPL-CCNC: 14 U/L (ref 1–40)
B PARAPERT DNA SPEC QL NAA+PROBE: NOT DETECTED
B PERT DNA SPEC QL NAA+PROBE: NOT DETECTED
BACTERIA SPEC AEROBE CULT: ABNORMAL
BASOPHILS # BLD AUTO: 0.1 10*3/MM3 (ref 0–0.2)
BASOPHILS NFR BLD AUTO: 0.7 % (ref 0–1.5)
BILIRUB SERPL-MCNC: 0.5 MG/DL (ref 0–1.2)
BUN SERPL-MCNC: 26 MG/DL (ref 8–23)
BUN/CREAT SERPL: 26 (ref 7–25)
C PNEUM DNA NPH QL NAA+NON-PROBE: NOT DETECTED
CA-I SERPL ISE-MCNC: 1.17 MMOL/L (ref 1.2–1.3)
CALCIUM SPEC-SCNC: 8.2 MG/DL (ref 8.2–9.6)
CHLORIDE SERPL-SCNC: 107 MMOL/L (ref 98–107)
CO2 SERPL-SCNC: 19 MMOL/L (ref 22–29)
CREAT SERPL-MCNC: 1 MG/DL (ref 0.76–1.27)
DEPRECATED RDW RBC AUTO: 49.4 FL (ref 37–54)
EGFRCR SERPLBLD CKD-EPI 2021: 70.2 ML/MIN/1.73
EOSINOPHIL # BLD AUTO: 0.1 10*3/MM3 (ref 0–0.4)
EOSINOPHIL NFR BLD AUTO: 1.1 % (ref 0.3–6.2)
ERYTHROCYTE [DISTWIDTH] IN BLOOD BY AUTOMATED COUNT: 14.9 % (ref 12.3–15.4)
FLUAV SUBTYP SPEC NAA+PROBE: NOT DETECTED
FLUBV RNA ISLT QL NAA+PROBE: NOT DETECTED
GLOBULIN UR ELPH-MCNC: 2.1 GM/DL
GLUCOSE BLDC GLUCOMTR-MCNC: 98 MG/DL (ref 70–105)
GLUCOSE SERPL-MCNC: 100 MG/DL (ref 65–99)
GRAM STN SPEC: ABNORMAL
HADV DNA SPEC NAA+PROBE: NOT DETECTED
HCOV 229E RNA SPEC QL NAA+PROBE: NOT DETECTED
HCOV HKU1 RNA SPEC QL NAA+PROBE: NOT DETECTED
HCOV NL63 RNA SPEC QL NAA+PROBE: NOT DETECTED
HCOV OC43 RNA SPEC QL NAA+PROBE: NOT DETECTED
HCT VFR BLD AUTO: 30.3 % (ref 37.5–51)
HGB BLD-MCNC: 10.5 G/DL (ref 13–17.7)
HMPV RNA NPH QL NAA+NON-PROBE: NOT DETECTED
HPIV1 RNA ISLT QL NAA+PROBE: NOT DETECTED
HPIV2 RNA SPEC QL NAA+PROBE: NOT DETECTED
HPIV3 RNA NPH QL NAA+PROBE: NOT DETECTED
HPIV4 P GENE NPH QL NAA+PROBE: NOT DETECTED
ISOLATED FROM: ABNORMAL
L PNEUMO1 AG UR QL IA: NEGATIVE
LYMPHOCYTES # BLD AUTO: 1.5 10*3/MM3 (ref 0.7–3.1)
LYMPHOCYTES NFR BLD AUTO: 11.2 % (ref 19.6–45.3)
M PNEUMO IGG SER IA-ACNC: NOT DETECTED
MAGNESIUM SERPL-MCNC: 1.8 MG/DL (ref 1.7–2.3)
MCH RBC QN AUTO: 33.3 PG (ref 26.6–33)
MCHC RBC AUTO-ENTMCNC: 34.8 G/DL (ref 31.5–35.7)
MCV RBC AUTO: 95.7 FL (ref 79–97)
MONOCYTES # BLD AUTO: 1.1 10*3/MM3 (ref 0.1–0.9)
MONOCYTES NFR BLD AUTO: 8.8 % (ref 5–12)
NEUTROPHILS NFR BLD AUTO: 10.2 10*3/MM3 (ref 1.7–7)
NEUTROPHILS NFR BLD AUTO: 78.2 % (ref 42.7–76)
NRBC BLD AUTO-RTO: 0.2 /100 WBC (ref 0–0.2)
PHOSPHATE SERPL-MCNC: 3.1 MG/DL (ref 2.5–4.5)
PLATELET # BLD AUTO: 258 10*3/MM3 (ref 140–450)
PMV BLD AUTO: 10.5 FL (ref 6–12)
POTASSIUM SERPL-SCNC: 3.4 MMOL/L (ref 3.5–5.2)
PROT SERPL-MCNC: 4.9 G/DL (ref 6–8.5)
RBC # BLD AUTO: 3.16 10*6/MM3 (ref 4.14–5.8)
RHINOVIRUS RNA SPEC NAA+PROBE: NOT DETECTED
RSV RNA NPH QL NAA+NON-PROBE: NOT DETECTED
S PNEUM AG SPEC QL LA: NEGATIVE
SARS-COV-2 RNA NPH QL NAA+NON-PROBE: NOT DETECTED
SODIUM SERPL-SCNC: 138 MMOL/L (ref 136–145)
WBC NRBC COR # BLD AUTO: 13.1 10*3/MM3 (ref 3.4–10.8)

## 2023-12-08 PROCEDURE — 82330 ASSAY OF CALCIUM: CPT | Performed by: INTERNAL MEDICINE

## 2023-12-08 PROCEDURE — 99233 SBSQ HOSP IP/OBS HIGH 50: CPT | Performed by: INTERNAL MEDICINE

## 2023-12-08 PROCEDURE — 83735 ASSAY OF MAGNESIUM: CPT | Performed by: INTERNAL MEDICINE

## 2023-12-08 PROCEDURE — 82948 REAGENT STRIP/BLOOD GLUCOSE: CPT

## 2023-12-08 PROCEDURE — 0202U NFCT DS 22 TRGT SARS-COV-2: CPT | Performed by: INTERNAL MEDICINE

## 2023-12-08 PROCEDURE — 84100 ASSAY OF PHOSPHORUS: CPT | Performed by: INTERNAL MEDICINE

## 2023-12-08 PROCEDURE — 25010000002 AZITHROMYCIN PER 500 MG: Performed by: INTERNAL MEDICINE

## 2023-12-08 PROCEDURE — 85025 COMPLETE CBC W/AUTO DIFF WBC: CPT | Performed by: INTERNAL MEDICINE

## 2023-12-08 PROCEDURE — 87205 SMEAR GRAM STAIN: CPT | Performed by: INTERNAL MEDICINE

## 2023-12-08 PROCEDURE — 80053 COMPREHEN METABOLIC PANEL: CPT | Performed by: INTERNAL MEDICINE

## 2023-12-08 PROCEDURE — 87449 NOS EACH ORGANISM AG IA: CPT | Performed by: INTERNAL MEDICINE

## 2023-12-08 PROCEDURE — 25810000003 SODIUM CHLORIDE 0.9 % SOLUTION 250 ML FLEX CONT: Performed by: INTERNAL MEDICINE

## 2023-12-08 PROCEDURE — 25010000002 CEFTRIAXONE PER 250 MG: Performed by: INTERNAL MEDICINE

## 2023-12-08 RX ORDER — METRONIDAZOLE 500 MG/100ML
500 INJECTION, SOLUTION INTRAVENOUS EVERY 8 HOURS
Status: DISCONTINUED | OUTPATIENT
Start: 2023-12-08 | End: 2023-12-08

## 2023-12-08 RX ORDER — ALBUTEROL SULFATE 2.5 MG/3ML
2.5 SOLUTION RESPIRATORY (INHALATION) EVERY 6 HOURS PRN
Status: DISCONTINUED | OUTPATIENT
Start: 2023-12-08 | End: 2023-12-11 | Stop reason: HOSPADM

## 2023-12-08 RX ORDER — BUMETANIDE 1 MG/1
1 TABLET ORAL 3 TIMES DAILY
Status: DISCONTINUED | OUTPATIENT
Start: 2023-12-08 | End: 2023-12-11 | Stop reason: HOSPADM

## 2023-12-08 RX ORDER — BENZONATATE 100 MG/1
100 CAPSULE ORAL 3 TIMES DAILY PRN
Status: DISCONTINUED | OUTPATIENT
Start: 2023-12-08 | End: 2023-12-11 | Stop reason: HOSPADM

## 2023-12-08 RX ADMIN — AZITHROMYCIN MONOHYDRATE 500 MG: 500 INJECTION, POWDER, LYOPHILIZED, FOR SOLUTION INTRAVENOUS at 08:26

## 2023-12-08 RX ADMIN — ASPIRIN 81 MG CHEWABLE TABLET 81 MG: 81 TABLET CHEWABLE at 08:26

## 2023-12-08 RX ADMIN — BENZONATATE 100 MG: 100 CAPSULE ORAL at 08:26

## 2023-12-08 RX ADMIN — BUMETANIDE 1 MG: 1 TABLET ORAL at 08:26

## 2023-12-08 RX ADMIN — CLOPIDOGREL BISULFATE 75 MG: 75 TABLET ORAL at 08:26

## 2023-12-08 RX ADMIN — ATORVASTATIN CALCIUM 10 MG: 10 TABLET, FILM COATED ORAL at 20:23

## 2023-12-08 RX ADMIN — Medication 10 ML: at 08:55

## 2023-12-08 RX ADMIN — CEFTRIAXONE 1000 MG: 1 INJECTION, POWDER, FOR SOLUTION INTRAMUSCULAR; INTRAVENOUS at 20:23

## 2023-12-08 RX ADMIN — Medication 10 ML: at 20:24

## 2023-12-08 RX ADMIN — BUMETANIDE 1 MG: 1 TABLET ORAL at 20:23

## 2023-12-08 NOTE — CONSULTS
Infectious Diseases Consult Note    Referring Provider: Magdiel Lao DO    Reason for Consultation: Positive blood culture and pneumonia    Patient Care Team:  Tricia Aldana APRN as PCP - General (Family Medicine)  Missy Domínguez MD as Consulting Physician (Cardiology)    Chief complaint   Productive cough    Subjective     History of present illness:      This is 89-year-old male with past medical history significant for coronary artery disease s/p CABG in the past who came to the hospital on December 1, 2023 with some complaints of chest pain and was found to have non-STEMI.  He required to have a cardiac stenting during this hospital admission.  The patient had a CT scan of the chest which showed bilateral effusion with minimally consolidated right lower lobe.  He is having productive cough with yellow sputum.  The patient is currently on 3 L oxygen via nasal cannula.  He had a COVID screen with viral PCR panel which was negative    Review of Systems   Review of Systems   Constitutional: Negative.    HENT: Negative.     Eyes: Negative.    Respiratory:  Positive for cough and shortness of breath.    Cardiovascular: Negative.    Gastrointestinal: Negative.    Genitourinary: Negative.    Musculoskeletal: Negative.    Skin: Negative.    Neurological: Negative.    Hematological: Negative.    Psychiatric/Behavioral: Negative.         Medications  Medications Prior to Admission   Medication Sig Dispense Refill Last Dose    acetaminophen (TYLENOL) 650 MG 8 hr tablet Take 1 tablet by mouth Every Night.       aspirin 81 MG chewable tablet Chew 1 tablet Daily.       azelastine (ASTEPRO) 0.15 % solution nasal spray USE 2 SPRAYS IN EACH NOSTRIL TWICE DAILY AS DIRECTED BY PROVIDER 30 mL 3     Cholecalciferol 25 MCG (1000 UT) tablet Take 1 tablet by mouth Daily.       docusate sodium (COLACE) 250 MG capsule Take 1 capsule by mouth Daily.       gabapentin (NEURONTIN) 100 MG capsule TAKE 1 CAPSULE BY MOUTH TWICE  DAILY 180 capsule 1     losartan (COZAAR) 50 MG tablet TAKE 1 TABLET BY MOUTH DAILY 90 tablet 1     omeprazole (priLOSEC) 20 MG capsule Take 1 capsule by mouth Daily. 90 capsule 3     tamsulosin (FLOMAX) 0.4 MG capsule 24 hr capsule TAKE 1 CAPSULE BY MOUTH DAILY 90 capsule 0        History  Past Medical History:   Diagnosis Date    Anxiety 2014    Atrial fibrillation     Benign prostatic hyperplasia     CAD (coronary artery disease)     Hyperlipidemia     Hypertension     Myocardial infarction      Past Surgical History:   Procedure Laterality Date    CARDIAC CATHETERIZATION N/A 12/1/2023    Procedure: Left Heart Cath;  Surgeon: Son العلي MD;  Location:  [facility];  Service: Cardiovascular;  Laterality: N/A;    CARDIAC CATHETERIZATION N/A 12/1/2023    Procedure: Coronary angiography;  Surgeon: Son العلي MD;  Location:  [facility];  Service: Cardiovascular;  Laterality: N/A;    CARDIAC CATHETERIZATION N/A 12/1/2023    Procedure: Percutaneous Coronary Intervention;  Surgeon: Son العلي MD;  Location:  [facility];  Service: Cardiovascular;  Laterality: N/A;    CARDIAC ELECTROPHYSIOLOGY PROCEDURE N/A 12/1/2023    Procedure: Temporary Pacemaker;  Surgeon: Son العلي MD;  Location:  [facility];  Service: Cardiovascular;  Laterality: N/A;    CARDIAC SURGERY      HERNIA REPAIR         Family History  Family History   Problem Relation Age of Onset    Heart disease Mother     Heart disease Father        Social History   reports that he quit smoking about 53 years ago. His smoking use included cigarettes. He has never used smokeless tobacco. He reports that he does not drink alcohol and does not use drugs.    Allergies  Iodine, Levofloxacin, Nitroglycerin, Paroxetine, Penicillin g, Prednisone, Sucralfate, Diltiazem hcl, Metoprolol, Pramipexole dihydrochloride, and Ropinirole hcl    Objective     Vital Signs   Vital Signs (last 24 hours)          12/07 0700  12/08 0659 12/08 0700  12/08 1400   Most Recent      Temp (°F) 97.2 -  97.8      98.2     98.2 (36.8) 12/08 1100    Heart Rate 36 -  57    37 -  62     39 12/08 1300    Resp 20 -  28    21 -  23     23 12/08 1100    /70 -  167/76    136/80 -  153/69     149/74 12/08 1300    SpO2 (%) 95 -  100    91 -  96     95 12/08 1300    Flow (L/min)   3      3     3 12/08 1100            Physical Exam:  Physical Exam  Vitals and nursing note reviewed.   Constitutional:       Appearance: He is well-developed.   HENT:      Head: Normocephalic and atraumatic.   Eyes:      Pupils: Pupils are equal, round, and reactive to light.   Cardiovascular:      Rate and Rhythm: Normal rate and regular rhythm.      Heart sounds: Normal heart sounds.   Pulmonary:      Effort: Pulmonary effort is normal. No respiratory distress.      Breath sounds: Rales present. No wheezing.   Abdominal:      General: Bowel sounds are normal. There is no distension.      Palpations: Abdomen is soft. There is no mass.      Tenderness: There is no abdominal tenderness. There is no guarding or rebound.   Musculoskeletal:         General: No deformity. Normal range of motion.      Cervical back: Normal range of motion and neck supple.      Right lower leg: Edema present.      Left lower leg: Edema present.   Skin:     General: Skin is warm.      Findings: No erythema or rash.   Neurological:      Mental Status: He is alert and oriented to person, place, and time.      Cranial Nerves: No cranial nerve deficit.         Microbiology  Microbiology Results (last 10 days)       Procedure Component Value - Date/Time    COVID PRE-OP / PRE-PROCEDURE SCREENING ORDER (NO ISOLATION) - Swab, Nasopharynx [[id number]]  (Normal) Collected: 12/08/23 0921    Lab Status: Final result Specimen: Swab from Nasopharynx Updated: 12/08/23 1100    Narrative:      The following orders were created for panel order COVID PRE-OP / PRE-PROCEDURE SCREENING ORDER (NO  ISOLATION) - Swab, Nasopharynx.  Procedure                               Abnormality         Status                     ---------                               -----------         ------                     Respiratory Panel PCR w/...[[id number]]  Normal              Final result                 Please view results for these tests on the individual orders.    Respiratory Panel PCR w/COVID-19(SARS-CoV-2) [facility] In-House, NP Swab in UTM/VTM, 2 HR TAT - Swab, Nasopharynx [[id number]]  (Normal) Collected: 12/08/23 0921    Lab Status: Final result Specimen: Swab from Nasopharynx Updated: 12/08/23 1100     ADENOVIRUS, PCR Not Detected     Coronavirus 229E Not Detected     Coronavirus HKU1 Not Detected     Coronavirus NL63 Not Detected     Coronavirus OC43 Not Detected     COVID19 Not Detected     Human Metapneumovirus Not Detected     Human Rhinovirus/Enterovirus Not Detected     Influenza A PCR Not Detected     Influenza B PCR Not Detected     Parainfluenza Virus 1 Not Detected     Parainfluenza Virus 2 Not Detected     Parainfluenza Virus 3 Not Detected     Parainfluenza Virus 4 Not Detected     RSV, PCR Not Detected     Bordetella pertussis pcr Not Detected     Bordetella parapertussis PCR Not Detected     Chlamydophila pneumoniae PCR Not Detected     Mycoplasma pneumo by PCR Not Detected    Narrative:      In the setting of a positive respiratory panel with a viral infection PLUS a negative procalcitonin without other underlying concern for bacterial infection, consider observing off antibiotics or discontinuation of antibiotics and continue supportive care. If the respiratory panel is positive for atypical bacterial infection (Bordetella pertussis, Chlamydophila pneumoniae, or Mycoplasma pneumoniae), consider antibiotic de-escalation to target atypical bacterial infection.    Blood Culture - Blood, Arm, Left [[id number]]  (Abnormal) Collected: 12/06/23 2028    Lab Status: Final result Specimen: Blood  from Arm, Left Updated: 12/08/23 0649     Blood Culture Staphylococcus, coagulase negative     Isolated from Aerobic Bottle     Gram Stain Aerobic Bottle Gram positive cocci in clusters    Narrative:      Probable contaminant requires clinical correlation, susceptibility not performed unless requested by physician.      Blood Culture - Blood, Hand, Right [[id number]]  (Normal) Collected: 12/06/23 2028    Lab Status: Preliminary result Specimen: Blood from Hand, Right Updated: 12/07/23 2045     Blood Culture No growth at 24 hours    Blood Culture ID, PCR - Blood, Arm, Left [[id number]]  (Abnormal) Collected: 12/06/23 2028    Lab Status: Final result Specimen: Blood from Arm, Left Updated: 12/07/23 2037     BCID, PCR Staph spp, not aureus or lugdunensis. Identification by BCID2 PCR.     BOTTLE TYPE Aerobic Bottle    Urine Culture - Urine, Urine, Clean Catch [[id number]]  (Normal) Collected: 12/06/23 1223    Lab Status: Final result Specimen: Urine, Clean Catch Updated: 12/07/23 2039     Urine Culture No growth    MRSA Screen, PCR (Inpatient) - Swab, Nares [[id number]]  (Normal) Collected: 12/01/23 2301    Lab Status: Final result Specimen: Swab from Nares Updated: 12/02/23 0231     MRSA PCR No MRSA Detected    Narrative:      The negative predictive value of this diagnostic test is high and should only be used to consider de-escalating anti-MRSA therapy. A positive result may indicate colonization with MRSA and must be correlated clinically.            Laboratory  Results from last 7 days   Lab Units 12/08/23  0700   WBC 10*3/mm3 13.10*   HEMOGLOBIN g/dL 10.5*   HEMATOCRIT % 30.3*   PLATELETS 10*3/mm3 258     Results from last 7 days   Lab Units 12/08/23  0700   SODIUM mmol/L 138   POTASSIUM mmol/L 3.4*   CHLORIDE mmol/L 107   CO2 mmol/L 19.0*   BUN mg/dL 26*   CREATININE mg/dL 1.00   GLUCOSE mg/dL 100*   CALCIUM mg/dL 8.2     Results from last 7 days   Lab Units 12/08/23  0700   SODIUM mmol/L 138   POTASSIUM mmol/L 3.4*    CHLORIDE mmol/L 107   CO2 mmol/L 19.0*   BUN mg/dL 26*   CREATININE mg/dL 1.00   GLUCOSE mg/dL 100*   CALCIUM mg/dL 8.2                   Radiology  Imaging Results (Last 72 Hours)       Procedure Component Value Units Date/Time    CT Chest Without Contrast Diagnostic [[id number]] Collected: 12/07/23 1616     Updated: 12/07/23 1622    Narrative:      CT CHEST WO CONTRAST DIAGNOSTIC    Date of Exam: 12/7/2023 3:46 PM EST    Indication: Pneumonia, complication suspected, xray done.    Comparison: None available.    Technique: Axial CT images were obtained of the chest without contrast administration.  Sagittal and coronal reconstructions were performed.  Automated exposure control and iterative reconstruction methods were used.      Findings:  There is underlying emphysema and evidence of prior granulomatous disease. There are small bilateral pleural effusions and mild dependent atelectasis in each lung base. Additionally there are patchy groundglass and interstitial opacities within the right   lower lobe which may represent superimposed infection. No suspicious pulmonary nodules. No pneumothorax. The thyroid gland is normal in size. No aggressive focal osseous lesions or acute bony abnormalities. There is evidence of previous CABG. There is   cardiomegaly. Coronary artery calcifications are present. The thoracic aorta is not aneurysmal. There is mild to moderate degree of calcification in the thoracic aorta. The unenhanced pulmonary arteries are normal in caliber. Limited images of the upper   abdomen demonstrate no acute or suspicious abnormalities. No acute bony abnormalities or aggressive appearing focal osseous lesions are demonstrated.        Impression:      Impression:  1.There are small bilateral pleural effusions and mild dependent atelectasis in each lung base. There are also patchy groundglass and interstitial opacities in the right lower lobe which may represent superimposed infection.  2.There is  underlying emphysema and evidence of prior granulomatous disease.  3.Cardiomegaly with evidence of previous CABG    Electronically Signed: Mariusz Low DO    12/7/2023 4:20 PM EST    Workstation ID: [id number]    XR Chest 1 View [[id number]] Collected: 12/06/23 1937     Updated: 12/06/23 1941    Narrative:      XR CHEST 1 VW    Date of Exam: 12/6/2023 7:26 PM EST    Indication: shortness of breath    Comparison: 12/2/2023    Findings:  Postsurgical changes of prior sternotomy. The heart is mildly enlarged, stable. Cardiac recorder noted. Minimal left basilar atelectasis/scarring similar to the prior study. Negative for pneumothorax. No significant effusion. Osseous structures grossly   intact.      Impression:      Impression:  Stable chronic findings without acute process.    Electronically Signed: Eric Lopez MD    12/6/2023 7:39 PM EST    Workstation ID: [id number]    US Renal Bilateral [[id number]] Collected: 12/06/23 1256     Updated: 12/06/23 1300    Narrative:      US RENAL BILATERAL    Date of Exam: 12/6/2023 12:00 PM EST    Indication: Lack of urine output.    Comparison: No comparisons available.    Technique: Grayscale and color Doppler ultrasound evaluation of the kidneys and urinary bladder was performed.      Findings:  There is mild pelvic caliectasis involving the right kidney. No shadowing calculi are identified. Left kidney collecting system is decompressed. Right kidney measures 9.1 x 4.1 x 5.3 cm, the left 8.7 x 4 x 4.1 cm. Urinary bladder is decompressed which   limits evaluation. Urinary bladder wall is diffusely thickened but may be accentuated by nondistention and measures up to 9 mm in thickness.      Impression:      Impression:    1. Mild right-sided pelvic caliectasis.  2. Diffuse urinary bladder wall thickening may be due to nondistention. Muscular hypertrophy or cystitis are also possible.        Electronically Signed: Kp yLnch MD    12/6/2023 12:58 PM EST    Workstation ID: [id number]             Cardiology      Results Review:  I have reviewed all clinical data, test, lab, and imaging results.       Schedule Meds  aspirin, 81 mg, Oral, Daily  atorvastatin, 10 mg, Oral, Nightly  azithromycin, 500 mg, Intravenous, Q24H  bumetanide, 1 mg, Oral, TID  cefTRIAXone, 1,000 mg, Intravenous, Q24H  clopidogrel, 75 mg, Oral, Daily  sodium chloride, 10 mL, Intravenous, Q12H        Infusion Meds       PRN Meds    acetaminophen **OR** acetaminophen    albuterol    benzonatate    senna-docusate sodium **AND** polyethylene glycol **AND** bisacodyl **AND** bisacodyl    guaifenesin    influenza vaccine    ipratropium-albuterol    ondansetron **OR** ondansetron    prochlorperazine    sodium chloride    sodium chloride    sodium chloride      Assessment & Plan       Assessment    Lower respiratory tract infection with acute bronchitis versus subtle right lower lobe pneumonia.  Patient is having productive sputum.  Currently on 3 L of oxygen    Hypoxia currently on 3 L of oxygen possibly secondary to congestive heart failure and possible mild pneumonia    S/p cardiac stenting for acute MI    S/p CABG in the past.  Patient did not have cardiac valve replacement according to him.    Positive blood culture in 1 out of 2 sets for coagulase-negative Staphylococcus.  In the setting where patient had no prior cardiac valve replacement or endovascular device this most likely indicates contamination    Plan    Continue IV ceftriaxone 1 g daily for now  Request sputum culture  Send urine for Legionella and pneumococcal antigen  Can discontinue azithromycin if Legionella urine antigen is negative  Supportive care    Dilia Carbajal MD  12/08/23  14:00 EST    Note is dictated utilizing voice recognition software/Dragon

## 2023-12-08 NOTE — PROGRESS NOTES
PROGRESS NOTE      Patient Name: Sage Flores  : 1930  MRN: [id number]  Primary Care Physician: Tricia Aldana APRN  Date of admission: 2023    Patient Care Team:  Tricia Aldana APRN as PCP - General (Family Medicine)  Missy Domínguez MD as Consulting Physician (Cardiology)        Subjective   Subjective:     Feel better  Review of systems:        Allergies:    Allergies   Allergen Reactions    Iodine Unknown (See Comments)     Pt unsure of reaction      Levofloxacin Unknown (See Comments)    Nitroglycerin Unknown (See Comments) and Other (See Comments)    Paroxetine Unknown (See Comments)    Penicillin G Unknown (See Comments)    Prednisone Unknown (See Comments)    Sucralfate Unknown (See Comments)    Diltiazem Hcl Hives    Metoprolol Other (See Comments)     Lowers heart rate     Pramipexole Dihydrochloride Unknown (See Comments)    Ropinirole Hcl Other (See Comments)       Objective   Exam:     Vital Signs  Temp:  [97.2 °F (36.2 °C)-98.2 °F (36.8 °C)] 98.2 °F (36.8 °C)  Heart Rate:  [36-61] 61  Resp:  [20-28] 21  BP: (114-151)/(59-87) 151/79  SpO2:  [95 %-98 %] 96 %  on  Flow (L/min):  [3] 3;   Device (Oxygen Therapy): nonrebreather mask  Body mass index is 25.18 kg/m².    General: Elderly white male in no acute distress.    Head:      Normocephalic and atraumatic.    Eyes:      PERRL/EOM intact, conjunctiva and sclera clear with out nystagmus.    Neck:      No masses, thyromegaly,  trachea central with normal respiratory effort   Lungs:    Clear bilaterally to auscultation.    Heart:      Regular rate and rhythm, no murmur no gallop  Abd:        Soft, nontender, not distended, bowel sounds positive, no shifting dullness   Pulses:   Pulses palpable  Extr:        No cyanosis or clubbing--+1-2 edema.    Neuro:    No focal deficits.   alert oriented x3  Skin:       Intact without lesions or rashes.    Psych:    Alert and cooperative; normal mood and affect; .      Results  Review:  I have personally reviewed most recent Data :  CBC    Results from last 7 days   Lab Units 12/08/23  0700 12/07/23  0111 12/06/23  0802 12/05/23  0614 12/04/23 0421 12/03/23  0657 12/02/23  0644   WBC 10*3/mm3 13.10* 16.00* 11.80* 12.60* 14.50* 12.40* 11.90*   HEMOGLOBIN g/dL 10.5* 12.0* 10.7* 11.0* 11.2* 10.9* 11.6*   PLATELETS 10*3/mm3 258 228 187 179 170 148 184     CMP   Results from last 7 days   Lab Units 12/08/23  0700 12/07/23  0111 12/06/23  0802 12/05/23  0614 12/04/23 0421 12/03/23  0657 12/02/23  0644 12/01/23  2302 12/01/23  1613   SODIUM mmol/L 138 138 137 136 135* 133* 132*   < > 130*   POTASSIUM mmol/L 3.4* 3.8 3.9 4.0 4.1 4.2 4.5   < > 4.0   CHLORIDE mmol/L 107 108* 108* 107 104 103 101   < > 97*   CO2 mmol/L 19.0* 18.0* 19.0* 17.0* 19.0* 22.0 21.0*   < > 21.0*   BUN mg/dL 26* 28* 29* 25* 21 17 15   < > 16   CREATININE mg/dL 1.00 0.88 1.04 0.93 0.89 1.01 1.04   < > 1.02   GLUCOSE mg/dL 100* 123* 110* 109* 112* 105* 120*   < > 124*   ALBUMIN g/dL 2.8* 3.4*  --   --   --   --   --   --  3.7   BILIRUBIN mg/dL 0.5 0.5  --   --   --   --   --   --  0.8   ALK PHOS U/L 78 86  --   --   --   --   --   --  83   AST (SGOT) U/L 14 17  --   --   --   --   --   --  21   ALT (SGPT) U/L 19 23  --   --   --   --   --   --  17    < > = values in this interval not displayed.     ABG      CT Chest Without Contrast Diagnostic    Result Date: 12/7/2023  Impression: 1.There are small bilateral pleural effusions and mild dependent atelectasis in each lung base. There are also patchy groundglass and interstitial opacities in the right lower lobe which may represent superimposed infection. 2.There is underlying emphysema and evidence of prior granulomatous disease. 3.Cardiomegaly with evidence of previous CABG Electronically Signed: Mariusz Low DO  12/7/2023 4:20 PM EST  Workstation ID: [id number]    XR Chest 1 View    Result Date: 12/6/2023  Impression: Stable chronic findings without acute process.  Electronically Signed: Eric Lopez MD  12/6/2023 7:39 PM EST  Workstation ID: [id number]     Renal Bilateral    Result Date: 12/6/2023  Impression: 1. Mild right-sided pelvic caliectasis. 2. Diffuse urinary bladder wall thickening may be due to nondistention. Muscular hypertrophy or cystitis are also possible. Electronically Signed: Kp Lynch MD  12/6/2023 12:58 PM EST  Workstation ID: [id number]     Results for orders placed during the hospital encounter of 12/01/23    Adult Transthoracic Echo Complete W/ Cont if Necessary Per Protocol    Interpretation Summary    Left ventricular ejection fraction appears to be 36 - 40%.    The left atrial cavity is moderately dilated.    Estimated right ventricular systolic pressure from tricuspid regurgitation is normal (<35 mmHg).    Scheduled Meds:aspirin, 81 mg, Oral, Daily  atorvastatin, 10 mg, Oral, Nightly  azithromycin, 500 mg, Intravenous, Q24H  bumetanide, 1 mg, Oral, BID  cefTRIAXone, 1,000 mg, Intravenous, Q24H  clopidogrel, 75 mg, Oral, Daily  sodium chloride, 10 mL, Intravenous, Q12H      Continuous Infusions:   PRN Meds:  acetaminophen **OR** acetaminophen    albuterol    benzonatate    senna-docusate sodium **AND** polyethylene glycol **AND** bisacodyl **AND** bisacodyl    guaifenesin    influenza vaccine    ipratropium-albuterol    ondansetron **OR** ondansetron    prochlorperazine    sodium chloride    sodium chloride    sodium chloride    Assessment & Plan   Assessment and Plan:         Acute ST elevation myocardial infarction (STEMI) involving right coronary artery    ASSESSMENT:  Acute kidney injury at risk  Congestive heart failure with ejection fraction of 36%  Coronary artery disease s/p STEMI and PCI temporary pacemaker insertion 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  Atrial fibrillation  Peripheral edema  History of hypertension              PLAN :      Blood pressure is still on the high side continue diuretics  Follow-up with a urine output and volume  status  Acidosis improving mild hypokalemia replaced  Low-dose angiotensin receptor blocker will be started  Acute coronary syndrome with increased troponin  Check repeat BNP level  Follow-up with complete urine studies  Follow-up with cardiology again  tomorrow if blood pressure is still on the acceptable range  Thank you for letting me parties      Electronically signed by Bear Lopez MD,   [facility] kidney consultant  [phone]  12/8/2023  09:13 EST

## 2023-12-08 NOTE — PROGRESS NOTES
Referring Provider: Magdiel Lao DO    Reason for follow-up: Inferior STEMI, bradycardia, junctional rhythm     Patient Care Team:  Tricia Aldana APRN as PCP - General (Family Medicine)  Missy Domínguez MD as Consulting Physician (Cardiology)      SUBJECTIVE  Sitting in bed eating breakfast.  Hospitalist and nephrologist at bedside.     ROS  Review of all systems negative except as indicated.    Since I have last seen, the patient has been without any chest discomfort, shortness of breath, palpitations, dizziness or syncope.  Denies having any headache, abdominal pain, nausea, vomiting, diarrhea, constipation, loss of weight or loss of appetite.  Denies having any excessive bruising, hematuria or blood in the stool.  ROS      Personal History:    Past Medical History:   Diagnosis Date    Anxiety 2014    Atrial fibrillation     Benign prostatic hyperplasia     CAD (coronary artery disease)     Hyperlipidemia     Hypertension     Myocardial infarction        Past Surgical History:   Procedure Laterality Date    CARDIAC CATHETERIZATION N/A 12/1/2023    Procedure: Left Heart Cath;  Surgeon: Son العلي MD;  Location: [facility] CATH INVASIVE LOCATION;  Service: Cardiovascular;  Laterality: N/A;    CARDIAC CATHETERIZATION N/A 12/1/2023    Procedure: Coronary angiography;  Surgeon: Son العلي MD;  Location: [facility] CATH INVASIVE LOCATION;  Service: Cardiovascular;  Laterality: N/A;    CARDIAC CATHETERIZATION N/A 12/1/2023    Procedure: Percutaneous Coronary Intervention;  Surgeon: Son العلي MD;  Location: [facility] CATH INVASIVE LOCATION;  Service: Cardiovascular;  Laterality: N/A;    CARDIAC ELECTROPHYSIOLOGY PROCEDURE N/A 12/1/2023    Procedure: Temporary Pacemaker;  Surgeon: Son اعللي MD;  Location: [facility] CATH INVASIVE LOCATION;  Service: Cardiovascular;  Laterality: N/A;    CARDIAC SURGERY      HERNIA REPAIR         Family History   Problem Relation Age of Onset    Heart disease Mother      Heart disease Father        Social History     Tobacco Use    Smoking status: Former     Types: Cigarettes     Quit date: 1970     Years since quittin.3    Smokeless tobacco: Never    Tobacco comments:     more than 50yrs   Vaping Use    Vaping Use: Never used   Substance Use Topics    Alcohol use: No    Drug use: No        Home meds:  Prior to Admission medications    Medication Sig Start Date End Date Taking? Authorizing Provider   acetaminophen (TYLENOL) 650 MG 8 hr tablet Take 1 tablet by mouth Every Night.    ProviderCarlos MD   aspirin 81 MG chewable tablet Chew 1 tablet Daily.    ProviderCarlos MD   azelastine (ASTEPRO) 0.15 % solution nasal spray USE 2 SPRAYS IN EACH NOSTRIL TWICE DAILY AS DIRECTED BY PROVIDER 10/27/20   Ashley Cano MD   Cholecalciferol 25 MCG (1000 UT) tablet Take 1 tablet by mouth Daily.    ProviderCarlos MD   docusate sodium (COLACE) 250 MG capsule Take 1 capsule by mouth Daily.    ProviderCarlos MD   gabapentin (NEURONTIN) 100 MG capsule TAKE 1 CAPSULE BY MOUTH TWICE DAILY 22   Ashley Cano MD   losartan (COZAAR) 50 MG tablet TAKE 1 TABLET BY MOUTH DAILY 23   Ashley Cano MD   omeprazole (priLOSEC) 20 MG capsule Take 1 capsule by mouth Daily. 22   Ashley Cano MD   tamsulosin (FLOMAX) 0.4 MG capsule 24 hr capsule TAKE 1 CAPSULE BY MOUTH DAILY 23   Tricia Aldana, APRN       Allergies:  Iodine, Levofloxacin, Nitroglycerin, Paroxetine, Penicillin g, Prednisone, Sucralfate, Diltiazem hcl, Metoprolol, Pramipexole dihydrochloride, and Ropinirole hcl    Scheduled Meds:aspirin, 81 mg, Oral, Daily  atorvastatin, 10 mg, Oral, Nightly  bumetanide, 1 mg, Oral, BID  cefTRIAXone, 1,000 mg, Intravenous, Q24H  clopidogrel, 75 mg, Oral, Daily  sodium chloride, 10 mL, Intravenous, Q12H      Continuous Infusions:   PRN Meds:.  acetaminophen **OR** acetaminophen    senna-docusate sodium **AND**  "polyethylene glycol **AND** bisacodyl **AND** bisacodyl    guaifenesin    influenza vaccine    ipratropium-albuterol    ondansetron **OR** ondansetron    prochlorperazine    sodium chloride    sodium chloride    sodium chloride      OBJECTIVE    Vital Signs  Vitals:    12/08/23 0300 12/08/23 0400 12/08/23 0500 12/08/23 0600   BP: 119/87 128/73 126/71 130/59   BP Location: Left arm Left arm Left arm Left arm   Patient Position: Lying Lying Lying Lying   Pulse: (!) 45 (!) 36 (!) 38 (!) 45   Resp: 23 26 27 21   Temp:  97.2 °F (36.2 °C)     TempSrc:  Oral     SpO2: 95% 97% 97% 96%   Weight:    79.6 kg (175 lb 7.8 oz)   Height:           Flowsheet Rows      Flowsheet Row First Filed Value   Admission Height 177.8 cm (70\") Documented at 12/01/2023 1603   Admission Weight 72.6 kg (160 lb) Documented at 12/01/2023 1603              Intake/Output Summary (Last 24 hours) at 12/8/2023 0703  Last data filed at 12/8/2023 0600  Gross per 24 hour   Intake 816 ml   Output 1425 ml   Net -609 ml          Telemetry: Junctional rhythm with heart rate in the 40s    Physical Exam:  The patient is alert, oriented and in no distress.  Vital signs as noted above.  Head and neck revealed no carotid bruits or jugular venous distention.  No thyromegaly or lymphadenopathy is present  Lungs clear.  No wheezing.  Breath sounds are normal bilaterally.  Heart normal first and second heart sounds.  No murmur. No precordial rub is present.  No gallop is present.  Abdomen soft and nontender.  No organomegaly is present.  Extremities with good peripheral pulses without any pedal edema.  Skin warm and dry.  Musculoskeletal system is grossly normal.  CNS grossly normal.       Results Review:  I have personally reviewed the results from the time of this admission to 12/8/2023 07:03 EST and agree with these findings:  []  Laboratory  []  Microbiology  []  Radiology  []  EKG/Telemetry   []  Cardiology/Vascular   []  Pathology  []  Old records  []  " Other:    Most notable findings include:    Lab Results (last 24 hours)       Procedure Component Value Units Date/Time    Blood Culture - Blood, Arm, Left [[id number]]  (Abnormal) Collected: 12/06/23 2028    Specimen: Blood from Arm, Left Updated: 12/08/23 0649     Blood Culture Staphylococcus, coagulase negative     Isolated from Aerobic Bottle     Gram Stain Aerobic Bottle Gram positive cocci in clusters    Narrative:      Probable contaminant requires clinical correlation, susceptibility not performed unless requested by physician.      Blood Culture - Blood, Hand, Right [[id number]]  (Normal) Collected: 12/06/23 2028    Specimen: Blood from Hand, Right Updated: 12/07/23 2045     Blood Culture No growth at 24 hours    Urine Culture - Urine, Urine, Clean Catch [[id number]]  (Normal) Collected: 12/06/23 1223    Specimen: Urine, Clean Catch Updated: 12/07/23 2039     Urine Culture No growth    Blood Culture ID, PCR - Blood, Arm, Left [[id number]]  (Abnormal) Collected: 12/06/23 2028    Specimen: Blood from Arm, Left Updated: 12/07/23 2037     BCID, PCR Staph spp, not aureus or lugdunensis. Identification by BCID2 PCR.     BOTTLE TYPE Aerobic Bottle    Creatinine Urine Random (kidney function) GFR component - Urine, Clean Catch [[id number]] Collected: 12/07/23 1351    Specimen: Urine, Clean Catch Updated: 12/07/23 1949     Creatinine, Urine 26.5 mg/dL     Narrative:      Reference intervals for random urine have not been established.  Clinical usage is dependent upon physician's interpretation in combination with other laboratory tests.       Protein, Urine, Random - Urine, Clean Catch [[id number]] Collected: 12/07/23 1351    Specimen: Urine, Clean Catch Updated: 12/07/23 1432     Total Protein, Urine 5.8 mg/dL     Narrative:      Reference intervals for random urine have not been established.  Clinical usage is dependent upon physician's interpretation in combination with other laboratory tests.       Sodium, Urine,  Random - Urine, Clean Catch [[id number]] Collected: 12/07/23 1351    Specimen: Urine, Clean Catch Updated: 12/07/23 1432     Sodium, Urine 126 mmol/L     Narrative:      Reference intervals for random urine have not been established.  Clinical usage is dependent upon physician's interpretation in combination with other laboratory tests.       Urinalysis, Microscopic Only - Urine, Clean Catch [[id number]]  (Abnormal) Collected: 12/07/23 1351    Specimen: Urine, Clean Catch Updated: 12/07/23 1408     RBC, UA 0-2 /HPF      WBC, UA 3-5 /HPF      Comment: Urine culture not indicated.        Bacteria, UA None Seen /HPF      Squamous Epithelial Cells, UA 0-2 /HPF      Hyaline Casts, UA 3-6 /LPF      Methodology Automated Microscopy    Urinalysis With Culture If Indicated - Urine, Clean Catch [[id number]]  (Abnormal) Collected: 12/07/23 1351    Specimen: Urine, Clean Catch Updated: 12/07/23 1408     Color, UA Yellow     Appearance, UA Clear     pH, UA <=5.0     Specific Gravity, UA 1.009     Glucose, UA Negative     Ketones, UA Negative     Bilirubin, UA Negative     Blood, UA Negative     Protein, UA Negative     Leuk Esterase, UA Trace     Nitrite, UA Negative     Urobilinogen, UA 0.2 E.U./dL    Narrative:      In absence of clinical symptoms, the presence of pyuria, bacteria, and/or nitrites on the urinalysis result does not correlate with infection.    BNP [[id number]]  (Abnormal) Collected: 12/07/23 0425    Specimen: Blood Updated: 12/07/23 1012     proBNP 13,592.0 pg/mL     Narrative:      This assay is used as an aid in the diagnosis of individuals suspected of having heart failure. It can be used as an aid in the diagnosis of acute decompensated heart failure (ADHF) in patients presenting with signs and symptoms of ADHF to the emergency department (ED). In addition, NT-proBNP of <300 pg/mL indicates ADHF is not likely.    Age Range Result Interpretation  NT-proBNP Concentration (pg/mL:      <50             Positive             >450                   Gray                 300-450                    Negative             <300    50-75           Positive            >900                  Gray                300-900                  Negative            <300      >75             Positive            >1800                  Gray                300-1800                  Negative            <300            Imaging Results (Last 24 Hours)       Procedure Component Value Units Date/Time    CT Chest Without Contrast Diagnostic [[id number]] Collected: 12/07/23 1616     Updated: 12/07/23 1622    Narrative:      CT CHEST WO CONTRAST DIAGNOSTIC    Date of Exam: 12/7/2023 3:46 PM EST    Indication: Pneumonia, complication suspected, xray done.    Comparison: None available.    Technique: Axial CT images were obtained of the chest without contrast administration.  Sagittal and coronal reconstructions were performed.  Automated exposure control and iterative reconstruction methods were used.      Findings:  There is underlying emphysema and evidence of prior granulomatous disease. There are small bilateral pleural effusions and mild dependent atelectasis in each lung base. Additionally there are patchy groundglass and interstitial opacities within the right   lower lobe which may represent superimposed infection. No suspicious pulmonary nodules. No pneumothorax. The thyroid gland is normal in size. No aggressive focal osseous lesions or acute bony abnormalities. There is evidence of previous CABG. There is   cardiomegaly. Coronary artery calcifications are present. The thoracic aorta is not aneurysmal. There is mild to moderate degree of calcification in the thoracic aorta. The unenhanced pulmonary arteries are normal in caliber. Limited images of the upper   abdomen demonstrate no acute or suspicious abnormalities. No acute bony abnormalities or aggressive appearing focal osseous lesions are demonstrated.        Impression:      Impression:  1.There are  small bilateral pleural effusions and mild dependent atelectasis in each lung base. There are also patchy groundglass and interstitial opacities in the right lower lobe which may represent superimposed infection.  2.There is underlying emphysema and evidence of prior granulomatous disease.  3.Cardiomegaly with evidence of previous CABG    Electronically Signed: Mariusz Low DO    12/7/2023 4:20 PM EST    Workstation ID: [id number]            LAB RESULTS (LAST 7 DAYS)    CBC  Results from last 7 days   Lab Units 12/07/23  0111 12/06/23  0802 12/05/23  0614 12/04/23  0421 12/03/23  0657 12/02/23  0644 12/01/23  2302   WBC 10*3/mm3 16.00* 11.80* 12.60* 14.50* 12.40* 11.90* 13.60*   RBC 10*6/mm3 3.73* 3.30* 3.40* 3.47* 3.34* 3.61* 3.58*   HEMOGLOBIN g/dL 12.0* 10.7* 11.0* 11.2* 10.9* 11.6* 11.4*   HEMATOCRIT % 35.4* 31.5* 32.5* 32.9* 32.3* 34.1* 34.6*   MCV fL 94.8 95.3 95.6 95.0 96.6 94.6 96.6   PLATELETS 10*3/mm3 228 187 179 170 148 184 189       BMP  Results from last 7 days   Lab Units 12/07/23  0425 12/07/23  0111 12/06/23  0802 12/05/23  0614 12/04/23  0421 12/03/23  0657 12/02/23  0644 12/01/23  2302   SODIUM mmol/L  --  138 137 136 135* 133* 132* 130*   POTASSIUM mmol/L  --  3.8 3.9 4.0 4.1 4.2 4.5 4.5   CHLORIDE mmol/L  --  108* 108* 107 104 103 101 100   CO2 mmol/L  --  18.0* 19.0* 17.0* 19.0* 22.0 21.0* 19.0*   BUN mg/dL  --  28* 29* 25* 21 17 15 14   CREATININE mg/dL  --  0.88 1.04 0.93 0.89 1.01 1.04 1.10   GLUCOSE mg/dL  --  123* 110* 109* 112* 105* 120* 129*   MAGNESIUM mg/dL 2.3  --  1.6* 1.6*  --   --   --   --        CMP   Results from last 7 days   Lab Units 12/07/23  0111 12/06/23  0802 12/05/23  0614 12/04/23  0421 12/03/23  0657 12/02/23  0644 12/01/23  2302 12/01/23  1613   SODIUM mmol/L 138 137 136 135* 133* 132* 130* 130*   POTASSIUM mmol/L 3.8 3.9 4.0 4.1 4.2 4.5 4.5 4.0   CHLORIDE mmol/L 108* 108* 107 104 103 101 100 97*   CO2 mmol/L 18.0* 19.0* 17.0* 19.0* 22.0 21.0* 19.0* 21.0*   BUN mg/dL  28* 29* 25* 21 17 15 14 16   CREATININE mg/dL 0.88 1.04 0.93 0.89 1.01 1.04 1.10 1.02   GLUCOSE mg/dL 123* 110* 109* 112* 105* 120* 129* 124*   ALBUMIN g/dL 3.4*  --   --   --   --   --   --  3.7   BILIRUBIN mg/dL 0.5  --   --   --   --   --   --  0.8   ALK PHOS U/L 86  --   --   --   --   --   --  83   AST (SGOT) U/L 17  --   --   --   --   --   --  21   ALT (SGPT) U/L 23  --   --   --   --   --   --  17       BNP        TROPONIN  Results from last 7 days   Lab Units 12/05/23  0831   HSTROP T ng/L 3,399*       CoAg  Results from last 7 days   Lab Units 12/06/23  0802 12/05/23  0614 12/04/23  2329 12/04/23  1728 12/04/23  1034 12/04/23  0421 12/03/23  2021 12/01/23  2302 12/01/23  1613   INR   --   --   --   --   --   --   --   --  1.08   APTT seconds 73.5 67.3 75.5 63.1 50.5* 62.7 57.7*   < > 25.5    < > = values in this interval not displayed.       Creatinine Clearance  Estimated Creatinine Clearance: 59 mL/min (by C-G formula based on SCr of 0.88 mg/dL).    ABG        Radiology  CT Chest Without Contrast Diagnostic    Result Date: 12/7/2023  Impression: 1.There are small bilateral pleural effusions and mild dependent atelectasis in each lung base. There are also patchy groundglass and interstitial opacities in the right lower lobe which may represent superimposed infection. 2.There is underlying emphysema and evidence of prior granulomatous disease. 3.Cardiomegaly with evidence of previous CABG Electronically Signed: Mariusz Low DO  12/7/2023 4:20 PM EST  Workstation ID: [id number]    XR Chest 1 View    Result Date: 12/6/2023  Impression: Stable chronic findings without acute process. Electronically Signed: Eric Lopez MD  12/6/2023 7:39 PM EST  Workstation ID: [id number]    US Renal Bilateral    Result Date: 12/6/2023  Impression: 1. Mild right-sided pelvic caliectasis. 2. Diffuse urinary bladder wall thickening may be due to nondistention. Muscular hypertrophy or cystitis are also possible. Electronically  Signed: Kp Lynch MD  12/6/2023 12:58 PM EST  Workstation ID: [id number]       EKG  I personally viewed and interpreted the patient's EKG/Telemetry data:  ECG 12 Lead Bradycardia   Final Result   HEART RATE= 46  bpm   RR Interval= 1304  ms   TN Interval= 362  ms   P Horizontal Axis=   deg   P Front Axis= 264  deg   QRSD Interval= 101  ms   QT Interval= 494  ms   QTcB= 433  ms   QRS Axis= 29  deg   T Wave Axis= -15  deg   - ABNORMAL ECG -   Sinus or ectopic atrial bradycardia   Prolonged TN interval   Low voltage, extremity and precordial leads   When compared with ECG of 04-Dec-2023 8:15:24,   New conduction abnormality   Significant repolarization change   Electronically Signed By: Arabella Ibarra ([facility]) 07-Dec-2023 14:42:25   Date and Time of Study: 2023-12-06 10:35:11      ECG 12 Lead Rhythm Change   Final Result   HEART RATE= 63  bpm   RR Interval= 960  ms   TN Interval=   ms   P Horizontal Axis=   deg   P Front Axis=   deg   QRSD Interval= 92  ms   QT Interval= 442  ms   QTcB= 451  ms   QRS Axis= 21  deg   T Wave Axis= -21  deg   - ABNORMAL ECG -   Junctional rhythm   Low voltage, extremity leads   Borderline ST depression, anterolateral leads   When compared with ECG of 01-Dec-2023 16:07:44,   Significant change in rhythm   Significant axis, voltage or hypertrophy change   Electronically Signed By: Christian Lees ([facility]) 07-Dec-2023 21:18:04   Date and Time of Study: 2023-12-04 08:15:24      ECG 12 Lead Syncope   Final Result   HEART RATE= 39  bpm   RR Interval= 1556  ms   TN Interval=   ms   P Horizontal Axis=   deg   P Front Axis=   deg   QRSD Interval= 109  ms   QT Interval= 528  ms   QTcB= 423  ms   QRS Axis= 34  deg   T Wave Axis= 63  deg   - ABNORMAL ECG -   A-flutter/fibrillation w/ complete AV block   ST depr, consider ischemia, anterolateral lds   acute Inf/post MI   When compared with ECG of 22-Nov-2020 12:15:56,   Significant change in rhythm: previously sinus   Significant axis, voltage or  hypertrophy change   Electronically Signed By: Petros Grimes ([facility]) 02-Dec-2023 06:32:11   Date and Time of Study: 2023-12-01 16:07:44      ECG 12 Lead Rhythm Change    (Results Pending)         Echocardiogram:    Results for orders placed during the hospital encounter of 12/01/23    Adult Transthoracic Echo Complete W/ Cont if Necessary Per Protocol    Interpretation Summary    Left ventricular ejection fraction appears to be 36 - 40%.    The left atrial cavity is moderately dilated.    Estimated right ventricular systolic pressure from tricuspid regurgitation is normal (<35 mmHg).        Stress Test:         Cardiac Catheterization:  Results for orders placed during the hospital encounter of 12/01/23    Cardiac Catheterization/Vascular Study         Other:         ASSESSMENT & PLAN:    Principal Problem:    Acute ST elevation myocardial infarction (STEMI) involving right coronary artery    //////////////////////////  History  =============  - Inferior STEMI 12/4/2023.     - Status post PCI with PTCA, stent placement on Pronto catheter atherectomy to the graft to the distal right coronary artery.-12/3/2023-Dr. العلي     Cardiac catheterization 12/3/2023-Dr. العلي     Left Main %: 20 to 30%   Proximal LAD %: 100%  Mid/Distal LAD %: 100%  LCX %: Proximal 80% OM1 100%  Ramus:   RCA %: 100% after the PDA.  Lima %: LIMA to LAD was patent  SVG(s) %: SVG to the marginal branch is patent but has some 30 to 40% disease.  SVG to the diagonal branch is occluded.  SVG to the PDA is occluded.  SVG to the distal RCA is occluded but is the culprit lesion     -Past history of syncope-no further episodes.     -status post loop recorder placement.  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 cardiac catheterization 12/26/2014 revealed 60% distal circumflex and total LAD and right coronary arteries.  Lima to LAD was patent ( lima coming off the left vertebral artery).  SVG to diagonal   marginal and PDA were  patent.  SVG to left ventricular branch was totally occluded (chronic)     - atrial fibrillation -has converted to sinus rhythm and maintaining sinus rhythm.  Recently patient was noted to have atrial dysrhythmia on the monitor with loop recorder.     - sinus bradycardia-asymptomatic     - status post acute inferior myocardial infarction prior to surgery requiring acute stent placement to right coronary artery ) complicated by ventricular fibrillation)     Echocardiogram 12/1/2023    Left ventricular ejection fraction appears to be 36 - 40%.    The left atrial cavity is moderately dilated.    Estimated right ventricular systolic pressure from tricuspid regurgitation is normal (<35 mmHg).      -Dyslipidemia and hypertension     - lower extremity weakness.   Arterial Doppler study of the lower extremity is normal. -  improved .   arterial Doppler study of the lower extremity was normal     - status post cholecystectomy     - allergy to penicillin iodine and metoprolol (rash).  Intolerance to atenolol due to bradycardia.  Allergy to Levaquin and penicillin.  Intolerance to prednisone Nitropatch IV nitroglycerin and prednisone.  =================    Plan  ==============   Inferior STEMI 12/4/2023.     Status post PCI with PTCA, stent placement on Pronto catheter atherectomy to the graft to the distal right coronary artery.-12/3/2023-Dr. العلي     Junctional bradycardia.  Patient was started on intravenous dopamine.  Blood pressure stable at this time.     Past history of complete heart block.  Patient had temporary pacemaker which was removed.     Consider permanent pacemaker implantation if patient has hemodynamic instability depending on patient's progress and patient's intentions and family's decisions..     Consideration will be given for EP consultation.     Hypomagnesemia-better at 2.3.     Patient is off beta-blocker Coreg.     Patient is off IV heparin.     Echocardiogram 12/1/2023    Left ventricular ejection  fraction appears to be 36 - 40%.    The left atrial cavity is moderately dilated.    Estimated right ventricular systolic pressure from tricuspid regurgitation is normal (<35 mmHg).     Have discussed with attending nurse and Dr. Loa regarding CODE STATUS etc.  Appreciated /palliative care note.  Patient family prefers DNR DNI status.  They are not ready for hospice but probable palliative care.     Medications were reviewed and updated.  Patient is on aspirin atorvastatin Plavix.        Updates on 12/8/2023    Coronary artery disease  Inferior STEMI 12/4/2023.  Continue dual antiplatelet therapy and high intensity statin    HFrEF  EF 36 to 40%  Unable to tolerate beta-blocker due to bradycardia  On diuretics with good urine output    Junctional rhythm  EP consultation for permanent pacemaker placement depending on final discussion with palliative care.  Device implantation will have to wait till he completed the course of antibiotics    Pneumonia  Started on antibiotics    Palliative care consultation.  CODE STATUS is DNI DNR          Christian Lees MD  12/08/23  07:03 EST

## 2023-12-08 NOTE — PROGRESS NOTES
The patient looks some better today. He is sitting up about to eat breakfast. No nausea or vomiting. No light headedness or dizziness. Patient is voiding better. The patient is able to follow commands without issue. The patient states that his bowels are working. He still has an ongoing cough with some sputum production. The patient was seen with cardiology, renal and nursing staff. No prolonged QT interval. The patient is still weak. Pulse rate is running in the 40s and unchanged. The patient still looks weak currently. He is not eating very much.     HENT:      Head: Atraumatic.      Mouth/Throat:      Mouth: Mucous membranes are moist.   Eyes:      Pupils: Pupils are equal, round, and reactive to light.   Cardiovascular:      Rate and Rhythm: bradycardic with regular rhythm     Pulses: Normal pulses.      Heart sounds: bradycardic with regular rhythm.   Pulmonary:      Effort: Pulmonary effort is normal.      Breath sounds: decreased breath sounds bilaterally with crackles present in the bases.   Abdominal:      Palpations: Abdomen is soft, nontender. NABS,  No G/R/R  Musculoskeletal:         General: No swelling. Normal range of motion.      Cervical back: Normal range of motion.  Swelling in his legs has improved. Still a small amount present.   Skin:     General: Skin is warm.   Neurological:      General: No focal deficit present.      Mental Status: He is alert and oriented to person, place, and time.   Psychiatric:         Mood and Affect: Mood normal.   Scheduled Meds   aspirin, 81 mg, Oral, Daily  atorvastatin, 10 mg, Oral, Nightly  clopidogrel, 75 mg, Oral, Daily  oxymetazoline, 2 spray, Each Nare, BID  sodium chloride, 10 mL, Intravenous, Q12H    .                   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   - conservative management   - Cardiology team following the patient  -Echocardiogram \"EF of 6 to 40% right dilatation  Continue aspirin and Plavix     Paroxysmal  A-fib  -Stable sinus rhythm with bradycardic, . EKG has been ordered. Discussed with cardiology. He does not require a pacemaker currently.     The patient remains off his beta blocker currently.      History of hypertension  -currently blood pressure stable  -Required dopamine drip previously which was discontinued     Nephrology consulted.      Hyperlipidemia   continue statin     Hyponatremia mild - not clinically significant.      Leukocytosis  - Likely reactive we will continue to monitor  -Urinalysis  - Chest x-ray unremarkable     Patients WBC is currently elevated to 16.0 today. The patient does not have any fever. UA with positive yesterday slightly but negative today. CXR does not show any infiltrates. The patient does not have any lines. No abdominal pain. Just not eating much.  No diarrhea. Will obtain a CT of the chest as aspiration is possible. Non contrast study. If negative then will consult ID. The patient remains on rocephin.      PT /OT consults appreciated. SNIF Discussed with patient that he will need rehab prior to going home. He is not strong enough to take care of himself.     Labs have been ordered for AM.     Goals of care discussion as per palliative care.     12/08/2023 - patient has pneumonia present on his CT scan. Discussed with pharmacy. Patient is low risk for coverage needed for anaerobes with CT findings. Covid 19 ordered. Sputum gram stain and culture. The patient has incentive spirometry at bedside. Encouraged to use it. The patient will be started on neb treatments as well. He was already evaluated by speech therapy. He does have a positive blood culture which appears to be contaminant but I have consulted ID to make sure this not related to his pneumonia. Patient has no " lines in currently other than peripheral.  WBC is slightly better today.  Speech evaluation already completed showing no issues with aspiration. Labs ordered for AM. Nephrology and cardiology assistance appreciated.

## 2023-12-08 NOTE — PROGRESS NOTES
Spoke with patients liam in [place] to verify demographics because patient wasn't sure of the number  He will be going home to [address] [place]. [address]. Please use his daughter, Alicja, kristi as the primary contact for visits: [phone] and felicitas as secondary: [phone]  [facility] will be following patient at home for future needs  Pt is a DNR at the hospital    Spoke with [facility]. Tricia Aldana NP is agreeable to sign the plan of care and follow for home health orders    Pharmacy: [facility] in [place]  ACO patient

## 2023-12-08 NOTE — CASE MANAGEMENT/SOCIAL WORK
Continued Stay Note  [facility]     Patient Name: Sage Flores  MRN: [id number]  Today's Date: 12/8/2023    Admit Date: 12/1/2023    Plan: Dc Plan: [facility] pending acceptance and bed availability. Precert not required. PASRR   Discharge Plan       Row Name 12/08/23 1508       Plan    Plan Dc Plan: [facility] pending acceptance and bed availability. Precert not required. PASRR    Provided Post Acute Provider List? N/A    Provided Post Acute Provider Quality & Resource List? N/A    Plan Comments CM was notified by [facility] liaison that she had spoken to daughter who informed her that they were now planning on going to rehab from hospital. CM spoke with patient about recommendations and report from daughter about rehab and patient is agreeable to go, but wants daughter to pick facility. CM contacted patient daughter via telephone and she selected [facility], and [facility]. [facility] declined secondary to no beds. CM awaiting confirmation of acceptance from [facility] at this time. Nursing updated on plan. CM will continue to follow for any changes. DC Barriers: Cardiac monitoring, O2@3L nc, IV abx X2, and pending DC plan.               Expected Discharge Date and Time       Expected Discharge Date Expected Discharge Time    Dec 9, 2023               Pat Prather RN    Office Phone: [phone]  Office Cell:     [phone]

## 2023-12-09 LAB
ALBUMIN SERPL-MCNC: 3.6 G/DL (ref 3.5–5.2)
ALBUMIN/GLOB SERPL: 1.6 G/DL
ALP SERPL-CCNC: 93 U/L (ref 39–117)
ALT SERPL W P-5'-P-CCNC: 18 U/L (ref 1–41)
ANION GAP SERPL CALCULATED.3IONS-SCNC: 12 MMOL/L (ref 5–15)
AST SERPL-CCNC: 15 U/L (ref 1–40)
BACTERIA SPEC RESP CULT: NORMAL
BASOPHILS # BLD AUTO: 0 10*3/MM3 (ref 0–0.2)
BASOPHILS NFR BLD AUTO: 0.3 % (ref 0–1.5)
BILIRUB SERPL-MCNC: 0.6 MG/DL (ref 0–1.2)
BUN SERPL-MCNC: 22 MG/DL (ref 8–23)
BUN/CREAT SERPL: 21 (ref 7–25)
CALCIUM SPEC-SCNC: 8.8 MG/DL (ref 8.2–9.6)
CHLORIDE SERPL-SCNC: 100 MMOL/L (ref 98–107)
CO2 SERPL-SCNC: 27 MMOL/L (ref 22–29)
CREAT SERPL-MCNC: 1.05 MG/DL (ref 0.76–1.27)
DEPRECATED RDW RBC AUTO: 48.6 FL (ref 37–54)
EGFRCR SERPLBLD CKD-EPI 2021: 66.2 ML/MIN/1.73
EOSINOPHIL # BLD AUTO: 0.2 10*3/MM3 (ref 0–0.4)
EOSINOPHIL NFR BLD AUTO: 1.4 % (ref 0.3–6.2)
ERYTHROCYTE [DISTWIDTH] IN BLOOD BY AUTOMATED COUNT: 14.6 % (ref 12.3–15.4)
GLOBULIN UR ELPH-MCNC: 2.3 GM/DL
GLUCOSE SERPL-MCNC: 97 MG/DL (ref 65–99)
GRAM STN SPEC: NORMAL
HCT VFR BLD AUTO: 37.2 % (ref 37.5–51)
HGB BLD-MCNC: 12.4 G/DL (ref 13–17.7)
LYMPHOCYTES # BLD AUTO: 1.7 10*3/MM3 (ref 0.7–3.1)
LYMPHOCYTES NFR BLD AUTO: 11.8 % (ref 19.6–45.3)
MAGNESIUM SERPL-MCNC: 1.6 MG/DL (ref 1.7–2.3)
MCH RBC QN AUTO: 32.1 PG (ref 26.6–33)
MCHC RBC AUTO-ENTMCNC: 33.4 G/DL (ref 31.5–35.7)
MCV RBC AUTO: 96.1 FL (ref 79–97)
MONOCYTES # BLD AUTO: 1.2 10*3/MM3 (ref 0.1–0.9)
MONOCYTES NFR BLD AUTO: 8.3 % (ref 5–12)
NEUTROPHILS NFR BLD AUTO: 11.2 10*3/MM3 (ref 1.7–7)
NEUTROPHILS NFR BLD AUTO: 78.2 % (ref 42.7–76)
NRBC BLD AUTO-RTO: 0.1 /100 WBC (ref 0–0.2)
PHOSPHATE SERPL-MCNC: 3.1 MG/DL (ref 2.5–4.5)
PLATELET # BLD AUTO: 230 10*3/MM3 (ref 140–450)
PMV BLD AUTO: 10.5 FL (ref 6–12)
POTASSIUM SERPL-SCNC: 3.3 MMOL/L (ref 3.5–5.2)
PROT SERPL-MCNC: 5.9 G/DL (ref 6–8.5)
RBC # BLD AUTO: 3.87 10*6/MM3 (ref 4.14–5.8)
SODIUM SERPL-SCNC: 139 MMOL/L (ref 136–145)
WBC NRBC COR # BLD AUTO: 14.3 10*3/MM3 (ref 3.4–10.8)

## 2023-12-09 PROCEDURE — 83735 ASSAY OF MAGNESIUM: CPT | Performed by: INTERNAL MEDICINE

## 2023-12-09 PROCEDURE — 84100 ASSAY OF PHOSPHORUS: CPT | Performed by: INTERNAL MEDICINE

## 2023-12-09 PROCEDURE — 99232 SBSQ HOSP IP/OBS MODERATE 35: CPT | Performed by: INTERNAL MEDICINE

## 2023-12-09 PROCEDURE — 25010000002 CEFTRIAXONE PER 250 MG: Performed by: INTERNAL MEDICINE

## 2023-12-09 PROCEDURE — 85025 COMPLETE CBC W/AUTO DIFF WBC: CPT | Performed by: INTERNAL MEDICINE

## 2023-12-09 PROCEDURE — 25010000002 MAGNESIUM SULFATE 4 GM/100ML SOLUTION: Performed by: INTERNAL MEDICINE

## 2023-12-09 PROCEDURE — 80053 COMPREHEN METABOLIC PANEL: CPT | Performed by: INTERNAL MEDICINE

## 2023-12-09 RX ORDER — MAGNESIUM SULFATE HEPTAHYDRATE 40 MG/ML
4 INJECTION, SOLUTION INTRAVENOUS ONCE
Status: COMPLETED | OUTPATIENT
Start: 2023-12-09 | End: 2023-12-09

## 2023-12-09 RX ORDER — LOSARTAN POTASSIUM 25 MG/1
25 TABLET ORAL
Status: DISCONTINUED | OUTPATIENT
Start: 2023-12-09 | End: 2023-12-11 | Stop reason: HOSPADM

## 2023-12-09 RX ORDER — POTASSIUM CHLORIDE 20 MEQ/1
40 TABLET, EXTENDED RELEASE ORAL ONCE
Status: COMPLETED | OUTPATIENT
Start: 2023-12-09 | End: 2023-12-09

## 2023-12-09 RX ADMIN — CEFTRIAXONE 1000 MG: 1 INJECTION, POWDER, FOR SOLUTION INTRAMUSCULAR; INTRAVENOUS at 20:41

## 2023-12-09 RX ADMIN — BUMETANIDE 1 MG: 1 TABLET ORAL at 08:39

## 2023-12-09 RX ADMIN — ATORVASTATIN CALCIUM 10 MG: 10 TABLET, FILM COATED ORAL at 20:41

## 2023-12-09 RX ADMIN — ACETAMINOPHEN 650 MG: 325 TABLET, FILM COATED ORAL at 08:39

## 2023-12-09 RX ADMIN — BUMETANIDE 1 MG: 1 TABLET ORAL at 15:36

## 2023-12-09 RX ADMIN — POTASSIUM CHLORIDE 40 MEQ: 1500 TABLET, EXTENDED RELEASE ORAL at 17:46

## 2023-12-09 RX ADMIN — ASPIRIN 81 MG CHEWABLE TABLET 81 MG: 81 TABLET CHEWABLE at 08:39

## 2023-12-09 RX ADMIN — Medication 10 ML: at 08:41

## 2023-12-09 RX ADMIN — BUMETANIDE 1 MG: 1 TABLET ORAL at 20:41

## 2023-12-09 RX ADMIN — CLOPIDOGREL BISULFATE 75 MG: 75 TABLET ORAL at 08:39

## 2023-12-09 RX ADMIN — MAGNESIUM SULFATE HEPTAHYDRATE 4 G: 40 INJECTION, SOLUTION INTRAVENOUS at 17:46

## 2023-12-09 NOTE — PLAN OF CARE
Goal Outcome Evaluation:      Tx from CVU, no acute distress noted, VSS on room air w/ out change in rhythm or rate, denies cp,soa,nausea, oriented to room

## 2023-12-09 NOTE — CASE MANAGEMENT/SOCIAL WORK
Continued Stay Note   [facility]     Patient Name: Sage Flores  MRN: [id number]  Today's Date: 12/9/2023    Admit Date: 12/1/2023    Plan: [facility] pending accept. and bed avai.  precert not required.  PASRR approved.  [facility] accepted and following.  [facility] following.   Discharge Plan       Row Name 12/09/23 1801       Plan    Plan [facility] pending accept. and bed avai.  precert not required.  PASRR approved.  [facility] accepted and following.  [facility] following.                      Expected Discharge Date and Time       Expected Discharge Date Expected Discharge Time    Dec 9, 2023               Elin Vera RN

## 2023-12-09 NOTE — PROGRESS NOTES
The patient feels well currently. Cough has improved. No nausea or vomiting. The patient is still not eating very much. No diarrhea or constipation. The patients heart rate is still running in the 40's. The patient has no dizziness or light headedness.  The patient has chest pain. The patient appears in NAD currently. No other issues over the night. The patient looks some better today. He is sitting up about to eat breakfast. No nausea or vomiting. No light headedness or dizziness. Patient is voiding better. The patient is able to follow commands without issue. The patient states that his bowels are working. He still has an ongoing cough with some sputum production. The patient was seen with cardiology, renal and nursing staff. No prolonged QT interval. The patient is still weak. Pulse rate is running in the 40s and unchanged. The patient still looks weak currently. He is not eating very much.     HENT:      Head: Atraumatic.      Mouth/Throat:      Mouth: Mucous membranes are moist.   Eyes:      Pupils: Pupils are equal, round, and reactive to light.   Cardiovascular:      Rate and Rhythm: bradycardic with regular rhythm     Pulses: Normal pulses.      Heart sounds: bradycardic with regular rhythm.   Pulmonary:      Effort: Pulmonary effort is normal.      Breath sounds: decreased breath sounds bilaterally with crackles present in the bases.   Abdominal:      Palpations: Abdomen is soft, nontender. NABS,  No G/R/R  Musculoskeletal:         General: No swelling. Normal range of motion.      Cervical back: Normal range of motion.  Swelling in his legs has improved. Still a small amount present.   Skin:     General: Skin is warm.   Neurological:      General: No focal deficit present.      Mental Status: He is alert and oriented to person, place, and time.   Psychiatric:         Mood and Affect: Mood normal.   Scheduled Meds   aspirin, 81 mg, Oral, Daily  atorvastatin, 10 mg, Oral, Nightly  clopidogrel, 75 mg, Oral,  "Daily  oxymetazoline, 2 spray, Each Nare, BID  sodium chloride, 10 mL, Intravenous, Q12H    .                   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   - conservative management   - Cardiology team following the patient  -Echocardiogram \"EF of 6 to 40% right dilatation  Continue aspirin and Plavix     Paroxysmal  A-fib  -Stable sinus rhythm with bradycardic, . EKG has been ordered. Discussed with cardiology. He does not require a pacemaker currently.     The patient remains off his beta blocker currently.      History of hypertension  -currently blood pressure stable  -Required dopamine drip previously which was discontinued     Nephrology consulted.      Hyperlipidemia   continue statin     Hyponatremia mild - not clinically significant.      Leukocytosis  - Likely reactive we will continue to monitor  -Urinalysis  - Chest x-ray unremarkable     Patients WBC is currently elevated to 16.0 today. The patient does not have any fever. UA with positive yesterday slightly but negative today. CXR does not show any infiltrates. The patient does not have any lines. No abdominal pain. Just not eating much.  No diarrhea. Will obtain a CT of the chest as aspiration is possible. Non contrast study. If negative then will consult ID. The patient remains on rocephin.      PT /OT consults appreciated. SNIF Discussed with patient that he will need rehab prior to going home. He is not strong enough to take care of himself.     Labs have been ordered for AM.     Goals of care discussion as per palliative care.     12/08/2023 - patient has pneumonia present on his CT scan. Discussed with pharmacy. Patient is low risk for coverage needed for anaerobes with CT findings. Covid 19 ordered. Sputum gram " stain and culture. The patient has incentive spirometry at bedside. Encouraged to use it. The patient will be started on neb treatments as well. He was already evaluated by speech therapy. He does have a positive blood culture which appears to be contaminant but I have consulted ID to make sure this not related to his pneumonia. Patient has no lines in currently other than peripheral.  WBC is slightly better today.  Speech evaluation already completed showing no issues with aspiration. Labs ordered for AM. Nephrology and cardiology assistance appreciated.     12/09/2023 - patient is doing better currently. He needs five days of antibiotic currently to finishing treating his pneumonia. The family will need to decide on the patients pacemaker which is the next step for him. The patient is slowly improving each day. Nasal congestion is better. Stop afrin nasal.  Saline nasal spray. Small bowel movement today. He has placement already arranged once these two things are completed. Discussed with family as well who is present at bedside. Labs are pending.

## 2023-12-09 NOTE — PLAN OF CARE
Problem: Adult Inpatient Plan of Care  Goal: Plan of Care Review  Outcome: Ongoing, Not Progressing  Flowsheets (Taken 12/9/2023 1700)  Progress: no change  Plan of Care Reviewed With: patient  Goal: Patient-Specific Goal (Individualized)  Outcome: Ongoing, Not Progressing  Goal: Absence of Hospital-Acquired Illness or Injury  Outcome: Ongoing, Not Progressing  Intervention: Identify and Manage Fall Risk  Recent Flowsheet Documentation  Taken 12/9/2023 1646 by Marisol Khan, RN  Safety Promotion/Fall Prevention:   assistive device/personal items within reach   clutter free environment maintained   fall prevention program maintained   nonskid shoes/slippers when out of bed   room organization consistent   safety round/check completed  Taken 12/9/2023 1435 by Marisol Khan, RN  Safety Promotion/Fall Prevention:   assistive device/personal items within reach   clutter free environment maintained   fall prevention program maintained   nonskid shoes/slippers when out of bed   room organization consistent   safety round/check completed  Taken 12/9/2023 1210 by Marisol Khan, RN  Safety Promotion/Fall Prevention:   assistive device/personal items within reach   clutter free environment maintained   fall prevention program maintained   nonskid shoes/slippers when out of bed   safety round/check completed   room organization consistent  Taken 12/9/2023 1001 by Marisol Khan, RN  Safety Promotion/Fall Prevention:   assistive device/personal items within reach   clutter free environment maintained   nonskid shoes/slippers when out of bed   room organization consistent   safety round/check completed   fall prevention program maintained  Taken 12/9/2023 0801 by Marisol Khan, RN  Safety Promotion/Fall Prevention:   assistive device/personal items within reach   clutter free environment maintained   fall prevention program maintained   nonskid shoes/slippers when out of bed   room organization consistent   safety  round/check completed  Intervention: Prevent Skin Injury  Recent Flowsheet Documentation  Taken 12/9/2023 0801 by Marisol Khan RN  Body Position: supine  Intervention: Prevent and Manage VTE (Venous Thromboembolism) Risk  Recent Flowsheet Documentation  Taken 12/9/2023 0801 by Marisol Khan RN  Activity Management: activity encouraged  VTE Prevention/Management: sequential compression devices off  Range of Motion: active ROM (range of motion) encouraged  Intervention: Prevent Infection  Recent Flowsheet Documentation  Taken 12/9/2023 1646 by Marisol Khan RN  Infection Prevention:   environmental surveillance performed   hand hygiene promoted   rest/sleep promoted   single patient room provided  Taken 12/9/2023 1435 by Marisol Khan RN  Infection Prevention:   environmental surveillance performed   hand hygiene promoted   single patient room provided  Taken 12/9/2023 1210 by Marisol Khan RN  Infection Prevention:   environmental surveillance performed   hand hygiene promoted   single patient room provided   visitors restricted/screened  Taken 12/9/2023 1001 by Marisol Khan RN  Infection Prevention:   environmental surveillance performed   hand hygiene promoted   single patient room provided  Taken 12/9/2023 0801 by Marisol Khan RN  Infection Prevention:   environmental surveillance performed   hand hygiene promoted   rest/sleep promoted   single patient room provided  Goal: Optimal Comfort and Wellbeing  Outcome: Ongoing, Not Progressing  Intervention: Provide Person-Centered Care  Recent Flowsheet Documentation  Taken 12/9/2023 0801 by Marisol Khan RN  Trust Relationship/Rapport:   care explained   choices provided  Goal: Readiness for Transition of Care  Outcome: Ongoing, Not Progressing     Problem: Heart Failure Comorbidity  Goal: Maintenance of Heart Failure Symptom Control  Outcome: Ongoing, Not Progressing  Intervention: Maintain Heart Failure-Management  Recent Flowsheet  Documentation  Taken 12/9/2023 1435 by Marisol Khan RN  Medication Review/Management:   medications reviewed   high-risk medications identified  Taken 12/9/2023 1210 by Marisol Khan RN  Medication Review/Management:   medications reviewed   high-risk medications identified  Taken 12/9/2023 1001 by Marisol Khan RN  Medication Review/Management:   medications reviewed   high-risk medications identified  Taken 12/9/2023 0801 by Marisol Khan RN  Medication Review/Management:   medications reviewed   high-risk medications identified     Problem: Skin Injury Risk Increased  Goal: Skin Health and Integrity  Outcome: Ongoing, Not Progressing  Intervention: Optimize Skin Protection  Recent Flowsheet Documentation  Taken 12/9/2023 0801 by Marisol Khan RN  Pressure Reduction Techniques:   frequent weight shift encouraged   weight shift assistance provided  Head of Bed (HOB) Positioning: HOB at 20-30 degrees  Pressure Reduction Devices: pressure-redistributing mattress utilized     Problem: Fall Injury Risk  Goal: Absence of Fall and Fall-Related Injury  Outcome: Ongoing, Not Progressing  Intervention: Identify and Manage Contributors  Recent Flowsheet Documentation  Taken 12/9/2023 1435 by Marisol Khan RN  Medication Review/Management:   medications reviewed   high-risk medications identified  Taken 12/9/2023 1210 by Marisol Khan RN  Medication Review/Management:   medications reviewed   high-risk medications identified  Taken 12/9/2023 1001 by Marisol Khan RN  Medication Review/Management:   medications reviewed   high-risk medications identified  Taken 12/9/2023 0801 by Marisol Khan RN  Medication Review/Management:   medications reviewed   high-risk medications identified  Self-Care Promotion:   independence encouraged   BADL personal objects within reach  Intervention: Promote Injury-Free Environment  Recent Flowsheet Documentation  Taken 12/9/2023 1646 by Marisol Khan RN  Safety  Promotion/Fall Prevention:   assistive device/personal items within reach   clutter free environment maintained   fall prevention program maintained   nonskid shoes/slippers when out of bed   room organization consistent   safety round/check completed  Taken 12/9/2023 1435 by Marisol Khan RN  Safety Promotion/Fall Prevention:   assistive device/personal items within reach   clutter free environment maintained   fall prevention program maintained   nonskid shoes/slippers when out of bed   room organization consistent   safety round/check completed  Taken 12/9/2023 1210 by Marisol Khan RN  Safety Promotion/Fall Prevention:   assistive device/personal items within reach   clutter free environment maintained   fall prevention program maintained   nonskid shoes/slippers when out of bed   safety round/check completed   room organization consistent  Taken 12/9/2023 1001 by Marisol Khan RN  Safety Promotion/Fall Prevention:   assistive device/personal items within reach   clutter free environment maintained   nonskid shoes/slippers when out of bed   room organization consistent   safety round/check completed   fall prevention program maintained  Taken 12/9/2023 0801 by Marisol Khan RN  Safety Promotion/Fall Prevention:   assistive device/personal items within reach   clutter free environment maintained   fall prevention program maintained   nonskid shoes/slippers when out of bed   room organization consistent   safety round/check completed   Goal Outcome Evaluation:  Plan of Care Reviewed With: patient        Progress: no change     Alert and oriented x 3-4. Able to verbalize needs and wants. Takes medication whole with water and tolerates well. Incontinent of bowel and bladder. Assist x 2 for transfers/ambulation. Does not require O2 therapy at this time. IV Zithromax discontinued. Continues to receive IV Rocephin, no s/s of adverse/allergic reaction noted. Continues to be followed by nephrology, infectious  disease and cardiology. Continues to have decreased appetite. No c/o pain/discomfort/SOB noted. Discharge plan: [facility], awaiting bed availability. Cardiology would like to wait for ABT completion and then possibly insert a pacemaker. Currently in bed, eyes closed. Rise and fall of chest observed. Call bell in reach. Potassium and Mg levels low, MD made aware. Awaiting response.

## 2023-12-09 NOTE — PROGRESS NOTES
Cardiology Progress Note      PATIENT IDENTIFICATION    Name: Sage Flores  Age: 89 y.o. Sex: male : 1930  MRN: [id number]    Requesting Provider    Magdiel Lao DO     LOS: 8 days       Reason For Followup:  Bradycardia  Heart failure reduced ejection fraction  Coronary artery disease      Subjective:    Interval History:  Seen and examined.  Chart and labs reviewed.  Patient and family considering comfort measures.  Patient is a DNR currently.  Patient declines pacemaker.    Review of Systems:  A complete review of systems was undertaken with the pertinent cardiovascular findings listed in history of present illness and all other systems being negative.     Assessment & Plan    Impressions:  STEMI status post PCI this admission  Coronary artery disease bradycardia symptomatic with syncopal episodes  Heart failure reduced ejection fraction  Cardiomyopathy most recent ejection fraction 35 to 40%    Recommendations:  Patient and family considering comfort measures at the present time.  Patient is a DNR.  Patient declines pacemaker currently.  Patient informs me that plans are for him to go home with hospice.  Will see the patient as needed.  Please call if needed.        Objective:    Medication Review:   Scheduled Meds:aspirin, 81 mg, Oral, Daily  atorvastatin, 10 mg, Oral, Nightly  bumetanide, 1 mg, Oral, TID  cefTRIAXone, 1,000 mg, Intravenous, Q24H  clopidogrel, 75 mg, Oral, Daily  sodium chloride, 10 mL, Intravenous, Q12H      Continuous Infusions:   PRN Meds:.  acetaminophen **OR** acetaminophen    albuterol    benzonatate    senna-docusate sodium **AND** polyethylene glycol **AND** bisacodyl **AND** bisacodyl    guaifenesin    influenza vaccine    ipratropium-albuterol    ondansetron **OR** ondansetron    prochlorperazine    sodium chloride    sodium chloride    sodium chloride      Acute ST elevation myocardial infarction (STEMI) involving right coronary artery         Physical  "Exam:    General: Alert, cooperative, no distress, appears stated age.  Frail and chronically ill-appearing  Head:  Normocephalic, atraumatic, mucous membranes moist  Eyes:  Conjunctivae/corneas clear, EOMs intact     Neck:  Supple,  no bruit  Lungs:  Coarse and diminished bilaterally  Chest wall: No tenderness  Heart::  Slow but regular rate and rhythm, S1 and S2 normal, 1/6 holosystolic murmur.  No rub or gallop  Abdomen: Soft, nontender, nondistended, bowel sounds active  Extremities: No cyanosis, clubbing, or edema  Pulses: Diminished pedal pulses  Skin:  No rashes or lesions  Neuro/psych: A&O x3. CN II through XII are grossly intact with appropriate affect    Vital Signs:  Vitals:    12/09/23 0000 12/09/23 0100 12/09/23 0434 12/09/23 0446   BP: 142/66 143/71 157/73    BP Location:   Right arm    Patient Position:   Lying    Pulse: (!) 41 (!) 37 (!) 43    Resp:   24    Temp:   97.5 °F (36.4 °C)    TempSrc:   Axillary    SpO2:  97% 95%    Weight:    75.1 kg (165 lb 9.1 oz)   Height:         Wt Readings from Last 1 Encounters:   12/09/23 75.1 kg (165 lb 9.1 oz)       Intake/Output Summary (Last 24 hours) at 12/9/2023 1046  Last data filed at 12/9/2023 0446  Gross per 24 hour   Intake 517 ml   Output 2075 ml   Net -1558 ml         Results Review:     CBC    Results from last 7 days   Lab Units 12/08/23  0700 12/07/23  0111 12/06/23  0802 12/05/23  0614 12/04/23  0421 12/03/23  0657   WBC 10*3/mm3 13.10* 16.00* 11.80* 12.60* 14.50* 12.40*   HEMOGLOBIN g/dL 10.5* 12.0* 10.7* 11.0* 11.2* 10.9*   PLATELETS 10*3/mm3 258 228 187 179 170 148     Cr Clearance Estimated Creatinine Clearance: 49 mL/min (by C-G formula based on SCr of 1 mg/dL).  Coag   Results from last 7 days   Lab Units 12/06/23  0802 12/05/23  0614 12/04/23  2329 12/04/23  1728 12/04/23  1034 12/04/23  0421 12/03/23 2021   APTT seconds 73.5 67.3 75.5 63.1 50.5* 62.7 57.7*     HbA1C No results found for: \"HGBA1C\"  Blood Glucose   Glucose   Date/Time Value " "Ref Range Status   12/08/2023 1351 98 70 - 105 mg/dL Final     Comment:     Serial Number: [id number]:  [id number]     Infection   Results from last 7 days   Lab Units 12/08/23 2034 12/06/23 2028 12/06/23  1223   BLOODCX   --  No growth at 2 days  Staphylococcus, coagulase negative*  --    RESPCX  Rejected  --   --    URINECX   --   --  No growth   BCIDPCR   --  Staph spp, not aureus or lugdunensis. Identification by BCID2 PCR.*  --      CMP   Results from last 7 days   Lab Units 12/08/23  0700 12/07/23  0111 12/06/23  0802 12/05/23  0614 12/04/23  0421 12/03/23  0657   SODIUM mmol/L 138 138 137 136 135* 133*   POTASSIUM mmol/L 3.4* 3.8 3.9 4.0 4.1 4.2   CHLORIDE mmol/L 107 108* 108* 107 104 103   CO2 mmol/L 19.0* 18.0* 19.0* 17.0* 19.0* 22.0   BUN mg/dL 26* 28* 29* 25* 21 17   CREATININE mg/dL 1.00 0.88 1.04 0.93 0.89 1.01   GLUCOSE mg/dL 100* 123* 110* 109* 112* 105*   ALBUMIN g/dL 2.8* 3.4*  --   --   --   --    BILIRUBIN mg/dL 0.5 0.5  --   --   --   --    ALK PHOS U/L 78 86  --   --   --   --    AST (SGOT) U/L 14 17  --   --   --   --    ALT (SGPT) U/L 19 23  --   --   --   --      ABG      UA    Results from last 7 days   Lab Units 12/07/23  1351 12/06/23  1223   NITRITE UA  Negative Negative   WBC UA /HPF 3-5* 21-50*   BACTERIA UA /HPF None Seen None Seen   SQUAM EPITHEL UA /HPF 0-2 0-2   URINECX   --  No growth     FACUNDO  No results found for: \"POCMETH\", \"POCAMPHET\", \"POCBARBITUR\", \"POCBENZO\", \"POCCOCAINE\", \"POCOPIATES\", \"POCOXYCODO\", \"POCPHENCYC\", \"POCPROPOXY\", \"POCTHC\", \"POCTRICYC\"  [facility]   Results from last 7 days   Lab Units 12/08/23  0700 12/07/23  0111 12/06/23  0802 12/05/23  0614 12/04/23  2329 12/04/23  1728 12/04/23  1034 12/04/23  0421 12/03/23  2021 12/03/23  1359 12/03/23  0657   APTT seconds  --   --  73.5 67.3 75.5 63.1 50.5* 62.7 57.7*   < > 61.5   HEMOGLOBIN g/dL 10.5* 12.0* 10.7* 11.0*  --   --   --  11.2*  --   --  10.9*   PLATELETS 10*3/mm3 258 228 187 179  --   --   --  170  " --   --  148   CREATININE mg/dL 1.00 0.88 1.04 0.93  --   --   --  0.89  --   --  1.01    < > = values in this interval not displayed.     Radiology(recent) CT Chest Without Contrast Diagnostic    Result Date: 12/7/2023  Impression: 1.There are small bilateral pleural effusions and mild dependent atelectasis in each lung base. There are also patchy groundglass and interstitial opacities in the right lower lobe which may represent superimposed infection. 2.There is underlying emphysema and evidence of prior granulomatous disease. 3.Cardiomegaly with evidence of previous CABG Electronically Signed: Mariusz Low DO  12/7/2023 4:20 PM EST  Workstation ID: [id number]       Results from last 7 days   Lab Units 12/05/23  0831   HSTROP T ng/L 3,399*       Imaging Results (Last 24 Hours)       ** No results found for the last 24 hours. **            Cardiac Studies:  Echo- Results for orders placed during the hospital encounter of 12/01/23    Adult Transthoracic Echo Complete W/ Cont if Necessary Per Protocol    Interpretation Summary    Left ventricular ejection fraction appears to be 36 - 40%.    The left atrial cavity is moderately dilated.    Estimated right ventricular systolic pressure from tricuspid regurgitation is normal (<35 mmHg).    Stress Myoview-  Cath-        Umer Meng DO  12/09/23  10:46 EST

## 2023-12-10 LAB
ANION GAP SERPL CALCULATED.3IONS-SCNC: 16 MMOL/L (ref 5–15)
BASOPHILS # BLD AUTO: 0.1 10*3/MM3 (ref 0–0.2)
BASOPHILS NFR BLD AUTO: 0.6 % (ref 0–1.5)
BUN SERPL-MCNC: 20 MG/DL (ref 8–23)
BUN/CREAT SERPL: 22.2 (ref 7–25)
CALCIUM SPEC-SCNC: 8.9 MG/DL (ref 8.2–9.6)
CHLORIDE SERPL-SCNC: 96 MMOL/L (ref 98–107)
CO2 SERPL-SCNC: 27 MMOL/L (ref 22–29)
CREAT SERPL-MCNC: 0.9 MG/DL (ref 0.76–1.27)
DEPRECATED RDW RBC AUTO: 49 FL (ref 37–54)
EGFRCR SERPLBLD CKD-EPI 2021: 79.6 ML/MIN/1.73
EOSINOPHIL # BLD AUTO: 0.4 10*3/MM3 (ref 0–0.4)
EOSINOPHIL NFR BLD AUTO: 2.4 % (ref 0.3–6.2)
ERYTHROCYTE [DISTWIDTH] IN BLOOD BY AUTOMATED COUNT: 14.8 % (ref 12.3–15.4)
GEN 5 2HR TROPONIN T REFLEX: 1204 NG/L
GLUCOSE SERPL-MCNC: 98 MG/DL (ref 65–99)
HCT VFR BLD AUTO: 38.9 % (ref 37.5–51)
HGB BLD-MCNC: 13.3 G/DL (ref 13–17.7)
LYMPHOCYTES # BLD AUTO: 1.8 10*3/MM3 (ref 0.7–3.1)
LYMPHOCYTES NFR BLD AUTO: 12 % (ref 19.6–45.3)
MAGNESIUM SERPL-MCNC: 2 MG/DL (ref 1.7–2.3)
MCH RBC QN AUTO: 32.9 PG (ref 26.6–33)
MCHC RBC AUTO-ENTMCNC: 34.2 G/DL (ref 31.5–35.7)
MCV RBC AUTO: 96.2 FL (ref 79–97)
MONOCYTES # BLD AUTO: 1.5 10*3/MM3 (ref 0.1–0.9)
MONOCYTES NFR BLD AUTO: 10 % (ref 5–12)
NEUTROPHILS NFR BLD AUTO: 11.3 10*3/MM3 (ref 1.7–7)
NEUTROPHILS NFR BLD AUTO: 75 % (ref 42.7–76)
NRBC BLD AUTO-RTO: 0.1 /100 WBC (ref 0–0.2)
PLATELET # BLD AUTO: 237 10*3/MM3 (ref 140–450)
PMV BLD AUTO: 9.8 FL (ref 6–12)
POTASSIUM SERPL-SCNC: 3.1 MMOL/L (ref 3.5–5.2)
RBC # BLD AUTO: 4.04 10*6/MM3 (ref 4.14–5.8)
SODIUM SERPL-SCNC: 139 MMOL/L (ref 136–145)
TROPONIN T DELTA: -98 NG/L
TROPONIN T SERPL HS-MCNC: 1302 NG/L
WBC NRBC COR # BLD AUTO: 15 10*3/MM3 (ref 3.4–10.8)

## 2023-12-10 PROCEDURE — 80048 BASIC METABOLIC PNL TOTAL CA: CPT | Performed by: INTERNAL MEDICINE

## 2023-12-10 PROCEDURE — 97530 THERAPEUTIC ACTIVITIES: CPT

## 2023-12-10 PROCEDURE — 97110 THERAPEUTIC EXERCISES: CPT

## 2023-12-10 PROCEDURE — 83880 ASSAY OF NATRIURETIC PEPTIDE: CPT | Performed by: INTERNAL MEDICINE

## 2023-12-10 PROCEDURE — 85025 COMPLETE CBC W/AUTO DIFF WBC: CPT | Performed by: NURSE PRACTITIONER

## 2023-12-10 PROCEDURE — 93010 ELECTROCARDIOGRAM REPORT: CPT | Performed by: INTERNAL MEDICINE

## 2023-12-10 PROCEDURE — 93005 ELECTROCARDIOGRAM TRACING: CPT | Performed by: INTERNAL MEDICINE

## 2023-12-10 PROCEDURE — 97112 NEUROMUSCULAR REEDUCATION: CPT

## 2023-12-10 PROCEDURE — 83735 ASSAY OF MAGNESIUM: CPT | Performed by: INTERNAL MEDICINE

## 2023-12-10 PROCEDURE — 84484 ASSAY OF TROPONIN QUANT: CPT | Performed by: INTERNAL MEDICINE

## 2023-12-10 RX ORDER — POTASSIUM CHLORIDE 20 MEQ/1
40 TABLET, EXTENDED RELEASE ORAL EVERY 4 HOURS
Status: COMPLETED | OUTPATIENT
Start: 2023-12-10 | End: 2023-12-10

## 2023-12-10 RX ORDER — CEFDINIR 300 MG/1
300 CAPSULE ORAL EVERY 12 HOURS SCHEDULED
Status: DISCONTINUED | OUTPATIENT
Start: 2023-12-10 | End: 2023-12-11 | Stop reason: HOSPADM

## 2023-12-10 RX ADMIN — CEFDINIR 300 MG: 300 CAPSULE ORAL at 20:19

## 2023-12-10 RX ADMIN — Medication 10 ML: at 08:44

## 2023-12-10 RX ADMIN — CLOPIDOGREL BISULFATE 75 MG: 75 TABLET ORAL at 08:44

## 2023-12-10 RX ADMIN — POTASSIUM CHLORIDE 40 MEQ: 1500 TABLET, EXTENDED RELEASE ORAL at 17:23

## 2023-12-10 RX ADMIN — BUMETANIDE 1 MG: 1 TABLET ORAL at 08:44

## 2023-12-10 RX ADMIN — ASPIRIN 81 MG CHEWABLE TABLET 81 MG: 81 TABLET CHEWABLE at 08:44

## 2023-12-10 RX ADMIN — BUMETANIDE 1 MG: 1 TABLET ORAL at 17:23

## 2023-12-10 RX ADMIN — LOSARTAN POTASSIUM 25 MG: 25 TABLET, FILM COATED ORAL at 08:44

## 2023-12-10 RX ADMIN — POTASSIUM CHLORIDE 40 MEQ: 1500 TABLET, EXTENDED RELEASE ORAL at 10:38

## 2023-12-10 RX ADMIN — ATORVASTATIN CALCIUM 10 MG: 10 TABLET, FILM COATED ORAL at 20:19

## 2023-12-10 RX ADMIN — Medication 10 ML: at 20:20

## 2023-12-10 RX ADMIN — POTASSIUM CHLORIDE 40 MEQ: 1500 TABLET, EXTENDED RELEASE ORAL at 14:06

## 2023-12-10 RX ADMIN — BUMETANIDE 1 MG: 1 TABLET ORAL at 20:19

## 2023-12-10 NOTE — THERAPY TREATMENT NOTE
"Subjective: Pt agreeable to therapeutic plan of care. Pt c/o blurry vision at EOB that did not subside with time.    Objective:     Bed mobility - Min-A  Transfers - Min-AHHA to [facility] at   Ambulation -  feet N/A or Not attempted.    Therapeutic Exercise - 10 Reps B LE AROM unsupported sitting / EOB    Vitals: BP supine_136/80, sitting-149/93    Pain: 0 VAS     Education: Provided education on the importance of mobility in the acute care setting, Verbal/Tactile Cues, and Transfer Training    Assessment: Sage Flores presents with functional mobility impairments which indicate the need for skilled intervention. Tolerating session today without incident. Pt still having difficulty with blurry vision and overall weakness. Was able to stand at EOB before incr c/o blurriness and fatigue. Plans on rehab at [place]. Possible PPM placement in future.Will continue to follow and progress as tolerated.     Plan/Recommendations:   Moderate Intensity Therapy recommended post-acute care. This is recommended as therapy feels the patient would require 3-4 days per week and wouldn't tolerate \"3 hour daily\" rehab intensity. SNF would be the preferred choice. If the patient does not agree to SNF, arrange HH or OP depending on home bound status. If patient is medically complex, consider LTACH.. Pt requires no DME at discharge.     Pt desires Skilled Rehab placement at discharge. Pt cooperative; agreeable to therapeutic recommendations and plan of care.         Basic Mobility 6-click:  Rollin = Total, A lot = 2, A little = 3; 4 = None  Supine>Sit:   1 = Total, A lot = 2, A little = 3; 4 = None   Sit>Stand with arms:  1 = Total, A lot = 2, A little = 3; 4 = None  Bed>Chair:   1 = Total, A lot = 2, A little = 3; 4 = None  Ambulate in room:  1 = Total, A lot = 2, A little = 3; 4 = None  3-5 Steps with railin = Total, A lot = 2, A little = 3; 4 = None  Score: 16    Post-Tx Position: Supine with HOB Elevated, Alarms activated, " and Call light and personal items within reach  PPE: gloves

## 2023-12-10 NOTE — PLAN OF CARE
Nursing reported that IV delivering rocephin infiltrated. Extravasation order set placed, discussed with pharmacy and no antidote recommended.    Electronically signed by Talha Young DO, 12/10/23, 2:49 AM EST.

## 2023-12-10 NOTE — PROGRESS NOTES
Infectious Diseases Progress Note      LOS: 9 days   Patient Care Team:  Tricia Aldana APRN as PCP - General (Family Medicine)  Missy Domínguez MD as Consulting Physician (Cardiology)    Chief Complaint: Productive cough    Subjective       The patient has been afebrile for the last 24 hours.  The patient is on room air, hemodynamically stable, and is tolerating antimicrobial therapy.  Patient denies significant shortness of breath or cough today      Review of Systems:   Review of Systems   Constitutional:  Positive for fatigue.   HENT: Negative.     Eyes: Negative.    Respiratory:  Positive for cough and shortness of breath.         Improved   Cardiovascular: Negative.    Gastrointestinal: Negative.    Endocrine: Negative.    Genitourinary: Negative.    Musculoskeletal: Negative.    Skin: Negative.    Neurological: Negative.    Psychiatric/Behavioral: Negative.     All other systems reviewed and are negative.       Objective     Vital Signs  Temp:  [97.5 °F (36.4 °C)-98.5 °F (36.9 °C)] 97.5 °F (36.4 °C)  Heart Rate:  [54-66] 64  Resp:  [18-29] 24  BP: (134-149)/(68-93) 149/93    Physical Exam:  Physical Exam  Vitals and nursing note reviewed.   Constitutional:       General: He is not in acute distress.     Appearance: Normal appearance. He is well-developed and normal weight. He is not diaphoretic.   HENT:      Head: Normocephalic and atraumatic.   Eyes:      Conjunctiva/sclera: Conjunctivae normal.      Pupils: Pupils are equal, round, and reactive to light.   Cardiovascular:      Rate and Rhythm: Normal rate and regular rhythm.      Heart sounds: Normal heart sounds, S1 normal and S2 normal.   Pulmonary:      Effort: Pulmonary effort is normal. No respiratory distress.      Breath sounds: Normal breath sounds. No stridor. No wheezing or rales.   Abdominal:      General: Bowel sounds are normal. There is no distension.      Palpations: Abdomen is soft. There is no mass.      Tenderness: There is no  abdominal tenderness. There is no guarding.   Musculoskeletal:         General: No deformity.      Cervical back: Neck supple.      Right lower leg: Edema present.      Left lower leg: Edema present.   Skin:     General: Skin is warm and dry.      Coloration: Skin is not pale.      Findings: No erythema or rash.   Neurological:      Mental Status: He is alert.      Cranial Nerves: No cranial nerve deficit.      Comments: Alert and oriented x 2          Results Review:    I have reviewed all clinical data, test, lab, and imaging results.     Radiology  No Radiology Exams Resulted Within Past 24 Hours    Cardiology    Laboratory    Results from last 7 days   Lab Units 12/10/23  0047 12/09/23  1400 12/08/23  0700 12/07/23  0111 12/06/23  0802 12/05/23  0614 12/04/23  0421   WBC 10*3/mm3 15.00* 14.30* 13.10* 16.00* 11.80* 12.60* 14.50*   HEMOGLOBIN g/dL 13.3 12.4* 10.5* 12.0* 10.7* 11.0* 11.2*   HEMATOCRIT % 38.9 37.2* 30.3* 35.4* 31.5* 32.5* 32.9*   PLATELETS 10*3/mm3 237 230 258 228 187 179 170     Results from last 7 days   Lab Units 12/10/23  0047 12/09/23  1400 12/08/23  0700 12/07/23  0111 12/06/23  0802 12/05/23  0614 12/04/23  0421   SODIUM mmol/L 139 139 138 138 137 136 135*   POTASSIUM mmol/L 3.1* 3.3* 3.4* 3.8 3.9 4.0 4.1   CHLORIDE mmol/L 96* 100 107 108* 108* 107 104   CO2 mmol/L 27.0 27.0 19.0* 18.0* 19.0* 17.0* 19.0*   BUN mg/dL 20 22 26* 28* 29* 25* 21   CREATININE mg/dL 0.90 1.05 1.00 0.88 1.04 0.93 0.89   GLUCOSE mg/dL 98 97 100* 123* 110* 109* 112*   ALBUMIN g/dL  --  3.6 2.8* 3.4*  --   --   --    BILIRUBIN mg/dL  --  0.6 0.5 0.5  --   --   --    ALK PHOS U/L  --  93 78 86  --   --   --    AST (SGOT) U/L  --  15 14 17  --   --   --    ALT (SGPT) U/L  --  18 19 23  --   --   --    CALCIUM mg/dL 8.9 8.8 8.2 8.3 8.4 8.1* 8.3                 Microbiology   Microbiology Results (last 10 days)       Procedure Component Value - Date/Time    Respiratory Culture - Sputum, Cough [[id number]] Collected: 12/08/23  2034    Lab Status: Final result Specimen: Sputum from Cough Updated: 12/09/23 0717     Respiratory Culture Rejected     Gram Stain Few (2+) WBCs per low power field      Moderate (3+) Epithelial cells per low power field      Rare (1+) Mixed bacterial morphotypes seen on Gram Stain    Narrative:      Specimen rejected due to oropharyngeal contamination. Please reorder and recollect specimen if clinically necessary.    Legionella Antigen, Urine - Urine, Urine, Clean Catch [[id number]]  (Normal) Collected: 12/08/23 1730    Lab Status: Final result Specimen: Urine, Clean Catch Updated: 12/08/23 1757     LEGIONELLA ANTIGEN, URINE Negative    S. Pneumo Ag Urine or CSF - Urine, Urine, Clean Catch [[id number]]  (Normal) Collected: 12/08/23 1730    Lab Status: Final result Specimen: Urine, Clean Catch Updated: 12/08/23 1756     Strep Pneumo Ag Negative    COVID PRE-OP / PRE-PROCEDURE SCREENING ORDER (NO ISOLATION) - Swab, Nasopharynx [[id number]]  (Normal) Collected: 12/08/23 0921    Lab Status: Final result Specimen: Swab from Nasopharynx Updated: 12/08/23 1100    Narrative:      The following orders were created for panel order COVID PRE-OP / PRE-PROCEDURE SCREENING ORDER (NO ISOLATION) - Swab, Nasopharynx.  Procedure                               Abnormality         Status                     ---------                               -----------         ------                     Respiratory Panel PCR w/...[[id number]]  Normal              Final result                 Please view results for these tests on the individual orders.    Respiratory Panel PCR w/COVID-19(SARS-CoV-2) SANDRA/EMILY/EULOGIO/PAD/COR/MIGNON In-House, NP Swab in UTM/VTM, 2 HR TAT - Swab, Nasopharynx [[id number]]  (Normal) Collected: 12/08/23 0921    Lab Status: Final result Specimen: Swab from Nasopharynx Updated: 12/08/23 1100     ADENOVIRUS, PCR Not Detected     Coronavirus 229E Not Detected     Coronavirus HKU1 Not Detected     Coronavirus NL63 Not Detected      Coronavirus OC43 Not Detected     COVID19 Not Detected     Human Metapneumovirus Not Detected     Human Rhinovirus/Enterovirus Not Detected     Influenza A PCR Not Detected     Influenza B PCR Not Detected     Parainfluenza Virus 1 Not Detected     Parainfluenza Virus 2 Not Detected     Parainfluenza Virus 3 Not Detected     Parainfluenza Virus 4 Not Detected     RSV, PCR Not Detected     Bordetella pertussis pcr Not Detected     Bordetella parapertussis PCR Not Detected     Chlamydophila pneumoniae PCR Not Detected     Mycoplasma pneumo by PCR Not Detected    Narrative:      In the setting of a positive respiratory panel with a viral infection PLUS a negative procalcitonin without other underlying concern for bacterial infection, consider observing off antibiotics or discontinuation of antibiotics and continue supportive care. If the respiratory panel is positive for atypical bacterial infection (Bordetella pertussis, Chlamydophila pneumoniae, or Mycoplasma pneumoniae), consider antibiotic de-escalation to target atypical bacterial infection.    Blood Culture - Blood, Arm, Left [[id number]]  (Abnormal) Collected: 12/06/23 2028    Lab Status: Final result Specimen: Blood from Arm, Left Updated: 12/08/23 0649     Blood Culture Staphylococcus, coagulase negative     Isolated from Aerobic Bottle     Gram Stain Aerobic Bottle Gram positive cocci in clusters    Narrative:      Probable contaminant requires clinical correlation, susceptibility not performed unless requested by physician.      Blood Culture - Blood, Hand, Right [[id number]]  (Normal) Collected: 12/06/23 2028    Lab Status: Preliminary result Specimen: Blood from Hand, Right Updated: 12/09/23 2045     Blood Culture No growth at 3 days    Blood Culture ID, PCR - Blood, Arm, Left [[id number]]  (Abnormal) Collected: 12/06/23 2028    Lab Status: Final result Specimen: Blood from Arm, Left Updated: 12/07/23 2037     BCID, PCR Staph spp, not aureus or lugdunensis.  Identification by BCID2 PCR.     BOTTLE TYPE Aerobic Bottle    Urine Culture - Urine, Urine, Clean Catch [[id number]]  (Normal) Collected: 12/06/23 1223    Lab Status: Final result Specimen: Urine, Clean Catch Updated: 12/07/23 2039     Urine Culture No growth    MRSA Screen, PCR (Inpatient) - Swab, Nares [[id number]]  (Normal) Collected: 12/01/23 2301    Lab Status: Final result Specimen: Swab from Nares Updated: 12/02/23 0231     MRSA PCR No MRSA Detected    Narrative:      The negative predictive value of this diagnostic test is high and should only be used to consider de-escalating anti-MRSA therapy. A positive result may indicate colonization with MRSA and must be correlated clinically.            Medication Review:       Schedule Meds  aspirin, 81 mg, Oral, Daily  atorvastatin, 10 mg, Oral, Nightly  bumetanide, 1 mg, Oral, TID  cefTRIAXone, 1,000 mg, Intravenous, Q24H  clopidogrel, 75 mg, Oral, Daily  losartan, 25 mg, Oral, Q24H  potassium chloride ER, 40 mEq, Oral, Q4H  sodium chloride, 10 mL, Intravenous, Q12H        Infusion Meds       PRN Meds    acetaminophen **OR** acetaminophen    albuterol    benzonatate    senna-docusate sodium **AND** polyethylene glycol **AND** bisacodyl **AND** bisacodyl    Calcium Replacement - Follow Nurse / BPA Driven Protocol    guaifenesin    influenza vaccine    ipratropium-albuterol    Magnesium Standard Dose Replacement - Follow Nurse / BPA Driven Protocol    ondansetron **OR** ondansetron    Phosphorus Replacement - Follow Nurse / BPA Driven Protocol    Potassium Replacement - Follow Nurse / BPA Driven Protocol    sodium chloride    sodium chloride    sodium chloride        Assessment & Plan       Antimicrobial Therapy   1.  IV Rocephin        2.  P.o. Omnicef        3.        4.        5.          Assessment     Lower respiratory tract infection with acute bronchitis versus subtle right lower lobe pneumonia.  Patient is having productive sputum.  Currently on room air.   Symptoms have improved.  Legionella and pneumococcal urine antigen was negative and sputum culture was rejected.  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  Patient did not have cardiac valve replacement according to him.     Positive blood culture in 1 out of 2 sets for coagulase-negative Staphylococcus.  In the setting where patient had no prior cardiac valve replacement or endovascular device this most likely indicates contamination     Plan     Discontinue IV ceftriaxone  Start p.o. cefdinir 300 mg twice daily for 3 days for  7 days total treatment  Continue supportive care   Not much more to add from infectious disease standpoint-we will sign off at this time-please call with any questions.      Eleanor Ricks, ROSA  12/10/23  16:04 EST    Note is dictated utilizing voice recognition software/Dragon

## 2023-12-10 NOTE — CASE MANAGEMENT/SOCIAL WORK
Continued Stay Note   Reji     Patient Name: Sage Flores  MRN: [id number]  Today's Date: 12/10/2023    Admit Date: 12/1/2023    Plan: [facility] accepted.  Bed avail 12/10  precert not required.  PASRR appproved.  BF accepted and following [facility] following.   Discharge Plan       Row Name 12/10/23 1638       Plan    Plan [facility] accepted.  Bed avail 12/10  precert not required.  PASRR appproved.  BF accepted and following [facility] following.    Plan Comments CM contacted by nursing inquiring if accepted by [facility].  per epic at that time, pt had not been accepted.  per nurse, family stated a bed was avail since friday night.  CM contacted oncall liasion who verified that patient was accepted and bed is ready.  CM updated nurse with this information and marked [facility] as accepted in [facility].                      Expected Discharge Date and Time       Expected Discharge Date Expected Discharge Time    Dec 11, 2023               Elin Vera RN

## 2023-12-10 NOTE — NURSING NOTE
IV Rocephin infiltration left forearm. Called hospitalist to notify on call with orders. Spoke to Talha Hamilton who put in orders see MAR.

## 2023-12-10 NOTE — PLAN OF CARE
Goal Outcome Evaluation:                          Patient doing okay this shift. Complains of some chest pain, troponin have greatly trended down. Palliative/ hospice referrals. Patient remains incontinent, q2 turn when patient allows/wants. Potassium replaced this shift. Patient A&O X4. Patient takes medications whole. Patient will discharge to [facility] when available.

## 2023-12-10 NOTE — PROGRESS NOTES
[facility] MEDICINE SERVICE  DAILY PROGRESS NOTE    Patient Name: Sage Flores  : 1930  MRN: [id number]  Primary Care Physician:  Tricia Aldana, ROSA  Date of admission: 2023  Date of service: 12/10/23      Subjective          Patient Reports-nonspecific complaints.  Occasional chest discomfort and abdominal pain.  No nausea vomiting.  No dizziness or lightheadedness.  No fever cough or aspiration.    ROS A 12 point review of system was done and was negative except as mentioned above      Objective      Vitals:   Temp:  [97.5 °F (36.4 °C)-98.5 °F (36.9 °C)] 97.5 °F (36.4 °C)  Heart Rate:  [54-66] 66  Resp:  [18-29] 29  BP: (134-167)/(68-96) 149/93    Physical Exam  Constitutional:       Appearance: Normal appearance.   HENT:      Head: Normocephalic and atraumatic.      Nose: Nose normal.   Cardiovascular:      Rate and Rhythm: Normal rate.      Heart sounds: Normal heart sounds.   Pulmonary:      Effort: Pulmonary effort is normal. No respiratory distress.      Breath sounds: Normal breath sounds. No stridor. No wheezing, rhonchi or rales.   Chest:      Chest wall: No tenderness.   Abdominal:      General: Abdomen is flat. There is no distension.      Palpations: Abdomen is soft. There is no mass.      Tenderness: There is no abdominal tenderness. There is no right CVA tenderness, left CVA tenderness, guarding or rebound.   Musculoskeletal:      Cervical back: Normal range of motion.   Skin:     General: Skin is warm and dry.   Neurological:      General: No focal deficit present.      Mental Status: He is alert.   Psychiatric:         Mood and Affect: Mood normal.         Behavior: Behavior normal.             Result Review    Result Review:  I have personally reviewed the results from the time of this admission to 12/10/2023 12:57 EST and agree with these findings:  [x]  Laboratory  []  Microbiology  [x]  Radiology  [x]  EKG/Telemetry   []  Cardiology/Vascular   []  Pathology  []   Old records  []  Other:      Wounds (last 24 hours)       LDA Wound       Row Name 12/10/23 0851 12/10/23 0258 12/09/23 2041       Wound 12/05/23 0800 coccyx Pressure Injury    Wound - Properties Group Placement Date: 12/05/23  - Placement Time: 0800 - Location: coccyx  -JH Primary Wound Type: Pressure inj  -JH    Dressing Appearance dry;intact  -AK -- dry;intact  -SB    Closure Adhesive bandage  -AK -- Adhesive bandage  -SB    Base dressing in place, unable to visualize  -AK -- dressing in place, unable to visualize  -SB    Periwound -- -- dry;intact  -SB    Drainage Amount -- -- none  -SB    Retired Wound - Properties Group Placement Date: 12/05/23  - Placement Time: 0800 - Location: coccyx  -JH Primary Wound Type: Pressure inj  -JH    Retired Wound - Properties Group Date first assessed: 12/05/23  - Time first assessed: 0800 - Location: coccyx  -JH Primary Wound Type: Pressure inj  -JH       Wound 12/10/23 0256 Left antecubital Extravasation    Wound - Properties Group Placement Date: 12/10/23  -SB Placement Time: 0256  -SB Side: Left  -SB Location: antecubital  -SB Primary Wound Type: Extravasatio  -SB    Wound Image -- Images linked: 1  -SB --    Base pink;red  -AK -- --    Retired Wound - Properties Group Placement Date: 12/10/23  -SB Placement Time: 0256  -SB Side: Left  -SB Location: antecubital  -SB Primary Wound Type: Extravasatio  -SB    Retired Wound - Properties Group Date first assessed: 12/10/23  -SB Time first assessed: 0256  -SB Side: Left  -SB Location: antecubital  -SB Primary Wound Type: Extravasatio  -SB              User Key  (r) = Recorded By, (t) = Taken By, (c) = Cosigned By      Initials Name Provider Type    Marisol Farris RN Registered Nurse    Emeli Shabazz, RN Registered Nurse    Kellie Bonds LPN Licensed Nurse                      Assessment & Plan      Brief Patient Summary:  A 89 y.o. old male patient with PMH of CAD, atrial fibrillation, presents to  the hospital with complaints of chest pain and syncopal episode which started an hour prior to admission to ER. EKG showed evidence of acute inferior posterior MI with heart block. Patient was taken to Cathlab emergently. Patient was hypotensive with mental status changes. He was started on Dopamine prior to cathlab and a TVP was placed in cathlab. Patient underwent successful senting to RCA.        aspirin, 81 mg, Oral, Daily  atorvastatin, 10 mg, Oral, Nightly  bumetanide, 1 mg, Oral, TID  cefTRIAXone, 1,000 mg, Intravenous, Q24H  clopidogrel, 75 mg, Oral, Daily  losartan, 25 mg, Oral, Q24H  potassium chloride ER, 40 mEq, Oral, Q4H  sodium chloride, 10 mL, Intravenous, Q12H             Active Hospital Problems:  Active Hospital Problems    Diagnosis     **Acute ST elevation myocardial infarction (STEMI) involving right coronary artery      Plan:   STEMI  Heart  block with bradycardia  Status post PCI to RCA  Hx of  CABG  -Status post emergent PCI  -Continue aspirin and Plavix  -Cardiology follow-up     Paroxysmal  A-fib  -Stable sinus rhythm with bradycardic, . EKG has been ordered.   Cardiology follow-up       History of hypertension  - Follow-up blood pressure.    Acute kidney injury at risk   Nephrology consulted.      Hyperlipidemia   continue statin    Hypokalemia  Follow-up potassium.  Renal seeing     Leukocytosis  - Likely reactive we will continue to monitor  -Urinalysis  - Recent CT chest reviewed.  Follow-up chest x-ray  -Follow-up CBC         DVT prophylaxis:  Mechanical DVT prophylaxis orders are present.    CODE STATUS:    Medical Intervention Limits: NO intubation (DNI)  Level Of Support Discussed With: Patient  Code Status (Patient has no pulse and is not breathing): No CPR (Do Not Attempt to Resuscitate)  Medical Interventions (Patient has pulse or is breathing): Limited Support  Comments: No CPE      Disposition:  I expect patient to be discharged 2 to 3 days    I discussed the patient's findings  and my recommendations with the patient.    Electronically signed by Geri Pruitt MD, 12/10/23, 12:57 EST.  [facility] Hospitalist Team

## 2023-12-10 NOTE — PROGRESS NOTES
PROGRESS NOTE      Patient Name: Sage Flores  : 1930  MRN: [id number]  Primary Care Physician: Tricia Aldana APRN  Date of admission: 2023    Patient Care Team:  Tricia Aldana APRN as PCP - General (Family Medicine)  Missy Domínguez MD as Consulting Physician (Cardiology)        Subjective   Subjective:     Feel better  Review of systems:        Allergies:    Allergies   Allergen Reactions    Iodine Unknown (See Comments)     Pt unsure of reaction      Levofloxacin Unknown (See Comments)    Nitroglycerin Unknown (See Comments) and Other (See Comments)    Paroxetine Unknown (See Comments)    Penicillin G Unknown (See Comments)    Prednisone Unknown (See Comments)    Sucralfate Unknown (See Comments)    Diltiazem Hcl Hives    Metoprolol Other (See Comments)     Lowers heart rate     Pramipexole Dihydrochloride Unknown (See Comments)    Ropinirole Hcl Other (See Comments)       Objective   Exam:     Vital Signs  Temp:  [97.7 °F (36.5 °C)-98.5 °F (36.9 °C)] 98.4 °F (36.9 °C)  Heart Rate:  [47-65] 65  Resp:  [14-20] 18  BP: (134-167)/(74-96) 135/87  SpO2:  [92 %-96 %] 92 %  on   ;   Device (Oxygen Therapy): room air  Body mass index is 22.84 kg/m².    General: Elderly white male in no acute distress.    Head:      Normocephalic and atraumatic.    Eyes:      PERRL/EOM intact, conjunctiva and sclera clear with out nystagmus.    Neck:      No masses, thyromegaly,  trachea central with normal respiratory effort   Lungs:    Clear bilaterally to auscultation.    Heart:      Regular rate and rhythm, no murmur no gallop  Abd:        Soft, nontender, not distended, bowel sounds positive, no shifting dullness   Pulses:   Pulses palpable  Extr:        No cyanosis or clubbing--+1-2 edema.    Neuro:    No focal deficits.   alert oriented x3  Skin:       Intact without lesions or rashes.    Psych:    Alert and cooperative; normal mood and affect; .      Results Review:  I have personally reviewed  most recent Data :  CBC    Results from last 7 days   Lab Units 12/10/23  0047 12/09/23  1400 12/08/23  0700 12/07/23  0111 12/06/23  0802 12/05/23  0614 12/04/23  0421   WBC 10*3/mm3 15.00* 14.30* 13.10* 16.00* 11.80* 12.60* 14.50*   HEMOGLOBIN g/dL 13.3 12.4* 10.5* 12.0* 10.7* 11.0* 11.2*   PLATELETS 10*3/mm3 237 230 258 228 187 179 170     CMP   Results from last 7 days   Lab Units 12/10/23  0047 12/09/23  1400 12/08/23  0700 12/07/23  0111 12/06/23  0802 12/05/23  0614 12/04/23  0421   SODIUM mmol/L 139 139 138 138 137 136 135*   POTASSIUM mmol/L 3.1* 3.3* 3.4* 3.8 3.9 4.0 4.1   CHLORIDE mmol/L 96* 100 107 108* 108* 107 104   CO2 mmol/L 27.0 27.0 19.0* 18.0* 19.0* 17.0* 19.0*   BUN mg/dL 20 22 26* 28* 29* 25* 21   CREATININE mg/dL 0.90 1.05 1.00 0.88 1.04 0.93 0.89   GLUCOSE mg/dL 98 97 100* 123* 110* 109* 112*   ALBUMIN g/dL  --  3.6 2.8* 3.4*  --   --   --    BILIRUBIN mg/dL  --  0.6 0.5 0.5  --   --   --    ALK PHOS U/L  --  93 78 86  --   --   --    AST (SGOT) U/L  --  15 14 17  --   --   --    ALT (SGPT) U/L  --  18 19 23  --   --   --      ABG      No radiology results for the last day    Results for orders placed during the hospital encounter of 12/01/23    Adult Transthoracic Echo Complete W/ Cont if Necessary Per Protocol    Interpretation Summary    Left ventricular ejection fraction appears to be 36 - 40%.    The left atrial cavity is moderately dilated.    Estimated right ventricular systolic pressure from tricuspid regurgitation is normal (<35 mmHg).    Scheduled Meds:aspirin, 81 mg, Oral, Daily  atorvastatin, 10 mg, Oral, Nightly  bumetanide, 1 mg, Oral, TID  cefTRIAXone, 1,000 mg, Intravenous, Q24H  clopidogrel, 75 mg, Oral, Daily  losartan, 25 mg, Oral, Q24H  potassium chloride ER, 40 mEq, Oral, Q4H  sodium chloride, 10 mL, Intravenous, Q12H      Continuous Infusions:   PRN Meds:  acetaminophen **OR** acetaminophen    albuterol    benzonatate    senna-docusate sodium **AND** polyethylene glycol  **AND** bisacodyl **AND** bisacodyl    Calcium Replacement - Follow Nurse / BPA Driven Protocol    guaifenesin    influenza vaccine    ipratropium-albuterol    Magnesium Standard Dose Replacement - Follow Nurse / BPA Driven Protocol    ondansetron **OR** ondansetron    Phosphorus Replacement - Follow Nurse / BPA Driven Protocol    Potassium Replacement - Follow Nurse / BPA Driven Protocol    sodium chloride    sodium chloride    sodium chloride    Assessment & Plan   Assessment and Plan:         Acute ST elevation myocardial infarction (STEMI) involving right coronary artery    ASSESSMENT:  Acute kidney injury at risk  Congestive heart failure with ejection fraction of 36%  Coronary artery disease s/p STEMI and PCI temporary pacemaker insertion 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  Atrial fibrillation  Peripheral edema  History of hypertension    PLAN :      Blood pressure is better than yesterday  Continue losartan at 25 once a day can be increased to 50 once a day if blood pressure still on the high side  Follow-up with a urine output and volume status  Acidosis improving mild hypokalemia replaced  Acute coronary syndrome with increased troponin  BNP level extremely high we will check the level again follow-up with complete urine studies  Follow-up with cardiology again  Thank you for letting me parties      Electronically signed by Bear Lopez MD,   [facility] kidney consultant  [phone]  12/10/2023  09:03 EST

## 2023-12-11 VITALS
TEMPERATURE: 97.8 F | OXYGEN SATURATION: 93 % | BODY MASS INDEX: 22.66 KG/M2 | HEART RATE: 75 BPM | SYSTOLIC BLOOD PRESSURE: 140 MMHG | WEIGHT: 158.29 LBS | HEIGHT: 70 IN | DIASTOLIC BLOOD PRESSURE: 88 MMHG | RESPIRATION RATE: 21 BRPM

## 2023-12-11 LAB
ANION GAP SERPL CALCULATED.3IONS-SCNC: 15 MMOL/L (ref 5–15)
BACTERIA SPEC AEROBE CULT: NORMAL
BUN SERPL-MCNC: 18 MG/DL (ref 8–23)
BUN/CREAT SERPL: 19.8 (ref 7–25)
CALCIUM SPEC-SCNC: 8.9 MG/DL (ref 8.2–9.6)
CHLORIDE SERPL-SCNC: 93 MMOL/L (ref 98–107)
CO2 SERPL-SCNC: 30 MMOL/L (ref 22–29)
CREAT SERPL-MCNC: 0.91 MG/DL (ref 0.76–1.27)
EGFRCR SERPLBLD CKD-EPI 2021: 78.6 ML/MIN/1.73
GLUCOSE SERPL-MCNC: 105 MG/DL (ref 65–99)
NT-PROBNP SERPL-MCNC: 5847 PG/ML (ref 0–1800)
POTASSIUM SERPL-SCNC: 3.3 MMOL/L (ref 3.5–5.2)
QT INTERVAL: 581 MS
QTC INTERVAL: 511 MS
SODIUM SERPL-SCNC: 138 MMOL/L (ref 136–145)

## 2023-12-11 PROCEDURE — 25010000002 INFLUENZA VAC HIGH-DOSE QUAD 0.7 ML SUSPENSION PREFILLED SYRINGE: Performed by: INTERNAL MEDICINE

## 2023-12-11 PROCEDURE — 90662 IIV NO PRSV INCREASED AG IM: CPT | Performed by: INTERNAL MEDICINE

## 2023-12-11 PROCEDURE — G0008 ADMIN INFLUENZA VIRUS VAC: HCPCS | Performed by: INTERNAL MEDICINE

## 2023-12-11 PROCEDURE — 99233 SBSQ HOSP IP/OBS HIGH 50: CPT | Performed by: INTERNAL MEDICINE

## 2023-12-11 RX ORDER — BUMETANIDE 1 MG/1
1 TABLET ORAL 3 TIMES DAILY
Qty: 90 TABLET | Refills: 0 | Status: ON HOLD | OUTPATIENT
Start: 2023-12-11 | End: 2024-01-10

## 2023-12-11 RX ORDER — POTASSIUM CHLORIDE 20 MEQ/1
20 TABLET, EXTENDED RELEASE ORAL ONCE
Status: COMPLETED | OUTPATIENT
Start: 2023-12-11 | End: 2023-12-11

## 2023-12-11 RX ORDER — ATORVASTATIN CALCIUM 10 MG/1
10 TABLET, FILM COATED ORAL NIGHTLY
Qty: 30 TABLET | Refills: 0 | Status: ON HOLD | OUTPATIENT
Start: 2023-12-11 | End: 2024-01-10

## 2023-12-11 RX ORDER — CEFDINIR 300 MG/1
300 CAPSULE ORAL EVERY 12 HOURS SCHEDULED
Qty: 4 CAPSULE | Refills: 0 | Status: SHIPPED | OUTPATIENT
Start: 2023-12-11 | End: 2023-12-13

## 2023-12-11 RX ORDER — ASPIRIN 81 MG/1
81 TABLET, CHEWABLE ORAL DAILY
Qty: 14 TABLET | Refills: 0 | Status: ON HOLD | OUTPATIENT
Start: 2023-12-12 | End: 2023-12-26

## 2023-12-11 RX ORDER — CLOPIDOGREL BISULFATE 75 MG/1
75 TABLET ORAL DAILY
Qty: 30 TABLET | Refills: 0 | Status: ON HOLD | OUTPATIENT
Start: 2023-12-12 | End: 2024-01-11

## 2023-12-11 RX ADMIN — CEFDINIR 300 MG: 300 CAPSULE ORAL at 08:50

## 2023-12-11 RX ADMIN — INFLUENZA A VIRUS A/VICTORIA/4897/2022 IVR-238 (H1N1) ANTIGEN (FORMALDEHYDE INACTIVATED), INFLUENZA A VIRUS A/DARWIN/9/2021 SAN-010 (H3N2) ANTIGEN (FORMALDEHYDE INACTIVATED), INFLUENZA B VIRUS B/PHUKET/3073/2013 ANTIGEN (FORMALDEHYDE INACTIVATED), AND INFLUENZA B VIRUS B/MICHIGAN/01/2021 ANTIGEN (FORMALDEHYDE INACTIVATED) 0.7 ML: 60; 60; 60; 60 INJECTION, SUSPENSION INTRAMUSCULAR at 12:47

## 2023-12-11 RX ADMIN — APIXABAN 2.5 MG: 2.5 TABLET, FILM COATED ORAL at 10:33

## 2023-12-11 RX ADMIN — CLOPIDOGREL BISULFATE 75 MG: 75 TABLET ORAL at 08:50

## 2023-12-11 RX ADMIN — BUMETANIDE 1 MG: 1 TABLET ORAL at 08:50

## 2023-12-11 RX ADMIN — ASPIRIN 81 MG CHEWABLE TABLET 81 MG: 81 TABLET CHEWABLE at 08:49

## 2023-12-11 RX ADMIN — POTASSIUM CHLORIDE 20 MEQ: 1500 TABLET, EXTENDED RELEASE ORAL at 03:16

## 2023-12-11 RX ADMIN — Medication 10 ML: at 08:57

## 2023-12-11 RX ADMIN — LOSARTAN POTASSIUM 25 MG: 25 TABLET, FILM COATED ORAL at 09:08

## 2023-12-11 NOTE — DISCHARGE SUMMARY
[facility] Medicine Services  Discharge Summary    Date of Service: 2023    Patient Name: Sage Flores  : 1930  MRN: [id number]    Date of Admission: 2023  Discharge Diagnosis:     STEMI  Heart  block with bradycardia  Status post PCI to RCA  Hx of  CAD s/p CABG  Paroxysmal  A-fib   Systolic heart failure, compensated EF 36 to 40%  Lower respiratory tract infection with acute bronchitis versus subtle right lower lobe pneumonia.   History of hypertension  Hyperlipidemia       Date of Discharge:  2023    Primary Care Physician: Tricia Aldana APRN      Presenting Problem:   Heart block AV third degree [I44.2]  Acute ST elevation myocardial infarction (STEMI) involving right coronary artery [I21.11]  ST elevation myocardial infarction (STEMI), unspecified artery [I21.3]    Active and Resolved Hospital Problems:  Active Hospital Problems    Diagnosis POA    **Acute ST elevation myocardial infarction (STEMI) involving right coronary artery [I21.11] Yes      Resolved Hospital Problems   No resolved problems to display.         Hospital Course         Hospital Course:      This is 89-year-old male with past medical history significant for coronary artery disease s/p CABG and paroxysmal atrial fibrillation who presented on 2023 with some complaints of chest pain and syncopal episode.  He was was found to have STEMI on admission.  EKG showed evidence of acute inferior posterior MI with heart block.  Patient was taken to Cath Lab emergently.  Patient was hypotensive with mental status changes. He was started on Dopamine prior to cathlab and a TVP was placed in cathlab. Patient underwent successful senting to RCA. The patient had a CT scan of the chest which showed bilateral effusion with minimally consolidated right lower lobe.  He is having productive cough with yellow sputum.  The patient was placed  on 3 L oxygen via nasal cannula.  He had a COVID screen with  viral PCR panel which was negative      During admission, patient was seen by Cardiology, nephrology and infectious disease.      STEMI  Heart  block with bradycardia  Status post PCI to RCA  Hx of  CABG  Paroxysmal  A-fib  Systolic heart failure, compensated EF 36 to 40%  -Status post emergent PCI  -Continue aspirin and Plavix  -Cardiology follow-up as outpatient    Lower respiratory tract infection with acute bronchitis versus subtle right lower lobe pneumonia.   -Continue on Cefdinir regimen as prescribed below    Hypertension  -Continue on current medication regimen below     Hyperlipidemia   -Continue statin     Hypokalemia-replaced  -Repeat basic metabolic panel as outpatient            Patient was discharged to skilled nursing facility in stable medical condition.    He is to continue current medication regimen as prescribed below.    He is advised to follow with PCP and Cardiology within 1 to 2 weeks of discharge.      Day of Discharge     Vital Signs:  Temp:  [97.5 °F (36.4 °C)-98.5 °F (36.9 °C)] 97.8 °F (36.6 °C)  Heart Rate:  [64-75] 75  Resp:  [20-29] 21  BP: (130-149)/(75-93) 140/88  Flow (L/min):  [3] 3    Physical Exam:  Constitutional:       Appearance: Normal appearance.   HENT:      Head: Normocephalic and atraumatic.      Nose: Nose normal.   Cardiovascular:      Rate and Rhythm: Normal rate.      Heart sounds: Normal heart sounds.   Pulmonary:      Effort: Pulmonary effort is normal. No respiratory distress.      Breath sounds: Normal breath sounds. No stridor. No wheezing, rhonchi or rales.   Chest:      Chest wall: No tenderness.   Abdominal:      General: Abdomen is flat. There is no distension.      Palpations: Abdomen is soft. There is no mass.      Tenderness: There is no abdominal tenderness. There is no right CVA tenderness, left CVA tenderness, guarding or rebound.   Musculoskeletal:      Cervical back: Normal range of motion.   Skin:     General: Skin is warm and dry.   Neurological:       General: No focal deficit present.      Mental Status: He is alert.   Psychiatric:         Mood and Affect: Mood normal.         Behavior: Behavior normal.          Pertinent  and/or Most Recent Results     LAB RESULTS:      Lab 12/10/23  0047 12/09/23  1400 12/08/23 0700 12/07/23 0425 12/07/23 0111 12/06/23 2029 12/06/23 0802 12/05/23 0614 12/05/23 0614 12/04/23  2329 12/04/23  1728   WBC 15.00* 14.30* 13.10*  --  16.00*  --  11.80*   < > 12.60*  --   --    HEMOGLOBIN 13.3 12.4* 10.5*  --  12.0*  --  10.7*   < > 11.0*  --   --    HEMATOCRIT 38.9 37.2* 30.3*  --  35.4*  --  31.5*   < > 32.5*  --   --    PLATELETS 237 230 258  --  228  --  187   < > 179  --   --    NEUTROS ABS 11.30* 11.20* 10.20*  --  12.80*  --   --   --  10.20*  --   --    LYMPHS ABS 1.80 1.70 1.50  --  1.60  --   --   --  1.40  --   --    MONOS ABS 1.50* 1.20* 1.10*  --  1.40*  --   --   --  1.00*  --   --    EOS ABS 0.40 0.20 0.10  --  0.10  --   --   --  0.00  --   --    MCV 96.2 96.1 95.7  --  94.8  --  95.3   < > 95.6  --   --    LACTATE  --   --   --  1.5  --  1.5  --   --   --   --   --    APTT  --   --   --   --   --   --  73.5  --  67.3 75.5 63.1    < > = values in this interval not displayed.         Lab 12/10/23  2338 12/10/23  0047 12/09/23  1400 12/08/23 0700 12/07/23 0425 12/07/23 0111 12/06/23 0802 12/05/23  0614   SODIUM 138 139 139 138  --  138 137 136   POTASSIUM 3.3* 3.1* 3.3* 3.4*  --  3.8 3.9 4.0   CHLORIDE 93* 96* 100 107  --  108* 108* 107   CO2 30.0* 27.0 27.0 19.0*  --  18.0* 19.0* 17.0*   ANION GAP 15.0 16.0* 12.0 12.0  --  12.0 10.0 12.0   BUN 18 20 22 26*  --  28* 29* 25*   CREATININE 0.91 0.90 1.05 1.00  --  0.88 1.04 0.93   EGFR 78.6 79.6 66.2 70.2  --  80.2 66.9 76.6   GLUCOSE 105* 98 97 100*  --  123* 110* 109*   CALCIUM 8.9 8.9 8.8 8.2  --  8.3 8.4 8.1*   IONIZED CALCIUM  --   --   --  1.17*  --   --   --   --    MAGNESIUM  --  2.0 1.6* 1.8 2.3  --  1.6* 1.6*   PHOSPHORUS  --   --  3.1 3.1  --   --    --   --    TSH  --   --   --   --   --   --   --  2.900         Lab 12/09/23  1400 12/08/23  0700 12/07/23  0111   TOTAL PROTEIN 5.9* 4.9* 5.6*   ALBUMIN 3.6 2.8* 3.4*   GLOBULIN 2.3 2.1 2.2   ALT (SGPT) 18 19 23   AST (SGOT) 15 14 17   BILIRUBIN 0.6 0.5 0.5   ALK PHOS 93 78 86         Lab 12/10/23  2338 12/10/23  1250 12/10/23  0951 12/07/23  0425 12/05/23  0831 12/05/23  0614   PROBNP 5,847.0*  --   --  13,592.0*  --   --    HSTROP T  --  1,204* 1,302*  --  3,399* 3,570*                 Brief Urine Lab Results  (Last result in the past 365 days)        Color   Clarity   Blood   Leuk Est   Nitrite   Protein   CREAT   Urine HCG        12/07/23 1351             26.5         12/07/23 1351 Yellow   Clear   Negative   Trace   Negative   Negative                 Microbiology Results (last 10 days)       Procedure Component Value - Date/Time    Respiratory Culture - Sputum, Cough [[id number]] Collected: 12/08/23 2034    Lab Status: Final result Specimen: Sputum from Cough Updated: 12/09/23 0717     Respiratory Culture Rejected     Gram Stain Few (2+) WBCs per low power field      Moderate (3+) Epithelial cells per low power field      Rare (1+) Mixed bacterial morphotypes seen on Gram Stain    Narrative:      Specimen rejected due to oropharyngeal contamination. Please reorder and recollect specimen if clinically necessary.    Legionella Antigen, Urine - Urine, Urine, Clean Catch [[id number]]  (Normal) Collected: 12/08/23 1730    Lab Status: Final result Specimen: Urine, Clean Catch Updated: 12/08/23 1757     LEGIONELLA ANTIGEN, URINE Negative    S. Pneumo Ag Urine or CSF - Urine, Urine, Clean Catch [[id number]]  (Normal) Collected: 12/08/23 1730    Lab Status: Final result Specimen: Urine, Clean Catch Updated: 12/08/23 1756     Strep Pneumo Ag Negative    COVID PRE-OP / PRE-PROCEDURE SCREENING ORDER (NO ISOLATION) - Swab, Nasopharynx [[id number]]  (Normal) Collected: 12/08/23 0921    Lab Status: Final result Specimen: Swab  from Nasopharynx Updated: 12/08/23 1100    Narrative:      The following orders were created for panel order COVID PRE-OP / PRE-PROCEDURE SCREENING ORDER (NO ISOLATION) - Swab, Nasopharynx.  Procedure                               Abnormality         Status                     ---------                               -----------         ------                     Respiratory Panel PCR w/...[[id number]]  Normal              Final result                 Please view results for these tests on the individual orders.    Respiratory Panel PCR w/COVID-19(SARS-CoV-2) SANDRA/EMILY/[facility]/PAD/COR/MIGNON In-House, NP Swab in UTM/VTM, 2 HR TAT - Swab, Nasopharynx [[id number]]  (Normal) Collected: 12/08/23 0921    Lab Status: Final result Specimen: Swab from Nasopharynx Updated: 12/08/23 1100     ADENOVIRUS, PCR Not Detected     Coronavirus 229E Not Detected     Coronavirus HKU1 Not Detected     Coronavirus NL63 Not Detected     Coronavirus OC43 Not Detected     COVID19 Not Detected     Human Metapneumovirus Not Detected     Human Rhinovirus/Enterovirus Not Detected     Influenza A PCR Not Detected     Influenza B PCR Not Detected     Parainfluenza Virus 1 Not Detected     Parainfluenza Virus 2 Not Detected     Parainfluenza Virus 3 Not Detected     Parainfluenza Virus 4 Not Detected     RSV, PCR Not Detected     Bordetella pertussis pcr Not Detected     Bordetella parapertussis PCR Not Detected     Chlamydophila pneumoniae PCR Not Detected     Mycoplasma pneumo by PCR Not Detected    Narrative:      In the setting of a positive respiratory panel with a viral infection PLUS a negative procalcitonin without other underlying concern for bacterial infection, consider observing off antibiotics or discontinuation of antibiotics and continue supportive care. If the respiratory panel is positive for atypical bacterial infection (Bordetella pertussis, Chlamydophila pneumoniae, or Mycoplasma pneumoniae), consider antibiotic de-escalation to target  atypical bacterial infection.    Blood Culture - Blood, Arm, Left [[id number]]  (Abnormal) Collected: 12/06/23 2028    Lab Status: Final result Specimen: Blood from Arm, Left Updated: 12/08/23 0649     Blood Culture Staphylococcus, coagulase negative     Isolated from Aerobic Bottle     Gram Stain Aerobic Bottle Gram positive cocci in clusters    Narrative:      Probable contaminant requires clinical correlation, susceptibility not performed unless requested by physician.      Blood Culture - Blood, Hand, Right [[id number]]  (Normal) Collected: 12/06/23 2028    Lab Status: Preliminary result Specimen: Blood from Hand, Right Updated: 12/10/23 2045     Blood Culture No growth at 4 days    Blood Culture ID, PCR - Blood, Arm, Left [[id number]]  (Abnormal) Collected: 12/06/23 2028    Lab Status: Final result Specimen: Blood from Arm, Left Updated: 12/07/23 2037     BCID, PCR Staph spp, not aureus or lugdunensis. Identification by BCID2 PCR.     BOTTLE TYPE Aerobic Bottle    Urine Culture - Urine, Urine, Clean Catch [[id number]]  (Normal) Collected: 12/06/23 1223    Lab Status: Final result Specimen: Urine, Clean Catch Updated: 12/07/23 2039     Urine Culture No growth    MRSA Screen, PCR (Inpatient) - Swab, Nares [[id number]]  (Normal) Collected: 12/01/23 2301    Lab Status: Final result Specimen: Swab from Nares Updated: 12/02/23 0231     MRSA PCR No MRSA Detected    Narrative:      The negative predictive value of this diagnostic test is high and should only be used to consider de-escalating anti-MRSA therapy. A positive result may indicate colonization with MRSA and must be correlated clinically.            CT Chest Without Contrast Diagnostic    Result Date: 12/7/2023  Impression: Impression: 1.There are small bilateral pleural effusions and mild dependent atelectasis in each lung base. There are also patchy groundglass and interstitial opacities in the right lower lobe which may represent superimposed infection. 2.There  is underlying emphysema and evidence of prior granulomatous disease. 3.Cardiomegaly with evidence of previous CABG Electronically Signed: Mariusz Low DO  12/7/2023 4:20 PM EST  Workstation ID: [id number]    XR Chest 1 View    Result Date: 12/6/2023  Impression: Impression: Stable chronic findings without acute process. Electronically Signed: Eric Lopez MD  12/6/2023 7:39 PM EST  Workstation ID: [id number]    US Renal Bilateral    Result Date: 12/6/2023  Impression: Impression: 1. Mild right-sided pelvic caliectasis. 2. Diffuse urinary bladder wall thickening may be due to nondistention. Muscular hypertrophy or cystitis are also possible. Electronically Signed: Kp Lynch MD  12/6/2023 12:58 PM EST  Workstation ID: [id number]    XR Chest 1 View    Result Date: 12/2/2023  Impression: Impression: 1.No acute radiographic abnormality is identified. 2.Curved lead or catheter projects over the right atrium, possibly external to the patient. Please correlate clinically. Repeat radiograph after clearing the patient of external wires may be considered if indicated. Electronically Signed: Gordon Lagos MD  12/2/2023 10:37 AM EST  Workstation ID: [id number]     Results for orders placed during the hospital encounter of 03/26/21    Duplex venous lower extremity right    Interpretation Summary  · Normal right lower extremity venous duplex scan.      Results for orders placed during the hospital encounter of 03/26/21    Duplex venous lower extremity right    Interpretation Summary  · Normal right lower extremity venous duplex scan.      Results for orders placed during the hospital encounter of 12/01/23    Adult Transthoracic Echo Complete W/ Cont if Necessary Per Protocol    Interpretation Summary    Left ventricular ejection fraction appears to be 36 - 40%.    The left atrial cavity is moderately dilated.    Estimated right ventricular systolic pressure from tricuspid regurgitation is normal (<35 mmHg).      Labs Pending at  Discharge:  Pending Labs       Order Current Status    Blood Culture - Blood, Hand, Right Preliminary result            Procedures Performed  Procedure(s):  Left Heart Cath  Coronary angiography  Percutaneous Coronary Intervention  Temporary Pacemaker         Consults:   Consults       Date and Time Order Name Status Description    12/8/2023  7:27 AM Inpatient Infectious Diseases Consult Completed     12/7/2023  1:08 AM Inpatient Nephrology Consult      12/6/2023  1:40 PM Inpatient Palliative Care MD Consult Completed     12/6/2023 10:28 AM Inpatient Palliative Care MD Consult Completed     12/3/2023 12:33 PM Inpatient Hospitalist Consult Completed               Discharge Details        Discharge Medications        New Medications        Instructions Start Date   apixaban 2.5 MG tablet tablet  Commonly known as: ELIQUIS   2.5 mg, Oral, Every 12 Hours Scheduled      atorvastatin 10 MG tablet  Commonly known as: LIPITOR   10 mg, Oral, Nightly      bumetanide 1 MG tablet  Commonly known as: BUMEX   1 mg, Oral, 3 Times Daily      cefdinir 300 MG capsule  Commonly known as: OMNICEF   300 mg, Oral, Every 12 Hours Scheduled      clopidogrel 75 MG tablet  Commonly known as: PLAVIX   75 mg, Oral, Daily   Start Date: December 12, 2023            Continue These Medications        Instructions Start Date   acetaminophen 650 MG 8 hr tablet  Commonly known as: TYLENOL   650 mg, Oral, Nightly      aspirin 81 MG chewable tablet   81 mg, Oral, Daily   Start Date: December 12, 2023     azelastine 0.15 % solution nasal spray  Commonly known as: ASTEPRO   USE 2 SPRAYS IN EACH NOSTRIL TWICE DAILY AS DIRECTED BY PROVIDER      cholecalciferol 25 MCG (1000 UT) tablet  Commonly known as: VITAMIN D3   1,000 Units, Oral, Daily      docusate sodium 250 MG capsule  Commonly known as: COLACE   250 mg, Oral, Daily      gabapentin 100 MG capsule  Commonly known as: NEURONTIN   TAKE 1 CAPSULE BY MOUTH TWICE DAILY      losartan 50 MG  tablet  Commonly known as: COZAAR   50 mg, Oral, Daily      omeprazole 20 MG capsule  Commonly known as: priLOSEC   20 mg, Oral, Daily      tamsulosin 0.4 MG capsule 24 hr capsule  Commonly known as: FLOMAX   0.4 mg, Oral, Daily               Allergies   Allergen Reactions    Iodine Unknown (See Comments)     Pt unsure of reaction      Levofloxacin Unknown (See Comments)    Nitroglycerin Unknown (See Comments) and Other (See Comments)    Paroxetine Unknown (See Comments)    Penicillin G Unknown (See Comments)    Prednisone Unknown (See Comments)    Sucralfate Unknown (See Comments)    Diltiazem Hcl Hives    Metoprolol Other (See Comments)     Lowers heart rate     Pramipexole Dihydrochloride Unknown (See Comments)    Ropinirole Hcl Other (See Comments)         Discharge Disposition:   Skilled Nursing Facility (DC - External)    Diet:  Hospital:  Diet Order   Procedures    Diet: Regular/House Diet; Texture: Mechanical Ground (NDD 2); Fluid Consistency: Thin (IDDSI 0)         Discharge Activity:   Activity as tolerated      CODE STATUS:  Code Status and Medical Interventions:   Ordered at: 12/06/23 1913     Medical Intervention Limits:    NO intubation (DNI)     Level Of Support Discussed With:    Patient     Code Status (Patient has no pulse and is not breathing):    No CPR (Do Not Attempt to Resuscitate)     Medical Interventions (Patient has pulse or is breathing):    Limited Support     Comments:    No CPE         Future Appointments   Date Time Provider Department Center   3/11/2024 10:30 AM Tricia Aldana APRN [facility]       Patient advised to follow-up with PCP and cardiology within 1 to 2 days discharge    Time spent on Discharge including face to face service:  >35 minutes    Signature: Electronically signed by Hilario Abbasi MD, 12/11/23, 10:35 EST.  [facility] Hospitalist Team

## 2023-12-11 NOTE — CASE MANAGEMENT/SOCIAL WORK
Case Management Discharge Note      Final Note: [facility]    Provided Post Acute Provider List?: N/A  Post Acute Provider List: Inpatient Rehab  Provided Post Acute Provider Quality & Resource List?: N/A  Delivered To: Patient  Method of Delivery: In person    Selected Continued Care - Discharged on 12/11/2023 Admission date: 12/1/2023 - Discharge disposition: Skilled Nursing Facility (DC - External)      Destination Coordination complete.      Service Provider Selected Services Address Phone Fax Patient Preferred    [facility] [place] [address] [place] [address] [phone] --                Home Medical Care Coordination complete.      Service Provider Selected Services Address Phone Fax Patient Preferred    [facility] [address] [place] [address] [phone] --               Transportation Services  Public Transit/Bus Service: Other  W/C Van:  ([facility] provided transport)    Final Discharge Disposition Code: 03 - skilled nursing facility (SNF)

## 2023-12-11 NOTE — CASE MANAGEMENT/SOCIAL WORK
Continued Stay Note  LISSETT Mendoza     Patient Name: Sage Flores  MRN: [id number]  Today's Date: 12/11/2023    Admit Date: 12/1/2023    Plan: [facility] Accepted. Bed avail 12/110 precert not required   Discharge Plan       Row Name 12/11/23 1153       Plan    Plan [facility] Accepted. Bed avail 12/110 precert not required    Patient/Family in Agreement with Plan yes    Plan Comments DC to [facility]. [facility] will provide transportation. Liasion notified patient is ready for discharge.             \Leena Sun RN    phone [phone]  fax [phone]       Received patient at 1900 resting in bed. Handoff report received by Christine MILLER. Patient AOX4, vitals are stable on room air.No abnormalities noted in neuro checks. Patient verbalizes neck pain that is currently well managed with current pain regimen. Patient educated on plan of care.Patient was transferred from [facility] to [facility] during the night for bed management for hospital needs. Patient was agreeable with transfer.Patient was oriented to new room. All belongings were moved from preveious room into new room. This includes clothing and cell phone. Fall and cervical precautions maintained. Call light and personal items within reach.  No significant events o/n. Care is ongoing.     Problem: At Risk for Falls  Goal: # Patient does not fall  Outcome: Outcome Met, Continue evaluating goal progress toward completion  Goal: # Takes action to control fall-related risks  Outcome: Outcome Met, Continue evaluating goal progress toward completion  Goal: # Verbalizes understanding of fall risk/precautions  Description: Document education using the patient education activity  Outcome: Outcome Met, Continue evaluating goal progress toward completion     Problem: Pain  Goal: #Acceptable pain level achieved/maintained at rest using NRS/Faces  Description: This goal is used for patients who can self-report.  Acceptable means the level is at or below the identified comfort/function goal.  Outcome: Outcome Met, Continue evaluating goal progress toward completion  Goal: # Acceptable pain level achieved/maintained at rest using NRS/Faces without oversedation (opioid naive or PCA/Epidural infusion)  Description: This goal is used if Opioid-naïve or on PCA/Epidural Infusion.  Outcome: Outcome Met, Continue evaluating goal progress toward completion  Goal: # Acceptable pain level achieved/maintained with activity using NRS/Faces  Description: This goal is used for patients who can self-report and are not achieving acceptable pain control  during activity.  Outcome: Outcome Met, Continue evaluating goal progress toward completion

## 2023-12-11 NOTE — SIGNIFICANT NOTE
12/11/23 1258   OTHER   Discipline occupational therapist   Rehab Time/Intention   Session Not Performed other (see comments)  (pt has current dc orders, OT will follow up if pt remains admitted)   Therapy Assessment/Plan (PT)   Criteria for Skilled Interventions Met (PT) yes;meets criteria;skilled treatment is necessary   Recommendation   OT - Next Appointment 12/12/23

## 2023-12-11 NOTE — PROGRESS NOTES
Referring Provider: Hilario Abbasi MD    Reason for follow-up:  Inferior STEMI  Status post CABG  Junctional rhythm     Patient Care Team:  Tricia Aldana APRN as PCP - General (Family Medicine)  Missy Domínguez MD as Consulting Physician (Cardiology)    Subjective .      ROS  Since I have last seen, the patient has been without any chest discomfort ,shortness of breath, palpitations, dizziness or syncope.  Denies having any headache ,abdominal pain ,nausea, vomiting , diarrhea constipation, loss of weight or loss of appetite.  Denies having any excessive bruising ,hematuria or blood in the stool.    Review of all systems negative except as indicated.    Reviewed ROS.  History  Past Medical History:   Diagnosis Date    Anxiety 2014    Atrial fibrillation     Benign prostatic hyperplasia     CAD (coronary artery disease)     Hyperlipidemia     Hypertension     Myocardial infarction        Past Surgical History:   Procedure Laterality Date    CARDIAC CATHETERIZATION N/A 12/1/2023    Procedure: Left Heart Cath;  Surgeon: Son العلي MD;  Location: [facility] CATH INVASIVE LOCATION;  Service: Cardiovascular;  Laterality: N/A;    CARDIAC CATHETERIZATION N/A 12/1/2023    Procedure: Coronary angiography;  Surgeon: Son العلي MD;  Location: [facility] CATH INVASIVE LOCATION;  Service: Cardiovascular;  Laterality: N/A;    CARDIAC CATHETERIZATION N/A 12/1/2023    Procedure: Percutaneous Coronary Intervention;  Surgeon: Son العلي MD;  Location: [facility] CATH INVASIVE LOCATION;  Service: Cardiovascular;  Laterality: N/A;    CARDIAC ELECTROPHYSIOLOGY PROCEDURE N/A 12/1/2023    Procedure: Temporary Pacemaker;  Surgeon: Son العلي MD;  Location: [facility] CATH INVASIVE LOCATION;  Service: Cardiovascular;  Laterality: N/A;    CARDIAC SURGERY      HERNIA REPAIR         Family History   Problem Relation Age of Onset    Heart disease Mother     Heart disease Father        Social History     Tobacco Use    Smoking  status: Former     Types: Cigarettes     Quit date: 1970     Years since quittin.3    Smokeless tobacco: Never    Tobacco comments:     more than 50yrs   Vaping Use    Vaping Use: Never used   Substance Use Topics    Alcohol use: No    Drug use: No        Medications Prior to Admission   Medication Sig Dispense Refill Last Dose    acetaminophen (TYLENOL) 650 MG 8 hr tablet Take 1 tablet by mouth Every Night.       aspirin 81 MG chewable tablet Chew 1 tablet Daily.       azelastine (ASTEPRO) 0.15 % solution nasal spray USE 2 SPRAYS IN EACH NOSTRIL TWICE DAILY AS DIRECTED BY PROVIDER 30 mL 3     Cholecalciferol 25 MCG (1000 UT) tablet Take 1 tablet by mouth Daily.       docusate sodium (COLACE) 250 MG capsule Take 1 capsule by mouth Daily.       gabapentin (NEURONTIN) 100 MG capsule TAKE 1 CAPSULE BY MOUTH TWICE DAILY 180 capsule 1     losartan (COZAAR) 50 MG tablet TAKE 1 TABLET BY MOUTH DAILY 90 tablet 1     omeprazole (priLOSEC) 20 MG capsule Take 1 capsule by mouth Daily. 90 capsule 3     tamsulosin (FLOMAX) 0.4 MG capsule 24 hr capsule TAKE 1 CAPSULE BY MOUTH DAILY 90 capsule 0        Allergies  Iodine, Levofloxacin, Nitroglycerin, Paroxetine, Penicillin g, Prednisone, Sucralfate, Diltiazem hcl, Metoprolol, Pramipexole dihydrochloride, and Ropinirole hcl    Scheduled Meds:aspirin, 81 mg, Oral, Daily  atorvastatin, 10 mg, Oral, Nightly  bumetanide, 1 mg, Oral, TID  cefdinir, 300 mg, Oral, Q12H  clopidogrel, 75 mg, Oral, Daily  losartan, 25 mg, Oral, Q24H  sodium chloride, 10 mL, Intravenous, Q12H      Continuous Infusions:     PRN Meds:.  acetaminophen **OR** acetaminophen    albuterol    benzonatate    senna-docusate sodium **AND** polyethylene glycol **AND** bisacodyl **AND** bisacodyl    Calcium Replacement - Follow Nurse / BPA Driven Protocol    guaifenesin    influenza vaccine    ipratropium-albuterol    Magnesium Standard Dose Replacement - Follow Nurse / BPA Driven Protocol    ondansetron **OR**  "ondansetron    Phosphorus Replacement - Follow Nurse / BPA Driven Protocol    Potassium Replacement - Follow Nurse / BPA Driven Protocol    sodium chloride    sodium chloride    sodium chloride    Objective     VITAL SIGNS  Vitals:    12/10/23 1907 12/11/23 0000 12/11/23 0437 12/11/23 0600   BP: 146/75 147/90 130/92    BP Location: Right arm Left arm Left arm    Patient Position: Lying Lying Lying    Pulse: 71 69 69    Resp: 20 26 21    Temp: 98.5 °F (36.9 °C) 98.5 °F (36.9 °C) 97.8 °F (36.6 °C)    TempSrc: Axillary Oral Oral    SpO2: 93% 92% 93%    Weight:    71.8 kg (158 lb 4.6 oz)   Height:           Flowsheet Rows      Flowsheet Row First Filed Value   Admission Height 177.8 cm (70\") Documented at 12/01/2023 1603   Admission Weight 72.6 kg (160 lb) Documented at 12/01/2023 1603              Intake/Output Summary (Last 24 hours) at 12/11/2023 0619  Last data filed at 12/11/2023 0437  Gross per 24 hour   Intake 50 ml   Output 565 ml   Net -515 ml        TELEMETRY: Probable junctional rhythm    Physical Exam:  The patient is alert, oriented and in no distress.  Vital signs as noted above.  Head and neck revealed no carotid bruits or jugular venous distention.  No thyromegaly or lymphadenopathy is present  Lungs clear.  No wheezing.  Breath sounds are normal bilaterally.  Heart normal first and second heart sounds.  No murmur. No precordial rub is present.  No gallop is present.  Abdomen soft and nontender.  No organomegaly is present.  Extremities with good peripheral pulses without any pedal edema.  Cardiac cath site looks normal.  Skin warm and dry.  Musculoskeletal system is grossly normal  CNS grossly normal    Reviewed and updated.  Results Review:   I reviewed the patient's new clinical results.  Lab Results (last 24 hours)       Procedure Component Value Units Date/Time    BNP [[id number]]  (Abnormal) Collected: 12/10/23 2338    Specimen: Blood Updated: 12/11/23 0107     proBNP 5,847.0 pg/mL     Narrative:   "    This assay is used as an aid in the diagnosis of individuals suspected of having heart failure. It can be used as an aid in the diagnosis of acute decompensated heart failure (ADHF) in patients presenting with signs and symptoms of ADHF to the emergency department (ED). In addition, NT-proBNP of <300 pg/mL indicates ADHF is not likely.    Age Range Result Interpretation  NT-proBNP Concentration (pg/mL:      <50             Positive            >450                   Gray                 300-450                    Negative             <300    50-75           Positive            >900                  Gray                300-900                  Negative            <300      >75             Positive            >1800                  Gray                300-1800                  Negative            <300    Basic Metabolic Panel [[id number]]  (Abnormal) Collected: 12/10/23 2338    Specimen: Blood Updated: 12/11/23 0107     Glucose 105 mg/dL      BUN 18 mg/dL      Creatinine 0.91 mg/dL      Sodium 138 mmol/L      Potassium 3.3 mmol/L      Chloride 93 mmol/L      CO2 30.0 mmol/L      Calcium 8.9 mg/dL      BUN/Creatinine Ratio 19.8     Anion Gap 15.0 mmol/L      eGFR 78.6 mL/min/1.73     Narrative:      GFR Normal >60  Chronic Kidney Disease <60  Kidney Failure <15    The GFR formula is only valid for adults with stable renal function between ages 18 and 70.    Blood Culture - Blood, Hand, Right [[id number]]  (Normal) Collected: 12/06/23 2028    Specimen: Blood from Hand, Right Updated: 12/10/23 2045     Blood Culture No growth at 4 days    High Sensitivity Troponin T 2Hr [[id number]]  (Abnormal) Collected: 12/10/23 1250    Specimen: Blood Updated: 12/10/23 1414     HS Troponin T 1,204 ng/L      Troponin T Delta -98 ng/L     Narrative:      High Sensitive Troponin T Reference Range:  <14.0 ng/L- Negative Female for AMI  <22.0 ng/L- Negative Male for AMI  >=14 - Abnormal Female indicating possible myocardial injury.  >=22 -  Abnormal Male indicating possible myocardial injury.   Clinicians would have to utilize clinical acumen, EKG, Troponin, and serial changes to determine if it is an Acute Myocardial Infarction or myocardial injury due to an underlying chronic condition.         High Sensitivity Troponin T [[id number]]  (Abnormal) Collected: 12/10/23 0951    Specimen: Blood Updated: 12/10/23 1112     HS Troponin T 1,302 ng/L     Narrative:      High Sensitive Troponin T Reference Range:  <14.0 ng/L- Negative Female for AMI  <22.0 ng/L- Negative Male for AMI  >=14 - Abnormal Female indicating possible myocardial injury.  >=22 - Abnormal Male indicating possible myocardial injury.   Clinicians would have to utilize clinical acumen, EKG, Troponin, and serial changes to determine if it is an Acute Myocardial Infarction or myocardial injury due to an underlying chronic condition.                 Imaging Results (Last 24 Hours)       ** No results found for the last 24 hours. **        LAB RESULTS (LAST 7 DAYS)    CBC  Results from last 7 days   Lab Units 12/10/23  0047 12/09/23  1400 12/08/23  0700 12/07/23  0111 12/06/23  0802 12/05/23  0614   WBC 10*3/mm3 15.00* 14.30* 13.10* 16.00* 11.80* 12.60*   RBC 10*6/mm3 4.04* 3.87* 3.16* 3.73* 3.30* 3.40*   HEMOGLOBIN g/dL 13.3 12.4* 10.5* 12.0* 10.7* 11.0*   HEMATOCRIT % 38.9 37.2* 30.3* 35.4* 31.5* 32.5*   MCV fL 96.2 96.1 95.7 94.8 95.3 95.6   PLATELETS 10*3/mm3 237 230 258 228 187 179       BMP  Results from last 7 days   Lab Units 12/10/23  2338 12/10/23  0047 12/09/23  1400 12/08/23  0700 12/07/23  0425 12/07/23  0111 12/06/23  0802 12/05/23  0614   SODIUM mmol/L 138 139 139 138  --  138 137 136   POTASSIUM mmol/L 3.3* 3.1* 3.3* 3.4*  --  3.8 3.9 4.0   CHLORIDE mmol/L 93* 96* 100 107  --  108* 108* 107   CO2 mmol/L 30.0* 27.0 27.0 19.0*  --  18.0* 19.0* 17.0*   BUN mg/dL 18 20 22 26*  --  28* 29* 25*   CREATININE mg/dL 0.91 0.90 1.05 1.00  --  0.88 1.04 0.93   GLUCOSE mg/dL 105* 98 97 100*   --  123* 110* 109*   MAGNESIUM mg/dL  --  2.0 1.6* 1.8 2.3  --  1.6* 1.6*   PHOSPHORUS mg/dL  --   --  3.1 3.1  --   --   --   --        CMP   Results from last 7 days   Lab Units 12/10/23  2338 12/10/23  0047 12/09/23  1400 12/08/23  0700 12/07/23  0111 12/06/23  0802 12/05/23  0614   SODIUM mmol/L 138 139 139 138 138 137 136   POTASSIUM mmol/L 3.3* 3.1* 3.3* 3.4* 3.8 3.9 4.0   CHLORIDE mmol/L 93* 96* 100 107 108* 108* 107   CO2 mmol/L 30.0* 27.0 27.0 19.0* 18.0* 19.0* 17.0*   BUN mg/dL 18 20 22 26* 28* 29* 25*   CREATININE mg/dL 0.91 0.90 1.05 1.00 0.88 1.04 0.93   GLUCOSE mg/dL 105* 98 97 100* 123* 110* 109*   ALBUMIN g/dL  --   --  3.6 2.8* 3.4*  --   --    BILIRUBIN mg/dL  --   --  0.6 0.5 0.5  --   --    ALK PHOS U/L  --   --  93 78 86  --   --    AST (SGOT) U/L  --   --  15 14 17  --   --    ALT (SGPT) U/L  --   --  18 19 23  --   --          BNP        TROPONIN  Results from last 7 days   Lab Units 12/10/23  1250   HSTROP T ng/L 1,204*       CoAg  Results from last 7 days   Lab Units 12/06/23  0802 12/05/23  0614 12/04/23  2329 12/04/23  1728 12/04/23  1034   APTT seconds 73.5 67.3 75.5 63.1 50.5*       Creatinine Clearance  Estimated Creatinine Clearance: 51.5 mL/min (by C-G formula based on SCr of 0.91 mg/dL).    ABG        Radiology  No radiology results for the last day        EKG                        I personally viewed and interpreted the patient's EKG/Telemetry data: Atrial fibrillation with controlled ventricular response.    ECHOCARDIOGRAM:    Results for orders placed during the hospital encounter of 12/01/23    Adult Transthoracic Echo Complete W/ Cont if Necessary Per Protocol    Interpretation Summary    Left ventricular ejection fraction appears to be 36 - 40%.    The left atrial cavity is moderately dilated.    Estimated right ventricular systolic pressure from tricuspid regurgitation is normal (<35 mmHg).          STRESS TEST        Cardiolite (Tc-99m sestamibi) stress test    CARDIAC  CATHETERIZATION  Results for orders placed during the hospital encounter of 12/01/23    Cardiac Catheterization/Vascular Study                OTHER:         Assessment & Plan     Principal Problem:    Acute ST elevation myocardial infarction (STEMI) involving right coronary artery    //////////////////////////  History  =============  - Inferior STEMI 12/4/2023.    - Status post PCI with PTCA, stent placement on Pronto catheter atherectomy to the graft to the distal right coronary artery.-12/3/2023-Dr. العلي    Cardiac catheterization 12/3/2023-Dr. العلي    Left Main %: 20 to 30%   Proximal LAD %: 100%  Mid/Distal LAD %: 100%  LCX %: Proximal 80% OM1 100%  Ramus:   RCA %: 100% after the PDA.  Lima %: LIMA to LAD was patent  SVG(s) %: SVG to the marginal branch is patent but has some 30 to 40% disease.  SVG to the diagonal branch is occluded.  SVG to the PDA is occluded.  SVG to the distal RCA is occluded but is the culprit lesion    -Past history of syncope-no further episodes.     -status post loop recorder placement.  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 cardiac catheterization 12/26/2014 revealed 60% distal circumflex and total LAD and right coronary arteries.  Lima to LAD was patent ( lima coming off the left vertebral artery).  SVG to diagonal   marginal and PDA were patent.  SVG to left ventricular branch was totally occluded (chronic)     - atrial fibrillation -has converted to sinus rhythm and maintaining sinus rhythm.  Recently patient was noted to have atrial dysrhythmia on the monitor with loop recorder.     - sinus bradycardia-asymptomatic     - status post acute inferior myocardial infarction prior to surgery requiring acute stent placement to right coronary artery ) complicated by ventricular fibrillation)    Echocardiogram 12/1/2023    Left ventricular ejection fraction appears to be 36 - 40%.    The left atrial cavity is moderately dilated.    Estimated right ventricular  systolic pressure from tricuspid regurgitation is normal (<35 mmHg).      -Dyslipidemia and hypertension     - lower extremity weakness.   Arterial Doppler study of the lower extremity is normal. -  improved .   arterial Doppler study of the lower extremity was normal     - status post cholecystectomy     - allergy to penicillin iodine and metoprolol (rash).  Intolerance to atenolol due to bradycardia.  Allergy to Levaquin and penicillin.  Intolerance to prednisone Nitropatch IV nitroglycerin and prednisone.  =================    Plan  ==============   Inferior STEMI 12/4/2023.    Status post PCI with PTCA, stent placement on Pronto catheter atherectomy to the graft to the distal right coronary artery.-12/3/2023-Dr. العلي    Junctional bradycardia.  Temporary pacemaker was placed in the setting of inferior STEMI.  Temporary pacemaker was removed.    Past history of complete heart block.  Patient had temporary pacemaker which was removed.    Atrial fibrillation with controlled ventricular response.  No need for permanent pacemaker at this time.  Consult patient declined to have permanent pacemaker implantation.  Start low-dose Eliquis 2.5 mg twice daily.  Discontinue aspirin after 2 weeks.  For now continue Eliquis and Plavix and baby aspirin.    Hypomagnesemia-better at 2.3.    Renal function-stable.    Patient is off beta-blocker Coreg.    Echocardiogram 12/1/2023    Left ventricular ejection fraction appears to be 36 - 40%.    The left atrial cavity is moderately dilated.    Estimated right ventricular systolic pressure from tricuspid regurgitation is normal (<35 mmHg).  Have discussed with attending nurse and Dr. Lao regarding CODE STATUS etc.  Appreciated /palliative care note.  Patient family prefers DNR DNI status.  They are not ready for hospice but probable palliative care.    Medications were reviewed and updated.  Patient is on aspirin atorvastatin Bumex 1 mg p.o. 3 times daily losartan 25  mg a day Plavix.    Reviewed and updated-12/11/2023.    Further plan will depend on patient's progress.  //////////////////////////////////////         Missy Domínguez MD  12/11/23  06:19 EST

## 2023-12-11 NOTE — PLAN OF CARE
Problem: Adult Inpatient Plan of Care  Goal: Plan of Care Review  Outcome: Ongoing, Not Progressing  Flowsheets (Taken 12/11/2023 0225)  Progress: no change  Plan of Care Reviewed With: patient  Goal: Patient-Specific Goal (Individualized)  Outcome: Ongoing, Not Progressing  Goal: Absence of Hospital-Acquired Illness or Injury  Outcome: Ongoing, Not Progressing  Intervention: Identify and Manage Fall Risk  Recent Flowsheet Documentation  Taken 12/11/2023 0201 by Marisol Khan, RN  Safety Promotion/Fall Prevention:   assistive device/personal items within reach   clutter free environment maintained   fall prevention program maintained   nonskid shoes/slippers when out of bed   room organization consistent   safety round/check completed  Taken 12/11/2023 0005 by Marisol Khan RN  Safety Promotion/Fall Prevention:   assistive device/personal items within reach   clutter free environment maintained   fall prevention program maintained   nonskid shoes/slippers when out of bed   room organization consistent   safety round/check completed  Taken 12/10/2023 2207 by Marisol Khan RN  Safety Promotion/Fall Prevention:   assistive device/personal items within reach   clutter free environment maintained   fall prevention program maintained   nonskid shoes/slippers when out of bed   room organization consistent   safety round/check completed  Taken 12/10/2023 2001 by Marisol Khan, RN  Safety Promotion/Fall Prevention:   assistive device/personal items within reach   clutter free environment maintained   fall prevention program maintained   nonskid shoes/slippers when out of bed   room organization consistent   safety round/check completed  Intervention: Prevent Skin Injury  Recent Flowsheet Documentation  Taken 12/10/2023 2001 by Marisol Khan, RN  Body Position: supine  Intervention: Prevent and Manage VTE (Venous Thromboembolism) Risk  Recent Flowsheet Documentation  Taken 12/10/2023 2001 by Marisol Khan  RN  Activity Management: activity encouraged  VTE Prevention/Management: sequential compression devices off  Range of Motion: active ROM (range of motion) encouraged  Intervention: Prevent Infection  Recent Flowsheet Documentation  Taken 12/11/2023 0201 by Marisol Khan RN  Infection Prevention:   environmental surveillance performed   hand hygiene promoted   rest/sleep promoted   single patient room provided  Taken 12/11/2023 0005 by Marisol Khan RN  Infection Prevention:   environmental surveillance performed   hand hygiene promoted   rest/sleep promoted   single patient room provided  Taken 12/10/2023 2207 by Marisol Khan RN  Infection Prevention:   environmental surveillance performed   hand hygiene promoted   rest/sleep promoted   single patient room provided  Taken 12/10/2023 2001 by Marisol Khan RN  Infection Prevention:   environmental surveillance performed   hand hygiene promoted   rest/sleep promoted   single patient room provided  Goal: Optimal Comfort and Wellbeing  Outcome: Ongoing, Not Progressing  Intervention: Provide Person-Centered Care  Recent Flowsheet Documentation  Taken 12/10/2023 2001 by Marisol Khan RN  Trust Relationship/Rapport:   care explained   choices provided  Goal: Readiness for Transition of Care  Outcome: Ongoing, Not Progressing     Problem: Heart Failure Comorbidity  Goal: Maintenance of Heart Failure Symptom Control  Outcome: Ongoing, Not Progressing  Intervention: Maintain Heart Failure-Management  Recent Flowsheet Documentation  Taken 12/10/2023 2001 by Marisol Khan RN  Medication Review/Management:   medications reviewed   high-risk medications identified     Problem: Skin Injury Risk Increased  Goal: Skin Health and Integrity  Outcome: Ongoing, Not Progressing  Intervention: Optimize Skin Protection  Recent Flowsheet Documentation  Taken 12/10/2023 2001 by Marisol Khan RN  Pressure Reduction Techniques: frequent weight shift encouraged  Head of Bed (HOB)  Positioning: HOB at 20-30 degrees     Problem: Fall Injury Risk  Goal: Absence of Fall and Fall-Related Injury  Outcome: Ongoing, Not Progressing  Intervention: Identify and Manage Contributors  Recent Flowsheet Documentation  Taken 12/10/2023 2001 by Marisol Khan RN  Medication Review/Management:   medications reviewed   high-risk medications identified  Self-Care Promotion:   independence encouraged   BADL personal objects within reach  Intervention: Promote Injury-Free Environment  Recent Flowsheet Documentation  Taken 12/11/2023 0201 by Marisol Khan, RN  Safety Promotion/Fall Prevention:   assistive device/personal items within reach   clutter free environment maintained   fall prevention program maintained   nonskid shoes/slippers when out of bed   room organization consistent   safety round/check completed  Taken 12/11/2023 0005 by Marisol Khan RN  Safety Promotion/Fall Prevention:   assistive device/personal items within reach   clutter free environment maintained   fall prevention program maintained   nonskid shoes/slippers when out of bed   room organization consistent   safety round/check completed  Taken 12/10/2023 2207 by Marisol Khan, RN  Safety Promotion/Fall Prevention:   assistive device/personal items within reach   clutter free environment maintained   fall prevention program maintained   nonskid shoes/slippers when out of bed   room organization consistent   safety round/check completed  Taken 12/10/2023 2001 by Marisol Khan, RN  Safety Promotion/Fall Prevention:   assistive device/personal items within reach   clutter free environment maintained   fall prevention program maintained   nonskid shoes/slippers when out of bed   room organization consistent   safety round/check completed   Goal Outcome Evaluation:  Plan of Care Reviewed With: patient        Progress: no change     Alert and oriented x 4. Able to verbalize needs and wants. Takes medication whole and tolerates well.  Incontinent of bowel and bladder. IV Rocephin discontinued. Receiving PO Omnicef, no s/s of adverse/allergic reaction noted. Does not require O2 therapy at this time. Infectious disease has signed off. Continues to be followed by nephrology and cardiology. Potassium replacement initiated. Discharge plan: [place]. Assist x 1 for bed mobility. Currently in bed, eyes closed. Rise and fall of chest observed. Call bell in reach.

## 2023-12-21 ENCOUNTER — INPATIENT HOSPITAL (OUTPATIENT)
Dept: URBAN - METROPOLITAN AREA HOSPITAL 84 | Facility: HOSPITAL | Age: 88
End: 2023-12-21

## 2023-12-21 ENCOUNTER — INPATIENT HOSPITAL (OUTPATIENT)
Dept: URBAN - METROPOLITAN AREA HOSPITAL 84 | Facility: HOSPITAL | Age: 88
End: 2023-12-21
Payer: MEDICARE

## 2023-12-21 ENCOUNTER — HOSPITAL ENCOUNTER (INPATIENT)
Facility: HOSPITAL | Age: 88
LOS: 5 days | Discharge: NURSING, REHAB OR LONG-TERM CARE | End: 2023-12-28
Attending: EMERGENCY MEDICINE | Admitting: HOSPITALIST
Payer: MEDICARE

## 2023-12-21 DIAGNOSIS — K92.1 MELENA: ICD-10-CM

## 2023-12-21 DIAGNOSIS — K92.2 GASTROINTESTINAL HEMORRHAGE, UNSPECIFIED: ICD-10-CM

## 2023-12-21 DIAGNOSIS — K92.2 GASTROINTESTINAL HEMORRHAGE, UNSPECIFIED GASTROINTESTINAL HEMORRHAGE TYPE: Primary | ICD-10-CM

## 2023-12-21 DIAGNOSIS — D72.829 LEUKOCYTOSIS, UNSPECIFIED TYPE: ICD-10-CM

## 2023-12-21 LAB
ABO GROUP BLD: NORMAL
ALBUMIN SERPL-MCNC: 3.7 G/DL (ref 3.5–5.2)
ALBUMIN/GLOB SERPL: 1.8 G/DL
ALP SERPL-CCNC: 90 U/L (ref 39–117)
ALT SERPL W P-5'-P-CCNC: 21 U/L (ref 1–41)
ANION GAP SERPL CALCULATED.3IONS-SCNC: 15 MMOL/L (ref 5–15)
APTT PPP: 26.3 SECONDS (ref 61–76.5)
AST SERPL-CCNC: 21 U/L (ref 1–40)
BASOPHILS # BLD AUTO: 0.1 10*3/MM3 (ref 0–0.2)
BASOPHILS NFR BLD AUTO: 0.5 % (ref 0–1.5)
BILIRUB SERPL-MCNC: 0.7 MG/DL (ref 0–1.2)
BILIRUB UR QL STRIP: NEGATIVE
BLD GP AB SCN SERPL QL: NEGATIVE
BUN SERPL-MCNC: 90 MG/DL (ref 8–23)
BUN/CREAT SERPL: 45.9 (ref 7–25)
CALCIUM SPEC-SCNC: 9.6 MG/DL (ref 8.2–9.6)
CHLORIDE SERPL-SCNC: 95 MMOL/L (ref 98–107)
CLARITY UR: CLEAR
CO2 SERPL-SCNC: 28 MMOL/L (ref 22–29)
COLOR UR: YELLOW
CREAT SERPL-MCNC: 1.96 MG/DL (ref 0.76–1.27)
D-LACTATE SERPL-SCNC: 2.5 MMOL/L (ref 0.5–2)
D-LACTATE SERPL-SCNC: 4.4 MMOL/L (ref 0.5–2)
DEPRECATED RDW RBC AUTO: 50.3 FL (ref 37–54)
EGFRCR SERPLBLD CKD-EPI 2021: 31.3 ML/MIN/1.73
EOSINOPHIL # BLD AUTO: 0.1 10*3/MM3 (ref 0–0.4)
EOSINOPHIL NFR BLD AUTO: 0.5 % (ref 0.3–6.2)
ERYTHROCYTE [DISTWIDTH] IN BLOOD BY AUTOMATED COUNT: 14.5 % (ref 12.3–15.4)
GLOBULIN UR ELPH-MCNC: 2.1 GM/DL
GLUCOSE SERPL-MCNC: 169 MG/DL (ref 65–99)
GLUCOSE UR STRIP-MCNC: NEGATIVE MG/DL
HCT VFR BLD AUTO: 30.5 % (ref 37.5–51)
HCT VFR BLD AUTO: 34.5 % (ref 37.5–51)
HGB BLD-MCNC: 10.1 G/DL (ref 13–17.7)
HGB BLD-MCNC: 11.3 G/DL (ref 13–17.7)
HGB UR QL STRIP.AUTO: NEGATIVE
INR PPP: 1.17 (ref 0.93–1.1)
KETONES UR QL STRIP: NEGATIVE
LEUKOCYTE ESTERASE UR QL STRIP.AUTO: NEGATIVE
LYMPHOCYTES # BLD AUTO: 2.6 10*3/MM3 (ref 0.7–3.1)
LYMPHOCYTES NFR BLD AUTO: 13.1 % (ref 19.6–45.3)
MAGNESIUM SERPL-MCNC: 1.7 MG/DL (ref 1.7–2.3)
MCH RBC QN AUTO: 30.7 PG (ref 26.6–33)
MCHC RBC AUTO-ENTMCNC: 32.7 G/DL (ref 31.5–35.7)
MCV RBC AUTO: 93.8 FL (ref 79–97)
MONOCYTES # BLD AUTO: 1 10*3/MM3 (ref 0.1–0.9)
MONOCYTES NFR BLD AUTO: 4.9 % (ref 5–12)
NEUTROPHILS NFR BLD AUTO: 15.9 10*3/MM3 (ref 1.7–7)
NEUTROPHILS NFR BLD AUTO: 81 % (ref 42.7–76)
NITRITE UR QL STRIP: NEGATIVE
NRBC BLD AUTO-RTO: 0 /100 WBC (ref 0–0.2)
PH UR STRIP.AUTO: <=5 [PH] (ref 5–8)
PHOSPHATE SERPL-MCNC: 3.5 MG/DL (ref 2.5–4.5)
PLATELET # BLD AUTO: 229 10*3/MM3 (ref 140–450)
PMV BLD AUTO: 11.7 FL (ref 6–12)
POTASSIUM SERPL-SCNC: 3.7 MMOL/L (ref 3.5–5.2)
PROCALCITONIN SERPL-MCNC: 0.1 NG/ML (ref 0–0.25)
PROT SERPL-MCNC: 5.8 G/DL (ref 6–8.5)
PROT UR QL STRIP: NEGATIVE
PROTHROMBIN TIME: 12.6 SECONDS (ref 9.6–11.7)
RBC # BLD AUTO: 3.68 10*6/MM3 (ref 4.14–5.8)
RH BLD: POSITIVE
SARS-COV-2 RNA RESP QL NAA+PROBE: NOT DETECTED
SODIUM SERPL-SCNC: 138 MMOL/L (ref 136–145)
SP GR UR STRIP: 1.02 (ref 1–1.03)
T&S EXPIRATION DATE: NORMAL
UROBILINOGEN UR QL STRIP: NORMAL
WBC NRBC COR # BLD AUTO: 19.7 10*3/MM3 (ref 3.4–10.8)

## 2023-12-21 PROCEDURE — 25810000003 SODIUM CHLORIDE 0.9 % SOLUTION: Performed by: EMERGENCY MEDICINE

## 2023-12-21 PROCEDURE — 84100 ASSAY OF PHOSPHORUS: CPT | Performed by: INTERNAL MEDICINE

## 2023-12-21 PROCEDURE — 99232 SBSQ HOSP IP/OBS MODERATE 35: CPT | Mod: FS | Performed by: NURSE PRACTITIONER

## 2023-12-21 PROCEDURE — 84145 PROCALCITONIN (PCT): CPT | Performed by: NURSE PRACTITIONER

## 2023-12-21 PROCEDURE — 86900 BLOOD TYPING SEROLOGIC ABO: CPT

## 2023-12-21 PROCEDURE — 25010000002 MAGNESIUM SULFATE 2 GM/50ML SOLUTION: Performed by: INTERNAL MEDICINE

## 2023-12-21 PROCEDURE — 86923 COMPATIBILITY TEST ELECTRIC: CPT

## 2023-12-21 PROCEDURE — 85610 PROTHROMBIN TIME: CPT | Performed by: EMERGENCY MEDICINE

## 2023-12-21 PROCEDURE — G0378 HOSPITAL OBSERVATION PER HR: HCPCS

## 2023-12-21 PROCEDURE — 25010000002 CEFTRIAXONE PER 250 MG: Performed by: NURSE PRACTITIONER

## 2023-12-21 PROCEDURE — 80053 COMPREHEN METABOLIC PANEL: CPT | Performed by: EMERGENCY MEDICINE

## 2023-12-21 PROCEDURE — 83605 ASSAY OF LACTIC ACID: CPT | Performed by: NURSE PRACTITIONER

## 2023-12-21 PROCEDURE — 86901 BLOOD TYPING SEROLOGIC RH(D): CPT | Performed by: EMERGENCY MEDICINE

## 2023-12-21 PROCEDURE — 86900 BLOOD TYPING SEROLOGIC ABO: CPT | Performed by: EMERGENCY MEDICINE

## 2023-12-21 PROCEDURE — 99222 1ST HOSP IP/OBS MODERATE 55: CPT | Mod: FS | Performed by: NURSE PRACTITIONER

## 2023-12-21 PROCEDURE — 36415 COLL VENOUS BLD VENIPUNCTURE: CPT | Performed by: NURSE PRACTITIONER

## 2023-12-21 PROCEDURE — 85730 THROMBOPLASTIN TIME PARTIAL: CPT | Performed by: EMERGENCY MEDICINE

## 2023-12-21 PROCEDURE — 87040 BLOOD CULTURE FOR BACTERIA: CPT | Performed by: NURSE PRACTITIONER

## 2023-12-21 PROCEDURE — 87635 SARS-COV-2 COVID-19 AMP PRB: CPT | Performed by: EMERGENCY MEDICINE

## 2023-12-21 PROCEDURE — 81003 URINALYSIS AUTO W/O SCOPE: CPT | Performed by: NURSE PRACTITIONER

## 2023-12-21 PROCEDURE — 25010000002 ONDANSETRON PER 1 MG: Performed by: NURSE PRACTITIONER

## 2023-12-21 PROCEDURE — 85025 COMPLETE CBC W/AUTO DIFF WBC: CPT | Performed by: EMERGENCY MEDICINE

## 2023-12-21 PROCEDURE — 85018 HEMOGLOBIN: CPT | Performed by: NURSE PRACTITIONER

## 2023-12-21 PROCEDURE — 83735 ASSAY OF MAGNESIUM: CPT | Performed by: INTERNAL MEDICINE

## 2023-12-21 PROCEDURE — 86901 BLOOD TYPING SEROLOGIC RH(D): CPT

## 2023-12-21 PROCEDURE — 85014 HEMATOCRIT: CPT | Performed by: NURSE PRACTITIONER

## 2023-12-21 PROCEDURE — 86850 RBC ANTIBODY SCREEN: CPT | Performed by: EMERGENCY MEDICINE

## 2023-12-21 RX ORDER — CLOPIDOGREL BISULFATE 75 MG/1
75 TABLET ORAL DAILY
Status: DISCONTINUED | OUTPATIENT
Start: 2023-12-21 | End: 2023-12-25

## 2023-12-21 RX ORDER — ACETAMINOPHEN 650 MG/1
650 SUPPOSITORY RECTAL EVERY 4 HOURS PRN
Status: DISCONTINUED | OUTPATIENT
Start: 2023-12-21 | End: 2023-12-28 | Stop reason: HOSPADM

## 2023-12-21 RX ORDER — SODIUM CHLORIDE 9 MG/ML
125 INJECTION, SOLUTION INTRAVENOUS CONTINUOUS
Status: DISPENSED | OUTPATIENT
Start: 2023-12-21 | End: 2023-12-22

## 2023-12-21 RX ORDER — SODIUM CHLORIDE 0.9 % (FLUSH) 0.9 %
10 SYRINGE (ML) INJECTION EVERY 12 HOURS SCHEDULED
Status: DISCONTINUED | OUTPATIENT
Start: 2023-12-21 | End: 2023-12-28 | Stop reason: HOSPADM

## 2023-12-21 RX ORDER — GABAPENTIN 100 MG/1
100 CAPSULE ORAL 2 TIMES DAILY
Status: DISCONTINUED | OUTPATIENT
Start: 2023-12-21 | End: 2023-12-28 | Stop reason: HOSPADM

## 2023-12-21 RX ORDER — TAMSULOSIN HYDROCHLORIDE 0.4 MG/1
0.4 CAPSULE ORAL NIGHTLY
Status: DISCONTINUED | OUTPATIENT
Start: 2023-12-21 | End: 2023-12-28 | Stop reason: HOSPADM

## 2023-12-21 RX ORDER — SODIUM CHLORIDE 0.9 % (FLUSH) 0.9 %
10 SYRINGE (ML) INJECTION AS NEEDED
Status: DISCONTINUED | OUTPATIENT
Start: 2023-12-21 | End: 2023-12-28 | Stop reason: HOSPADM

## 2023-12-21 RX ORDER — ACETAMINOPHEN 160 MG/5ML
650 SOLUTION ORAL EVERY 4 HOURS PRN
Status: DISCONTINUED | OUTPATIENT
Start: 2023-12-21 | End: 2023-12-28 | Stop reason: HOSPADM

## 2023-12-21 RX ORDER — FLUORIDE TOOTHPASTE
15 TOOTHPASTE DENTAL 3 TIMES DAILY PRN
COMMUNITY

## 2023-12-21 RX ORDER — ASPIRIN 81 MG/1
81 TABLET, CHEWABLE ORAL DAILY
Status: ACTIVE | OUTPATIENT
Start: 2023-12-21 | End: 2023-12-26

## 2023-12-21 RX ORDER — PANTOPRAZOLE SODIUM 40 MG/10ML
40 INJECTION, POWDER, LYOPHILIZED, FOR SOLUTION INTRAVENOUS EVERY 12 HOURS SCHEDULED
Status: DISCONTINUED | OUTPATIENT
Start: 2023-12-21 | End: 2023-12-26

## 2023-12-21 RX ORDER — POTASSIUM CHLORIDE 750 MG/1
10 TABLET, FILM COATED, EXTENDED RELEASE ORAL DAILY
COMMUNITY
End: 2023-12-28 | Stop reason: HOSPADM

## 2023-12-21 RX ORDER — ONDANSETRON 4 MG/1
4 TABLET, FILM COATED ORAL EVERY 6 HOURS PRN
Status: DISCONTINUED | OUTPATIENT
Start: 2023-12-21 | End: 2023-12-28 | Stop reason: HOSPADM

## 2023-12-21 RX ORDER — ATORVASTATIN CALCIUM 10 MG/1
10 TABLET, FILM COATED ORAL NIGHTLY
Status: DISCONTINUED | OUTPATIENT
Start: 2023-12-21 | End: 2023-12-28 | Stop reason: HOSPADM

## 2023-12-21 RX ORDER — POLYETHYLENE GLYCOL 3350 17 G/17G
17 POWDER, FOR SOLUTION ORAL DAILY PRN
Status: DISCONTINUED | OUTPATIENT
Start: 2023-12-21 | End: 2023-12-28 | Stop reason: HOSPADM

## 2023-12-21 RX ORDER — MAGNESIUM SULFATE HEPTAHYDRATE 40 MG/ML
2 INJECTION, SOLUTION INTRAVENOUS ONCE
Status: COMPLETED | OUTPATIENT
Start: 2023-12-21 | End: 2023-12-21

## 2023-12-21 RX ORDER — LOSARTAN POTASSIUM 25 MG/1
25 TABLET ORAL DAILY
COMMUNITY
End: 2023-12-28 | Stop reason: HOSPADM

## 2023-12-21 RX ORDER — BISACODYL 5 MG/1
5 TABLET, DELAYED RELEASE ORAL DAILY PRN
Status: DISCONTINUED | OUTPATIENT
Start: 2023-12-21 | End: 2023-12-28 | Stop reason: HOSPADM

## 2023-12-21 RX ORDER — NITROGLYCERIN 0.4 MG/1
0.4 TABLET SUBLINGUAL
Status: DISCONTINUED | OUTPATIENT
Start: 2023-12-21 | End: 2023-12-28 | Stop reason: HOSPADM

## 2023-12-21 RX ORDER — PANTOPRAZOLE SODIUM 40 MG/1
40 TABLET, DELAYED RELEASE ORAL DAILY
Status: ON HOLD | COMMUNITY
End: 2023-12-28 | Stop reason: SDUPTHER

## 2023-12-21 RX ORDER — ONDANSETRON 2 MG/ML
4 INJECTION INTRAMUSCULAR; INTRAVENOUS EVERY 6 HOURS PRN
Status: DISCONTINUED | OUTPATIENT
Start: 2023-12-21 | End: 2023-12-28 | Stop reason: HOSPADM

## 2023-12-21 RX ORDER — SODIUM CHLORIDE 9 MG/ML
40 INJECTION, SOLUTION INTRAVENOUS AS NEEDED
Status: DISCONTINUED | OUTPATIENT
Start: 2023-12-21 | End: 2023-12-28 | Stop reason: HOSPADM

## 2023-12-21 RX ORDER — BISACODYL 10 MG
10 SUPPOSITORY, RECTAL RECTAL DAILY PRN
Status: DISCONTINUED | OUTPATIENT
Start: 2023-12-21 | End: 2023-12-28 | Stop reason: HOSPADM

## 2023-12-21 RX ORDER — PANTOPRAZOLE SODIUM 40 MG/10ML
80 INJECTION, POWDER, LYOPHILIZED, FOR SOLUTION INTRAVENOUS ONCE
Status: COMPLETED | OUTPATIENT
Start: 2023-12-21 | End: 2023-12-21

## 2023-12-21 RX ORDER — LOSARTAN POTASSIUM 25 MG/1
25 TABLET ORAL DAILY
Status: DISCONTINUED | OUTPATIENT
Start: 2023-12-21 | End: 2023-12-24

## 2023-12-21 RX ORDER — AMOXICILLIN 250 MG
2 CAPSULE ORAL 2 TIMES DAILY
Status: DISCONTINUED | OUTPATIENT
Start: 2023-12-21 | End: 2023-12-28 | Stop reason: HOSPADM

## 2023-12-21 RX ORDER — ACETAMINOPHEN 325 MG/1
650 TABLET ORAL EVERY 4 HOURS PRN
Status: DISCONTINUED | OUTPATIENT
Start: 2023-12-21 | End: 2023-12-28 | Stop reason: HOSPADM

## 2023-12-21 RX ADMIN — MAGNESIUM SULFATE HEPTAHYDRATE 2 G: 40 INJECTION, SOLUTION INTRAVENOUS at 11:51

## 2023-12-21 RX ADMIN — ATORVASTATIN CALCIUM 10 MG: 20 TABLET, FILM COATED ORAL at 20:19

## 2023-12-21 RX ADMIN — PANTOPRAZOLE SODIUM 80 MG: 40 INJECTION, POWDER, FOR SOLUTION INTRAVENOUS at 09:13

## 2023-12-21 RX ADMIN — ONDANSETRON 4 MG: 2 INJECTION INTRAMUSCULAR; INTRAVENOUS at 18:48

## 2023-12-21 RX ADMIN — PANTOPRAZOLE SODIUM 40 MG: 40 INJECTION, POWDER, FOR SOLUTION INTRAVENOUS at 20:19

## 2023-12-21 RX ADMIN — Medication 10 ML: at 20:01

## 2023-12-21 RX ADMIN — Medication 10 ML: at 11:28

## 2023-12-21 RX ADMIN — SODIUM CHLORIDE 500 ML: 9 INJECTION, SOLUTION INTRAVENOUS at 07:57

## 2023-12-21 RX ADMIN — TAMSULOSIN HYDROCHLORIDE 0.4 MG: 0.4 CAPSULE ORAL at 20:20

## 2023-12-21 RX ADMIN — CEFTRIAXONE 1000 MG: 1 INJECTION, POWDER, FOR SOLUTION INTRAMUSCULAR; INTRAVENOUS at 11:11

## 2023-12-21 RX ADMIN — GABAPENTIN 100 MG: 100 CAPSULE ORAL at 20:20

## 2023-12-21 RX ADMIN — SODIUM CHLORIDE 500 ML: 9 INJECTION, SOLUTION INTRAVENOUS at 11:12

## 2023-12-21 NOTE — CONSULTS
".GI CONSULT  NOTE:    Referring Provider:  Dr. Mccauley    Chief complaint: Melena, GI bleed    Subjective . \"I think I had a heart attack\"    History of present illness: Sage Flores is a 89 y.o. male who has a history of A-fib on Eliquis, CAD/stent 12/3/2023 with recent STEMI on Plavix/aspirin, heart failure and cholecystectomy.  Recent hospitalization earlier this month with STEMI s/p stent placement and heart failure as well as bronchitis versus pneumonia with antibiotic use.  He presents from the nursing facility with GI bleeding.  Unfortunately, patient is a poor historian and no family is present.  He does report some nausea and lower abdominal pressure but denies any abdominal pain.  He has a dry mouth and denies any vomiting.  No heartburn or difficulty swallowing.  He does report decreased appetite for \"awhile\" but is unable to quantify.  He is unsure if he has had weight loss.  Nurse reports melena with possible rust colored stool in the ER.  Per pharmacy, last dose of Eliquis, aspirin and Plavix was yesterday.  Patient denies any current chest pain or shortness of breath.    Endo History:  12/2014 EGD by Dr. Gale -fundic gland gastric polyp  11/2012 colonoscopy by Dr. Hanna -mucosal polyp, diverticulosis    Past Medical History:  Past Medical History:   Diagnosis Date    Anxiety 2014    Atrial fibrillation     Benign prostatic hyperplasia     CAD (coronary artery disease)     Hyperlipidemia     Hypertension     Myocardial infarction        Past Surgical History:  Past Surgical History:   Procedure Laterality Date    CARDIAC CATHETERIZATION N/A 12/1/2023    Procedure: Left Heart Cath;  Surgeon: Son العلي MD;  Location: [facility] CATH INVASIVE LOCATION;  Service: Cardiovascular;  Laterality: N/A;    CARDIAC CATHETERIZATION N/A 12/1/2023    Procedure: Coronary angiography;  Surgeon: Son العلي MD;  Location: [facility] CATH INVASIVE LOCATION;  Service: Cardiovascular;  Laterality: N/A;    CARDIAC " CATHETERIZATION N/A 2023    Procedure: Percutaneous Coronary Intervention;  Surgeon: Son العلي MD;  Location: [facility] CATH INVASIVE LOCATION;  Service: Cardiovascular;  Laterality: N/A;    CARDIAC ELECTROPHYSIOLOGY PROCEDURE N/A 2023    Procedure: Temporary Pacemaker;  Surgeon: Son العلي MD;  Location: [facility] CATH INVASIVE LOCATION;  Service: Cardiovascular;  Laterality: N/A;    CARDIAC SURGERY      HERNIA REPAIR         Social History:  Social History     Tobacco Use    Smoking status: Former     Types: Cigarettes     Quit date: 1970     Years since quittin.3    Smokeless tobacco: Never    Tobacco comments:     more than 50yrs   Vaping Use    Vaping Use: Never used   Substance Use Topics    Alcohol use: No    Drug use: No       Family History:  Family History   Problem Relation Age of Onset    Heart disease Mother     Heart disease Father        Medications:  (Not in a hospital admission)      Scheduled Meds:cefTRIAXone, 1,000 mg, Intravenous, Q24H  magnesium sulfate, 2 g, Intravenous, Once  pantoprazole, 40 mg, Intravenous, Q12H  senna-docusate sodium, 2 tablet, Oral, BID  sodium chloride, 500 mL, Intravenous, Once  sodium chloride, 10 mL, Intravenous, Q12H      Continuous Infusions:   PRN Meds:.  acetaminophen **OR** acetaminophen **OR** acetaminophen    senna-docusate sodium **AND** polyethylene glycol **AND** bisacodyl **AND** bisacodyl    Calcium Replacement - Follow Nurse / BPA Driven Protocol    Magnesium Cardiology Dose Replacement - Follow Nurse / BPA Driven Protocol    nitroglycerin    ondansetron **OR** ondansetron    Phosphorus Replacement - Follow Nurse / BPA Driven Protocol    Potassium Replacement - Follow Nurse / BPA Driven Protocol    [COMPLETED] Insert Peripheral IV **AND** sodium chloride    sodium chloride    sodium chloride    ALLERGIES:  Iodine, Levofloxacin, Nitroglycerin, Paroxetine, Penicillin g, Prednisone, Sucralfate, Diltiazem hcl, Metoprolol, Pramipexole  dihydrochloride, and Ropinirole hcl    ROS:  The following systems were reviewed   Constitution:  No fevers, no chills, + unintentional weight loss  Skin: no rash, no jaundice  Eyes:  No blurry vision, no eye pain  HENT:  No change in hearing or smell,+ dry mouth  Resp:  No dyspnea or cough  CV:  No chest pain or palpitations  :  No dysuria, hematuria  Musculoskeletal:  No leg cramps or arthralgias  Neuro:  No tremor, no numbness  Psych:  No depression or confusion    Objective chronically ill-appearing but no acute distress, nurse in room but no family present    Vital Signs:   Vitals:    12/21/23 0738 12/21/23 0748 12/21/23 0754 12/21/23 1120   BP:  112/71  111/72   BP Location:    Right arm   Patient Position:    Lying   Pulse:  104  93   Resp:    23   Temp: 98 °F (36.7 °C)   97.9 °F (36.6 °C)   TempSrc: Rectal   Oral   SpO2:   99% 100%   Weight:       Height:           Physical Exam:     General Appearance:    Awake and alert, in no acute distress, thin   Head:    Normocephalic, without obvious abnormality, atraumatic   Throat:   No oral lesions, no thrush, oral mucosa moist   Lungs:     Respirations regular, even and unlabored   Chest Wall:    No abnormalities observed   Abdomen:     Soft, non-tender, nondistended   Rectal:     Deferred   Extremities:   Moves all extremities, no edema, no cyanosis       Skin:   No rash, no jaundice, normal palpation, dry skin       Neurologic: Alert but poor historian       Results Review:   I reviewed the patient's labs and imaging.  CBC    Results from last 7 days   Lab Units 12/21/23  0748   WBC 10*3/mm3 19.70*   HEMOGLOBIN g/dL 11.3*   PLATELETS 10*3/mm3 229     CMP   Results from last 7 days   Lab Units 12/21/23  0748   SODIUM mmol/L 138   POTASSIUM mmol/L 3.7   CHLORIDE mmol/L 95*   CO2 mmol/L 28.0   BUN mg/dL 90*   CREATININE mg/dL 1.96*   GLUCOSE mg/dL 169*   ALBUMIN g/dL 3.7   BILIRUBIN mg/dL 0.7   ALK PHOS U/L 90   AST (SGOT) U/L 21   ALT (SGPT) U/L 21   MAGNESIUM  "mg/dL 1.7   PHOSPHORUS mg/dL 3.5     Cr Clearance Estimated Creatinine Clearance: 23.9 mL/min (A) (by C-G formula based on SCr of 1.96 mg/dL (H)).  Coag   Results from last 7 days   Lab Units 12/21/23  0748   INR  1.17*   APTT seconds 26.3*     HbA1C No results found for: \"HGBA1C\"  Blood Glucose No results found for: \"POCGLU\"  Infection   Results from last 7 days   Lab Units 12/21/23  0748   PROCALCITONIN ng/mL 0.10     UA      Imaging Results (Last 72 Hours)       ** No results found for the last 72 hours. **          ASSESSMENT:  Melena  Mild normocytic anemia with acute GI blood loss  Leukocytosis  ЕЛЕНА  Recent STEMI/CAD with stent (12/3/23) -Plavix and aspirin  History of CABG/A-fib -Eliquis  Heart failure -EF 36 to 40%  Recent bronchitis vs pneumonia  History of cholecystectomy    PLAN:  Patient is a 89-year-old male with recent hospitalization with STEMI requiring stent placement and a history of A-fib and heart failure on Plavix/aspirin and Eliquis.  He presents from the nursing facility with melena. Per pharmacist, last dose of Eliquis, aspirin and Plavix were yesterday (timing unclear).  Per bedside RN, patient has been hemodynamically stable and currently getting a 500 ml fluid bolus with ЕЛЕНА.  Eliquis currently on hold, continue to monitor hemoglobin closely.  Start twice daily PPI IV.  We will continue resuscitation and plan EGD evaluation in a.m.  Continue supportive care we will follow closely.  Discussed with bedside RN in ER.    I discussed the patients findings and my recommendations with the patient.    We appreciate the referral    Electronically signed by ROSA Miller, 12/21/23, 11:30 AM EST.               "

## 2023-12-21 NOTE — CASE MANAGEMENT/SOCIAL WORK
"Discharge Planning Assessment  [facility]     Patient Name: Sage Flores  MRN: [id number]  Today's Date: 12/21/2023    Admit Date: 12/21/2023    Plan: Return to [facility] no Precert or PASRR required   Discharge Needs Assessment       Row Name 12/21/23 1444       Living Environment    People in Home alone    Current Living Arrangements extended care facility    Potentially Unsafe Housing Conditions none    In the past 12 months has the electric, gas, oil, or water company threatened to shut off services in your home? No    Primary Care Provided by self    Provides Primary Care For no one    Family Caregiver if Needed child(laney), adult    Family Caregiver Names Alicja Daughter    Quality of Family Relationships supportive    Able to Return to Prior Arrangements yes       Resource/Environmental Concerns    Resource/Environmental Concerns none    Transportation Concerns none       Food Insecurity    Within the past 12 months, you worried that your food would run out before you got the money to buy more. Never true    Within the past 12 months, the food you bought just didn't last and you didn't have money to get more. Never true       Transition Planning    Patient/Family Anticipates Transition to inpatient rehabilitation facility    Patient/Family Anticipated Services at Transition skilled nursing    Transportation Anticipated health plan transportation;family or friend will provide       Discharge Needs Assessment    Equipment Currently Used at Home walker, rolling    Concerns to be Addressed denies needs/concerns at this time    Anticipated Changes Related to Illness none    Equipment Needed After Discharge none                   Discharge Plan       Row Name 12/21/23 1445       Plan    Plan Return to [facility] no Precert or PASRR required    Plan Comments Met with Patient at bedside Lives at home alone and IADL\"s except driving and kids provide transportation. Patient currently at [facility] for rehab and family " would like him to return at ID. Liatashia Salmon was notified. Per daughter she did not pay the [facility] bed hold so she thinks they will only hold bed until tomorrow. Pending clinical status may need transportation assist. ID Barriers: GI COnsult, EGD in am                  Continued Care and Services - Admitted Since 12/21/2023       Destination       Service Provider Request Status Selected Services Address Phone Fax Patient Preferred    [facility] AT [facility] Pending - Request Sent N/A [address] [facility] [place] [address] [phone] --                              Demographic Summary       Row Name 12/21/23 1444       General Information    Admission Type observation    Arrived From emergency department    Required Notices Provided Observation Status Notice    Referral Source admission list    Reason for Consult discharge planning    Preferred Language English                   Functional Status       Row Name 12/21/23 1444       Functional Status    Usual Activity Tolerance moderate    Current Activity Tolerance moderate       Functional Status, IADL    Medications independent    Meal Preparation independent    Housekeeping independent    Laundry independent    Shopping independent       Mental Status    General Appearance WDL WDL       Mental Status Summary    Recent Changes in Mental Status/Cognitive Functioning no changes                            Patient Forms       Row Name 12/21/23 1452       Patient Forms    Important Message from Medicare (IMM) --  moon 12/21 per reg                      Kassidy Lozada RN

## 2023-12-21 NOTE — DISCHARGE PLACEMENT REQUEST
"Vikki Eli \"Tristan\" (89 y.o. Male)       Date of Birth   09/07/1930    Social Security Number       Address   [address] [place] [facility]    Home Phone   [phone]    MRN   [id number]       Mormon   Other    Marital Status                               Admission Date   12/21/23    Admission Type   Emergency    Admitting Provider   Anthony Ambriz MD    Attending Provider   Anthony Ambriz MD    Department, Room/Bed   [facility] EMERGENCY DEPARTMENT, EDH3/3       Discharge Date       Discharge Disposition       Discharge Destination                                 Attending Provider: Anthony Ambriz MD    Allergies: Iodine, Levofloxacin, Nitroglycerin, Paroxetine, Penicillin G, Prednisone, Sucralfate, Diltiazem Hcl, Metoprolol, Pramipexole Dihydrochloride, Ropinirole Hcl    Isolation: None   Infection: None   Code Status: CPR    Ht: 177.8 cm (70\")   Wt: 71.7 kg (158 lb)    Admission Cmt: None   Principal Problem: Melena [K92.1]                   Active Insurance as of 12/21/2023       Primary Coverage       Payor Plan Insurance Group Employer/Plan Group    MEDICARE MEDICARE A & B        Payor Plan Address Payor Plan Phone Number Payor Plan Fax Number Effective Dates    [address] [phone]  9/1/1995 - None Entered    [facility] [id number]         Subscriber Name Subscriber Birth Date Member ID       VIKKI ELI 9/7/1930 [id number]               Secondary Coverage       Payor Plan Insurance Group Employer/Plan Group    BANKERS FIDELITY BANKERS FIDELITY PLAN F       Payor Plan Address Payor Plan Phone Number Payor Plan Fax Number Effective Dates    [address] [phone]  2/1/2012 - None Entered    [facility] [id number]         Subscriber Name Subscriber Birth Date Member ID       VIKKI ELI 9/7/1930 [id number]                     Emergency Contacts        (Rel.) Home Phone Work Phone Mobile Phone    TODD BAIN -- -- [phone]    GREGORIA ELI (Son) [phone] -- " --

## 2023-12-21 NOTE — ED PROVIDER NOTES
Subjective   History of Present Illness  89-year-old male presents from [facility] facility with reports of blood in the stool this morning.  Patient denies pain or nausea.  He reports no fevers or chills.  He reports no other bruising or bleeding.  Patient reportedly on Eliquis chronically for atrial fibrillation.  Review of Systems    Past Medical History:   Diagnosis Date    Anxiety 2014    Atrial fibrillation     Benign prostatic hyperplasia     CAD (coronary artery disease)     Hyperlipidemia     Hypertension     Myocardial infarction        Allergies   Allergen Reactions    Iodine Unknown (See Comments)     Pt unsure of reaction      Levofloxacin Unknown (See Comments)    Nitroglycerin Unknown (See Comments) and Other (See Comments)    Paroxetine Unknown (See Comments)    Penicillin G Unknown (See Comments)    Prednisone Unknown (See Comments)    Sucralfate Unknown (See Comments)    Diltiazem Hcl Hives    Metoprolol Other (See Comments)     Lowers heart rate     Pramipexole Dihydrochloride Unknown (See Comments)    Ropinirole Hcl Other (See Comments)       Past Surgical History:   Procedure Laterality Date    CARDIAC CATHETERIZATION N/A 12/1/2023    Procedure: Left Heart Cath;  Surgeon: Son العلي MD;  Location: [facility] CATH INVASIVE LOCATION;  Service: Cardiovascular;  Laterality: N/A;    CARDIAC CATHETERIZATION N/A 12/1/2023    Procedure: Coronary angiography;  Surgeon: Son العلي MD;  Location: [facility] CATH INVASIVE LOCATION;  Service: Cardiovascular;  Laterality: N/A;    CARDIAC CATHETERIZATION N/A 12/1/2023    Procedure: Percutaneous Coronary Intervention;  Surgeon: Son العلي MD;  Location: [facility] CATH INVASIVE LOCATION;  Service: Cardiovascular;  Laterality: N/A;    CARDIAC ELECTROPHYSIOLOGY PROCEDURE N/A 12/1/2023    Procedure: Temporary Pacemaker;  Surgeon: Son العلي MD;  Location: [facility] CATH INVASIVE LOCATION;  Service: Cardiovascular;  Laterality: N/A;    CARDIAC SURGERY       HERNIA REPAIR         Family History   Problem Relation Age of Onset    Heart disease Mother     Heart disease Father        Social History     Socioeconomic History    Marital status:    Tobacco Use    Smoking status: Former     Types: Cigarettes     Quit date: 1970     Years since quittin.3    Smokeless tobacco: Never    Tobacco comments:     more than 50yrs   Vaping Use    Vaping Use: Never used   Substance and Sexual Activity    Alcohol use: No    Drug use: No    Sexual activity: Not Currently     Prior to Admission medications    Medication Sig Start Date End Date Taking? Authorizing Provider   acetaminophen (TYLENOL) 650 MG 8 hr tablet Take 1 tablet by mouth Every Night.    ProviderCarlos MD   apixaban (ELIQUIS) 2.5 MG tablet tablet Take 1 tablet by mouth Every 12 (Twelve) Hours for 30 days. Indications: Atrial Fibrillation 12/11/23 1/10/24  Hilario Abbasi MD   aspirin 81 MG chewable tablet Chew 1 tablet Daily for 14 days. 23  Hilario Abbasi MD   atorvastatin (LIPITOR) 10 MG tablet Take 1 tablet by mouth Every Night for 30 days. 12/11/23 1/10/24  Hilario Abbasi MD   azelastine (ASTEPRO) 0.15 % solution nasal spray USE 2 SPRAYS IN EACH NOSTRIL TWICE DAILY AS DIRECTED BY PROVIDER 10/27/20   Ashley Cano MD   bumetanide (BUMEX) 1 MG tablet Take 1 tablet by mouth 3 (Three) Times a Day for 30 days. 12/11/23 1/10/24  Hilario Abbasi MD   Cholecalciferol 25 MCG (1000 UT) tablet Take 1 tablet by mouth Daily.    ProviderCarlos MD   clopidogrel (PLAVIX) 75 MG tablet Take 1 tablet by mouth Daily for 30 days. 23  Hilario Abbasi MD   docusate sodium (COLACE) 250 MG capsule Take 1 capsule by mouth Daily.    ProviderCarlos MD   gabapentin (NEURONTIN) 100 MG capsule TAKE 1 CAPSULE BY MOUTH TWICE DAILY 22   Ashley Cano MD   losartan (COZAAR) 50 MG tablet TAKE 1 TABLET BY MOUTH DAILY 23   Ashley Cano MD  "  omeprazole (priLOSEC) 20 MG capsule Take 1 capsule by mouth Daily. 9/7/22   Ashley Cano MD   tamsulosin (FLOMAX) 0.4 MG capsule 24 hr capsule TAKE 1 CAPSULE BY MOUTH DAILY 11/19/23   Tricia Aldana, APRN     /71   Pulse 104   Temp 98 °F (36.7 °C) (Rectal)   Resp 26   Ht 177.8 cm (70\")   Wt 71.7 kg (158 lb)   SpO2 99%   BMI 22.67 kg/m²         Objective   Physical Exam  General: Well-developed elderly male in no acute distress, awake and alert  Eyes:  sclera nonicteric  HEENT: Mucous membranes moist, no mucosal swelling  Neck: Supple, no nuchal rigidity,   Respirations: Respirations nonlabored, equal breath sounds bilaterally, clear lungs  Heart regular rate and rhythm,    Abdomen soft nontender nondistended, no hepatosplenomegaly, no hernia, no mass, normal bowel sounds, no CVA tenderness  Extremities no clubbing cyanosis or edema,   Neuro cranial nerves grossly intact, no focal limb deficits  Psych oriented, pleasant affect  Skin no rash, brisk cap refill  Procedures       Patient had some bloody output from the rectum was dark maroon.    ED Course      Results for orders placed or performed during the hospital encounter of 12/21/23   COVID-19,CEPHEID/CK,COR/EULOGIO/PAD/[place]/LAG IN-HOUSE,NP SWAB IN TRANSPORT MEDIA 1 HR TAT, RT-PCR - Swab, Nasopharynx    Specimen: Nasopharynx; Swab   Result Value Ref Range    COVID19 Not Detected Not Detected - Ref. Range   Comprehensive Metabolic Panel    Specimen: Blood   Result Value Ref Range    Glucose 169 (H) 65 - 99 mg/dL    BUN 90 (H) 8 - 23 mg/dL    Creatinine 1.96 (H) 0.76 - 1.27 mg/dL    Sodium 138 136 - 145 mmol/L    Potassium 3.7 3.5 - 5.2 mmol/L    Chloride 95 (L) 98 - 107 mmol/L    CO2 28.0 22.0 - 29.0 mmol/L    Calcium 9.6 8.2 - 9.6 mg/dL    Total Protein 5.8 (L) 6.0 - 8.5 g/dL    Albumin 3.7 3.5 - 5.2 g/dL    ALT (SGPT) 21 1 - 41 U/L    AST (SGOT) 21 1 - 40 U/L    Alkaline Phosphatase 90 39 - 117 U/L    Total Bilirubin 0.7 0.0 - 1.2 " mg/dL    Globulin 2.1 gm/dL    A/G Ratio 1.8 g/dL    BUN/Creatinine Ratio 45.9 (H) 7.0 - 25.0    Anion Gap 15.0 5.0 - 15.0 mmol/L    eGFR 31.3 (L) >60.0 mL/min/1.73   Protime-INR    Specimen: Blood   Result Value Ref Range    Protime 12.6 (H) 9.6 - 11.7 Seconds    INR 1.17 (H) 0.93 - 1.10   aPTT    Specimen: Blood   Result Value Ref Range    PTT 26.3 (L) 61.0 - 76.5 seconds   CBC Auto Differential    Specimen: Blood   Result Value Ref Range    WBC 19.70 (H) 3.40 - 10.80 10*3/mm3    RBC 3.68 (L) 4.14 - 5.80 10*6/mm3    Hemoglobin 11.3 (L) 13.0 - 17.7 g/dL    Hematocrit 34.5 (L) 37.5 - 51.0 %    MCV 93.8 79.0 - 97.0 fL    MCH 30.7 26.6 - 33.0 pg    MCHC 32.7 31.5 - 35.7 g/dL    RDW 14.5 12.3 - 15.4 %    RDW-SD 50.3 37.0 - 54.0 fl    MPV 11.7 6.0 - 12.0 fL    Platelets 229 140 - 450 10*3/mm3    Neutrophil % 81.0 (H) 42.7 - 76.0 %    Lymphocyte % 13.1 (L) 19.6 - 45.3 %    Monocyte % 4.9 (L) 5.0 - 12.0 %    Eosinophil % 0.5 0.3 - 6.2 %    Basophil % 0.5 0.0 - 1.5 %    Neutrophils, Absolute 15.90 (H) 1.70 - 7.00 10*3/mm3    Lymphocytes, Absolute 2.60 0.70 - 3.10 10*3/mm3    Monocytes, Absolute 1.00 (H) 0.10 - 0.90 10*3/mm3    Eosinophils, Absolute 0.10 0.00 - 0.40 10*3/mm3    Basophils, Absolute 0.10 0.00 - 0.20 10*3/mm3    nRBC 0.0 0.0 - 0.2 /100 WBC   Type & Screen    Specimen: Blood   Result Value Ref Range    ABO Type O     RH type Positive     Antibody Screen Negative     T&S Expiration Date 12/24/2023 11:59:59 PM                                             Medical Decision Making  Patient presents with some dark blood from rectum.  Notably he is describing no abdominal pain and has no abdominal tenderness or any signs of peritonitis or acute abdomen.  He had no vomiting.  However laboratory analysis is suggestive of upper GI bleed with elevated BUN.  He has a mild anemia.  He does have leukocytosis but in review of the records this has been persistent over the last few weeks.  Patient is afebrile.  Blood cultures  ordered and pending in discussion with hospitalist service.  Patient was ordered IV Protonix and IV fluids.  He was hemodynamically stable during the emergency room course.  He is on Eliquis for A-fib which will need to be held due to GI bleed.  He was advised of findings and agreeable plan of admission.  Leslye with GI service was contacted and case discussed.  Also case discussed with Thanh with the hospitalist service for admission.    Problems Addressed:  Gastrointestinal hemorrhage, unspecified gastrointestinal hemorrhage type: complicated acute illness or injury  Leukocytosis, unspecified type: complicated acute illness or injury    Amount and/or Complexity of Data Reviewed  Labs: ordered. Decision-making details documented in ED Course.    Risk  Prescription drug management.  Decision regarding hospitalization.        Final diagnoses:   Gastrointestinal hemorrhage, unspecified gastrointestinal hemorrhage type   Leukocytosis, unspecified type       ED Disposition  ED Disposition       ED Disposition   Decision to Admit    Condition   --    Comment   Level of Care: Telemetry [5]   Admitting Physician: ESPERANZA STREET [[id number]]   Attending Physician: ESPERANZA STERET [[id number]]                 No follow-up provider specified.       Medication List      No changes were made to your prescriptions during this visit.            Luc Mccauley MD  12/21/23 7736       Luc Mccauley MD  12/21/23 2042

## 2023-12-21 NOTE — ED NOTES
Pt sent in from [facility] c/o blood in stool that started this morning.  EMS stated pt was Covid positive today.

## 2023-12-21 NOTE — Clinical Note
Level of Care: Telemetry [5]   Diagnosis: Gastrointestinal bleed [356126]   Admitting Physician: ESPERANZA STREET [[id number]]   Attending Physician: ESPERANZA STREET [[id number]]

## 2023-12-21 NOTE — H&P
Hospitalist History and Physical     Sage Flores : 1930 MRN:[id number] LOS:0 ROOM: 15/15     Reason for admission: Melena     Assessment / Plan     Melena, suspected upper GIB  Sent in for blood in stool, patient is on dual antiplatelet therapy and apixaban.  Hold antiplatelet and anticoagulants.  Protonix IV twice daily.  Gastroenterology consulted.    Mild anemia, likely due to blood loss  Baseline hemoglobin of 13.3 on 12/10/2023, hemoglobin 11.3 on admission.  Monitor and trend hemoglobin/hematocrit every 8 hours.    Transfuse for hemoglobin <7.0    Acute Kidney Injury  Remains nonoliguric.   Likely prerenal.  Likely due to dehydration, and diuretic use.  BUN/Creatinine on admission 90/1.96, previous 18/0.91.  Given 500 mL bolus in ED, continue with 1 L infusion over 10 hours, then saline lock.   Monitor  Input/Output very closely.   Net IO Since Admission: 500 mL [23 1222]     Atherosclerotic heart disease  Chronic and stable.    Recent STEMI with acute inferior posterior MI and heart block, status post stent to RCA on 2023.  Aspirin, Plavix on hold.  BB being withheld due to recent heart block.  Continue statin therapy when able to take PO medications.    Chronic Combined Systolic and Diastolic heart failure  Currently compensated.   Last ECHO showed an EF of 36-40%.   Hold Cozaar, bumex.  Monitor Input/Output very closely. Follow daily weights.   Net IO Since Admission: 500 mL [23 1207]    Essential Hypertension: well controlled.   Hold Cozaar/Bumex, BP stable off of medications.  Titrate medications as needed.    Chronic / Paroxysmal atrial fibrillation :   Due to recent hospitalist involving heart block, remains on no antidysrhythmics or beta blockers.  [id number] Score of at least 4. Home apixaban on hold due to melena, resume when clinically appropriate.    Recent heart block, resolved on prior admission  Dyslipidemia: Continue statin therapy when able to take PO  medications.  BPH: Resume Flomax when able to take PO medications.  GERD: Continue PPI.  Neuropathy: Continue gabapentin when able to take PO medications.    Level Of Support Discussed With: Patient  Code Status (Patient has no pulse and is not breathing): CPR (Attempt to Resuscitate)  Medical Interventions (Patient has pulse or is breathing): Full Support       Nutrition:   NPO Diet NPO Type: Strict NPO  Diet: Liquid Diets; Clear Liquid; Fluid Consistency: Thin (IDDSI 0)     DVT prophylaxis:  Mechanical DVT prophylaxis orders are present.     History of Present illness     Sage Flores is a 89 y.o. male with PMH of PAF on chronic anticoagulation with apixaban, BPH, CAD, HLD, and HTN, and presented to the hospital for blood in stool from [facility]care facility, and was admitted with a principal diagnosis of Melena.  Patient is relatively without complaints, he denies chest pain, abdominal pain, nausea, vomiting, diarrhea.  Patient stated that he did not even realize that he was having blood in his stool.  Patient is on Eliquis, aspirin, and Plavix as outpatient.    In review of patient's previous medical record, patient was hospitalized at this facility from 12/1/2023 - 12/11/2023 for heart block with bradycardia, underwent a PCI to the RCA, and noted to be in systolic heart failure that was compensated with an ejection fraction of 36-40%.  Patient was discharged from this facility on dual antiplatelet therapy in conjunction with his apixaban.  Heart block was felt to be related to coronary artery disease, and did resolve.  Patient has paroxysmal atrial fibrillation at baseline.  Patient was discharged home on no antidysrhythmic's or beta-blockers.    In the ED, patient had labs obtained with the following abnormalities: Chloride 95, BUN 90, creatinine 1.96, glucose 169, total protein 5.8, INR 1.17, WBC 19.7, hemoglobin 11.3, hematocrit 34.5.  It was reported that patient tested COVID-positive at [facility]  facility, but on COVID-19 swab at this facility was negative, does not require isolation.  On previous admission, patient remained with persistent leukocytosis, but without bandemia.  Patient was noted to be guaiac positive on exam in ED.  Patient had blood cultures obtained and will be started on Rocephin, will de-escalate pending culture results.  No obvious signs or symptoms of infection at time of assessment.  Hospitalist service was consulted for admission for further treatment and evaluation.  It is suspected that patient's hospitalization will require less than 2 midnights, therefore patient will be placed in observation status.      Patient was seen and examined on 12/21/23 at 09:14 EST .    Subjective / Review of systems     Review of Systems   Constitutional:  Negative for chills and fever.   HENT:  Negative for congestion and sore throat.    Respiratory:  Negative for cough and shortness of breath.    Cardiovascular:  Negative for chest pain and palpitations.   Gastrointestinal:  Positive for blood in stool (Though patient did not realize this.). Negative for abdominal pain and nausea.   Endocrine: Negative for heat intolerance and polyuria.        Thirsty.   Genitourinary:  Negative for dysuria and urgency.   Musculoskeletal:  Negative for arthralgias and myalgias.   Skin:  Negative for rash and wound.   Neurological:  Negative for weakness and numbness.   Psychiatric/Behavioral:  Negative for confusion. The patient is not nervous/anxious.         Past Medical/Surgical/Social/Family History & Allergies     Past Medical History:   Diagnosis Date    Anxiety 2014    Atrial fibrillation     Benign prostatic hyperplasia     CAD (coronary artery disease)     Hyperlipidemia     Hypertension     Myocardial infarction       Past Surgical History:   Procedure Laterality Date    CARDIAC CATHETERIZATION N/A 12/1/2023    Procedure: Left Heart Cath;  Surgeon: Son العلي MD;  Location: [facility] CATH INVASIVE  LOCATION;  Service: Cardiovascular;  Laterality: N/A;    CARDIAC CATHETERIZATION N/A 2023    Procedure: Coronary angiography;  Surgeon: Son العلي MD;  Location: [facility] CATH INVASIVE LOCATION;  Service: Cardiovascular;  Laterality: N/A;    CARDIAC CATHETERIZATION N/A 2023    Procedure: Percutaneous Coronary Intervention;  Surgeon: Son العلي MD;  Location: [facility] CATH INVASIVE LOCATION;  Service: Cardiovascular;  Laterality: N/A;    CARDIAC ELECTROPHYSIOLOGY PROCEDURE N/A 2023    Procedure: Temporary Pacemaker;  Surgeon: Son العلي MD;  Location: [facility] CATH INVASIVE LOCATION;  Service: Cardiovascular;  Laterality: N/A;    CARDIAC SURGERY      HERNIA REPAIR        Social History     Socioeconomic History    Marital status:    Tobacco Use    Smoking status: Former     Types: Cigarettes     Quit date: 1970     Years since quittin.3    Smokeless tobacco: Never    Tobacco comments:     more than 50yrs   Vaping Use    Vaping Use: Never used   Substance and Sexual Activity    Alcohol use: No    Drug use: No    Sexual activity: Not Currently      Family History   Problem Relation Age of Onset    Heart disease Mother     Heart disease Father       Allergies   Allergen Reactions    Iodine Unknown (See Comments)     Pt unsure of reaction      Levofloxacin Unknown (See Comments)    Nitroglycerin Unknown (See Comments) and Other (See Comments)    Paroxetine Unknown (See Comments)    Penicillin G Unknown (See Comments)    Prednisone Unknown (See Comments)    Sucralfate Unknown (See Comments)    Diltiazem Hcl Hives    Metoprolol Other (See Comments)     Lowers heart rate     Pramipexole Dihydrochloride Unknown (See Comments)    Ropinirole Hcl Other (See Comments)      Social Determinants of Health     Tobacco Use: Medium Risk (2023)    Patient History     Smoking Tobacco Use: Former     Smokeless Tobacco Use: Never     Passive Exposure: Not on file   Alcohol Use: Not At Risk  (12/1/2023)    AUDIT-C     Frequency of Alcohol Consumption: Never     Average Number of Drinks: Patient does not drink     Frequency of Binge Drinking: Never   Financial Resource Strain: Not on file   Food Insecurity: Not on file   Transportation Needs: Not on file   Physical Activity: Not on file   Stress: Not on file   Social Connections: Unknown (10/11/2023)    Family and Community Support     Help with Day-to-Day Activities: Not on file     Lonely or Isolated: Not on file   Interpersonal Safety: Not At Risk (12/21/2023)    Abuse Screen     Unsafe at Home or Work/School: no     Feels Threatened by Someone?: no     Does Anyone Keep You from Contacting Others or Doint Things Outside the Home?: no     Physical Sign of Abuse Present: no   Depression: Not at risk (3/10/2023)    PHQ-2     PHQ-2 Score: 0   Housing Stability: Not At Risk (12/4/2023)    Housing Stability     Current Living Arrangements: home     Potentially Unsafe Housing Conditions: none   Utilities: Not At Risk (12/4/2023)    [facility] Utilities     Threatened with loss of utilities: No   Health Literacy: Unknown (12/4/2023)    Education     Help with school or training?: Not on file     Preferred Language: English   Employment: Unknown (10/11/2023)    Employment     Do you want help finding or keeping work or a job?: Not on file   Disabilities: Not At Risk (12/2/2023)    Disabilities     Concentrating, Remembering, or Making Decisions Difficulty: no     Doing Errands Independently Difficulty: no        Home Medications     Prior to Admission medications    Medication Sig Start Date End Date Taking? Authorizing Provider   acetaminophen (TYLENOL) 650 MG 8 hr tablet Take 1 tablet by mouth Every Night.    Provider, MD Carlos   apixaban (ELIQUIS) 2.5 MG tablet tablet Take 1 tablet by mouth Every 12 (Twelve) Hours for 30 days. Indications: Atrial Fibrillation 12/11/23 1/10/24  Hilario Abbasi MD   aspirin 81 MG chewable tablet Chew 1 tablet Daily for 14  days. 12/12/23 12/26/23  Hilario Abbasi MD   atorvastatin (LIPITOR) 10 MG tablet Take 1 tablet by mouth Every Night for 30 days. 12/11/23 1/10/24  Hilario Abbasi MD   azelastine (ASTEPRO) 0.15 % solution nasal spray USE 2 SPRAYS IN EACH NOSTRIL TWICE DAILY AS DIRECTED BY PROVIDER 10/27/20   Ashley Cano MD   bumetanide (BUMEX) 1 MG tablet Take 1 tablet by mouth 3 (Three) Times a Day for 30 days. 12/11/23 1/10/24  Hilario Abbasi MD   Cholecalciferol 25 MCG (1000 UT) tablet Take 1 tablet by mouth Daily.    ProviderCarlos MD   clopidogrel (PLAVIX) 75 MG tablet Take 1 tablet by mouth Daily for 30 days. 12/12/23 1/11/24  Hilario Abbasi MD   docusate sodium (COLACE) 250 MG capsule Take 1 capsule by mouth Daily.    Provider, MD Carlos   gabapentin (NEURONTIN) 100 MG capsule TAKE 1 CAPSULE BY MOUTH TWICE DAILY 11/17/22   Ashley Cano MD   losartan (COZAAR) 50 MG tablet TAKE 1 TABLET BY MOUTH DAILY 5/30/23   Ashley Cano MD   omeprazole (priLOSEC) 20 MG capsule Take 1 capsule by mouth Daily. 9/7/22   Ashley Cano MD   tamsulosin (FLOMAX) 0.4 MG capsule 24 hr capsule TAKE 1 CAPSULE BY MOUTH DAILY 11/19/23   Tricia Aldana, APRN        Objective / Physical Exam     Vital signs:  Temp: 98 °F (36.7 °C)  BP: 112/71  Heart Rate: 104  Resp: 26  SpO2: 99 %  Weight: 71.7 kg (158 lb)    Admission Weight: Weight: 71.7 kg (158 lb)    Physical Exam  Vitals and nursing note reviewed.   Constitutional:       General: He is not in acute distress.     Appearance: Normal appearance. He is not ill-appearing, toxic-appearing or diaphoretic.   HENT:      Head: Normocephalic and atraumatic.      Nose: Nose normal. No congestion or rhinorrhea.      Mouth/Throat:      Mouth: Mucous membranes are moist.      Pharynx: Oropharynx is clear. No oropharyngeal exudate or posterior oropharyngeal erythema.   Eyes:      General: No scleral icterus.     Extraocular Movements: Extraocular movements  intact.      Conjunctiva/sclera: Conjunctivae normal.      Pupils: Pupils are equal, round, and reactive to light.   Cardiovascular:      Rate and Rhythm: Tachycardia present. Rhythm irregular.      Pulses: Normal pulses.      Heart sounds: Normal heart sounds. No murmur heard.     No gallop.   Pulmonary:      Effort: Pulmonary effort is normal. No respiratory distress.      Breath sounds: Normal breath sounds. No wheezing or rales.   Abdominal:      General: Abdomen is flat. Bowel sounds are normal. There is no distension.      Palpations: Abdomen is soft.      Tenderness: There is no abdominal tenderness. There is no rebound.   Musculoskeletal:         General: No tenderness.      Cervical back: Normal range of motion. No rigidity.      Right lower leg: No edema.      Left lower leg: No edema.   Skin:     General: Skin is warm and dry.      Findings: No rash.   Neurological:      General: No focal deficit present.      Mental Status: He is alert and oriented to person, place, and time. Mental status is at baseline.   Psychiatric:      Comments: Calm, cooperative.          Labs     Results from last 7 days   Lab Units 12/21/23  0748   WBC 10*3/mm3 19.70*   HEMOGLOBIN g/dL 11.3*   HEMATOCRIT % 34.5*   PLATELETS 10*3/mm3 229      Results from last 7 days   Lab Units 12/21/23  0748   ALK PHOS U/L 90   AST (SGOT) U/L 21   ALT (SGPT) U/L 21      Results from last 7 days   Lab Units 12/21/23  0748   PROTIME Seconds 12.6*   INR  1.17*   APTT seconds 26.3*      Results from last 7 days   Lab Units 12/21/23  0748   SODIUM mmol/L 138   POTASSIUM mmol/L 3.7   CHLORIDE mmol/L 95*   CO2 mmol/L 28.0   BUN mg/dL 90*   CREATININE mg/dL 1.96*   GLUCOSE mg/dL 169*        Imaging     No Radiology Exams Resulted Within Past 24 Hours     Current Medications     Scheduled Meds:  pantoprazole, 40 mg, Intravenous, Q12H  senna-docusate sodium, 2 tablet, Oral, BID  sodium chloride, 10 mL, Intravenous, Q12H         Continuous Infusions:       Plan discussed with RN. Reviewed all other data in the last 24 hours, including but not limited to vitals, labs, microbiology, imaging and pertinent notes from other providers.  Plan also discussed with patient at the bedside.      Kulwinder Caballero, [facility] Medicine  12/21/23   09:14 EST

## 2023-12-21 NOTE — PLAN OF CARE
Goal Outcome Evaluation:         Pt A&Ox4, VS stable, pt on RA. GI consulted, plan for EGD tomorrow. Will continue to monitor.

## 2023-12-22 ENCOUNTER — INPATIENT HOSPITAL (OUTPATIENT)
Dept: URBAN - METROPOLITAN AREA HOSPITAL 84 | Facility: HOSPITAL | Age: 88
End: 2023-12-22

## 2023-12-22 ENCOUNTER — ANESTHESIA (OUTPATIENT)
Dept: GASTROENTEROLOGY | Facility: HOSPITAL | Age: 88
End: 2023-12-22
Payer: MEDICARE

## 2023-12-22 ENCOUNTER — ANESTHESIA EVENT (OUTPATIENT)
Dept: GASTROENTEROLOGY | Facility: HOSPITAL | Age: 88
End: 2023-12-22
Payer: MEDICARE

## 2023-12-22 DIAGNOSIS — K26.4 CHRONIC OR UNSPECIFIED DUODENAL ULCER WITH HEMORRHAGE: ICD-10-CM

## 2023-12-22 DIAGNOSIS — K44.9 DIAPHRAGMATIC HERNIA WITHOUT OBSTRUCTION OR GANGRENE: ICD-10-CM

## 2023-12-22 DIAGNOSIS — K92.1 MELENA: ICD-10-CM

## 2023-12-22 LAB
ALBUMIN SERPL-MCNC: 3.3 G/DL (ref 3.5–5.2)
ALBUMIN/GLOB SERPL: 1.9 G/DL
ALP SERPL-CCNC: 75 U/L (ref 39–117)
ALT SERPL W P-5'-P-CCNC: 20 U/L (ref 1–41)
ANION GAP SERPL CALCULATED.3IONS-SCNC: 11 MMOL/L (ref 5–15)
AST SERPL-CCNC: 18 U/L (ref 1–40)
BASOPHILS # BLD AUTO: 0.1 10*3/MM3 (ref 0–0.2)
BASOPHILS NFR BLD AUTO: 0.4 % (ref 0–1.5)
BILIRUB SERPL-MCNC: 0.5 MG/DL (ref 0–1.2)
BUN SERPL-MCNC: 89 MG/DL (ref 8–23)
BUN/CREAT SERPL: 57.1 (ref 7–25)
CALCIUM SPEC-SCNC: 8.8 MG/DL (ref 8.2–9.6)
CHLORIDE SERPL-SCNC: 101 MMOL/L (ref 98–107)
CO2 SERPL-SCNC: 27 MMOL/L (ref 22–29)
CREAT SERPL-MCNC: 1.56 MG/DL (ref 0.76–1.27)
D-LACTATE SERPL-SCNC: 2.3 MMOL/L (ref 0.5–2)
D-LACTATE SERPL-SCNC: 2.8 MMOL/L (ref 0.5–2)
DEPRECATED RDW RBC AUTO: 49.4 FL (ref 37–54)
EGFRCR SERPLBLD CKD-EPI 2021: 41.2 ML/MIN/1.73
EOSINOPHIL # BLD AUTO: 0.1 10*3/MM3 (ref 0–0.4)
EOSINOPHIL NFR BLD AUTO: 0.3 % (ref 0.3–6.2)
ERYTHROCYTE [DISTWIDTH] IN BLOOD BY AUTOMATED COUNT: 14.6 % (ref 12.3–15.4)
GLOBULIN UR ELPH-MCNC: 1.7 GM/DL
GLUCOSE SERPL-MCNC: 129 MG/DL (ref 65–99)
HCT VFR BLD AUTO: 25.8 % (ref 37.5–51)
HCT VFR BLD AUTO: 26.2 % (ref 37.5–51)
HGB BLD-MCNC: 8.6 G/DL (ref 13–17.7)
HGB BLD-MCNC: 8.7 G/DL (ref 13–17.7)
INR PPP: 1.29 (ref 0.93–1.1)
LYMPHOCYTES # BLD AUTO: 2.6 10*3/MM3 (ref 0.7–3.1)
LYMPHOCYTES NFR BLD AUTO: 14.1 % (ref 19.6–45.3)
MAGNESIUM SERPL-MCNC: 2.3 MG/DL (ref 1.7–2.3)
MCH RBC QN AUTO: 32.3 PG (ref 26.6–33)
MCHC RBC AUTO-ENTMCNC: 33.3 G/DL (ref 31.5–35.7)
MCV RBC AUTO: 96.9 FL (ref 79–97)
MONOCYTES # BLD AUTO: 0.9 10*3/MM3 (ref 0.1–0.9)
MONOCYTES NFR BLD AUTO: 5 % (ref 5–12)
NEUTROPHILS NFR BLD AUTO: 14.7 10*3/MM3 (ref 1.7–7)
NEUTROPHILS NFR BLD AUTO: 80.2 % (ref 42.7–76)
NRBC BLD AUTO-RTO: 0.1 /100 WBC (ref 0–0.2)
PHOSPHATE SERPL-MCNC: 3.3 MG/DL (ref 2.5–4.5)
PLATELET # BLD AUTO: 169 10*3/MM3 (ref 140–450)
PMV BLD AUTO: 11.8 FL (ref 6–12)
POTASSIUM SERPL-SCNC: 3.6 MMOL/L (ref 3.5–5.2)
PROT SERPL-MCNC: 5 G/DL (ref 6–8.5)
PROTHROMBIN TIME: 13.8 SECONDS (ref 9.6–11.7)
RBC # BLD AUTO: 2.71 10*6/MM3 (ref 4.14–5.8)
SODIUM SERPL-SCNC: 139 MMOL/L (ref 136–145)
WBC NRBC COR # BLD AUTO: 18.3 10*3/MM3 (ref 3.4–10.8)

## 2023-12-22 PROCEDURE — 25010000002 EPINEPHRINE 1 MG/10ML SOLUTION PREFILLED SYRINGE: Performed by: INTERNAL MEDICINE

## 2023-12-22 PROCEDURE — 25010000002 PROPOFOL 200 MG/20ML EMULSION: Performed by: NURSE ANESTHETIST, CERTIFIED REGISTERED

## 2023-12-22 PROCEDURE — 83605 ASSAY OF LACTIC ACID: CPT | Performed by: NURSE PRACTITIONER

## 2023-12-22 PROCEDURE — G0378 HOSPITAL OBSERVATION PER HR: HCPCS

## 2023-12-22 PROCEDURE — 0W3P8ZZ CONTROL BLEEDING IN GASTROINTESTINAL TRACT, VIA NATURAL OR ARTIFICIAL OPENING ENDOSCOPIC: ICD-10-PCS | Performed by: INTERNAL MEDICINE

## 2023-12-22 PROCEDURE — 25810000003 SODIUM CHLORIDE 0.9 % SOLUTION: Performed by: INTERNAL MEDICINE

## 2023-12-22 PROCEDURE — 85610 PROTHROMBIN TIME: CPT | Performed by: NURSE PRACTITIONER

## 2023-12-22 PROCEDURE — 25010000002 CEFTRIAXONE PER 250 MG: Performed by: NURSE PRACTITIONER

## 2023-12-22 PROCEDURE — 84100 ASSAY OF PHOSPHORUS: CPT | Performed by: NURSE PRACTITIONER

## 2023-12-22 PROCEDURE — 25810000003 SODIUM CHLORIDE 0.9 % SOLUTION: Performed by: NURSE ANESTHETIST, CERTIFIED REGISTERED

## 2023-12-22 PROCEDURE — 43255 EGD CONTROL BLEEDING ANY: CPT | Performed by: INTERNAL MEDICINE

## 2023-12-22 PROCEDURE — 92526 ORAL FUNCTION THERAPY: CPT

## 2023-12-22 PROCEDURE — 80053 COMPREHEN METABOLIC PANEL: CPT | Performed by: NURSE PRACTITIONER

## 2023-12-22 PROCEDURE — 3E0G8TZ INTRODUCTION OF DESTRUCTIVE AGENT INTO UPPER GI, VIA NATURAL OR ARTIFICIAL OPENING ENDOSCOPIC: ICD-10-PCS | Performed by: INTERNAL MEDICINE

## 2023-12-22 PROCEDURE — 0DJ08ZZ INSPECTION OF UPPER INTESTINAL TRACT, VIA NATURAL OR ARTIFICIAL OPENING ENDOSCOPIC: ICD-10-PCS | Performed by: INTERNAL MEDICINE

## 2023-12-22 PROCEDURE — 25010000002 ONDANSETRON PER 1 MG: Performed by: NURSE PRACTITIONER

## 2023-12-22 PROCEDURE — 25010000002 MORPHINE PER 10 MG: Performed by: INTERNAL MEDICINE

## 2023-12-22 PROCEDURE — 83735 ASSAY OF MAGNESIUM: CPT | Performed by: NURSE PRACTITIONER

## 2023-12-22 PROCEDURE — 25010000002 ESMOLOL 100 MG/10ML SOLUTION: Performed by: NURSE ANESTHETIST, CERTIFIED REGISTERED

## 2023-12-22 PROCEDURE — 99285 EMERGENCY DEPT VISIT HI MDM: CPT

## 2023-12-22 PROCEDURE — 85014 HEMATOCRIT: CPT | Performed by: NURSE PRACTITIONER

## 2023-12-22 PROCEDURE — 92610 EVALUATE SWALLOWING FUNCTION: CPT

## 2023-12-22 PROCEDURE — 85025 COMPLETE CBC W/AUTO DIFF WBC: CPT | Performed by: NURSE PRACTITIONER

## 2023-12-22 PROCEDURE — 85018 HEMOGLOBIN: CPT | Performed by: NURSE PRACTITIONER

## 2023-12-22 PROCEDURE — 99214 OFFICE O/P EST MOD 30 MIN: CPT | Performed by: INTERNAL MEDICINE

## 2023-12-22 DEVICE — DEV CLIP HEMO RESOLUTION360/ULTR 235CM 17MM STRL: Type: IMPLANTABLE DEVICE | Site: DUODENUM | Status: FUNCTIONAL

## 2023-12-22 DEVICE — CLIP
Type: IMPLANTABLE DEVICE | Site: DUODENUM | Status: FUNCTIONAL
Brand: MANTIS™ CLIP

## 2023-12-22 RX ORDER — SODIUM CHLORIDE 9 MG/ML
INJECTION, SOLUTION INTRAVENOUS CONTINUOUS PRN
Status: DISCONTINUED | OUTPATIENT
Start: 2023-12-22 | End: 2023-12-22 | Stop reason: SURG

## 2023-12-22 RX ORDER — EPINEPHRINE IN SOD CHLOR,ISO 1 MG/10 ML
SYRINGE (ML) INTRAVENOUS AS NEEDED
Status: DISCONTINUED | OUTPATIENT
Start: 2023-12-22 | End: 2023-12-22 | Stop reason: HOSPADM

## 2023-12-22 RX ORDER — PROPOFOL 10 MG/ML
INJECTION, EMULSION INTRAVENOUS AS NEEDED
Status: DISCONTINUED | OUTPATIENT
Start: 2023-12-22 | End: 2023-12-22 | Stop reason: SURG

## 2023-12-22 RX ORDER — DEXTROSE AND SODIUM CHLORIDE 5; .45 G/100ML; G/100ML
75 INJECTION, SOLUTION INTRAVENOUS CONTINUOUS
Status: DISCONTINUED | OUTPATIENT
Start: 2023-12-22 | End: 2023-12-25

## 2023-12-22 RX ORDER — ESMOLOL HYDROCHLORIDE 10 MG/ML
INJECTION INTRAVENOUS AS NEEDED
Status: DISCONTINUED | OUTPATIENT
Start: 2023-12-22 | End: 2023-12-22 | Stop reason: SURG

## 2023-12-22 RX ORDER — GLYCOPYRROLATE 0.2 MG/ML
INJECTION INTRAMUSCULAR; INTRAVENOUS AS NEEDED
Status: DISCONTINUED | OUTPATIENT
Start: 2023-12-22 | End: 2023-12-22 | Stop reason: SURG

## 2023-12-22 RX ORDER — PHENYLEPHRINE HCL IN 0.9% NACL 1 MG/10 ML
SYRINGE (ML) INTRAVENOUS AS NEEDED
Status: DISCONTINUED | OUTPATIENT
Start: 2023-12-22 | End: 2023-12-22 | Stop reason: SURG

## 2023-12-22 RX ORDER — SODIUM CHLORIDE 9 MG/ML
125 INJECTION, SOLUTION INTRAVENOUS CONTINUOUS
Status: DISPENSED | OUTPATIENT
Start: 2023-12-22 | End: 2023-12-22

## 2023-12-22 RX ORDER — LIDOCAINE HYDROCHLORIDE 10 MG/ML
INJECTION, SOLUTION EPIDURAL; INFILTRATION; INTRACAUDAL; PERINEURAL AS NEEDED
Status: DISCONTINUED | OUTPATIENT
Start: 2023-12-22 | End: 2023-12-22 | Stop reason: SURG

## 2023-12-22 RX ADMIN — Medication 10 ML: at 21:52

## 2023-12-22 RX ADMIN — GLYCOPYRROLATE 0.2 MG: 0.2 INJECTION INTRAMUSCULAR; INTRAVENOUS at 12:31

## 2023-12-22 RX ADMIN — PROPOFOL 30 MG: 10 INJECTION, EMULSION INTRAVENOUS at 12:20

## 2023-12-22 RX ADMIN — LIDOCAINE HYDROCHLORIDE 40 MG: 10 INJECTION, SOLUTION EPIDURAL; INFILTRATION; INTRACAUDAL; PERINEURAL at 12:20

## 2023-12-22 RX ADMIN — PROPOFOL 20 MG: 10 INJECTION, EMULSION INTRAVENOUS at 12:22

## 2023-12-22 RX ADMIN — DEXTROSE AND SODIUM CHLORIDE 75 ML/HR: 5; 450 INJECTION, SOLUTION INTRAVENOUS at 17:03

## 2023-12-22 RX ADMIN — PROPOFOL 20 MG: 10 INJECTION, EMULSION INTRAVENOUS at 12:26

## 2023-12-22 RX ADMIN — Medication 100 MCG: at 12:20

## 2023-12-22 RX ADMIN — CEFTRIAXONE 1000 MG: 1 INJECTION, POWDER, FOR SOLUTION INTRAMUSCULAR; INTRAVENOUS at 09:04

## 2023-12-22 RX ADMIN — Medication 100 MCG: at 12:22

## 2023-12-22 RX ADMIN — PROPOFOL 10 MG: 10 INJECTION, EMULSION INTRAVENOUS at 12:31

## 2023-12-22 RX ADMIN — GABAPENTIN 100 MG: 100 CAPSULE ORAL at 09:03

## 2023-12-22 RX ADMIN — ESMOLOL HYDROCHLORIDE 5 MG: 100 INJECTION, SOLUTION INTRAVENOUS at 12:34

## 2023-12-22 RX ADMIN — SODIUM CHLORIDE 125 ML/HR: 9 INJECTION, SOLUTION INTRAVENOUS at 09:04

## 2023-12-22 RX ADMIN — Medication 100 MCG: at 12:26

## 2023-12-22 RX ADMIN — ONDANSETRON 4 MG: 2 INJECTION INTRAMUSCULAR; INTRAVENOUS at 12:53

## 2023-12-22 RX ADMIN — MORPHINE SULFATE 2 MG: 4 INJECTION, SOLUTION INTRAMUSCULAR; INTRAVENOUS at 19:57

## 2023-12-22 RX ADMIN — SENNOSIDES AND DOCUSATE SODIUM 2 TABLET: 50; 8.6 TABLET ORAL at 09:03

## 2023-12-22 RX ADMIN — PROPOFOL 10 MG: 10 INJECTION, EMULSION INTRAVENOUS at 12:35

## 2023-12-22 RX ADMIN — Medication 10 ML: at 09:03

## 2023-12-22 RX ADMIN — PANTOPRAZOLE SODIUM 40 MG: 40 INJECTION, POWDER, FOR SOLUTION INTRAVENOUS at 21:51

## 2023-12-22 RX ADMIN — SODIUM CHLORIDE: 9 INJECTION, SOLUTION INTRAVENOUS at 12:14

## 2023-12-22 RX ADMIN — PANTOPRAZOLE SODIUM 40 MG: 40 INJECTION, POWDER, FOR SOLUTION INTRAVENOUS at 09:03

## 2023-12-22 NOTE — CASE MANAGEMENT/SOCIAL WORK
"Continued Stay Note   Reji     Patient Name: Sage Flores  MRN: [id number]  Today's Date: 12/22/2023    Admit Date: 12/21/2023    Plan: Return to [facility]; No precert or PASRR   Discharge Plan       Row Name 12/22/23 1051       Plan    Plan Return to [facility]; No precert or PASRR    Plan Comments Per Viky with [facility], they will hold patient's bed \"a day or 2\". Will need to recheck with facility regarding bed availability if patient isn't ready for dc by Saturday. DC Barriers: EGD, IV antibiotics, IV fluids.                   Discharge Codes    No documentation.                   Christin Carpio RN, [facility]  Office: [phone]  Fax: [phone]  [email or web address]      Phone communication or documentation only - no physical contact with patient or family.    Christin Carpio RN    "

## 2023-12-22 NOTE — ANESTHESIA PREPROCEDURE EVALUATION
Anesthesia Evaluation     Patient summary reviewed   NPO Solid Status: > 8 hours  NPO Liquid Status: > 8 hours           Airway   Mallampati: II  Dental      Pulmonary    (+) ,recent URI  Cardiovascular   Exercise tolerance: good (4-7 METS)    ECG reviewed  PT is on anticoagulation therapy    (+) hypertension, past MI , CAD, dysrhythmias Atrial Fib      Neuro/Psych  (+) dizziness/light headedness  GI/Hepatic/Renal/Endo    (+) GERD    Musculoskeletal     Abdominal    Substance History      OB/GYN          Other        ROS/Med Hx Other: 12/10/2023.    1.  GI bleed-suspect upper GI source with his elevated BUN and history of vomiting but differential diagnosis includes small bowel or lower GI etiology.  He is hemodynamically stable.  Will follow H&H and transfuse blood as needed as needed for hemoglobin less than 8, and plan PPI twice daily.  Discussed with patient and family at the bedside and they are agreeable for EGD in a.m.  2.  Recent STEMI-on aspirin and Plavix  3.  A-fib-hold Eliquis  4.  Recent constipation-as needed MiraLAX  5.  ЕЛЕНА                Anesthesia Plan    ASA 3 - emergent     general and MAC     (Patient and daughters request DNR/DNI if CPR or intubation were to become necessary.  Respect/honor patient wishes discussed by me and Dr Fowler, patient and daughters.)  intravenous induction     Anesthetic plan, risks, benefits, and alternatives have been provided, discussed and informed consent has been obtained with: patient.    Plan discussed with CRNA.    CODE STATUS:    Medical Intervention Limits: NO intubation (DNI)  Code Status (Patient has no pulse and is not breathing): No CPR (Do Not Attempt to Resuscitate)  Medical Interventions (Patient has pulse or is breathing): Limited Support

## 2023-12-22 NOTE — PROGRESS NOTES
Hospitalist Progress Note   Sage Flores : 1930 MRN:[id number] LOS:0     Principal Problem: Melena     Reason for follow up: All the medical problems listed below    Summary     89 y.o. male with PMH of PAF on chronic anticoagulation with apixaban, BPH, CAD, HLD, and HTN, and presented to the hospital for blood in stool from extended-care facility, and was admitted with a principal diagnosis of Melena.  EGD on  showing duodenal ulcer.  Patient failed dysphagia screening.  Plan for swallowing video tomorrow.     Cardiology on board for Mx of anticoagulant as the patient is on Eliquis and aspirin and Plavix.    Assessment / Plan     Melena from UGIB, s/p EGD on 23   Sent in for blood in stool, patient is on dual antiplatelet therapy and apixaban.  EGD on  Bleeding duodenal ulcer status post endoscopic hemostasis with epinephrine and Hemoclip  Hold antiplatelet and anticoagulants. GI rec to hold Eliquis for 5 days.  Cardio hoping to resume Plavix in 1 to 2 days.  Protonix IV twice daily.  Gastroenterology following     Dysphagia  -Patient failed speech and swallow test today.  Video swallow test tomorrow.  N.p.o. for now.  On D5 half-normal saline 75 cc/h.     Mild anemia, likely due to blood loss  Baseline hemoglobin of 13.3 on 12/10/2023, hemoglobin 11.3 on admission.  Monitor and trend hemoglobin/hematocrit every 8 hours.    Transfuse for hemoglobin <7.0     Pre renal Acute Kidney Injury, improved   Remains nonoliguric.   BUN/Creatinine on admission 90/1.96, previous 18/0.91.  Given 500 mL bolus in ED, continue with 1 L infusion over 10 hours, then saline lock.   Monitor  Input/Output very closely.      Atherosclerotic heart disease  Chronic and stable.    Recent STEMI with acute inferior posterior MI and heart block, status post stent to RCA on 2023.  Aspirin, Plavix on hold.  BB being withheld due to recent heart block.  Continue statin therapy when able to take PO  medications.     Chronic Combined Systolic and Diastolic heart failure  Currently compensated.   Last ECHO showed an EF of 36-40%.   Hold Cozaar, bumex.  Monitor Input/Output very closely. Follow daily weights.   Net IO Since Admission: 500 mL [12/21/23 1207]     Essential Hypertension: well controlled.   Hold Cozaar/Bumex, BP stable off of medications.  Titrate medications as needed.     Chronic / Paroxysmal atrial fibrillation :   Due to recent hospitalist involving heart block, remains on no antidysrhythmics or beta blockers.  [id number] Score of at least 4. Home apixaban on hold due to melena, resume when clinically appropriate.     Recent heart block, resolved on prior admission  Dyslipidemia: Continue statin therapy when able to take PO medications.  BPH: Resume Flomax when able to take PO medications.  GERD: Continue PPI.  Neuropathy: Continue gabapentin when able to take PO medications.    Disposition: TBD    Subjective / Review of systems     Review of Systems   Wants to eat     Objective / Physical Exam   Vital signs:  Temp: 98.4 °F (36.9 °C)  BP: 105/68  Heart Rate: 102  Resp: 16  SpO2: 93 %  Weight: 67.6 kg (149 lb 0.5 oz)    Admission Weight: Weight: 71.7 kg (158 lb)  Current Weight: Weight: 67.6 kg (149 lb 0.5 oz)    Input/Output in last 24 hours:    Intake/Output Summary (Last 24 hours) at 12/22/2023 1643  Last data filed at 12/21/2023 1929  Gross per 24 hour   Intake 50 ml   Output --   Net 50 ml      Physical Exam   Physical Exam  HENT:      Head: Normocephalic and atraumatic.      Nose: Nose normal.   Eyes:      Extraocular Movements: Extraocular movements intact.      Conjunctivae/sclera: Conjunctivae normal.      Pupils: Pupils are equal, round, and reactive to light.   Cardiovascular:      Rate and Rhythm: normal       Pulses: Normal pulses.      Heart sounds: Normal heart sounds.   Pulmonary:      Effort: normal      Breath sounds: normal   Abdominal:      General: Abdomen is flat. Bowel  sounds are normal.      Palpations: Abdomen is soft.   Musculoskeletal:         General: Normal range of motion.      Cervical back: Normal range of motion and neck supple.   Skin:     General: Skin is dry.   Neurological:      General: No focal deficit present.      Mental Status: alert.   Psychiatric:         Mood and Affect: Mood normal.        Radiology and Labs     Results from last 7 days   Lab Units 12/22/23  0714 12/22/23  0113 12/21/23  1723 12/21/23  0748   WBC 10*3/mm3 18.30*  --   --  19.70*   HEMATOCRIT % 26.2* 25.8* 30.5* 34.5*   PLATELETS 10*3/mm3 169  --   --  229      Results from last 7 days   Lab Units 12/22/23  0715 12/21/23  0748   SODIUM mmol/L 139 138   POTASSIUM mmol/L 3.6 3.7   CHLORIDE mmol/L 101 95*   CO2 mmol/L 27.0 28.0   BUN mg/dL 89* 90*   CREATININE mg/dL 1.56* 1.96*      Current medications   Scheduled Meds: [Held by provider] apixaban, 2.5 mg, Oral, Q12H  [Held by provider] aspirin, 81 mg, Oral, Daily  atorvastatin, 10 mg, Oral, Nightly  cefTRIAXone, 1,000 mg, Intravenous, Q24H  [Held by provider] clopidogrel, 75 mg, Oral, Daily  gabapentin, 100 mg, Oral, BID  [Held by provider] losartan, 25 mg, Oral, Daily  pantoprazole, 40 mg, Intravenous, Q12H  senna-docusate sodium, 2 tablet, Oral, BID  sodium chloride, 10 mL, Intravenous, Q12H  tamsulosin, 0.4 mg, Oral, Nightly      Continuous Infusions: dextrose 5 % and sodium chloride 0.45 %, 75 mL/hr  sodium chloride, 125 mL/hr, Last Rate: 125 mL/hr (12/22/23 0904)        Reviewed all other data in the last 24 hours, including but not limited to vitals, labs, microbiology, imaging and pertinent notes from other providers.     Anthony Ambriz MD   [facility] Medicine  12/22/23   16:43 EST

## 2023-12-22 NOTE — CONSULTS
Referring Provider: Anthony Ambriz MD  Reason for Consultation:  Melena.  Recent stent placement    Patient Care Team:  Tricia Aldana APRN as PCP - General (Family Medicine)  Missy Domínguez MD as Consulting Physician (Cardiology)    Chief complaint  Black stools    Subjective .     History of present illness:  Sage Flores is a 89 y.o. male who presents with history of multiple cardiac and noncardiac problems and recently stent placement in the setting of inferior STEMI.  Patient was discharged home on antiplatelet therapy as well as Eliquis due to presence of atrial fibrillation.  Patient has severe coronary artery disease.  Patient is DNR/DNI.  Patient denies having any chest discomfort heaviness or tightness in the chest.  Cardiology consultation was requested to coordinate antiplatelet and anticoagulation status.  Patient had endoscopy today.    ROS    Patient has melena.  Since I have last seen, the patient has been without any chest discomfort, shortness of breath, palpitations, dizziness or syncope.  Denies having any headache, abdominal pain, nausea, vomiting, diarrhea, constipation, loss of weight or loss of appetite.  Denies having any excessive bruising, hematuria.    Review of all systems negative except as indicated      History  Past Medical History:   Diagnosis Date    Anxiety 2014    Atrial fibrillation     Benign prostatic hyperplasia     CAD (coronary artery disease)     Hyperlipidemia     Hypertension     Myocardial infarction        Past Surgical History:   Procedure Laterality Date    CARDIAC CATHETERIZATION N/A 12/1/2023    Procedure: Left Heart Cath;  Surgeon: Son العلي MD;  Location: [facility] CATH INVASIVE LOCATION;  Service: Cardiovascular;  Laterality: N/A;    CARDIAC CATHETERIZATION N/A 12/1/2023    Procedure: Coronary angiography;  Surgeon: Son العلي MD;  Location: [facility] INVASIVE LOCATION;  Service: Cardiovascular;  Laterality: N/A;    CARDIAC CATHETERIZATION  N/A 2023    Procedure: Percutaneous Coronary Intervention;  Surgeon: Son العلي MD;  Location: [facility] CATH INVASIVE LOCATION;  Service: Cardiovascular;  Laterality: N/A;    CARDIAC ELECTROPHYSIOLOGY PROCEDURE N/A 2023    Procedure: Temporary Pacemaker;  Surgeon: Son العلي MD;  Location: [facility] CATH INVASIVE LOCATION;  Service: Cardiovascular;  Laterality: N/A;    CARDIAC SURGERY      HERNIA REPAIR         Family History   Problem Relation Age of Onset    Heart disease Mother     Heart disease Father        Social History     Tobacco Use    Smoking status: Former     Types: Cigarettes     Quit date: 1970     Years since quittin.3    Smokeless tobacco: Never    Tobacco comments:     more than 50yrs   Vaping Use    Vaping Use: Never used   Substance Use Topics    Alcohol use: No    Drug use: No        (Not in a hospital admission)        Iodine, Levofloxacin, Nitroglycerin, Paroxetine, Penicillin g, Prednisone, Sucralfate, Diltiazem hcl, Metoprolol, Pramipexole dihydrochloride, and Ropinirole hcl    Scheduled Meds:[Held by provider] apixaban, 2.5 mg, Oral, Q12H  [Held by provider] aspirin, 81 mg, Oral, Daily  atorvastatin, 10 mg, Oral, Nightly  cefTRIAXone, 1,000 mg, Intravenous, Q24H  [Held by provider] clopidogrel, 75 mg, Oral, Daily  gabapentin, 100 mg, Oral, BID  [Held by provider] losartan, 25 mg, Oral, Daily  pantoprazole, 40 mg, Intravenous, Q12H  senna-docusate sodium, 2 tablet, Oral, BID  sodium chloride, 10 mL, Intravenous, Q12H  tamsulosin, 0.4 mg, Oral, Nightly      Continuous Infusions:sodium chloride, 125 mL/hr, Last Rate: 125 mL/hr (23 0904)      PRN Meds:.  acetaminophen **OR** acetaminophen **OR** acetaminophen    senna-docusate sodium **AND** polyethylene glycol **AND** bisacodyl **AND** bisacodyl    Calcium Replacement - Follow Nurse / BPA Driven Protocol    Magnesium Cardiology Dose Replacement - Follow Nurse / BPA Driven Protocol    nitroglycerin    ondansetron  "**OR** ondansetron    Phosphorus Replacement - Follow Nurse / BPA Driven Protocol    Potassium Replacement - Follow Nurse / BPA Driven Protocol    [COMPLETED] Insert Peripheral IV **AND** sodium chloride    sodium chloride    sodium chloride    Objective     VITAL SIGNS  Vitals:    12/22/23 1125 12/22/23 1240 12/22/23 1250 12/22/23 1300   BP: 100/45 113/71 122/73 115/73   BP Location: Left arm      Patient Position: Sitting      Pulse: 92 113 112 115   Resp: 17 23 13 24   Temp: 98.4 °F (36.9 °C)      TempSrc: Oral      SpO2: 98% 97% 100% 100%   Weight:       Height:           Flowsheet Rows      Flowsheet Row First Filed Value   Admission Height 177.8 cm (70\") Documented at 12/21/2023 0735   Admission Weight 71.7 kg (158 lb) Documented at 12/21/2023 0735              Intake/Output Summary (Last 24 hours) at 12/22/2023 1346  Last data filed at 12/21/2023 1929  Gross per 24 hour   Intake 170 ml   Output --   Net 170 ml        TELEMETRY: Atrial fibrillation    Physical Exam:  The patient is alert, oriented and in no distress.  Vital signs as noted above.  Head and neck revealed no carotid bruits or jugular venous distention.  No thyromegaly or lymphadenopathy is present  Lungs clear.  No wheezing.  Breath sounds are normal bilaterally.  Heart normal first and second heart sounds. No murmur.  No precordial rub is present.  No gallop is present.  Abdomen soft and nontender.  No organomegaly is present.  Extremities with good peripheral pulses without any pedal edema.  Skin warm and dry.  Musculoskeletal system is grossly normal  CNS grossly normal    Reviewed and updated.    Results Review:   I reviewed the patient's new clinical results.  Lab Results (last 24 hours)       Procedure Component Value Units Date/Time    Blood Culture - Blood, Arm, Right [[id number]]  (Normal) Collected: 12/21/23 0950    Specimen: Blood from Arm, Right Updated: 12/22/23 1001     Blood Culture No growth at 24 hours    Blood Culture - Blood, " Arm, Left [[id number]]  (Normal) Collected: 12/21/23 0924    Specimen: Blood from Arm, Left Updated: 12/22/23 0930     Blood Culture No growth at 24 hours    Magnesium [[id number]]  (Normal) Collected: 12/22/23 0715    Specimen: Blood Updated: 12/22/23 0818     Magnesium 2.3 mg/dL     Phosphorus [[id number]]  (Normal) Collected: 12/22/23 0715    Specimen: Blood Updated: 12/22/23 0816     Phosphorus 3.3 mg/dL     Comprehensive Metabolic Panel [[id number]]  (Abnormal) Collected: 12/22/23 0715    Specimen: Blood Updated: 12/22/23 0816     Glucose 129 mg/dL      BUN 89 mg/dL      Creatinine 1.56 mg/dL      Sodium 139 mmol/L      Potassium 3.6 mmol/L      Chloride 101 mmol/L      CO2 27.0 mmol/L      Calcium 8.8 mg/dL      Total Protein 5.0 g/dL      Albumin 3.3 g/dL      ALT (SGPT) 20 U/L      AST (SGOT) 18 U/L      Alkaline Phosphatase 75 U/L      Total Bilirubin 0.5 mg/dL      Globulin 1.7 gm/dL      A/G Ratio 1.9 g/dL      BUN/Creatinine Ratio 57.1     Anion Gap 11.0 mmol/L      eGFR 41.2 mL/min/1.73     Narrative:      GFR Normal >60  Chronic Kidney Disease <60  Kidney Failure <15    The GFR formula is only valid for adults with stable renal function between ages 18 and 70.    STAT Lactic Acid, Reflex [[id number]]  (Abnormal) Collected: 12/22/23 0714    Specimen: Blood Updated: 12/22/23 0813     Lactate 2.8 mmol/L     Protime-INR [[id number]]  (Abnormal) Collected: 12/22/23 0714    Specimen: Blood Updated: 12/22/23 0755     Protime 13.8 Seconds      INR 1.29    CBC & Differential [[id number]]  (Abnormal) Collected: 12/22/23 0714    Specimen: Blood Updated: 12/22/23 0749    Narrative:      The following orders were created for panel order CBC & Differential.  Procedure                               Abnormality         Status                     ---------                               -----------         ------                     CBC Auto Differential[[id number]]        Abnormal            Final result                 Please  view results for these tests on the individual orders.    CBC Auto Differential [[id number]]  (Abnormal) Collected: 12/22/23 0714    Specimen: Blood Updated: 12/22/23 0749     WBC 18.30 10*3/mm3      RBC 2.71 10*6/mm3      Hemoglobin 8.7 g/dL      Hematocrit 26.2 %      MCV 96.9 fL      MCH 32.3 pg      MCHC 33.3 g/dL      RDW 14.6 %      RDW-SD 49.4 fl      MPV 11.8 fL      Platelets 169 10*3/mm3      Neutrophil % 80.2 %      Lymphocyte % 14.1 %      Monocyte % 5.0 %      Eosinophil % 0.3 %      Basophil % 0.4 %      Neutrophils, Absolute 14.70 10*3/mm3      Lymphocytes, Absolute 2.60 10*3/mm3      Monocytes, Absolute 0.90 10*3/mm3      Eosinophils, Absolute 0.10 10*3/mm3      Basophils, Absolute 0.10 10*3/mm3      nRBC 0.1 /100 WBC     STAT Lactic Acid, Reflex [[id number]]  (Abnormal) Collected: 12/22/23 0113    Specimen: Blood Updated: 12/22/23 0140     Lactate 2.3 mmol/L     Hemoglobin & Hematocrit, Blood [[id number]]  (Abnormal) Collected: 12/22/23 0113    Specimen: Blood Updated: 12/22/23 0126     Hemoglobin 8.6 g/dL      Hematocrit 25.8 %     STAT Lactic Acid, Reflex [[id number]]  (Abnormal) Collected: 12/21/23 1723    Specimen: Blood Updated: 12/21/23 1804     Lactate 4.4 mmol/L     Hemoglobin & Hematocrit, Blood [[id number]]  (Abnormal) Collected: 12/21/23 1723    Specimen: Blood Updated: 12/21/23 1737     Hemoglobin 10.1 g/dL      Hematocrit 30.5 %             Imaging Results (Last 24 Hours)       ** No results found for the last 24 hours. **        LAB RESULTS (LAST 7 DAYS)    CBC  Results from last 7 days   Lab Units 12/22/23 0714 12/22/23 0113 12/21/23 1723 12/21/23 0748   WBC 10*3/mm3 18.30*  --   --  19.70*   RBC 10*6/mm3 2.71*  --   --  3.68*   HEMOGLOBIN g/dL 8.7* 8.6* 10.1* 11.3*   HEMATOCRIT % 26.2* 25.8* 30.5* 34.5*   MCV fL 96.9  --   --  93.8   PLATELETS 10*3/mm3 169  --   --  229       BMP  Results from last 7 days   Lab Units 12/22/23  0715 12/21/23  0748   SODIUM mmol/L 139 138   POTASSIUM  mmol/L 3.6 3.7   CHLORIDE mmol/L 101 95*   CO2 mmol/L 27.0 28.0   BUN mg/dL 89* 90*   CREATININE mg/dL 1.56* 1.96*   GLUCOSE mg/dL 129* 169*   MAGNESIUM mg/dL 2.3 1.7   PHOSPHORUS mg/dL 3.3 3.5       CMP   Results from last 7 days   Lab Units 12/22/23  0715 12/21/23  0748   SODIUM mmol/L 139 138   POTASSIUM mmol/L 3.6 3.7   CHLORIDE mmol/L 101 95*   CO2 mmol/L 27.0 28.0   BUN mg/dL 89* 90*   CREATININE mg/dL 1.56* 1.96*   GLUCOSE mg/dL 129* 169*   ALBUMIN g/dL 3.3* 3.7   BILIRUBIN mg/dL 0.5 0.7   ALK PHOS U/L 75 90   AST (SGOT) U/L 18 21   ALT (SGPT) U/L 20 21         BNP        TROPONIN        CoAg  Results from last 7 days   Lab Units 12/22/23  0714 12/21/23  0748   INR  1.29* 1.17*   APTT seconds  --  26.3*       Creatinine Clearance  Estimated Creatinine Clearance: 28.3 mL/min (A) (by C-G formula based on SCr of 1.56 mg/dL (H)).    ABG        Radiology  No radiology results for the last day      EKG      I personally viewed and interpreted the patient's EKG/Telemetry data:    ECHOCARDIOGRAM:    Results for orders placed during the hospital encounter of 12/01/23    Adult Transthoracic Echo Complete W/ Cont if Necessary Per Protocol    Interpretation Summary    Left ventricular ejection fraction appears to be 36 - 40%.    The left atrial cavity is moderately dilated.    Estimated right ventricular systolic pressure from tricuspid regurgitation is normal (<35 mmHg).      STRESS TEST        Cardiolite (Tc-99m sestamibi) stress test    HEART CATHETERIZATION  Results for orders placed during the hospital encounter of 12/01/23    Cardiac Catheterization/Vascular Study      OTHER:     Assessment & Plan     Principal Problem:    Melena    //////////////////////////  History  =============  - Recent melena.    - Inferior STEMI 12/4/2023.     - Status post PCI with PTCA, stent placement on Pronto catheter atherectomy to the graft to the distal right coronary artery.-12/3/2023-Dr. العلي     Cardiac catheterization  12/3/2023-Dr. العلي     Left Main %: 20 to 30%   Proximal LAD %: 100%  Mid/Distal LAD %: 100%  LCX %: Proximal 80% OM1 100%  Ramus:   RCA %: 100% after the PDA.  Lima %: LIMA to LAD was patent  SVG(s) %: SVG to the marginal branch is patent but has some 30 to 40% disease.  SVG to the diagonal branch is occluded.  SVG to the PDA is occluded.  SVG to the distal RCA is occluded but is the culprit lesion     -Past history of syncope-no further episodes.     -status post loop recorder placement.  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 cardiac catheterization 12/26/2014 revealed 60% distal circumflex and total LAD and right coronary arteries.  Lima to LAD was patent ( lima coming off the left vertebral artery).  SVG to diagonal   marginal and PDA were patent.  SVG to left ventricular branch was totally occluded (chronic)     - atrial fibrillation -has converted to sinus rhythm and maintaining sinus rhythm.  Recently patient was noted to have atrial dysrhythmia on the monitor with loop recorder.     - sinus bradycardia-asymptomatic     - status post acute inferior myocardial infarction prior to surgery requiring acute stent placement to right coronary artery ) complicated by ventricular fibrillation)     Echocardiogram 12/1/2023    Left ventricular ejection fraction appears to be 36 - 40%.    The left atrial cavity is moderately dilated.    Estimated right ventricular systolic pressure from tricuspid regurgitation is normal (<35 mmHg).      -Dyslipidemia and hypertension     - lower extremity weakness.   Arterial Doppler study of the lower extremity is normal. -  improved .   arterial Doppler study of the lower extremity was normal     - status post cholecystectomy     - allergy to penicillin iodine and metoprolol (rash).  Intolerance to atenolol due to bradycardia.  Allergy to Levaquin and penicillin.  Intolerance to prednisone Nitropatch IV nitroglycerin and prednisone.  =================     Plan  ==============  Recent melena and GI bleed.  Patient had endoscopy today 12/22/2023 that revealed bleeding duodenal ulcer.  Patient had endoscopic hemostasis with epinephrine and Hemoclip.  Patient was started on PPI.  Patient is on antiplatelet therapy (Plavix) and anticoagulation for atrial fibrillation (Eliquis)  GI has suggested holding Eliquis for at least 5 days.  Hopefully Plavix can be restarted in the next 1 to 2 days with recent inferior STEMI and graft status.  Closely follow cardiovascular status.     Inferior STEMI 12/4/2023.     Status post PCI with PTCA, stent placement on Pronto catheter atherectomy to the graft to the distal right coronary artery.-12/3/2023-Dr. العلي     History of junctional bradycardia.  Temporary pacemaker was placed in the setting of inferior STEMI.  Temporary pacemaker was removed.     Past history of complete heart block.  Patient had temporary pacemaker which was removed.     Atrial fibrillation with controlled ventricular response.    Hypomagnesemia-better at 2.3.     Renal function-stable.     Patient is off beta-blocker Coreg.     Echocardiogram 12/1/2023-as above    Patient is DNR DNI..    Medications were reviewed and updated.  Reviewed and updated-12/22/2023    Further plan will depend on patient's progress.  //////////////////////////////////////          Missy Domínguez MD  12/22/23  13:46 EST

## 2023-12-22 NOTE — THERAPY EVALUATION
Acute Care - Speech Language Pathology   Swallow Initial Evaluation  Reji     Patient Name: Sage Flores  : 1930  MRN: [id number]  Today's Date: 2023               Admit Date: 2023    Visit Dx:     ICD-10-CM ICD-9-CM   1. Gastrointestinal hemorrhage, unspecified gastrointestinal hemorrhage type  K92.2 578.9   2. Leukocytosis, unspecified type  D72.829 288.60   3. Melena  K92.1 578.1     Patient Active Problem List   Diagnosis    Allergic rhinitis    Atrial fibrillation    Claudication    Coronary heart disease    Edema    Extremity pain    History of left heart catheterization (LHC)    Hyperlipidemia    Essential hypertension    Inguinal hernia, right    Laceration    Lower extremity edema    Myocardial infarction    Need for other prophylactic vaccination and inoculation against single diseases    Presence of other cardiac implants and grafts    Status post coronary artery bypass graft    Status post percutaneous transluminal coronary angioplasty    Viral URI    Rib pain on left side    Dizziness    Acute cystitis without hematuria    Status post placement of implantable loop recorder    Lab test negative for COVID-19 virus    Cellulitis of left leg    Iron deficiency anemia    Encounter for support and coordination of transition of care    Hematoma of right lower extremity    Encounter for screening for malignant neoplasm of prostate     Medicare annual wellness visit, subsequent    Vitamin D deficiency    Hyponatremia    Gastroesophageal reflux disease without esophagitis    Acute ST elevation myocardial infarction (STEMI) involving right coronary artery    Melena     SLP Recommendation and Plan  SLP Swallowing Diagnosis: mild, oral dysphagia, suspected pharyngeal dysphagia (23 1600)  SLP Diet Recommendation: NPO (23 1600)     SLP Rec. for Method of Medication Administration: meds via alternate route (23 1600)     Monitor for Signs of Aspiration: yes, notify SLP if any  "concerns (12/22/23 1600)  Recommended Diagnostics: reassess via clinical swallow evaluation, reassess via VFSS (MBS) (12/22/23 1600)  Swallow Criteria for Skilled Therapeutic Interventions Met: demonstrates skilled criteria (12/22/23 1600)    Therapy Frequency (Swallow): PRN (12/22/23 1600)  Predicted Duration Therapy Intervention (Days): until discharge (12/22/23 1600)       SWALLOW EVALUATION (last 72 hours)       SLP Adult Swallow Evaluation       Row Name 12/22/23 1600       Rehab Evaluation    Document Type evaluation  -PF    Subjective Information no complaints  -PF    Patient Observations alert;cooperative;lethargic  -PF    Patient Effort good  -PF       General Information    Patient Profile Reviewed yes  -PF    Pertinent History Of Current Problem Sage Flores \"Tristan\" is male, 89 years old, who presented to [facility] on 12/22/2023 with c/o some dark blood from rectum. Upon admission at [facility] ED, he denied abdominal pain or tenderness or any sign of peritonitis. Laboratory analysis is suggestive of upper GI bleed with elevated BUN. PMHx of A-fib, coronary heart disease, high cholesterol, high blood pressure, s/p angioplasty, anemia, acid reflux. Recent CXR 12/7/2023 revealed infection in the right lower lobe. EGD completed today revealed 1cm hiatal hernia, duodenal ulcer in the bulb with stigmata of recent bleeding and a pigmented spot. Pt was cleared on a clear liquid diet by GI at the time of clinical swallow evaluation at bedside.  -PF    Current Method of Nutrition clear liquids  -PF    Plans/Goals Discussed with patient  -PF    Barriers to Rehab medically complex  -PF       Oral Musculature and Cranial Nerve Assessment    Oral Motor General Assessment generalized oral motor weakness  -PF       General Eating/Swallowing Observations    Respiratory Support Currently in Use nasal cannula  -PF    O2 Liters 2L  -PF    Eating/Swallowing Skills self-fed;fed by SLP  -PF    Positioning During Eating upright in bed  -PF "    Utensils Used cup;straw  -PF    Consistencies Trialed thin liquids  -PF       Clinical Swallow Eval    Clinical Swallow Evaluation Summary Bedside swallow evaluation completed. Patient positioned upright in bed at a 90-degree angle hip flexion prior to PO. Patient is currently on clear liquids diet prior to swallow evaluation. Pt accepted thin liquids by cup and straw, declined liquids by spoon. Complete labial seal around cup resulting in no anterior loss of bolus, palpable laryngeal movement and excursion, unable to assess oral transit and bolus control as pt is not cleared on solids by GI, delayed initiation of swallow, multiple swallows on liquids, pt coughed and throat clared on 75% trials given, wet vocal quality, O2 sats dropped from 97% to 88%, required time to recover. Based on above findings, recent CXR 12/7 revealed infection in right lower lobe, pt is at risk of aspiration, ST recommends pt to be NPO at this time w/exception of [place] Protocol. ST to complete a clinical and/or instrumental re-evaluation of swallow in order to initiate a PO diet upon being cleared by GI regarding pt's diet.     Recommendations:     - NPO w/exeception of [place] Protocol  - meds: via alternate route   - reflux precautions   - ST to reassess via VFSS/MBSS in order to initiate a safest and L/R PO diet upon being cleared by GI regarding pt's diet -PF         SLP Evaluation Clinical Impression    SLP Swallowing Diagnosis mild;oral dysphagia;suspected pharyngeal dysphagia  -PF    Functional Impact risk of aspiration/pneumonia  -PF    Swallow Criteria for Skilled Therapeutic Interventions Met demonstrates skilled criteria  -PF       Recommendations    Therapy Frequency (Swallow) PRN  -PF    Predicted Duration Therapy Intervention (Days) until discharge  -PF    SLP Diet Recommendation NPO  -PF    Recommended Diagnostics reassess via clinical swallow evaluation;reassess via VFSS (MBS)  -PF    SLP Rec. for Method of  Medication Administration meds via alternate route  -PF    Monitor for Signs of Aspiration yes;notify SLP if any concerns  -PF       Swallow Goals (SLP)    Swallow LTGs Swallow Long Term Goal (free text)  -PF    Swallow STGs diet tolerance goal selection (SLP)  -PF    Diet Tolerance Goal Selection (SLP) Swallow Short Term Goal 1;Swallow Short Term Goal 2  -PF       (LTG) Swallow    (LTG) Swallow Pt will maximize swallow function for least restrictive PO diet, exhibiting no complication associated with dysphagia, adequate PO intake, and demonstrating independent use of swallow compensation.  -PF    [place] (Swallow Long Term Goal) with minimal cues (75-90% accuracy)  -PF    Time Frame (Swallow Long Term Goal) by discharge  -PF       (STG) Swallow 1    (STG) Swallow 1 Patient will participate in ongoing assessment of swallow, including reevaluation clinically and/or including instrumental assessment of swallow if indicated, to further assess swallow function and return to oral nutrition.  -PF    [place] (Swallow Short Term Goal 1) with minimal cues (75-90% accuracy)  -PF    Time Frame (Swallow Short Term Goal 1) 1 week  -PF       (STG) Swallow 2    (STG) Swallow 2 Pt will tolerate diagnostic amounts PO (water/ice chips) at bedside without significant discomfort or severe coughing response.  -PF    [place] (Swallow Short Term Goal 2) with minimal cues (75-90% accuracy)  -PF    Time Frame (Swallow Short Term Goal 2) 1 week  -PF              User Key  (r) = Recorded By, (t) = Taken By, (c) = Cosigned By      Initials Name Effective Dates    Anthony Suarez SLP 05/08/23 -                     EDUCATION  The patient has been educated in the following areas:   Dysphagia (Swallowing Impairment) Oral Care/Hydration NPO rationale.        SLP GOALS       Row Name 12/22/23 1600       (LTG) Swallow    (LTG) Swallow Pt will maximize swallow function for least restrictive PO diet, exhibiting no complication  associated with dysphagia, adequate PO intake, and demonstrating independent use of swallow compensation.  -PF    [place] (Swallow Long Term Goal) with minimal cues (75-90% accuracy)  -PF    Time Frame (Swallow Long Term Goal) by discharge  -PF       (STG) Swallow 1    (STG) Swallow 1 Patient will participate in ongoing assessment of swallow, including reevaluation clinically and/or including instrumental assessment of swallow if indicated, to further assess swallow function and return to oral nutrition.  -PF    [place] (Swallow Short Term Goal 1) with minimal cues (75-90% accuracy)  -PF    Time Frame (Swallow Short Term Goal 1) 1 week  -PF       (STG) Swallow 2    (STG) Swallow 2 Pt will tolerate diagnostic amounts PO (water/ice chips) at bedside without significant discomfort or severe coughing response.  -PF    [place] (Swallow Short Term Goal 2) with minimal cues (75-90% accuracy)  -PF    Time Frame (Swallow Short Term Goal 2) 1 week  -PF              User Key  (r) = Recorded By, (t) = Taken By, (c) = Cosigned By      Initials Name Provider Type    PF Fakto, [place], SLP Speech and Language Pathologist                   JULIÁN Barlow  12/22/2023

## 2023-12-22 NOTE — ANESTHESIA POSTPROCEDURE EVALUATION
Patient: Sage Flores    Procedure Summary       Date: 12/22/23 Room / Location: [facility] ENDOSCOPY 1 / [facility] ENDOSCOPY    Anesthesia Start: 1214 Anesthesia Stop: 1242    Procedure: ESOPHAGOGASTRODUODENOSCOPY with sclerotherapy and endoscopic clipping x 3 of duodenal ulcer Diagnosis:       Melena      (Melena [K92.1])    Surgeons: Tereza Fowler MD Provider: Luis Eduardo Garza MD    Anesthesia Type: general, MAC ASA Status: 3 - Emergent            Anesthesia Type: general, MAC    Vitals  Vitals Value Taken Time   /67 12/22/23 1311   Temp     Pulse 110 12/22/23 1311   Resp 24 12/22/23 1300   SpO2 98 % 12/22/23 1311   Vitals shown include unfiled device data.        Post Anesthesia Care and Evaluation    Patient location during evaluation: PACU  Patient participation: complete - patient participated  Level of consciousness: awake  Pain scale: See nurse's notes for pain score.  Pain management: adequate    Airway patency: patent  Anesthetic complications: No anesthetic complications  PONV Status: none  Cardiovascular status: acceptable  Respiratory status: acceptable and spontaneous ventilation  Hydration status: acceptable    Comments: Patient seen and examined postoperatively; vital signs stable; SpO2 greater than or equal to 90%; cardiopulmonary status stable; nausea/vomiting adequately controlled; pain adequately controlled; no apparent anesthesia complications; patient discharged from anesthesia care when discharge criteria were met

## 2023-12-22 NOTE — SIGNIFICANT NOTE
Spoke with patient's daughters at the bedside.  Patient and family do not want his DNR/DNI status to be rescinded at the time of endoscopy.  Anesthesiologist and I are both okay with proceeding with endoscopy and sedation while maintaining DNR/DNI status.  Risks, benefits and alternatives including no endoscopic evaluation have been discussed with the family.

## 2023-12-22 NOTE — PLAN OF CARE
"Goal Outcome Evaluation:  Sage Flores \"Tristan\" is male, 89 years old, who presented to [facility] on 12/22/2023 with c/o some dark blood from rectum. Upon admission at [facility] ED, he denied abdominal pain or tenderness or any sign of peritonitis. Laboratory analysis is suggestive of upper GI bleed with elevated BUN. PMHx of A-fib, coronary heart disease, high cholesterol, high blood pressure, s/p angioplasty, anemia, acid reflux. Recent CXR 12/7/2023 revealed infection in the right lower lobe. EGD completed today revealed 1cm hiatal hernia, duodenal ulcer in the bulb with stigmata of recent bleeding and a pigmented spot. Pt was cleared on a clear liquid diet by GI at the time of clinical swallow evaluation at bedside. Bedside swallow evaluation completed. Patient positioned upright in bed at a 90-degree angle hip flexion prior to PO. Patient is currently on clear liquids diet prior to swallow evaluation. Pt accepted thin liquids by cup and straw, declined liquids by spoon. Complete labial seal around cup resulting in no anterior loss of bolus, palpable laryngeal movement and excursion, unable to assess oral transit and bolus control as pt is not cleared on solids by GI, delayed initiation of swallow, multiple swallows on liquids, pt coughed and throat clared on 75% trials given, wet vocal quality, O2 sats dropped from 97% to 88%, required time to recover. Based on above findings, recent CXR 12/7 revealed infection in right lower lobe, pt is at risk of aspiration, ST recommends pt to be NPO at this time w/exception of [place] Protocol. ST to complete a clinical and/or instrumental re-evaluation of swallow in order to initiate a PO diet upon being cleared by GI regarding pt's diet.     Recommendations:     - NPO w/exeception of [place] Protocol  - meds: via alternate route   - reflux precautions   - ST to reassess via VFSS/MBSS in order to initiate a safest and L/R PO diet upon being cleared by GI regarding pt's diet " -PF

## 2023-12-22 NOTE — NURSING NOTE
1130 Patients daughter POA for patient is at bedside. During assessment it was revealed that patient is a DNR/DNI. I asked patients family if they knew that generally a patients DNR/DNI is rescinded during a procedure. Meaning we would do whatever necessary ie, CPR if something happened during procedure. The daughter stated she was unaware of that, and absolutely did not want any life saving measures, ie intubation or CPR. She stated they just wanted to find out where the bleeding was coming from, but if that resulted in his passing, that was okay. Situation was discussed with Dr Garza and Dr. Fowler. Both physicians agreed to honor the DNR/DNI status during procedure.

## 2023-12-22 NOTE — OP NOTE
ESOPHAGOGASTRODUODENOSCOPY Procedure Report    Patient Name:  Sage Flores  YOB: 1930    Date of Surgery:  12/22/2023     Pre-Op Diagnosis:  Melena [K92.1]       Post Op Diagnosis:  Duodenal ulcer      Procedure/CPT® Codes:      Procedure(s):  ESOPHAGOGASTRODUODENOSCOPY with sclerotherapy and endoscopic clipping x 3 of duodenal ulcer    Staff:  Surgeon(s):  Tereza Fowler MD         Anesthesia: Monitored Anesthesia Care    Patient is DNR/DNI, and after discussion with the family, the decision was made to maintain the DNR/DNI status during the procedure.    Implants:    Implant Name Type Inv. Item Serial No.  Lot No. LRB No. Used Action   DEV CLIP HEMO [id number] 235CM 17MM STRL - [id number] Implant DEV CLIP HEMO [id number] 235CM 17MM STRL  [employer] [employer] [id number] N/A 1 Implanted   DEV CLIP ENDO MANTIS 235CM 2.8MM - [id number] Implant DEV CLIP ENDO MANTIS 235CM 2.8MM  [employer] [id number] N/A 2 Implanted       Specimen:        See Below    No blood loss    Complications:  None     Description of Procedure:  Informed consent was obtained for the procedure, including sedation.  Risks of perforation, hemorrhage, adverse drug reaction and aspiration were discussed.  The patient was brought into the endoscopy suite. Continuous cardiopulmonary monitoring was performed. The patient was placed in the left lateral decubitus position.  The bite block was inserted into the patient's mouth. After adequate sedation was attained, the Olympus gastroscope was inserted into the patient's mouth and advanced to the second portion of the duodenum without difficulty.  Circumferential examination was performed. A retroflex exam was performed in the patient's stomach.  On completion of the exam, the bowel was decompressed, the scope was removed from the patient, the patient tolerated the procedure well, there were no immediate post-operative complications.      Examination of the esophagus showed normal mucosa, 1cm hiatal hernia  Examination of the stomach showed old blood in the stomach, normal mucosa  Retroflex examination of the stomach was normal   Examination of the duodenum showed deeply cratered 1.5 cm duodenal ulcer in the bulb with stigmata of recent bleeding and a pigmented spot.  The sclera needle was utilized to inject epinephrine x 4 cc followed by placement of 3 hemoclips, final closure was performed with a Mantis clip.      Impression:  Bleeding duodenal ulcer status post endoscopic hemostasis with epinephrine and Hemoclip    Recommendations:  PPI twice daily  Okay for aspirin  Recommend cardiology consult, previously followed by Dr. Domínguez, to help manage antiplatelet-anticoagulation given significant cardiac comorbidities.   Recommend holding Eliquis for 5 days.  Timing of resuming Plavix to be weighed very carefully, he recently had a STEMI but was admitted with a GI bleed.  If no significant rebleeding, consider resuming Plavix in the next 1 to 2 days.        Tereza Fowler MD     Date: 12/22/2023  Time: 12:43 EST

## 2023-12-22 NOTE — PLAN OF CARE
Problem: Adult Inpatient Plan of Care  Goal: Plan of Care Review  Outcome: Ongoing, Progressing  Goal: Patient-Specific Goal (Individualized)  Outcome: Ongoing, Progressing  Goal: Absence of Hospital-Acquired Illness or Injury  Outcome: Ongoing, Progressing  Intervention: Identify and Manage Fall Risk  Recent Flowsheet Documentation  Taken 12/22/2023 1400 by Gage, Akosua A, LPN  Safety Promotion/Fall Prevention:   activity supervised   assistive device/personal items within reach   clutter free environment maintained   fall prevention program maintained   nonskid shoes/slippers when out of bed   room organization consistent   safety round/check completed  Intervention: Prevent and Manage VTE (Venous Thromboembolism) Risk  Recent Flowsheet Documentation  Taken 12/22/2023 1400 by Akosua Kwan LPN  VTE Prevention/Management:   bilateral   sequential compression devices off   patient refused intervention  Intervention: Prevent Infection  Recent Flowsheet Documentation  Taken 12/22/2023 1400 by Akosua Kwan LPN  Infection Prevention: hand hygiene promoted  Goal: Optimal Comfort and Wellbeing  Outcome: Ongoing, Progressing  Intervention: Provide Person-Centered Care  Recent Flowsheet Documentation  Taken 12/22/2023 1400 by Akosua Kwan LPN  Trust Relationship/Rapport:   care explained   thoughts/feelings acknowledged  Goal: Readiness for Transition of Care  Outcome: Ongoing, Progressing     Problem: Adjustment to Illness (Gastrointestinal Bleeding)  Goal: Optimal Coping with Acute Illness  Outcome: Ongoing, Progressing  Intervention: Optimize Psychosocial Response  Recent Flowsheet Documentation  Taken 12/22/2023 1400 by Akosua Kwan LPN  Supportive Measures: active listening utilized     Problem: Bleeding (Gastrointestinal Bleeding)  Goal: Hemostasis  Outcome: Ongoing, Progressing  Intervention: Manage Gastrointestinal Bleeding  Recent Flowsheet Documentation  Taken 12/22/2023 1400 by  Akosua Kwan LPN  Environmental Support: calm environment promoted   Goal Outcome Evaluation:

## 2023-12-22 NOTE — PLAN OF CARE
Goal Outcome Evaluation:      VSS on room air, wants to drink- not happy- npo for am EGD, no stools this shift, turn q 2, oral care

## 2023-12-23 PROBLEM — K92.2 GI BLEED: Status: ACTIVE | Noted: 2023-12-23

## 2023-12-23 LAB
ALBUMIN SERPL-MCNC: 3.1 G/DL (ref 3.5–5.2)
ALBUMIN/GLOB SERPL: 1.7 G/DL
ALP SERPL-CCNC: 69 U/L (ref 39–117)
ALT SERPL W P-5'-P-CCNC: 18 U/L (ref 1–41)
ANION GAP SERPL CALCULATED.3IONS-SCNC: 10 MMOL/L (ref 5–15)
AST SERPL-CCNC: 16 U/L (ref 1–40)
BILIRUB SERPL-MCNC: 0.3 MG/DL (ref 0–1.2)
BUN SERPL-MCNC: 89 MG/DL (ref 8–23)
BUN/CREAT SERPL: 53.6 (ref 7–25)
CALCIUM SPEC-SCNC: 8.5 MG/DL (ref 8.2–9.6)
CHLORIDE SERPL-SCNC: 104 MMOL/L (ref 98–107)
CO2 SERPL-SCNC: 24 MMOL/L (ref 22–29)
CREAT SERPL-MCNC: 1.66 MG/DL (ref 0.76–1.27)
DEPRECATED RDW RBC AUTO: 50.8 FL (ref 37–54)
EGFRCR SERPLBLD CKD-EPI 2021: 38.2 ML/MIN/1.73
ERYTHROCYTE [DISTWIDTH] IN BLOOD BY AUTOMATED COUNT: 14.7 % (ref 12.3–15.4)
GLOBULIN UR ELPH-MCNC: 1.8 GM/DL
GLUCOSE SERPL-MCNC: 171 MG/DL (ref 65–99)
HCT VFR BLD AUTO: 18.8 % (ref 37.5–51)
HCT VFR BLD AUTO: 31.6 % (ref 37.5–51)
HGB BLD-MCNC: 10.3 G/DL (ref 13–17.7)
HGB BLD-MCNC: 6.1 G/DL (ref 13–17.7)
LARGE PLATELETS: ABNORMAL
LYMPHOCYTES # BLD MANUAL: 1.67 10*3/MM3 (ref 0.7–3.1)
LYMPHOCYTES NFR BLD MANUAL: 4 % (ref 5–12)
MAGNESIUM SERPL-MCNC: 2.2 MG/DL (ref 1.7–2.3)
MCH RBC QN AUTO: 32 PG (ref 26.6–33)
MCHC RBC AUTO-ENTMCNC: 32.6 G/DL (ref 31.5–35.7)
MCV RBC AUTO: 98.2 FL (ref 79–97)
MONOCYTES # BLD: 1.34 10*3/MM3 (ref 0.1–0.9)
NEUTROPHILS # BLD AUTO: 30.39 10*3/MM3 (ref 1.7–7)
NEUTROPHILS NFR BLD MANUAL: 86 % (ref 42.7–76)
NEUTS BAND NFR BLD MANUAL: 5 % (ref 0–5)
NRBC SPEC MANUAL: 2 /100 WBC (ref 0–0.2)
PHOSPHATE SERPL-MCNC: 2.6 MG/DL (ref 2.5–4.5)
PLATELET # BLD AUTO: 189 10*3/MM3 (ref 140–450)
PMV BLD AUTO: 12 FL (ref 6–12)
POTASSIUM SERPL-SCNC: 4 MMOL/L (ref 3.5–5.2)
PROT SERPL-MCNC: 4.9 G/DL (ref 6–8.5)
RBC # BLD AUTO: 1.91 10*6/MM3 (ref 4.14–5.8)
RBC MORPH BLD: NORMAL
SCAN SLIDE: NORMAL
SODIUM SERPL-SCNC: 138 MMOL/L (ref 136–145)
TOXIC GRANULATION: ABNORMAL
VARIANT LYMPHS NFR BLD MANUAL: 5 % (ref 19.6–45.3)
WBC NRBC COR # BLD AUTO: 33.4 10*3/MM3 (ref 3.4–10.8)

## 2023-12-23 PROCEDURE — 25010000002 METOCLOPRAMIDE PER 10 MG: Performed by: INTERNAL MEDICINE

## 2023-12-23 PROCEDURE — 36430 TRANSFUSION BLD/BLD COMPNT: CPT

## 2023-12-23 PROCEDURE — 85018 HEMOGLOBIN: CPT | Performed by: INTERNAL MEDICINE

## 2023-12-23 PROCEDURE — 36415 COLL VENOUS BLD VENIPUNCTURE: CPT | Performed by: INTERNAL MEDICINE

## 2023-12-23 PROCEDURE — 80053 COMPREHEN METABOLIC PANEL: CPT | Performed by: NURSE PRACTITIONER

## 2023-12-23 PROCEDURE — 85014 HEMATOCRIT: CPT | Performed by: INTERNAL MEDICINE

## 2023-12-23 PROCEDURE — 85007 BL SMEAR W/DIFF WBC COUNT: CPT | Performed by: NURSE PRACTITIONER

## 2023-12-23 PROCEDURE — 25010000002 ONDANSETRON PER 1 MG: Performed by: NURSE PRACTITIONER

## 2023-12-23 PROCEDURE — 86900 BLOOD TYPING SEROLOGIC ABO: CPT

## 2023-12-23 PROCEDURE — 99232 SBSQ HOSP IP/OBS MODERATE 35: CPT | Performed by: INTERNAL MEDICINE

## 2023-12-23 PROCEDURE — 25010000002 MORPHINE PER 10 MG: Performed by: HOSPITALIST

## 2023-12-23 PROCEDURE — 25010000002 CEFTRIAXONE PER 250 MG: Performed by: NURSE PRACTITIONER

## 2023-12-23 PROCEDURE — 84100 ASSAY OF PHOSPHORUS: CPT | Performed by: NURSE PRACTITIONER

## 2023-12-23 PROCEDURE — 83735 ASSAY OF MAGNESIUM: CPT | Performed by: NURSE PRACTITIONER

## 2023-12-23 PROCEDURE — 85025 COMPLETE CBC W/AUTO DIFF WBC: CPT | Performed by: NURSE PRACTITIONER

## 2023-12-23 PROCEDURE — P9016 RBC LEUKOCYTES REDUCED: HCPCS

## 2023-12-23 RX ORDER — METOCLOPRAMIDE HYDROCHLORIDE 5 MG/ML
5 INJECTION INTRAMUSCULAR; INTRAVENOUS 2 TIMES DAILY
Status: DISCONTINUED | OUTPATIENT
Start: 2023-12-23 | End: 2023-12-27

## 2023-12-23 RX ADMIN — ONDANSETRON 4 MG: 2 INJECTION INTRAMUSCULAR; INTRAVENOUS at 05:17

## 2023-12-23 RX ADMIN — MORPHINE SULFATE 0.52 MG: 4 INJECTION, SOLUTION INTRAMUSCULAR; INTRAVENOUS at 10:16

## 2023-12-23 RX ADMIN — Medication 10 ML: at 21:00

## 2023-12-23 RX ADMIN — METOCLOPRAMIDE 5 MG: 5 INJECTION, SOLUTION INTRAMUSCULAR; INTRAVENOUS at 10:17

## 2023-12-23 RX ADMIN — CEFTRIAXONE 1000 MG: 1 INJECTION, POWDER, FOR SOLUTION INTRAMUSCULAR; INTRAVENOUS at 13:14

## 2023-12-23 RX ADMIN — METOCLOPRAMIDE 5 MG: 5 INJECTION, SOLUTION INTRAMUSCULAR; INTRAVENOUS at 20:59

## 2023-12-23 RX ADMIN — PANTOPRAZOLE SODIUM 40 MG: 40 INJECTION, POWDER, FOR SOLUTION INTRAVENOUS at 20:59

## 2023-12-23 RX ADMIN — PANTOPRAZOLE SODIUM 40 MG: 40 INJECTION, POWDER, FOR SOLUTION INTRAVENOUS at 10:17

## 2023-12-23 NOTE — PROGRESS NOTES
" LOS: 0 days   Patient Care Team:  Tricia Aldana APRN as PCP - General (Family Medicine)  Missy Domínguez MD as Consulting Physician (Cardiology)      Subjective   \"Not so good\"    Interval History:   12/22/2023 EGD (Dr. Fowler) bleeding duodenal ulcer status post epinephrine and hemoclips x 3 followed by mantis clip.  1 cm hiatal hernia.  Labs: Sodium potassium are normal.  Creatinine 1.66.  LFTs are normal.  White blood cell count up to 33.4 from 18.3 yesterday hemoglobin 6.1 down from 8.7 yesterday, platelets 189, 5 bands.  Had some decreased blood pressure and confusion overnight.  Family states they were called in at 4 AM.  Had 1 dark-colored bowel movement overnight.    ROS: Weakness and fatigue  No chest pain, shortness of breath, or cough.         Medication Review:     Current Facility-Administered Medications:     acetaminophen (TYLENOL) tablet 650 mg, 650 mg, Oral, Q4H PRN **OR** acetaminophen (TYLENOL) 160 MG/5ML oral solution 650 mg, 650 mg, Oral, Q4H PRN **OR** acetaminophen (TYLENOL) suppository 650 mg, 650 mg, Rectal, Q4H PRN, Tereza Fowler MD    [Held by provider] apixaban (ELIQUIS) tablet 2.5 mg, 2.5 mg, Oral, Q12H, Kulwinder Caballero APRN    [Held by provider] aspirin chewable tablet 81 mg, 81 mg, Oral, Daily, Kulwinder Caballero APRN    atorvastatin (LIPITOR) tablet 10 mg, 10 mg, Oral, Nightly, Tereza Fowler MD, 10 mg at 12/21/23 2019    sennosides-docusate (PERICOLACE) 8.6-50 MG per tablet 2 tablet, 2 tablet, Oral, BID, 2 tablet at 12/22/23 0903 **AND** polyethylene glycol (MIRALAX) packet 17 g, 17 g, Oral, Daily PRN **AND** bisacodyl (DULCOLAX) EC tablet 5 mg, 5 mg, Oral, Daily PRN **AND** bisacodyl (DULCOLAX) suppository 10 mg, 10 mg, Rectal, Daily PRN, Neno Fowlerri Bizer, MD    Calcium Replacement - Follow Nurse / BPA Driven Protocol, , Does not apply, Janeth DE LUNA Emori Bizer, MD    cefTRIAXone (ROCEPHIN) 1,000 mg in sodium chloride 0.9 % 100 mL IVPB, 1,000 mg, " Intravenous, Q24H, Kulwinder Caballero APRN, Stopped at 12/22/23 0934    [Held by provider] clopidogrel (PLAVIX) tablet 75 mg, 75 mg, Oral, Daily, Kulwinder Caballero APRN    dextrose 5 % and sodium chloride 0.45 % infusion, 75 mL/hr, Intravenous, Continuous, Anthony Ambriz MD, Last Rate: 75 mL/hr at 12/22/23 1703, 75 mL/hr at 12/22/23 1703    gabapentin (NEURONTIN) capsule 100 mg, 100 mg, Oral, BID, Tereza Fowler MD, 100 mg at 12/22/23 0903    [Held by provider] losartan (COZAAR) tablet 25 mg, 25 mg, Oral, Daily, Kulwinder Caballero APRN    Magnesium Cardiology Dose Replacement - Follow Nurse / BPA Driven Protocol, , Does not apply, PRNJaneth Emori Bizer, MD    metoclopramide (REGLAN) injection 5 mg, 5 mg, Intravenous, BID, Joselito Hanna MD    morphine injection 0.52 mg, 0.52 mg, Intravenous, Q4H PRN, Juan Shaffer MD    nitroglycerin (NITROSTAT) SL tablet 0.4 mg, 0.4 mg, Sublingual, Q5 Min PRN, Tereza Fowler MD    ondansetron (ZOFRAN) tablet 4 mg, 4 mg, Oral, Q6H PRN **OR** ondansetron (ZOFRAN) injection 4 mg, 4 mg, Intravenous, Q6H PRN, Tereza Fowler MD, 4 mg at 12/23/23 0517    pantoprazole (PROTONIX) injection 40 mg, 40 mg, Intravenous, Q12H, Tereza Fowler MD, 40 mg at 12/22/23 2151    Phosphorus Replacement - Follow Nurse / BPA Driven Protocol, , Does not apply, Janeth DE LUNA Emori Bizer, MD    Potassium Replacement - Follow Nurse / BPA Driven Protocol, , Does not apply, Janeth DE LUNA Emori Bizer, MD    [COMPLETED] Insert Peripheral IV, , , Once **AND** sodium chloride 0.9 % flush 10 mL, 10 mL, Intravenous, PRN, Tereza Fowler MD    sodium chloride 0.9 % flush 10 mL, 10 mL, Intravenous, Q12H, Tereza Fowler MD, 10 mL at 12/22/23 2152    sodium chloride 0.9 % flush 10 mL, 10 mL, Intravenous, PRN, Tereza Fowler MD    sodium chloride 0.9 % infusion 40 mL, 40 mL, Intravenous, PRN, Tereza Fowler MD    tamsulosin (FLOMAX) 24 hr capsule 0.4 mg, 0.4 mg,  Oral, Nightly, Tereza Fowler MD, 0.4 mg at 12/21/23 2020    Current Outpatient Medications:     acetaminophen (TYLENOL) 650 MG 8 hr tablet, Take 1 tablet by mouth Every Night., Disp: , Rfl:     apixaban (ELIQUIS) 2.5 MG tablet tablet, Take 1 tablet by mouth Every 12 (Twelve) Hours for 30 days. Indications: Atrial Fibrillation, Disp: 60 tablet, Rfl: 0    aspirin 81 MG chewable tablet, Chew 1 tablet Daily for 14 days., Disp: 14 tablet, Rfl: 0    atorvastatin (LIPITOR) 10 MG tablet, Take 1 tablet by mouth Every Night for 30 days., Disp: 30 tablet, Rfl: 0    azelastine (ASTEPRO) 0.15 % solution nasal spray, USE 2 SPRAYS IN EACH NOSTRIL TWICE DAILY AS DIRECTED BY PROVIDER, Disp: 30 mL, Rfl: 3    bumetanide (BUMEX) 1 MG tablet, Take 1 tablet by mouth 3 (Three) Times a Day for 30 days., Disp: 90 tablet, Rfl: 0    Cholecalciferol 25 MCG (1000 UT) tablet, Take 1 tablet by mouth Daily., Disp: , Rfl:     clopidogrel (PLAVIX) 75 MG tablet, Take 1 tablet by mouth Daily for 30 days., Disp: 30 tablet, Rfl: 0    docusate sodium (COLACE) 250 MG capsule, Take 1 capsule by mouth Daily., Disp: , Rfl:     gabapentin (NEURONTIN) 100 MG capsule, TAKE 1 CAPSULE BY MOUTH TWICE DAILY, Disp: 180 capsule, Rfl: 1    losartan (COZAAR) 25 MG tablet, Take 1 tablet by mouth Daily., Disp: , Rfl:     Mouthwashes (Biotene dry mouth) liquid liquid, Take 15 mL by mouth 3 (Three) Times a Day As Needed for Dry Mouth., Disp: , Rfl:     pantoprazole (PROTONIX) 40 MG EC tablet, Take 1 tablet by mouth Daily., Disp: , Rfl:     potassium chloride 10 MEQ CR tablet, Take 1 tablet by mouth Daily., Disp: , Rfl:     tamsulosin (FLOMAX) 0.4 MG capsule 24 hr capsule, TAKE 1 CAPSULE BY MOUTH DAILY, Disp: 90 capsule, Rfl: 0      Objective resting in the hospital bed.  Malaised appearing, weak.  Chronically ill-appearing.  Family at bedside.    Vital Signs  Vitals:    12/23/23 0615 12/23/23 0714 12/23/23 0734 12/23/23 0749   BP: (!) 70/50 112/87 112/80 (!) 86/64    BP Location: Right arm      Patient Position: Lying      Pulse: 119 117 115 118   Resp: (!) 38 (!) 40 (!) 40 (!) 38   Temp: 97.6 °F (36.4 °C) 97.7 °F (36.5 °C) 97.7 °F (36.5 °C) 97.7 °F (36.5 °C)   TempSrc: Rectal Rectal Rectal Rectal   SpO2:  90% 100% 90%   Weight:       Height:           Physical Exam:    General Appearance:    Awake and alert, in no acute distress   Head:    Normocephalic, without obvious abnormality   Eyes:          Conjunctivae normal, anicteric sclerae   Ears:    Hearing intact   Throat:   No oral lesions, no thrush, oral mucosa moist   Neck:   No adenopathy, supple, no JVD   Lungs:      respirations regular, even and unlabored       Abdomen:     soft, nontender, no rebound or guarding, nondistended, no hepatosplenomegaly   Rectal:     Deferred   Extremities:   No edema, no cyanosis   Skin:   No bruising or rash, no jaundice.  Pale   Neurologic:   Cranial nerves 2 - 12 grossly intact, no asterixis        Results Review:    Lab Results (last 24 hours)       Procedure Component Value Units Date/Time    CBC & Differential [[id number]]  (Abnormal) Collected: 12/23/23 0532    Specimen: Blood from Arm, Left Updated: 12/23/23 0635    Narrative:      The following orders were created for panel order CBC & Differential.  Procedure                               Abnormality         Status                     ---------                               -----------         ------                     CBC Auto Differential[[id number]]        Abnormal            Final result               Scan Slide[[id number]]                                       Final result                 Please view results for these tests on the individual orders.    CBC Auto Differential [[id number]]  (Abnormal) Collected: 12/23/23 0532    Specimen: Blood from Arm, Left Updated: 12/23/23 0635     WBC 33.40 10*3/mm3      RBC 1.91 10*6/mm3      Hemoglobin 6.1 g/dL      Hematocrit 18.8 %      MCV 98.2 fL      MCH 32.0 pg      MCHC 32.6 g/dL       RDW 14.7 %      RDW-SD 50.8 fl      MPV 12.0 fL      Platelets 189 10*3/mm3     Narrative:      The previously reported component NRBC is no longer being reported. Previous result was 0.0 /100 WBC (Reference Range: 0.0-0.2 /100 WBC) on 12/23/2023 at 0612 EST.    Scan Slide [[id number]] Collected: 12/23/23 0532    Specimen: Blood from Arm, Left Updated: 12/23/23 0635     Scan Slide --     Comment: See Manual Differential Results       Manual Differential [[id number]]  (Abnormal) Collected: 12/23/23 0532    Specimen: Blood from Arm, Left Updated: 12/23/23 0635     Neutrophil % 86.0 %      Lymphocyte % 5.0 %      Monocyte % 4.0 %      Bands %  5.0 %      Neutrophils Absolute 30.39 10*3/mm3      Lymphocytes Absolute 1.67 10*3/mm3      Monocytes Absolute 1.34 10*3/mm3      nRBC 2.0 /100 WBC      RBC Morphology Normal     Toxic Granulation Slight/1+     Large Platelets Slight/1+    Magnesium [[id number]]  (Normal) Collected: 12/23/23 0035    Specimen: Blood Updated: 12/23/23 0105     Magnesium 2.2 mg/dL     Phosphorus [[id number]]  (Normal) Collected: 12/23/23 0035    Specimen: Blood Updated: 12/23/23 0105     Phosphorus 2.6 mg/dL     Comprehensive Metabolic Panel [[id number]]  (Abnormal) Collected: 12/23/23 0035    Specimen: Blood Updated: 12/23/23 0105     Glucose 171 mg/dL      BUN 89 mg/dL      Creatinine 1.66 mg/dL      Sodium 138 mmol/L      Potassium 4.0 mmol/L      Comment: Slight hemolysis detected by analyzer. Result may be falsely elevated.        Chloride 104 mmol/L      CO2 24.0 mmol/L      Calcium 8.5 mg/dL      Total Protein 4.9 g/dL      Albumin 3.1 g/dL      ALT (SGPT) 18 U/L      AST (SGOT) 16 U/L      Alkaline Phosphatase 69 U/L      Total Bilirubin 0.3 mg/dL      Globulin 1.8 gm/dL      A/G Ratio 1.7 g/dL      BUN/Creatinine Ratio 53.6     Anion Gap 10.0 mmol/L      eGFR 38.2 mL/min/1.73     Narrative:      GFR Normal >60  Chronic Kidney Disease <60  Kidney Failure <15    The GFR formula is only valid  for adults with stable renal function between ages 18 and 70.    Blood Culture - Blood, Arm, Right [[id number]]  (Normal) Collected: 12/21/23 0950    Specimen: Blood from Arm, Right Updated: 12/22/23 1001     Blood Culture No growth at 24 hours    Blood Culture - Blood, Arm, Left [[id number]]  (Normal) Collected: 12/21/23 0924    Specimen: Blood from Arm, Left Updated: 12/22/23 0930     Blood Culture No growth at 24 hours            Imaging Results (Last 24 Hours)       ** No results found for the last 24 hours. **              Assessment & Plan   ASSESSMENT:  Melena  Mild normocytic anemia with acute GI blood loss  Leukocytosis  ЕЛЕНА  Recent STEMI/CAD with stent (12/3/23) -Plavix and aspirin  History of CABG/A-fib -Eliquis  Heart failure -EF 36 to 40%  Recent bronchitis vs pneumonia  History of cholecystectomy    12/22/2023 EGD (Dr. Fowler) bleeding duodenal ulcer status post epinephrine and hemoclips x 3 followed by mantis clip.  1 cm hiatal hernia.     PLAN:  Patient is a 89-year-old male with recent hospitalization with STEMI requiring stent placement and a history of A-fib and heart failure on Plavix/aspirin and Eliquis.  He presents from the nursing facility with melena. Per pharmacist, last dose of Eliquis, aspirin and Plavix were 12/20/23.    Underwent endoscopy as noted above yesterday.  Hemoglobin did drop to 6.1 overnight could be equilibration from procedure and IV fluids yesterday.  Agree with transfer of packed red blood cells up to 7 and monitor.    Continue IV PPI twice a day.    Pending speech therapy to evaluate for safety of oral intake.  No signs of active bleeding.  Only had 1 dark-colored firm stool overnight.     I discussed the patients findings and my recommendations with the patient.    Hortensia Mejía, APRN  12/23/23  09:08 EST

## 2023-12-23 NOTE — PROGRESS NOTES
Referring Provider: Juan Shaffer MD    Reason for follow-up:  Melena.  Status post stent  Status post inferior STEMI     Patient Care Team:  Tricia Aldana APRN as PCP - General (Family Medicine)  Missy Domínguez MD as Consulting Physician (Cardiology)    Subjective .      ROS    Since I have last seen, the patient has been without any chest discomfort ,shortness of breath, palpitations, dizziness or syncope.  Denies having any headache ,abdominal pain ,nausea, vomiting , diarrhea constipation, loss of weight or loss of appetite.  Denies having any excessive bruising ,hematuria.    Review of all systems negative except as indicated    History  Past Medical History:   Diagnosis Date    Anxiety 2014    Atrial fibrillation     Benign prostatic hyperplasia     CAD (coronary artery disease)     Hyperlipidemia     Hypertension     Myocardial infarction        Past Surgical History:   Procedure Laterality Date    CARDIAC CATHETERIZATION N/A 12/1/2023    Procedure: Left Heart Cath;  Surgeon: Son العلي MD;  Location: [facility] CATH INVASIVE LOCATION;  Service: Cardiovascular;  Laterality: N/A;    CARDIAC CATHETERIZATION N/A 12/1/2023    Procedure: Coronary angiography;  Surgeon: Son العلي MD;  Location: [facility] CATH INVASIVE LOCATION;  Service: Cardiovascular;  Laterality: N/A;    CARDIAC CATHETERIZATION N/A 12/1/2023    Procedure: Percutaneous Coronary Intervention;  Surgeon: Son العلي MD;  Location: [facility] CATH INVASIVE LOCATION;  Service: Cardiovascular;  Laterality: N/A;    CARDIAC ELECTROPHYSIOLOGY PROCEDURE N/A 12/1/2023    Procedure: Temporary Pacemaker;  Surgeon: Son العلي MD;  Location: [facility] CATH INVASIVE LOCATION;  Service: Cardiovascular;  Laterality: N/A;    CARDIAC SURGERY      HERNIA REPAIR         Family History   Problem Relation Age of Onset    Heart disease Mother     Heart disease Father        Social History     Tobacco Use    Smoking status: Former     Types: Cigarettes      Quit date: 1970     Years since quittin.3    Smokeless tobacco: Never    Tobacco comments:     more than 50yrs   Vaping Use    Vaping Use: Never used   Substance Use Topics    Alcohol use: No    Drug use: No        (Not in a hospital admission)      Allergies  Iodine, Levofloxacin, Nitroglycerin, Paroxetine, Penicillin g, Prednisone, Sucralfate, Diltiazem hcl, Metoprolol, Pramipexole dihydrochloride, and Ropinirole hcl    Scheduled Meds:[Held by provider] apixaban, 2.5 mg, Oral, Q12H  [Held by provider] aspirin, 81 mg, Oral, Daily  atorvastatin, 10 mg, Oral, Nightly  cefTRIAXone, 1,000 mg, Intravenous, Q24H  [Held by provider] clopidogrel, 75 mg, Oral, Daily  gabapentin, 100 mg, Oral, BID  [Held by provider] losartan, 25 mg, Oral, Daily  metoclopramide, 5 mg, Intravenous, BID  pantoprazole, 40 mg, Intravenous, Q12H  senna-docusate sodium, 2 tablet, Oral, BID  sodium chloride, 10 mL, Intravenous, Q12H  tamsulosin, 0.4 mg, Oral, Nightly      Continuous Infusions:dextrose 5 % and sodium chloride 0.45 %, 75 mL/hr, Last Rate: 75 mL/hr (23 1703)      PRN Meds:.  acetaminophen **OR** acetaminophen **OR** acetaminophen    senna-docusate sodium **AND** polyethylene glycol **AND** bisacodyl **AND** bisacodyl    Calcium Replacement - Follow Nurse / BPA Driven Protocol    Magnesium Cardiology Dose Replacement - Follow Nurse / BPA Driven Protocol    nitroglycerin    ondansetron **OR** ondansetron    Phosphorus Replacement - Follow Nurse / BPA Driven Protocol    Potassium Replacement - Follow Nurse / BPA Driven Protocol    [COMPLETED] Insert Peripheral IV **AND** sodium chloride    sodium chloride    sodium chloride    Objective     VITAL SIGNS  Vitals:    23 0554 23 0615 23 0714 23 0734   BP: (!) 80/58 (!) 70/50 112/87    BP Location: Right arm Right arm     Patient Position: Lying Lying     Pulse: 108 119 117    Resp:  (!) 38 (!) 40    Temp:  97.6 °F (36.4 °C) 97.7 °F (36.5 °C)    TempSrc:  " Rectal Rectal    SpO2:   90% 100%   Weight:       Height:           Flowsheet Rows      Flowsheet Row First Filed Value   Admission Height 177.8 cm (70\") Documented at 12/21/2023 0735   Admission Weight 71.7 kg (158 lb) Documented at 12/21/2023 0735            No intake or output data in the 24 hours ending 12/23/23 0754     TELEMETRY: Atrial fibrillation    Physical Exam:  The patient is alert, oriented and in no distress.  Vital signs as noted above.  Head and neck revealed no carotid bruits or jugular venous distention.  No thyromegaly or lymphadenopathy is present  Lungs clear.  No wheezing.  Breath sounds are normal bilaterally.  Heart normal first and second heart sounds.  No murmur. No precordial rub is present.  No gallop is present.  Abdomen soft and nontender.  No organomegaly is present.  Extremities with good peripheral pulses without any pedal edema.  Skin warm and dry.  Musculoskeletal system is grossly normal  CNS grossly normal    Reviewed and updated.    Results Review:   I reviewed the patient's new clinical results.  Lab Results (last 24 hours)       Procedure Component Value Units Date/Time    CBC & Differential [[id number]]  (Abnormal) Collected: 12/23/23 0532    Specimen: Blood from Arm, Left Updated: 12/23/23 0635    Narrative:      The following orders were created for panel order CBC & Differential.  Procedure                               Abnormality         Status                     ---------                               -----------         ------                     CBC Auto Differential[[id number]]        Abnormal            Final result               Scan Slide[[id number]]                                       Final result                 Please view results for these tests on the individual orders.    CBC Auto Differential [[id number]]  (Abnormal) Collected: 12/23/23 0532    Specimen: Blood from Arm, Left Updated: 12/23/23 0635     WBC 33.40 10*3/mm3      RBC 1.91 10*6/mm3      " Hemoglobin 6.1 g/dL      Hematocrit 18.8 %      MCV 98.2 fL      MCH 32.0 pg      MCHC 32.6 g/dL      RDW 14.7 %      RDW-SD 50.8 fl      MPV 12.0 fL      Platelets 189 10*3/mm3     Narrative:      The previously reported component NRBC is no longer being reported. Previous result was 0.0 /100 WBC (Reference Range: 0.0-0.2 /100 WBC) on 12/23/2023 at 0612 EST.    Scan Slide [[id number]] Collected: 12/23/23 0532    Specimen: Blood from Arm, Left Updated: 12/23/23 0635     Scan Slide --     Comment: See Manual Differential Results       Manual Differential [[id number]]  (Abnormal) Collected: 12/23/23 0532    Specimen: Blood from Arm, Left Updated: 12/23/23 0635     Neutrophil % 86.0 %      Lymphocyte % 5.0 %      Monocyte % 4.0 %      Bands %  5.0 %      Neutrophils Absolute 30.39 10*3/mm3      Lymphocytes Absolute 1.67 10*3/mm3      Monocytes Absolute 1.34 10*3/mm3      nRBC 2.0 /100 WBC      RBC Morphology Normal     Toxic Granulation Slight/1+     Large Platelets Slight/1+    Magnesium [[id number]]  (Normal) Collected: 12/23/23 0035    Specimen: Blood Updated: 12/23/23 0105     Magnesium 2.2 mg/dL     Phosphorus [[id number]]  (Normal) Collected: 12/23/23 0035    Specimen: Blood Updated: 12/23/23 0105     Phosphorus 2.6 mg/dL     Comprehensive Metabolic Panel [[id number]]  (Abnormal) Collected: 12/23/23 0035    Specimen: Blood Updated: 12/23/23 0105     Glucose 171 mg/dL      BUN 89 mg/dL      Creatinine 1.66 mg/dL      Sodium 138 mmol/L      Potassium 4.0 mmol/L      Comment: Slight hemolysis detected by analyzer. Result may be falsely elevated.        Chloride 104 mmol/L      CO2 24.0 mmol/L      Calcium 8.5 mg/dL      Total Protein 4.9 g/dL      Albumin 3.1 g/dL      ALT (SGPT) 18 U/L      AST (SGOT) 16 U/L      Alkaline Phosphatase 69 U/L      Total Bilirubin 0.3 mg/dL      Globulin 1.8 gm/dL      A/G Ratio 1.7 g/dL      BUN/Creatinine Ratio 53.6     Anion Gap 10.0 mmol/L      eGFR 38.2 mL/min/1.73     Narrative:       GFR Normal >60  Chronic Kidney Disease <60  Kidney Failure <15    The GFR formula is only valid for adults with stable renal function between ages 18 and 70.    Blood Culture - Blood, Arm, Right [[id number]]  (Normal) Collected: 12/21/23 0950    Specimen: Blood from Arm, Right Updated: 12/22/23 1001     Blood Culture No growth at 24 hours    Blood Culture - Blood, Arm, Left [[id number]]  (Normal) Collected: 12/21/23 0924    Specimen: Blood from Arm, Left Updated: 12/22/23 0930     Blood Culture No growth at 24 hours    Magnesium [[id number]]  (Normal) Collected: 12/22/23 0715    Specimen: Blood Updated: 12/22/23 0818     Magnesium 2.3 mg/dL     Phosphorus [[id number]]  (Normal) Collected: 12/22/23 0715    Specimen: Blood Updated: 12/22/23 0816     Phosphorus 3.3 mg/dL     Comprehensive Metabolic Panel [[id number]]  (Abnormal) Collected: 12/22/23 0715    Specimen: Blood Updated: 12/22/23 0816     Glucose 129 mg/dL      BUN 89 mg/dL      Creatinine 1.56 mg/dL      Sodium 139 mmol/L      Potassium 3.6 mmol/L      Chloride 101 mmol/L      CO2 27.0 mmol/L      Calcium 8.8 mg/dL      Total Protein 5.0 g/dL      Albumin 3.3 g/dL      ALT (SGPT) 20 U/L      AST (SGOT) 18 U/L      Alkaline Phosphatase 75 U/L      Total Bilirubin 0.5 mg/dL      Globulin 1.7 gm/dL      A/G Ratio 1.9 g/dL      BUN/Creatinine Ratio 57.1     Anion Gap 11.0 mmol/L      eGFR 41.2 mL/min/1.73     Narrative:      GFR Normal >60  Chronic Kidney Disease <60  Kidney Failure <15    The GFR formula is only valid for adults with stable renal function between ages 18 and 70.    STAT Lactic Acid, Reflex [[id number]]  (Abnormal) Collected: 12/22/23 0714    Specimen: Blood Updated: 12/22/23 0813     Lactate 2.8 mmol/L     Protime-INR [[id number]]  (Abnormal) Collected: 12/22/23 0714    Specimen: Blood Updated: 12/22/23 0755     Protime 13.8 Seconds      INR 1.29            Imaging Results (Last 24 Hours)       ** No results found for the last 24 hours. **        LAB  RESULTS (LAST 7 DAYS)    CBC  Results from last 7 days   Lab Units 12/23/23  0532 12/22/23  0714 12/22/23  0113 12/21/23  1723 12/21/23  0748   WBC 10*3/mm3 33.40* 18.30*  --   --  19.70*   RBC 10*6/mm3 1.91* 2.71*  --   --  3.68*   HEMOGLOBIN g/dL 6.1* 8.7* 8.6* 10.1* 11.3*   HEMATOCRIT % 18.8* 26.2* 25.8* 30.5* 34.5*   MCV fL 98.2* 96.9  --   --  93.8   PLATELETS 10*3/mm3 189 169  --   --  229       BMP  Results from last 7 days   Lab Units 12/23/23  0035 12/22/23  0715 12/21/23  0748   SODIUM mmol/L 138 139 138   POTASSIUM mmol/L 4.0 3.6 3.7   CHLORIDE mmol/L 104 101 95*   CO2 mmol/L 24.0 27.0 28.0   BUN mg/dL 89* 89* 90*   CREATININE mg/dL 1.66* 1.56* 1.96*   GLUCOSE mg/dL 171* 129* 169*   MAGNESIUM mg/dL 2.2 2.3 1.7   PHOSPHORUS mg/dL 2.6 3.3 3.5       CMP   Results from last 7 days   Lab Units 12/23/23  0035 12/22/23  0715 12/21/23  0748   SODIUM mmol/L 138 139 138   POTASSIUM mmol/L 4.0 3.6 3.7   CHLORIDE mmol/L 104 101 95*   CO2 mmol/L 24.0 27.0 28.0   BUN mg/dL 89* 89* 90*   CREATININE mg/dL 1.66* 1.56* 1.96*   GLUCOSE mg/dL 171* 129* 169*   ALBUMIN g/dL 3.1* 3.3* 3.7   BILIRUBIN mg/dL 0.3 0.5 0.7   ALK PHOS U/L 69 75 90   AST (SGOT) U/L 16 18 21   ALT (SGPT) U/L 18 20 21         BNP        TROPONIN        CoAg  Results from last 7 days   Lab Units 12/22/23  0714 12/21/23  0748   INR  1.29* 1.17*   APTT seconds  --  26.3*       Creatinine Clearance  Estimated Creatinine Clearance: 26.6 mL/min (A) (by C-G formula based on SCr of 1.66 mg/dL (H)).    ABG        Radiology  No radiology results for the last day        EKG      I personally viewed and interpreted the patient's EKG/Telemetry data:    ECHOCARDIOGRAM:    Results for orders placed during the hospital encounter of 12/01/23    Adult Transthoracic Echo Complete W/ Cont if Necessary Per Protocol    Interpretation Summary    Left ventricular ejection fraction appears to be 36 - 40%.    The left atrial cavity is moderately dilated.    Estimated right  ventricular systolic pressure from tricuspid regurgitation is normal (<35 mmHg).          STRESS TEST        Cardiolite (Tc-99m sestamibi) stress test    CARDIAC CATHETERIZATION  Results for orders placed during the hospital encounter of 12/01/23    Cardiac Catheterization/Vascular Study                OTHER:         Assessment & Plan     Principal Problem:    Melena      /////////////////////////  History  =============  - Recent melena.     - Inferior STEMI 12/4/2023.     - Status post PCI with PTCA, stent placement on Pronto catheter atherectomy to the graft to the distal right coronary artery.-12/3/2023-Dr. العلي     Cardiac catheterization 12/3/2023-Dr. العلي     Left Main %: 20 to 30%   Proximal LAD %: 100%  Mid/Distal LAD %: 100%  LCX %: Proximal 80% OM1 100%  Ramus:   RCA %: 100% after the PDA.  Lima %: LIMA to LAD was patent  SVG(s) %: SVG to the marginal branch is patent but has some 30 to 40% disease.  SVG to the diagonal branch is occluded.  SVG to the PDA is occluded.  SVG to the distal RCA is occluded but is the culprit lesion     -Past history of syncope-no further episodes.     -status post loop recorder placement.  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 cardiac catheterization 12/26/2014 revealed 60% distal circumflex and total LAD and right coronary arteries.  Lima to LAD was patent ( lima coming off the left vertebral artery).  SVG to diagonal   marginal and PDA were patent.  SVG to left ventricular branch was totally occluded (chronic)     - atrial fibrillation -has converted to sinus rhythm and maintaining sinus rhythm.  Recently patient was noted to have atrial dysrhythmia on the monitor with loop recorder.     - sinus bradycardia-asymptomatic     - status post acute inferior myocardial infarction prior to surgery requiring acute stent placement to right coronary artery ) complicated by ventricular fibrillation)     Echocardiogram 12/1/2023    Left ventricular  ejection fraction appears to be 36 - 40%.    The left atrial cavity is moderately dilated.    Estimated right ventricular systolic pressure from tricuspid regurgitation is normal (<35 mmHg).      -Dyslipidemia and hypertension     - lower extremity weakness.   Arterial Doppler study of the lower extremity is normal. -  improved .   arterial Doppler study of the lower extremity was normal     - status post cholecystectomy     - allergy to penicillin iodine and metoprolol (rash).  Intolerance to atenolol due to bradycardia.  Allergy to Levaquin and penicillin.  Intolerance to prednisone Nitropatch IV nitroglycerin and prednisone.  =================    Plan  ==============  Recent melena and GI bleed.  Patient had endoscopy today 12/22/2023 that revealed bleeding duodenal ulcer.  Patient had endoscopic hemostasis with epinephrine and Hemoclip.  Patient was started on PPI.  Patient is on antiplatelet therapy (Plavix) and anticoagulation for atrial fibrillation (Eliquis)  GI has suggested holding Eliquis for at least 5 days.  Hopefully Plavix can be restarted in the next 1 to 2 days with recent inferior STEMI and graft status.  Closely follow cardiovascular status.      Inferior STEMI 12/4/2023.     Status post PCI with PTCA, stent placement on Pronto catheter atherectomy to the graft to the distal right coronary artery.-12/3/2023-Dr. العلي     History of junctional bradycardia.  Temporary pacemaker was placed in the setting of inferior STEMI.  Temporary pacemaker was removed.     Past history of complete heart block.  Patient had temporary pacemaker which was removed.     Atrial fibrillation with controlled ventricular response.     Hypomagnesemia-better at 2.3.    Leukocytosis  WBC 33,000.    Anemia  Hemoglobin 6.1-12/23/2023    Renal dysfunction  BUN/creatinine 89/1.66-12/23/2023       Patient is off beta-blocker Coreg.     Echocardiogram 12/1/2023-as above     Patient is DNR DNI..     Medications were reviewed and  updated.  Current medications include atorvastatin and gabapentin  Pantoprazole.  Eliquis is on hold.  Plavix is on hold at this time.  Would like to restart soon possible.  Will discuss with GI service.    Reviewed and updated-12/23/2023    Further plan will depend on patient's progress.  //////////////////////////////////////        Missy Domínguez MD  12/23/23  07:54 EST

## 2023-12-23 NOTE — SIGNIFICANT NOTE
Pt showing signs of decline this AM. Increased RR, HR and decreased BP. AM labs show hgb now 6.1, WBC now 33.4, difficulty obtaining O2 sats.   Notified LEONEL Guerrero for hospitalist. Adv to give 2 U PRBC, contact family to see how aggressive they wish to be in his care, as well as RYAN FRANCIS.  Notified Dr. Hanna, RYAN FRANCIS the above. Also adv pt continues to have dry heaving. No BM since last PM and it was very small, but dark red/black. Dr. Hanna adv he believes pt is reequilibriating post GIB and EGD yesterday. Orders for Reglan IV BID to help with dryheaves and OK to adv diet once cleared by ST.   Spoke to daughter, Alicja, she adv that they would  not want to be too aggressive at this point. She is OK with the blood. Will come up to hospital.  Report given to Ariel douglas RN.

## 2023-12-23 NOTE — PLAN OF CARE
Goal Outcome Evaluation:  Plan of Care Reviewed With: patient        Progress: no change  Outcome Evaluation: VSS, medicated x1 for pain, turned q2h, remains NPO, oral care given q2h, tolerating sips of water. Continues to have dry heaves. turned q2h. awaiting VFSS to eat. EGD completed today. awaiting update to plan of care.

## 2023-12-23 NOTE — PROGRESS NOTES
"HOSPITALIST PROGRESS NOTE     12/23/2023      Clinical Summary / Reason for Hospitalization     Sage Flores is a 89 y.o. male admitted to hospital on 12/21/2023 with c/o Melena.      Admitted on 12/21/2023. Today is hospital day 0.     Subjective     Sage Flores seen and examined this morning.  Patient is comfortable.  He has altered level of orientation.  Answering simple questions.  Patient's family at bedside.       Interval / Overnight Issues     Objective     Vital Signs     /71   Pulse 100   Temp 97.6 °F (36.4 °C) (Rectal)   Resp 20   Ht 177.8 cm (70\")   Wt 67.6 kg (149 lb 0.5 oz)   SpO2 100%   BMI 21.38 kg/m²      Input / Output       Intake/Output Summary (Last 24 hours) at 12/23/2023 1356  Last data filed at 12/23/2023 1259  Gross per 24 hour   Intake 1158.75 ml   Output 400 ml   Net 758.75 ml        Physical Exam     General : Patient lying in bed in no acute distress.      HEENT : Normocephalic atraumatic. Neck supple, no JVP   PERRLA, EOM intact. Throat clear, mucous membranes moist   CVS : S1 + S2 regular, No R/M/G     Chest : Clear to auscultation bilaterally    Abdomen : Soft, nontender, bowel sounds are present, no organomegaly   Extremities : No clubbing, cyanosis, edema    Neurologic exam: Patient is somnolent but answering simple questions.     Current Medications     Scheduled Meds:[Held by provider] apixaban, 2.5 mg, Oral, Q12H  [Held by provider] aspirin, 81 mg, Oral, Daily  atorvastatin, 10 mg, Oral, Nightly  [Held by provider] clopidogrel, 75 mg, Oral, Daily  gabapentin, 100 mg, Oral, BID  [Held by provider] losartan, 25 mg, Oral, Daily  metoclopramide, 5 mg, Intravenous, BID  pantoprazole, 40 mg, Intravenous, Q12H  senna-docusate sodium, 2 tablet, Oral, BID  sodium chloride, 10 mL, Intravenous, Q12H  tamsulosin, 0.4 mg, Oral, Nightly      Continuous Infusions:dextrose 5 % and sodium chloride 0.45 %, 75 mL/hr, Last Rate: 75 mL/hr (12/22/23 1703)      PRN Meds:.  " acetaminophen **OR** acetaminophen **OR** acetaminophen    senna-docusate sodium **AND** polyethylene glycol **AND** bisacodyl **AND** bisacodyl    Calcium Replacement - Follow Nurse / BPA Driven Protocol    Magnesium Cardiology Dose Replacement - Follow Nurse / BPA Driven Protocol    Morphine    nitroglycerin    ondansetron **OR** ondansetron    Phosphorus Replacement - Follow Nurse / BPA Driven Protocol    Potassium Replacement - Follow Nurse / BPA Driven Protocol    [COMPLETED] Insert Peripheral IV **AND** sodium chloride    sodium chloride    sodium chloride      Labs & Imaging     Results from last 7 days   Lab Units 12/23/23  0532 12/23/23  0035   WBC 10*3/mm3 33.40*  --    HEMOGLOBIN g/dL 6.1*  --    HEMATOCRIT % 18.8*  --    PLATELETS 10*3/mm3 189  --    GLUCOSE mg/dL  --  171*   CREATININE mg/dL  --  1.66*   BUN mg/dL  --  89*   SODIUM mmol/L  --  138   POTASSIUM mmol/L  --  4.0   AST (SGOT) U/L  --  16   ALT (SGPT) U/L  --  18   ALK PHOS U/L  --  69   BILIRUBIN mg/dL  --  0.3   ANION GAP mmol/L  --  10.0       No radiology results for the last day    I reviewed the patient's new clinical results.    ASSESSMENT AND PLAN:    Sage Flores is a 89 y.o. male with past medical history of paroxysmal atrial fibrillation on chronic anticoagulation with apixaban, coronary artery disease, BPH, hyperlipidemia, hypertension, presented to emergency room with complaints of black stool and blood in the stool.  Initial hemoglobin on presentation was 11.3 and dropped to 8.6.  This morning hemoglobin is 6.1.  Patient is status post EGD showing which showed a bleeding duodenal ulcer and he had endoscopic hemostasis with epinephrine and Hemoclip.    Patient admitted with    Acute upper GI bleed with acute blood loss anemia: Patient is on Protonix.  He is status post EGD with findings of duodenal ulcer treated with endoscopic hemostasis with epinephrine and Hemoclip.   Continue Protonix.   Due to acute GI bleed and dropping  hemoglobin apixaban and Plavix are on hold.  Patient seen by cardiology.  Patient had recent PCI and stenting.  But unfortunately weighing the risks versus benefits we are currently holding the Plavix and apixaban.  Hemoglobin dropped to 6.1 this morning.  Patient is receiving PRBC transfusions.  GI and cardiology consult and follow-up appreciated    Leukocytosis: Likely reactive in the setting of acute GI bleed.  Monitor CBC and WBC    Dysphagia: Patient currently on gentle IV fluid hydration.    Acute kidney injury: Likely prerenal.  Patient received IV fluids.  Monitor creatinine.    History of CAD with recent ST elevation MI status post RCA stenting: Aspirin and Plavix are on hold.  Beta-blocker being held due to recent heart block.  Continue statin.    Chronic combined systolic and diastolic congestive heart failure: Currently compensated.  Holding Bumex and Cozaar at this time.  Last echocardiogram showing EF of 35%    Soft blood pressure: Medications are currently being held because of low/soft blood pressure.  Cozaar, bumetanide are on hold    BPH: Continue Flomax    DVT Prophylaxis:  SCDs due to GI bleed and severe anemia    I spoke to patient and his daughters at bedside, answered all their questions and concerns, communicated all test results, diagnosis, treatment and plan.    Given patient's age, multiple comorbidities, recent MI and acute GI bleed, family wanted no aggressive intervention and are open to keeping patient comfortable.  And patient is a DO NOT RESUSCITATE and DO NOT INTUBATE.    Code Status:   Code Status and Medical Interventions:   Ordered at: 12/21/23 1525     Medical Intervention Limits:    NO intubation (DNI)     Code Status (Patient has no pulse and is not breathing):    No CPR (Do Not Attempt to Resuscitate)     Medical Interventions (Patient has pulse or is breathing):    Limited Support     Disposition (Where, When):  TBD/ when medically ready     Primary Emergency Contact:  TODD BAIN     Copied text in this note has been reviewed and is accurate as of 12/23/2023.    Juan Shaffer MD   Hospitalist

## 2023-12-24 ENCOUNTER — INPATIENT HOSPITAL (OUTPATIENT)
Dept: URBAN - METROPOLITAN AREA HOSPITAL 84 | Facility: HOSPITAL | Age: 88
End: 2023-12-24

## 2023-12-24 DIAGNOSIS — K92.2 GASTROINTESTINAL HEMORRHAGE, UNSPECIFIED: ICD-10-CM

## 2023-12-24 LAB
ALBUMIN SERPL-MCNC: 3.2 G/DL (ref 3.5–5.2)
ALBUMIN SERPL-MCNC: 3.2 G/DL (ref 3.5–5.2)
ALBUMIN/GLOB SERPL: 1.8 G/DL
ALBUMIN/GLOB SERPL: 1.9 G/DL
ALP SERPL-CCNC: 70 U/L (ref 39–117)
ALP SERPL-CCNC: 78 U/L (ref 39–117)
ALT SERPL W P-5'-P-CCNC: 20 U/L (ref 1–41)
ALT SERPL W P-5'-P-CCNC: 32 U/L (ref 1–41)
ANION GAP SERPL CALCULATED.3IONS-SCNC: 11 MMOL/L (ref 5–15)
ANION GAP SERPL CALCULATED.3IONS-SCNC: 12 MMOL/L (ref 5–15)
ANISOCYTOSIS BLD QL: ABNORMAL
AST SERPL-CCNC: 19 U/L (ref 1–40)
AST SERPL-CCNC: 32 U/L (ref 1–40)
BH BB BLOOD EXPIRATION DATE: NORMAL
BH BB BLOOD EXPIRATION DATE: NORMAL
BH BB BLOOD TYPE BARCODE: 5100
BH BB BLOOD TYPE BARCODE: 5100
BH BB DISPENSE STATUS: NORMAL
BH BB DISPENSE STATUS: NORMAL
BH BB PRODUCT CODE: NORMAL
BH BB PRODUCT CODE: NORMAL
BH BB UNIT NUMBER: NORMAL
BH BB UNIT NUMBER: NORMAL
BILIRUB SERPL-MCNC: 0.5 MG/DL (ref 0–1.2)
BILIRUB SERPL-MCNC: 0.6 MG/DL (ref 0–1.2)
BUN SERPL-MCNC: 71 MG/DL (ref 8–23)
BUN SERPL-MCNC: 90 MG/DL (ref 8–23)
BUN/CREAT SERPL: 42 (ref 7–25)
BUN/CREAT SERPL: 52.6 (ref 7–25)
CALCIUM SPEC-SCNC: 8.7 MG/DL (ref 8.2–9.6)
CALCIUM SPEC-SCNC: 8.7 MG/DL (ref 8.2–9.6)
CHLORIDE SERPL-SCNC: 112 MMOL/L (ref 98–107)
CHLORIDE SERPL-SCNC: 113 MMOL/L (ref 98–107)
CO2 SERPL-SCNC: 25 MMOL/L (ref 22–29)
CO2 SERPL-SCNC: 27 MMOL/L (ref 22–29)
CREAT SERPL-MCNC: 1.69 MG/DL (ref 0.76–1.27)
CREAT SERPL-MCNC: 1.71 MG/DL (ref 0.76–1.27)
CROSSMATCH INTERPRETATION: NORMAL
CROSSMATCH INTERPRETATION: NORMAL
CRP SERPL-MCNC: 0.38 MG/DL (ref 0–0.5)
D-LACTATE SERPL-SCNC: 2.2 MMOL/L (ref 0.5–2)
DEPRECATED RDW RBC AUTO: 53.4 FL (ref 37–54)
EGFRCR SERPLBLD CKD-EPI 2021: 36.9 ML/MIN/1.73
EGFRCR SERPLBLD CKD-EPI 2021: 37.4 ML/MIN/1.73
ERYTHROCYTE [DISTWIDTH] IN BLOOD BY AUTOMATED COUNT: 16.6 % (ref 12.3–15.4)
GLOBULIN UR ELPH-MCNC: 1.7 GM/DL
GLOBULIN UR ELPH-MCNC: 1.8 GM/DL
GLUCOSE SERPL-MCNC: 103 MG/DL (ref 65–99)
GLUCOSE SERPL-MCNC: 108 MG/DL (ref 65–99)
HCT VFR BLD AUTO: 28.4 % (ref 37.5–51)
HGB BLD-MCNC: 9.2 G/DL (ref 13–17.7)
LARGE PLATELETS: ABNORMAL
LYMPHOCYTES # BLD MANUAL: 1.9 10*3/MM3 (ref 0.7–3.1)
LYMPHOCYTES NFR BLD MANUAL: 1 % (ref 5–12)
MAGNESIUM SERPL-MCNC: 2.1 MG/DL (ref 1.7–2.3)
MAGNESIUM SERPL-MCNC: 2.3 MG/DL (ref 1.7–2.3)
MCH RBC QN AUTO: 30.5 PG (ref 26.6–33)
MCHC RBC AUTO-ENTMCNC: 32.4 G/DL (ref 31.5–35.7)
MCV RBC AUTO: 94.2 FL (ref 79–97)
MONOCYTES # BLD: 0.32 10*3/MM3 (ref 0.1–0.9)
MYELOCYTES NFR BLD MANUAL: 2 % (ref 0–0)
NEUTROPHILS # BLD AUTO: 28.85 10*3/MM3 (ref 1.7–7)
NEUTROPHILS NFR BLD MANUAL: 86 % (ref 42.7–76)
NEUTS BAND NFR BLD MANUAL: 5 % (ref 0–5)
NRBC SPEC MANUAL: 1 /100 WBC (ref 0–0.2)
PHOSPHATE SERPL-MCNC: 2.2 MG/DL (ref 2.5–4.5)
PHOSPHATE SERPL-MCNC: 2.3 MG/DL (ref 2.5–4.5)
PLATELET # BLD AUTO: 149 10*3/MM3 (ref 140–450)
PMV BLD AUTO: 11.3 FL (ref 6–12)
POTASSIUM SERPL-SCNC: 3.1 MMOL/L (ref 3.5–5.2)
POTASSIUM SERPL-SCNC: 3.4 MMOL/L (ref 3.5–5.2)
PROT SERPL-MCNC: 4.9 G/DL (ref 6–8.5)
PROT SERPL-MCNC: 5 G/DL (ref 6–8.5)
RBC # BLD AUTO: 3.02 10*6/MM3 (ref 4.14–5.8)
SCAN SLIDE: NORMAL
SODIUM SERPL-SCNC: 149 MMOL/L (ref 136–145)
SODIUM SERPL-SCNC: 151 MMOL/L (ref 136–145)
UNIT  ABO: NORMAL
UNIT  ABO: NORMAL
UNIT  RH: NORMAL
UNIT  RH: NORMAL
VARIANT LYMPHS NFR BLD MANUAL: 6 % (ref 19.6–45.3)
WBC MORPH BLD: NORMAL
WBC NRBC COR # BLD AUTO: 31.7 10*3/MM3 (ref 3.4–10.8)

## 2023-12-24 PROCEDURE — 86140 C-REACTIVE PROTEIN: CPT | Performed by: HOSPITALIST

## 2023-12-24 PROCEDURE — 85025 COMPLETE CBC W/AUTO DIFF WBC: CPT | Performed by: INTERNAL MEDICINE

## 2023-12-24 PROCEDURE — 84100 ASSAY OF PHOSPHORUS: CPT | Performed by: INTERNAL MEDICINE

## 2023-12-24 PROCEDURE — 99232 SBSQ HOSP IP/OBS MODERATE 35: CPT | Mod: FS | Performed by: NURSE PRACTITIONER

## 2023-12-24 PROCEDURE — 80053 COMPREHEN METABOLIC PANEL: CPT | Performed by: INTERNAL MEDICINE

## 2023-12-24 PROCEDURE — 97162 PT EVAL MOD COMPLEX 30 MIN: CPT

## 2023-12-24 PROCEDURE — 85007 BL SMEAR W/DIFF WBC COUNT: CPT | Performed by: INTERNAL MEDICINE

## 2023-12-24 PROCEDURE — 36415 COLL VENOUS BLD VENIPUNCTURE: CPT | Performed by: INTERNAL MEDICINE

## 2023-12-24 PROCEDURE — 25010000002 METRONIDAZOLE 500 MG/100ML SOLUTION: Performed by: HOSPITALIST

## 2023-12-24 PROCEDURE — 83605 ASSAY OF LACTIC ACID: CPT | Performed by: HOSPITALIST

## 2023-12-24 PROCEDURE — 87040 BLOOD CULTURE FOR BACTERIA: CPT | Performed by: HOSPITALIST

## 2023-12-24 PROCEDURE — 83735 ASSAY OF MAGNESIUM: CPT | Performed by: INTERNAL MEDICINE

## 2023-12-24 PROCEDURE — 92526 ORAL FUNCTION THERAPY: CPT

## 2023-12-24 PROCEDURE — 25810000003 SODIUM CHLORIDE 0.9 % SOLUTION 500 ML FLEX CONT: Performed by: INTERNAL MEDICINE

## 2023-12-24 PROCEDURE — 25010000002 CEFTRIAXONE PER 250 MG: Performed by: HOSPITALIST

## 2023-12-24 PROCEDURE — 25010000002 METOCLOPRAMIDE PER 10 MG: Performed by: INTERNAL MEDICINE

## 2023-12-24 PROCEDURE — 99232 SBSQ HOSP IP/OBS MODERATE 35: CPT | Performed by: INTERNAL MEDICINE

## 2023-12-24 PROCEDURE — 25010000002 DIGOXIN PER 500 MCG: Performed by: INTERNAL MEDICINE

## 2023-12-24 RX ORDER — DIGOXIN 0.25 MG/ML
500 INJECTION INTRAMUSCULAR; INTRAVENOUS
Status: DISCONTINUED | OUTPATIENT
Start: 2023-12-24 | End: 2023-12-25

## 2023-12-24 RX ORDER — POTASSIUM CHLORIDE, DEXTROSE MONOHYDRATE 150; 5 MG/100ML; G/100ML
75 INJECTION, SOLUTION INTRAVENOUS CONTINUOUS
Status: DISPENSED | OUTPATIENT
Start: 2023-12-24 | End: 2023-12-24

## 2023-12-24 RX ORDER — DEXTROSE MONOHYDRATE 50 MG/ML
75 INJECTION, SOLUTION INTRAVENOUS CONTINUOUS
Status: DISCONTINUED | OUTPATIENT
Start: 2023-12-24 | End: 2023-12-24

## 2023-12-24 RX ORDER — METRONIDAZOLE 500 MG/100ML
500 INJECTION, SOLUTION INTRAVENOUS EVERY 8 HOURS
Qty: 600 ML | Refills: 0 | Status: COMPLETED | OUTPATIENT
Start: 2023-12-24 | End: 2023-12-26

## 2023-12-24 RX ORDER — LOSARTAN POTASSIUM 25 MG/1
25 TABLET ORAL DAILY
Status: DISCONTINUED | OUTPATIENT
Start: 2023-12-24 | End: 2023-12-28 | Stop reason: HOSPADM

## 2023-12-24 RX ORDER — BUMETANIDE 1 MG/1
1 TABLET ORAL
Status: DISCONTINUED | OUTPATIENT
Start: 2023-12-24 | End: 2023-12-28 | Stop reason: HOSPADM

## 2023-12-24 RX ADMIN — Medication 10 ML: at 20:15

## 2023-12-24 RX ADMIN — PANTOPRAZOLE SODIUM 40 MG: 40 INJECTION, POWDER, FOR SOLUTION INTRAVENOUS at 08:08

## 2023-12-24 RX ADMIN — PANTOPRAZOLE SODIUM 40 MG: 40 INJECTION, POWDER, FOR SOLUTION INTRAVENOUS at 20:14

## 2023-12-24 RX ADMIN — METRONIDAZOLE 500 MG: 500 INJECTION, SOLUTION INTRAVENOUS at 15:35

## 2023-12-24 RX ADMIN — BUMETANIDE 1 MG: 1 TABLET ORAL at 18:00

## 2023-12-24 RX ADMIN — SENNOSIDES AND DOCUSATE SODIUM 2 TABLET: 50; 8.6 TABLET ORAL at 20:14

## 2023-12-24 RX ADMIN — DIGOXIN 500 MCG: 250 INJECTION, SOLUTION INTRAMUSCULAR; INTRAVENOUS at 10:43

## 2023-12-24 RX ADMIN — TAMSULOSIN HYDROCHLORIDE 0.4 MG: 0.4 CAPSULE ORAL at 20:15

## 2023-12-24 RX ADMIN — Medication 10 ML: at 08:10

## 2023-12-24 RX ADMIN — GABAPENTIN 100 MG: 100 CAPSULE ORAL at 13:45

## 2023-12-24 RX ADMIN — GABAPENTIN 100 MG: 100 CAPSULE ORAL at 20:14

## 2023-12-24 RX ADMIN — BUMETANIDE 1 MG: 1 TABLET ORAL at 13:35

## 2023-12-24 RX ADMIN — DEXTROSE AND SODIUM CHLORIDE 75 ML/HR: 5; 450 INJECTION, SOLUTION INTRAVENOUS at 23:28

## 2023-12-24 RX ADMIN — METOCLOPRAMIDE 5 MG: 5 INJECTION, SOLUTION INTRAMUSCULAR; INTRAVENOUS at 20:14

## 2023-12-24 RX ADMIN — SODIUM CHLORIDE 40 ML: 9 INJECTION, SOLUTION INTRAVENOUS at 23:25

## 2023-12-24 RX ADMIN — ATORVASTATIN CALCIUM 10 MG: 20 TABLET, FILM COATED ORAL at 20:15

## 2023-12-24 RX ADMIN — METRONIDAZOLE 500 MG: 500 INJECTION, SOLUTION INTRAVENOUS at 23:24

## 2023-12-24 RX ADMIN — CEFTRIAXONE 1000 MG: 1 INJECTION, POWDER, FOR SOLUTION INTRAMUSCULAR; INTRAVENOUS at 13:35

## 2023-12-24 RX ADMIN — METOCLOPRAMIDE 5 MG: 5 INJECTION, SOLUTION INTRAMUSCULAR; INTRAVENOUS at 08:08

## 2023-12-24 NOTE — THERAPY EVALUATION
Patient Name: Sage Flores  : 1930    MRN: [id number]                              Today's Date: 2023       Admit Date: 2023    Visit Dx:     ICD-10-CM ICD-9-CM   1. Gastrointestinal hemorrhage, unspecified gastrointestinal hemorrhage type  K92.2 578.9   2. Leukocytosis, unspecified type  D72.829 288.60   3. Melena  K92.1 578.1     Patient Active Problem List   Diagnosis    Allergic rhinitis    Atrial fibrillation    Claudication    Coronary heart disease    Edema    Extremity pain    History of left heart catheterization (LHC)    Hyperlipidemia    Essential hypertension    Inguinal hernia, right    Laceration    Lower extremity edema    Myocardial infarction    Need for other prophylactic vaccination and inoculation against single diseases    Presence of other cardiac implants and grafts    Status post coronary artery bypass graft    Status post percutaneous transluminal coronary angioplasty    Viral URI    Rib pain on left side    Dizziness    Acute cystitis without hematuria    Status post placement of implantable loop recorder    Lab test negative for COVID-19 virus    Cellulitis of left leg    Iron deficiency anemia    Encounter for support and coordination of transition of care    Hematoma of right lower extremity    Encounter for screening for malignant neoplasm of prostate     Medicare annual wellness visit, subsequent    Vitamin D deficiency    Hyponatremia    Gastroesophageal reflux disease without esophagitis    Acute ST elevation myocardial infarction (STEMI) involving right coronary artery    Melena    GI bleed     Past Medical History:   Diagnosis Date    Anxiety 2014    Atrial fibrillation     Benign prostatic hyperplasia     CAD (coronary artery disease)     Hyperlipidemia     Hypertension     Myocardial infarction      Past Surgical History:   Procedure Laterality Date    CARDIAC CATHETERIZATION N/A 2023    Procedure: Left Heart Cath;  Surgeon: Son العلي MD;  Location:  [facility] CATH INVASIVE LOCATION;  Service: Cardiovascular;  Laterality: N/A;    CARDIAC CATHETERIZATION N/A 12/1/2023    Procedure: Coronary angiography;  Surgeon: Son العلي MD;  Location: [facility] CATH INVASIVE LOCATION;  Service: Cardiovascular;  Laterality: N/A;    CARDIAC CATHETERIZATION N/A 12/1/2023    Procedure: Percutaneous Coronary Intervention;  Surgeon: Son العلي MD;  Location: [facility] CATH INVASIVE LOCATION;  Service: Cardiovascular;  Laterality: N/A;    CARDIAC ELECTROPHYSIOLOGY PROCEDURE N/A 12/1/2023    Procedure: Temporary Pacemaker;  Surgeon: Son العلي MD;  Location: [facility] CATH INVASIVE LOCATION;  Service: Cardiovascular;  Laterality: N/A;    CARDIAC SURGERY      HERNIA REPAIR        General Information       Row Name 12/24/23 1434          Physical Therapy Time and Intention    Document Type evaluation  -AM     Mode of Treatment physical therapy  -AM       Row Name 12/24/23 1434          General Information    Patient Profile Reviewed yes  -AM     Prior Level of Function independent:;all household mobility;community mobility;gait;transfer;bed mobility  ambulates with rollator  -AM     Existing Precautions/Restrictions fall;oxygen therapy device and L/min  6L O2  -AM     Barriers to Rehab none identified  -AM       Row Name 12/24/23 1434          Living Environment    People in Home facility resident  -AM       Row Name 12/24/23 1434          Home Main Entrance    Number of Stairs, Main Entrance none  -AM       Row Name 12/24/23 1434          Stairs Within Home, Primary    Number of Stairs, Within Home, Primary none  -AM       Row Name 12/24/23 1434          Cognition    Orientation Status (Cognition) oriented x 3  -AM       Row Name 12/24/23 1434          Safety Issues, Functional Mobility    Impairments Affecting Function (Mobility) balance;endurance/activity tolerance;pain;strength  -AM               User Key  (r) = Recorded By, (t) = Taken By, (c) = Cosigned By      Initials Name  Provider Type    AM Mariusz Bourne, PT Physical Therapist                   Mobility       Row Name 12/24/23 1435          Bed Mobility    Bed Mobility bed mobility (all) activities  -AM     All Activities, [place] (Bed Mobility) minimum assist (75% patient effort);moderate assist (50% patient effort)  -AM     Assistive Device (Bed Mobility) bed rails;head of bed elevated  -AM     Comment, (Bed Mobility) with increased time and effort.  Heart rate increased to 151 bpm sitting EOB.  Pt laid back down and RN notified.  -AM       Row Name 12/24/23 1435          Transfers    Comment, (Transfers) NT secondary to elevated HR of 150 with sitting EOB  -AM       Row Name 12/24/23 1435          Bed-Chair Transfer    Bed-Chair [place] (Transfers) not tested  -AM       Row Name 12/24/23 1435          Sit-Stand Transfer    Sit-Stand [place] (Transfers) not tested  -AM       [facility] Name 12/24/23 1435          Gait/Stairs (Locomotion)    [place] Level (Gait) not tested  -AM               User Key  (r) = Recorded By, (t) = Taken By, (c) = Cosigned By      Initials Name Provider Type    AM Mariusz Bourne, PT Physical Therapist                   Obj/Interventions       Row Name 12/24/23 1436          Range of Motion Comprehensive    General Range of Motion no range of motion deficits identified  -AM       [facility] Name 12/24/23 1436          Strength Comprehensive (MMT)    Comment, General Manual Muscle Testing (MMT) Assessment 4-/5 throughout  -AM       [facility] Name 12/24/23 1436          Motor Skills    Motor Skills functional endurance  -AM     Functional Endurance poor  -AM       [facility] Name 12/24/23 1436          Balance    Balance Assessment sitting static balance;sitting dynamic balance  -AM     Static Sitting Balance contact guard  -AM     Dynamic Sitting Balance contact guard  -AM     Position, Sitting Balance unsupported;sitting edge of bed  -AM       Row Name 12/24/23 1436          Sensory Assessment (Somatosensory)     Sensory Assessment (Somatosensory) sensation intact  -AM               User Key  (r) = Recorded By, (t) = Taken By, (c) = Cosigned By      Initials Name Provider Type    AM Mariusz Bourne PT Physical Therapist                   Goals/Plan       Row Name 12/24/23 1441          Bed Mobility Goal 1 (PT)    Activity/Assistive Device (Bed Mobility Goal 1, PT) bed mobility activities, all  -AM     [place] Level/Cues Needed (Bed Mobility Goal 1, PT) modified independence  -AM     Time Frame (Bed Mobility Goal 1, PT) long term goal (LTG)  -AM       Row Name 12/24/23 1441          Transfer Goal 1 (PT)    Activity/Assistive Device (Transfer Goal 1, PT) transfers, all  -AM     [place] Level/Cues Needed (Transfer Goal 1, PT) modified independence  -AM     Time Frame (Transfer Goal 1, PT) long term goal (LTG)  -AM       Row Name 12/24/23 1441          Gait Training Goal 1 (PT)    Activity/Assistive Device (Gait Training Goal 1, PT) gait (walking locomotion);walker, rolling  -AM     [place] Level (Gait Training Goal 1, PT) modified independence  -AM     Time Frame (Gait Training Goal 1, PT) long term goal (LTG)  -AM       Row Name 12/24/23 1441          Therapy Assessment/Plan (PT)    Planned Therapy Interventions (PT) balance training;bed mobility training;gait training;strengthening;patient/family education;transfer training  -AM               User Key  (r) = Recorded By, (t) = Taken By, (c) = Cosigned By      Initials Name Provider Type    AM Mariusz Bourne, PT Physical Therapist                   Clinical Impression       Row Name 12/24/23 1437          Pain    Pretreatment Pain Rating 0/10 - no pain  -AM     Posttreatment Pain Rating 0/10 - no pain  -AM       Row Name 12/24/23 1437          Plan of Care Review    Plan of Care Reviewed With patient  -AM     Outcome Evaluation Pt is a 89 y/o male, F resident, with recent hospitalization from 12/1-12/11 secondary to 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  EF 36-40%.  Pt with melena and s/p EGD with clipping x 3 duodenal ulcer.  Pt reports he ambulated with a rollator prior to hospitalization.  Pt on 6L O2, telemetry and Purewick.  Pt required Min/Mod A for bed mobility with increased time and effort.  HR increased to 151 bpm sitting EOB.  Pt laid back down into supine and RN notified of increased HR.  Recommend SNF.  -AM       Row Name 12/24/23 1437          Therapy Assessment/Plan (PT)    Patient/Family Therapy Goals Statement (PT) To get stronger  -AM     Rehab Potential (PT) good, to achieve stated therapy goals  -AM     Criteria for Skilled Interventions Met (PT) yes  -AM     Therapy Frequency (PT) 5 times/wk  -AM     Predicted Duration of Therapy Intervention (PT) until d/c  -AM       Row Name 12/24/23 1437          Vital Signs    Pretreatment Heart Rate (beats/min) 116  -AM     Intratreatment Heart Rate (beats/min) 151  -AM     Posttreatment Heart Rate (beats/min) 119  -AM     Pre SpO2 (%) 99  -AM     O2 Delivery Pre Treatment nasal cannula  6L O2  -AM     Intra SpO2 (%) 96  -AM     O2 Delivery Intra Treatment nasal cannula  -AM     Post SpO2 (%) 97  -AM     O2 Delivery Post Treatment nasal cannula  -AM     Pre Patient Position Supine  -AM     Intra Patient Position Standing  -AM     Post Patient Position Supine  -AM       Row Name 12/24/23 1437          Positioning and Restraints    Pre-Treatment Position in bed  -AM     Post Treatment Position bed  -AM     In Bed notified nsg;call light within reach;encouraged to call for assist;exit alarm on  -AM               User Key  (r) = Recorded By, (t) = Taken By, (c) = Cosigned By      Initials Name Provider Type    AM Mariusz Bourne, PT Physical Therapist                   Outcome Measures       Row Name 12/24/23 1441 12/24/23 0800       How much help from another person do you currently need...    Turning from your back to your side while in flat bed without using bedrails? 3  -AM 3  -AB    Moving  from lying on back to sitting on the side of a flat bed without bedrails? 2  -AM 3  -AB    Moving to and from a bed to a chair (including a wheelchair)? 1  -AM 2  -AB    Standing up from a chair using your arms (e.g., wheelchair, bedside chair)? 1  -AM 2  -AB    Climbing 3-5 steps with a railing? 1  -AM 1  -AB    To walk in hospital room? 1  -AM 1  -AB    AM-PAC 6 Clicks Score (PT) 9  -AM 12  -AB    Highest Level of Mobility Goal 3 --> Sit at edge of bed  -AM 4 --> Transfer to chair/commode  -AB              User Key  (r) = Recorded By, (t) = Taken By, (c) = Cosigned By      Initials Name Provider Type    AB Chanelle Farmer, RN Registered Nurse    Mariusz Hurtado, PT Physical Therapist                                 Physical Therapy Education       Title: PT OT SLP Therapies (Done)       Topic: Physical Therapy (Done)       Point: Mobility training (Done)       Learning Progress Summary             Patient Acceptance, E,TB, VU by AM at 12/24/2023 1442                         Point: Body mechanics (Done)       Learning Progress Summary             Patient Acceptance, E,TB, VU by AM at 12/24/2023 1442                         Point: Precautions (Done)       Learning Progress Summary             Patient Acceptance, E,TB, VU by AM at 12/24/2023 1442                                         User Key       Initials Effective Dates Name Provider Type Discipline    AM 05/10/21 -  Mariusz Bourne, PT Physical Therapist PT                  PT Recommendation and Plan  Planned Therapy Interventions (PT): balance training, bed mobility training, gait training, strengthening, patient/family education, transfer training  Plan of Care Reviewed With: patient  Outcome Evaluation: Pt is a 89 y/o male, ECF resident, with recent hospitalization from 12/1-12/11 secondary to 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  EF 36-40%.  Pt with melena and s/p EGD with clipping x 3 duodenal ulcer.  Pt reports he ambulated with a  rollator prior to hospitalization.  Pt on 6L O2, telemetry and Purewick.  Pt required Min/Mod A for bed mobility with increased time and effort.  HR increased to 151 bpm sitting EOB.  Pt laid back down into supine and RN notified of increased HR.  Recommend SNF.     Time Calculation:         PT Charges       Row Name 12/24/23 1443             Time Calculation    Start Time 1028  -AM      Stop Time 1045  -AM      Time Calculation (min) 17 min  -AM      PT Received On 12/24/23  -AM      PT - Next Appointment 12/26/23  -AM      PT Goal Re-Cert Due Date 01/07/24 -AM                User Key  (r) = Recorded By, (t) = Taken By, (c) = Cosigned By      Initials Name Provider Type    AM Mariusz Bourne, PT Physical Therapist                  Therapy Charges for Today       Code Description Service Date Service Provider Modifiers Qty    59756203719 HC PT EVAL MOD COMPLEXITY 3 12/24/2023 Mairusz Bourne, PT GP 1            PT G-Codes  AM-PAC 6 Clicks Score (PT): 9  PT Discharge Summary  Anticipated Discharge Disposition (PT): skilled nursing facility    Mariusz Bourne PT  12/24/2023

## 2023-12-24 NOTE — PLAN OF CARE
Goal Outcome Evaluation:   Pt was seen for re-eval of swallow. Pt was more alert and responsive this date and able to dfollow commands and answer questions appropriately. Pt was given trials of ice chips, water by spoon and straw and applesauce. Solids not attempted due to pt not having his dentures with him. Pt had good bolus manipulation of ice chip and no difficulty pulling liquids from spoon or straw. No labial spillage occurred. Oral transit timely and there was no pocketing or oral residual. Digital palpation of swallow suggests timely initiation/funtional pharyngeal phase of swallow. After the swallow pt had clear vocal quality and no cough or other overt s/s of aspiration.     Pt appears much stronger today and is tolerating a PO duiet. It is rec that pt initiate a puree diet with thin liquids. Discussed with GI MD and he would like pt on full liquids at this time. education provided to pt on diet recs and he verbalized understanding. ST will follow to assure tolerance of diet and make upgrades as pt improves.

## 2023-12-24 NOTE — PLAN OF CARE
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  EF 36-40%.  Pt with melena and s/p EGD with clipping x 3 duodenal ulcer.  Pt reports he ambulated with a rollator prior to hospitalization.  Pt on 6L O2, telemetry and Purewick.  Pt required Min/Mod A for bed mobility with increased time and effort.  HR increased to 151 bpm sitting EOB.  Pt laid back down into supine and RN notified of increased HR.  Recommend SNF.      Anticipated Discharge Disposition (PT): skilled nursing facility

## 2023-12-24 NOTE — PROGRESS NOTES
"HOSPITALIST PROGRESS NOTE     12/24/2023      Clinical Summary / Reason for Hospitalization     Sage Flores is a 89 y.o. male admitted to hospital on 12/21/2023 with c/o Melena.      Admitted on 12/21/2023. Today is hospital day 1.     Subjective     Sage Flores seen and examined this morning.  Patient is comfortable.   Patient requesting a diet.  Denies any abdominal pain, nausea or vomiting.  Denies any chest pain or shortness of breath.  Patient is saturating at 100% on 6 L high flow nasal cannula.      Interval / Overnight Issues     Objective     Vital Signs     /85 (BP Location: Left arm, Patient Position: Lying)   Pulse 100   Temp 97.6 °F (36.4 °C) (Axillary)   Resp 17   Ht 177.8 cm (70\")   Wt 65.7 kg (144 lb 13.5 oz)   SpO2 100%   BMI 20.78 kg/m²      Input / Output       Intake/Output Summary (Last 24 hours) at 12/24/2023 1056  Last data filed at 12/23/2023 2352  Gross per 24 hour   Intake 887.5 ml   Output 900 ml   Net -12.5 ml        Physical Exam     General : Patient lying in bed in no acute distress.      HEENT : Normocephalic atraumatic. Neck supple, no JVP   PERRLA, EOM intact. Throat clear, mucous membranes moist   CVS : S1 + S2 regular, No R/M/G     Chest : Clear to auscultation bilaterally    Abdomen : Soft, nontender, bowel sounds are present  Extremities : No clubbing, cyanosis, edema    Neurologic exam: Patient is awake, answering questions appropriately.  Moving all extremities.       Current Medications     Scheduled Meds:[Held by provider] apixaban, 2.5 mg, Oral, Q12H  [Held by provider] aspirin, 81 mg, Oral, Daily  atorvastatin, 10 mg, Oral, Nightly  [Held by provider] clopidogrel, 75 mg, Oral, Daily  digoxin, 500 mcg, Intravenous, Daily  gabapentin, 100 mg, Oral, BID  losartan, 25 mg, Oral, Daily  metoclopramide, 5 mg, Intravenous, BID  metroNIDAZOLE, 500 mg, Intravenous, Q8H  pantoprazole, 40 mg, Intravenous, Q12H  senna-docusate sodium, 2 tablet, Oral, BID  sodium " chloride, 10 mL, Intravenous, Q12H  tamsulosin, 0.4 mg, Oral, Nightly      Continuous Infusions:dextrose 5 % and sodium chloride 0.45 %, 75 mL/hr, Last Rate: 75 mL/hr (12/22/23 1703)  dextrose 5 % with KCl 20 mEq, 75 mL/hr      PRN Meds:.  acetaminophen **OR** acetaminophen **OR** acetaminophen    senna-docusate sodium **AND** polyethylene glycol **AND** bisacodyl **AND** bisacodyl    Calcium Replacement - Follow Nurse / BPA Driven Protocol    Magnesium Cardiology Dose Replacement - Follow Nurse / BPA Driven Protocol    nitroglycerin    ondansetron **OR** ondansetron    Phosphorus Replacement - Follow Nurse / BPA Driven Protocol    Potassium Replacement - Follow Nurse / BPA Driven Protocol    [COMPLETED] Insert Peripheral IV **AND** sodium chloride    sodium chloride    sodium chloride      Labs & Imaging     Results from last 7 days   Lab Units 12/24/23  0708   WBC 10*3/mm3 31.70*   HEMOGLOBIN g/dL 9.2*   HEMATOCRIT % 28.4*   PLATELETS 10*3/mm3 149   GLUCOSE mg/dL 103*   CREATININE mg/dL 1.71*   BUN mg/dL 90*   SODIUM mmol/L 149*   POTASSIUM mmol/L 3.4*   AST (SGOT) U/L 19   ALT (SGPT) U/L 20   ALK PHOS U/L 70   BILIRUBIN mg/dL 0.6   ANION GAP mmol/L 12.0       No radiology results for the last day    I reviewed the patient's new clinical results.    ASSESSMENT AND PLAN:    Sage Flores is a 89 y.o. male with past medical history of paroxysmal atrial fibrillation on chronic anticoagulation with apixaban, coronary artery disease, BPH, hyperlipidemia, hypertension, presented to emergency room with complaints of black stool and blood in the stool.  Initial hemoglobin on presentation was 11.3 and dropped to 8.6.  This morning hemoglobin is 6.1.  Patient is status post EGD showing which showed a bleeding duodenal ulcer and he had endoscopic hemostasis with epinephrine and Hemoclip.    Patient admitted with    Acute upper GI bleed with acute blood loss anemia: Patient is on Protonix.    Patient is status post EGD with  findings of duodenal ulcer treated with endoscopic hemostasis with epinephrine and Hemoclip.   Continue Protonix.   Due to acute GI bleed and dropping hemoglobin apixaban and Plavix are on hold.  Patient seen by cardiology.    Patient had recent PCI and stenting.  But unfortunately weighing the risks versus benefits we are currently holding the Plavix and apixaban.  Hemoglobin improved after PRBC transfusion.   GI and cardiology consult and follow-up appreciated    Leukocytosis: Etiology unclear but likely reactive in the setting of acute GI bleed.    Patient is on ceftriaxone and Flagyl.      Hypernatremia: Likely secondary to dehydration and patient being NPO.  Patient started on D5W.    Hypokalemia: Replace potassium in the IV fluids above.    Dysphagia: Patient currently on gentle IV fluid hydration.  Patient requesting to eat.  Will give him a clear liquid diet after bedside swallow evaluation    Acute kidney injury: Likely prerenal.  Monitor creatinine.    History of CAD with recent ST elevation MI status post RCA stenting: Aspirin and Plavix are on hold.  Beta-blocker being held due to recent heart block.  Continue statin.    Chronic combined systolic and diastolic congestive heart failure: Currently compensated.  Holding Bumex and Cozaar at this time.  Last echocardiogram showing EF of 35%.  Resume bumetanide as patient received multiple PRBC transfusions.    Paroxysmal atrial fibrillation: Rate is under control with digoxin.    Patient blood pressure has improved.  It is now in hypertensive range.  Will resume his losartan.      BPH: Continue Flomax    DVT Prophylaxis:  SCDs due to GI bleed and severe anemia    I spoke to patient and his daughters at bedside, answered all their questions and concerns, communicated all test results, diagnosis, treatment and plan.    Given patient's age, multiple comorbidities, recent MI and acute GI bleed, family wanted no aggressive intervention and are open to keeping  patient comfortable.  And patient is a DO NOT RESUSCITATE and DO NOT INTUBATE.    Code Status:   Code Status and Medical Interventions:   Ordered at: 12/21/23 1525     Medical Intervention Limits:    NO intubation (DNI)     Code Status (Patient has no pulse and is not breathing):    No CPR (Do Not Attempt to Resuscitate)     Medical Interventions (Patient has pulse or is breathing):    Limited Support     Disposition (Where, When):  TBD/ when medically ready     Primary Emergency Contact: TODD BAIN     Copied text in this note has been reviewed and is accurate as of 12/24/2023.    Juan Shaffer MD   Hospitalist

## 2023-12-24 NOTE — THERAPY RE-EVALUATION
Acute Care - Speech Language Pathology   Swallow Re-Evaluation  Reji     Patient Name: Sage Flores  : 1930  MRN: [id number]  Today's Date: 2023               Admit Date: 2023    Visit Dx:     ICD-10-CM ICD-9-CM   1. Gastrointestinal hemorrhage, unspecified gastrointestinal hemorrhage type  K92.2 578.9   2. Leukocytosis, unspecified type  D72.829 288.60   3. Melena  K92.1 578.1     Patient Active Problem List   Diagnosis    Allergic rhinitis    Atrial fibrillation    Claudication    Coronary heart disease    Edema    Extremity pain    History of left heart catheterization (LHC)    Hyperlipidemia    Essential hypertension    Inguinal hernia, right    Laceration    Lower extremity edema    Myocardial infarction    Need for other prophylactic vaccination and inoculation against single diseases    Presence of other cardiac implants and grafts    Status post coronary artery bypass graft    Status post percutaneous transluminal coronary angioplasty    Viral URI    Rib pain on left side    Dizziness    Acute cystitis without hematuria    Status post placement of implantable loop recorder    Lab test negative for COVID-19 virus    Cellulitis of left leg    Iron deficiency anemia    Encounter for support and coordination of transition of care    Hematoma of right lower extremity    Encounter for screening for malignant neoplasm of prostate     Medicare annual wellness visit, subsequent    Vitamin D deficiency    Hyponatremia    Gastroesophageal reflux disease without esophagitis    Acute ST elevation myocardial infarction (STEMI) involving right coronary artery    Melena    GI bleed     Past Medical History:   Diagnosis Date    Anxiety 2014    Atrial fibrillation     Benign prostatic hyperplasia     CAD (coronary artery disease)     Hyperlipidemia     Hypertension     Myocardial infarction      Past Surgical History:   Procedure Laterality Date    CARDIAC CATHETERIZATION N/A 2023    Procedure:  Left Heart Cath;  Surgeon: Son العلي MD;  Location: [facility] CATH INVASIVE LOCATION;  Service: Cardiovascular;  Laterality: N/A;    CARDIAC CATHETERIZATION N/A 12/1/2023    Procedure: Coronary angiography;  Surgeon: Son العلي MD;  Location: [facility] CATH INVASIVE LOCATION;  Service: Cardiovascular;  Laterality: N/A;    CARDIAC CATHETERIZATION N/A 12/1/2023    Procedure: Percutaneous Coronary Intervention;  Surgeon: Son العلي MD;  Location: [facility] CATH INVASIVE LOCATION;  Service: Cardiovascular;  Laterality: N/A;    CARDIAC ELECTROPHYSIOLOGY PROCEDURE N/A 12/1/2023    Procedure: Temporary Pacemaker;  Surgeon: Son العلي MD;  Location: [facility] CATH INVASIVE LOCATION;  Service: Cardiovascular;  Laterality: N/A;    CARDIAC SURGERY      HERNIA REPAIR         SLP Recommendation and Plan  SLP Swallowing Diagnosis: mild, oral dysphagia, functional pharyngeal phase (12/24/23 1300)  SLP Diet Recommendation: full liquid diet (12/24/23 1300)  Recommended Precautions and Strategies: upright posture during/after eating, small bites of food and sips of liquid, alternate between small bites of food and sips of liquid, general aspiration precautions, reflux precautions, fatigue precautions, 1:1 supervision (12/24/23 1300)  SLP Rec. for Method of Medication Administration: meds whole, meds crushed, as tolerated (12/24/23 1300)     Monitor for Signs of Aspiration: yes, notify SLP if any concerns, cough, gurgly voice, throat clearing (12/24/23 1300)  Recommended Diagnostics: reassess via clinical swallow evaluation (12/24/23 1300)  Swallow Criteria for Skilled Therapeutic Interventions Met: demonstrates skilled criteria (12/24/23 1300)     Rehab Potential/Prognosis, Swallowing: good, to achieve stated therapy goals (12/24/23 1300)  Therapy Frequency (Swallow): PRN (12/24/23 1300)  Predicted Duration Therapy Intervention (Days): until discharge (12/24/23 1300)  Oral Care Recommendations: Oral Care before breakfast, after  meals and PRN (12/24/23 1300)                        Treatment Assessment (SLP): improved, toleration of diet (12/24/23 1300)             Oral Care Recommendations: Oral Care before breakfast, after meals and PRN (12/24/23 1300)           SWALLOW EVALUATION (last 72 hours)       SLP Adult Swallow Evaluation       Row Name 12/24/23 1300       Rehab Evaluation    Document Type re-evaluation  -CP    Subjective Information complains of  thirst  -CP    Patient Observations alert;cooperative  -CP    Patient Effort good  -CP    Comment Pt was seen for re-eval of swallow. Pt was more alert and responsive this date and able to dfollow commands and answer questions appropriately. Pt was given trials of ice chips, water by spoon and straw and applesauce. Solids not attempted due to pt not having his dentures with him. Pt had good bolus manipulation of ice chip and no difficulty pulling liquids from spoon or straw. No labial spillage occurred. Oral transit timely and there was no pocketing or oral residual. Digital palpation of swallow suggests timely initiation/funtional pharyngeal phase of swallow. After the swallow pt had clear vocal quality and no cough or other overt s/s of aspiration. Pt appears much stronger today and is tolerating a PO duiet. It is rec that pt initiate a puree diet with thin liquids. Discussed with GI MD and he would like pt on full liquids at this time. education provided to pt on diet recs and he verbalized understanding. ST will follow to assure tolerance of diet and make upgrades as pt improves.  -CP       General Information       SLP Evaluation Clinical Impression    SLP Swallowing Diagnosis mild;oral dysphagia;functional pharyngeal phase  -CP    Functional Impact risk of aspiration/pneumonia;risk of malnutrition  -CP    Rehab Potential/Prognosis, Swallowing good, to achieve stated therapy goals  -CP    Swallow Criteria for Skilled Therapeutic Interventions Met demonstrates skilled criteria  -CP        SLP Treatment Clinical Impressions    Treatment Assessment (SLP) improved;toleration of diet  -CP    Care Plan Review evaluation/treatment results reviewed  -CP       Recommendations    Therapy Frequency (Swallow) PRN  -CP    Predicted Duration Therapy Intervention (Days) until discharge  -CP    SLP Diet Recommendation full liquid diet  -CP    Recommended Diagnostics reassess via clinical swallow evaluation  -CP    Recommended Precautions and Strategies upright posture during/after eating;small bites of food and sips of liquid;alternate between small bites of food and sips of liquid;general aspiration precautions;reflux precautions;fatigue precautions;1:1 supervision  -CP    Oral Care Recommendations Oral Care before breakfast, after meals and PRN  -CP    SLP Rec. for Method of Medication Administration meds whole;meds crushed;as tolerated  -CP    Monitor for Signs of Aspiration yes;notify SLP if any concerns;cough;gurgly voice;throat clearing  -CP       Swallow Goals (SLP)    Swallow LTGs Swallow Long Term Goal (free text)  -CP    Swallow STGs diet tolerance goal selection (SLP)  -CP    Diet Tolerance Goal Selection (SLP) Swallow Short Term Goal 1;Swallow Short Term Goal 2  -CP       (LTG) Swallow    (LTG) Swallow Pt will maximize swallow function for least restrictive PO diet, exhibiting no complication associated with dysphagia, adequate PO intake, and demonstrating independent use of swallow compensation.  -CP    [place] (Swallow Long Term Goal) with minimal cues (75-90% accuracy)  -CP    Time Frame (Swallow Long Term Goal) by discharge  -CP    Progress/Outcomes (Swallow Long Term Goal) good progress toward goal  -CP    Comment (Swallow Long Term Goal) See above re-eval  -CP       (STG) Swallow 1    (STG) Swallow 1 Patient will participate in ongoing assessment of swallow, including reevaluation clinically and/or including instrumental assessment of swallow if indicated, to further assess swallow  function and return to oral nutrition.  -CP    [place] (Swallow Short Term Goal 1) with minimal cues (75-90% accuracy)  -CP    Time Frame (Swallow Short Term Goal 1) 1 week  -CP    Progress/Outcomes (Swallow Short Term Goal 1) good progress toward goal  -CP    Comment (Swallow Short Term Goal 1) See above re-eval  -CP       (STG) Swallow 2    (STG) Swallow 2 The patient will participate in a meal/follow-up assessment to determine safety and adequacy of recommended diet, independent use of safe swallow compensations, pt/family education and additional goals/recommendations to follow  -CP    [place] (Swallow Short Term Goal 2) --    Time Frame (Swallow Short Term Goal 2) 1 week  -CP    Progress/Outcomes (Swallow Short Term Goal 2) new goal  -CP              User Key  (r) = Recorded By, (t) = Taken By, (c) = Cosigned By      Initials Name Effective Dates    CP Shaye Desai, SLP 06/16/21 -     PF Anthony Bolaños, SLP 05/08/23 -                     EDUCATION  The patient has been educated in the following areas:   Dysphagia (Swallowing Impairment) Oral Care/Hydration Modified Diet Instruction.        SLP GOALS       Row Name 12/24/23 1300 12/22/23 1600          (LTG) Swallow    (LTG) Swallow Pt will maximize swallow function for least restrictive PO diet, exhibiting no complication associated with dysphagia, adequate PO intake, and demonstrating independent use of swallow compensation.  -CP Pt will maximize swallow function for least restrictive PO diet, exhibiting no complication associated with dysphagia, adequate PO intake, and demonstrating independent use of swallow compensation.  -PF     [place] (Swallow Long Term Goal) with minimal cues (75-90% accuracy)  -CP with minimal cues (75-90% accuracy)  -PF     Time Frame (Swallow Long Term Goal) by discharge  -CP by discharge  -PF     Progress/Outcomes (Swallow Long Term Goal) good progress toward goal  -CP --     Comment (Swallow Long Term Goal) See  above re-eval  -CP --        (STG) Swallow 1    (STG) Swallow 1 Patient will participate in ongoing assessment of swallow, including reevaluation clinically and/or including instrumental assessment of swallow if indicated, to further assess swallow function and return to oral nutrition.  -CP Patient will participate in ongoing assessment of swallow, including reevaluation clinically and/or including instrumental assessment of swallow if indicated, to further assess swallow function and return to oral nutrition.  -PF     [place] (Swallow Short Term Goal 1) with minimal cues (75-90% accuracy)  -CP with minimal cues (75-90% accuracy)  -PF     Time Frame (Swallow Short Term Goal 1) 1 week  -CP 1 week  -PF     Progress/Outcomes (Swallow Short Term Goal 1) good progress toward goal  -CP --     Comment (Swallow Short Term Goal 1) See above re-eval  -CP --        (STG) Swallow 2    (STG) Swallow 2 The patient will participate in a meal/follow-up assessment to determine safety and adequacy of recommended diet, independent use of safe swallow compensations, pt/family education and additional goals/recommendations to follow  -CP Pt will tolerate diagnostic amounts PO (water/ice chips) at bedside without significant discomfort or severe coughing response.  -PF     [place] (Swallow Short Term Goal 2) -- with minimal cues (75-90% accuracy)  -PF     Time Frame (Swallow Short Term Goal 2) 1 week  -CP 1 week  -PF     Progress/Outcomes (Swallow Short Term Goal 2) new goal  -CP --               User Key  (r) = Recorded By, (t) = Taken By, (c) = Cosigned By      Initials Name Provider Type    CP Shaye Desai, SLP Speech and Language Pathologist    PF Anthony Bolaños SLP Speech and Language Pathologist                       Time Calculation:                JULIÁN Mortensen  12/24/2023

## 2023-12-24 NOTE — PROGRESS NOTES
" LOS: 1 day   Patient Care Team:  Tricia Aldana APRN as PCP - General (Family Medicine)  Missy Domínguez MD as Consulting Physician (Cardiology)      Subjective   \"I would like a coke\"  \"Woozy\"    Interval History:   Labs: Sodium 149, potassium 3.4, creatinine 1.71.  LFTs remain normal.  WBCs 31.7 (33.4), hemoglobin 9.2.  Hemoglobin 6.1 yesterday was transfused 2 units of packed red blood cells and is up to 10.3 is 9.2 this morning.  Platelets 149.  5 bands.  Pending speech therapy evaluation today he may undergo barium swallow. I have discussed with Shaye in speech therapy this morning.     ROS:  Denies   No chest pain, shortness of breath, or cough.         Medication Review:     Current Facility-Administered Medications:     acetaminophen (TYLENOL) tablet 650 mg, 650 mg, Oral, Q4H PRN **OR** acetaminophen (TYLENOL) 160 MG/5ML oral solution 650 mg, 650 mg, Oral, Q4H PRN **OR** acetaminophen (TYLENOL) suppository 650 mg, 650 mg, Rectal, Q4H PRN, Tereza Fowler MD    [Held by provider] apixaban (ELIQUIS) tablet 2.5 mg, 2.5 mg, Oral, Q12H, Kulwinder Caballero APRN    [Held by provider] aspirin chewable tablet 81 mg, 81 mg, Oral, Daily, Kulwinder Caballero APRN    atorvastatin (LIPITOR) tablet 10 mg, 10 mg, Oral, Nightly, Tereza Fowler MD, 10 mg at 12/21/23 2019    sennosides-docusate (PERICOLACE) 8.6-50 MG per tablet 2 tablet, 2 tablet, Oral, BID, 2 tablet at 12/22/23 0903 **AND** polyethylene glycol (MIRALAX) packet 17 g, 17 g, Oral, Daily PRN **AND** bisacodyl (DULCOLAX) EC tablet 5 mg, 5 mg, Oral, Daily PRN **AND** bisacodyl (DULCOLAX) suppository 10 mg, 10 mg, Rectal, Daily PRN, Tereza Fowler MD    Calcium Replacement - Follow Nurse / BPA Driven Protocol, , Does not apply, PRN, Tereza Fowler MD    [Held by provider] clopidogrel (PLAVIX) tablet 75 mg, 75 mg, Oral, Daily, Kulwinder Caballero APRN    dextrose 5 % and sodium chloride 0.45 % infusion, 75 mL/hr, Intravenous, Continuous, " Anthony Ambriz MD, Last Rate: 75 mL/hr at 12/22/23 1703, 75 mL/hr at 12/22/23 1703    gabapentin (NEURONTIN) capsule 100 mg, 100 mg, Oral, BID, Tereza Fowler MD, 100 mg at 12/22/23 0903    losartan (COZAAR) tablet 25 mg, 25 mg, Oral, Daily, Juan Shaffer MD    Magnesium Cardiology Dose Replacement - Follow Nurse / BPA Driven Protocol, , Does not apply, PRNJaneth Emori Bizer, MD    metoclopramide (REGLAN) injection 5 mg, 5 mg, Intravenous, BID, Joselito Hanna MD, 5 mg at 12/24/23 0808    nitroglycerin (NITROSTAT) SL tablet 0.4 mg, 0.4 mg, Sublingual, Q5 Min PRN, Tereza Fowler MD    ondansetron (ZOFRAN) tablet 4 mg, 4 mg, Oral, Q6H PRN **OR** ondansetron (ZOFRAN) injection 4 mg, 4 mg, Intravenous, Q6H PRN, Tereza Fowler MD, 4 mg at 12/23/23 0517    pantoprazole (PROTONIX) injection 40 mg, 40 mg, Intravenous, Q12H, Tereza Fowler MD, 40 mg at 12/24/23 0808    Phosphorus Replacement - Follow Nurse / BPA Driven Protocol, , Does not apply, Janeth DE LUNA Emori Bizer, MD    Potassium Replacement - Follow Nurse / BPA Driven Protocol, , Does not apply, Janeth DE LUNA Emori Bizer, MD    [COMPLETED] Insert Peripheral IV, , , Once **AND** sodium chloride 0.9 % flush 10 mL, 10 mL, Intravenous, PRN, Tereza Fowler MD    sodium chloride 0.9 % flush 10 mL, 10 mL, Intravenous, Q12H, Tereza Fowler MD, 10 mL at 12/24/23 0810    sodium chloride 0.9 % flush 10 mL, 10 mL, Intravenous, PRNJaneth Emori Bizer, MD    sodium chloride 0.9 % infusion 40 mL, 40 mL, Intravenous, PRNJaneth Emori Bizer, MD    tamsulosin (FLOMAX) 24 hr capsule 0.4 mg, 0.4 mg, Oral, Nightly, Tereza Fowler MD, 0.4 mg at 12/21/23 2020      Objective resting in the hospital bed. More awake & spry today. No family present.     Vital Signs  Vitals:    12/23/23 2107 12/23/23 2352 12/24/23 0338 12/24/23 0803   BP: 134/81 122/73 110/63 159/85   BP Location: Left arm Left arm Left arm Left arm   Patient  Position: Lying Lying Lying Lying   Pulse: 96 101 99 100   Resp: 16 16 22 17   Temp: 97.8 °F (36.6 °C) 97 °F (36.1 °C) 97.5 °F (36.4 °C) 97.6 °F (36.4 °C)   TempSrc: Oral Axillary Axillary Axillary   SpO2: 90% 96% 100% 100%   Weight:   65.7 kg (144 lb 13.5 oz)    Height:           Physical Exam:  General Appearance:    Awake and alert, in no acute distress   Head:    Normocephalic, without obvious abnormality   Eyes:          Conjunctivae normal, anicteric sclerae   Ears:    Hearing intact   Throat:   No oral lesions, no thrush, oral mucosa moist   Neck:   No adenopathy, supple, no JVD   Lungs:      respirations regular, even and unlabored       Abdomen:     soft, nontender, no rebound or guarding, nondistended, no hepatosplenomegaly   Rectal:     Deferred   Extremities:   No edema, no cyanosis   Skin:   No bruising or rash, no jaundice.  Pale   Neurologic:   Cranial nerves 2 - 12 grossly intact, no asterixis        Results Review:    Lab Results (last 24 hours)       Procedure Component Value Units Date/Time    CBC & Differential [[id number]]  (Abnormal) Collected: 12/24/23 0708    Specimen: Blood Updated: 12/24/23 0855    Narrative:      The following orders were created for panel order CBC & Differential.  Procedure                               Abnormality         Status                     ---------                               -----------         ------                     CBC Auto Differential[[id number]]        Abnormal            Final result               Scan Slide[[id number]]                                       Final result                 Please view results for these tests on the individual orders.    CBC Auto Differential [[id number]]  (Abnormal) Collected: 12/24/23 0708    Specimen: Blood Updated: 12/24/23 0855     WBC 31.70 10*3/mm3      RBC 3.02 10*6/mm3      Hemoglobin 9.2 g/dL      Hematocrit 28.4 %      MCV 94.2 fL      MCH 30.5 pg      MCHC 32.4 g/dL      RDW 16.6 %      RDW-SD 53.4 fl      MPV  11.3 fL      Platelets 149 10*3/mm3     Narrative:      The previously reported component NRBC is no longer being reported. Previous result was 0.2 /100 WBC (Reference Range: 0.0-0.2 /100 WBC) on 12/24/2023 at 0823 EST.    Scan Slide [[id number]] Collected: 12/24/23 0708    Specimen: Blood Updated: 12/24/23 0855     Scan Slide --     Comment: See Manual Differential Results       Manual Differential [[id number]]  (Abnormal) Collected: 12/24/23 0708    Specimen: Blood Updated: 12/24/23 0855     Neutrophil % 86.0 %      Lymphocyte % 6.0 %      Monocyte % 1.0 %      Bands %  5.0 %      Myelocyte % 2.0 %      Neutrophils Absolute 28.85 10*3/mm3      Lymphocytes Absolute 1.90 10*3/mm3      Monocytes Absolute 0.32 10*3/mm3      nRBC 1.0 /100 WBC      Anisocytosis Slight/1+     WBC Morphology Normal     Large Platelets Slight/1+    Magnesium [[id number]]  (Normal) Collected: 12/24/23 0708    Specimen: Blood Updated: 12/24/23 0843     Magnesium 2.3 mg/dL     Phosphorus [[id number]]  (Abnormal) Collected: 12/24/23 0708    Specimen: Blood Updated: 12/24/23 0843     Phosphorus 2.3 mg/dL     Comprehensive Metabolic Panel [[id number]]  (Abnormal) Collected: 12/24/23 0708    Specimen: Blood Updated: 12/24/23 0843     Glucose 103 mg/dL      BUN 90 mg/dL      Creatinine 1.71 mg/dL      Sodium 149 mmol/L      Potassium 3.4 mmol/L      Chloride 112 mmol/L      CO2 25.0 mmol/L      Calcium 8.7 mg/dL      Total Protein 4.9 g/dL      Albumin 3.2 g/dL      ALT (SGPT) 20 U/L      AST (SGOT) 19 U/L      Alkaline Phosphatase 70 U/L      Total Bilirubin 0.6 mg/dL      Globulin 1.7 gm/dL      A/G Ratio 1.9 g/dL      BUN/Creatinine Ratio 52.6     Anion Gap 12.0 mmol/L      eGFR 36.9 mL/min/1.73     Narrative:      GFR Normal >60  Chronic Kidney Disease <60  Kidney Failure <15    The GFR formula is only valid for adults with stable renal function between ages 18 and 70.    Hemoglobin & Hematocrit, Blood [[id number]]  (Abnormal) Collected: 12/23/23  1612    Specimen: Blood Updated: 12/23/23 1702     Hemoglobin 10.3 g/dL      Hematocrit 31.6 %     Blood Culture - Blood, Arm, Right [[id number]]  (Normal) Collected: 12/21/23 0950    Specimen: Blood from Arm, Right Updated: 12/23/23 1000     Blood Culture No growth at 2 days    Blood Culture - Blood, Arm, Left [[id number]]  (Normal) Collected: 12/21/23 0924    Specimen: Blood from Arm, Left Updated: 12/23/23 0931     Blood Culture No growth at 2 days            Imaging Results (Last 24 Hours)       ** No results found for the last 24 hours. **              Assessment & Plan   ASSESSMENT:  Melena   Mild normocytic anemia with acute GI blood loss  Leukocytosis IMPROVING   ЕЛЕНА  Recent STEMI/CAD with stent (12/3/23) -Plavix and aspirin  History of CABG/A-fib -Eliquis  Heart failure -EF 36 to 40%  Recent bronchitis vs pneumonia  History of cholecystectomy    12/22/2023 EGD (Dr. Fowler) bleeding duodenal ulcer status post epinephrine and hemoclips x 3 followed by mantis clip.  1 cm hiatal hernia.     PLAN:  Patient is a 89-year-old male with recent hospitalization with STEMI requiring stent placement and a history of A-fib and heart failure on Plavix/aspirin and Eliquis.  He presents from the nursing facility with melena. Per pharmacist, last dose of Eliquis, aspirin and Plavix were 12/20/23.    Feeling better today. No bowel movement today.   Patient received 2 units of packed red blood cells yesterday and hemoglobin is 9.2 today.    Continue to monitor H&H and transfuse for hemoglobin less than 7.  Continue IV PPI twice a day.    Pending speech therapy to evaluate for safety of oral intake.      I discussed the patients findings and my recommendations with the patient.    Hortensia Mejía, APRN  12/24/23  09:24 EST

## 2023-12-24 NOTE — PROGRESS NOTES
Referring Provider: Juan Shaffer MD    Reason for follow-up:  Melena.  Status post stent  Status post inferior STEMI     Patient Care Team:  Tricia Aldana APRN as PCP - General (Family Medicine)  Missy Domínguez MD as Consulting Physician (Cardiology)    Subjective .      ROS    Since I have last seen, the patient has been without any chest discomfort ,shortness of breath, palpitations, dizziness or syncope.  Denies having any headache ,abdominal pain ,nausea, vomiting , diarrhea constipation, loss of weight or loss of appetite.  Denies having any excessive bruising ,hematuria.    Review of all systems negative except as indicated    History  Past Medical History:   Diagnosis Date    Anxiety 2014    Atrial fibrillation     Benign prostatic hyperplasia     CAD (coronary artery disease)     Hyperlipidemia     Hypertension     Myocardial infarction        Past Surgical History:   Procedure Laterality Date    CARDIAC CATHETERIZATION N/A 12/1/2023    Procedure: Left Heart Cath;  Surgeon: Son العلي MD;  Location: [facility] CATH INVASIVE LOCATION;  Service: Cardiovascular;  Laterality: N/A;    CARDIAC CATHETERIZATION N/A 12/1/2023    Procedure: Coronary angiography;  Surgeon: Son العلي MD;  Location: [facility] CATH INVASIVE LOCATION;  Service: Cardiovascular;  Laterality: N/A;    CARDIAC CATHETERIZATION N/A 12/1/2023    Procedure: Percutaneous Coronary Intervention;  Surgeon: Son العلي MD;  Location: [facility] CATH INVASIVE LOCATION;  Service: Cardiovascular;  Laterality: N/A;    CARDIAC ELECTROPHYSIOLOGY PROCEDURE N/A 12/1/2023    Procedure: Temporary Pacemaker;  Surgeon: Son العلي MD;  Location: [facility] CATH INVASIVE LOCATION;  Service: Cardiovascular;  Laterality: N/A;    CARDIAC SURGERY      HERNIA REPAIR         Family History   Problem Relation Age of Onset    Heart disease Mother     Heart disease Father        Social History     Tobacco Use    Smoking status: Former     Types: Cigarettes      Quit date: 1970     Years since quittin.3    Smokeless tobacco: Never    Tobacco comments:     more than 50yrs   Vaping Use    Vaping Use: Never used   Substance Use Topics    Alcohol use: No    Drug use: No        Medications Prior to Admission   Medication Sig Dispense Refill Last Dose    acetaminophen (TYLENOL) 650 MG 8 hr tablet Take 1 tablet by mouth Every Night.   2023    apixaban (ELIQUIS) 2.5 MG tablet tablet Take 1 tablet by mouth Every 12 (Twelve) Hours for 30 days. Indications: Atrial Fibrillation 60 tablet 0 2023    aspirin 81 MG chewable tablet Chew 1 tablet Daily for 14 days. 14 tablet 0 2023    atorvastatin (LIPITOR) 10 MG tablet Take 1 tablet by mouth Every Night for 30 days. 30 tablet 0 2023    azelastine (ASTEPRO) 0.15 % solution nasal spray USE 2 SPRAYS IN EACH NOSTRIL TWICE DAILY AS DIRECTED BY PROVIDER 30 mL 3 2023    bumetanide (BUMEX) 1 MG tablet Take 1 tablet by mouth 3 (Three) Times a Day for 30 days. 90 tablet 0 2023    Cholecalciferol 25 MCG (1000 UT) tablet Take 1 tablet by mouth Daily.   2023    clopidogrel (PLAVIX) 75 MG tablet Take 1 tablet by mouth Daily for 30 days. 30 tablet 0 2023    docusate sodium (COLACE) 250 MG capsule Take 1 capsule by mouth Daily.   2023    gabapentin (NEURONTIN) 100 MG capsule TAKE 1 CAPSULE BY MOUTH TWICE DAILY 180 capsule 1 2023    losartan (COZAAR) 25 MG tablet Take 1 tablet by mouth Daily.       Mouthwashes (Biotene dry mouth) liquid liquid Take 15 mL by mouth 3 (Three) Times a Day As Needed for Dry Mouth.       pantoprazole (PROTONIX) 40 MG EC tablet Take 1 tablet by mouth Daily.   2023    potassium chloride 10 MEQ CR tablet Take 1 tablet by mouth Daily.   2023    tamsulosin (FLOMAX) 0.4 MG capsule 24 hr capsule TAKE 1 CAPSULE BY MOUTH DAILY 90 capsule 0 2023       Allergies  Iodine, Levofloxacin, Nitroglycerin, Paroxetine, Penicillin g, Prednisone, Sucralfate,  "Diltiazem hcl, Metoprolol, Pramipexole dihydrochloride, and Ropinirole hcl    Scheduled Meds:[Held by provider] apixaban, 2.5 mg, Oral, Q12H  [Held by provider] aspirin, 81 mg, Oral, Daily  atorvastatin, 10 mg, Oral, Nightly  [Held by provider] clopidogrel, 75 mg, Oral, Daily  gabapentin, 100 mg, Oral, BID  [Held by provider] losartan, 25 mg, Oral, Daily  metoclopramide, 5 mg, Intravenous, BID  pantoprazole, 40 mg, Intravenous, Q12H  senna-docusate sodium, 2 tablet, Oral, BID  sodium chloride, 10 mL, Intravenous, Q12H  tamsulosin, 0.4 mg, Oral, Nightly      Continuous Infusions:dextrose 5 % and sodium chloride 0.45 %, 75 mL/hr, Last Rate: 75 mL/hr (12/22/23 1703)      PRN Meds:.  acetaminophen **OR** acetaminophen **OR** acetaminophen    senna-docusate sodium **AND** polyethylene glycol **AND** bisacodyl **AND** bisacodyl    Calcium Replacement - Follow Nurse / BPA Driven Protocol    Magnesium Cardiology Dose Replacement - Follow Nurse / BPA Driven Protocol    Morphine    nitroglycerin    ondansetron **OR** ondansetron    Phosphorus Replacement - Follow Nurse / BPA Driven Protocol    Potassium Replacement - Follow Nurse / BPA Driven Protocol    [COMPLETED] Insert Peripheral IV **AND** sodium chloride    sodium chloride    sodium chloride    Objective     VITAL SIGNS  Vitals:    12/23/23 1459 12/23/23 2107 12/23/23 2352 12/24/23 0338   BP: 134/85 134/81 122/73 110/63   BP Location: Left arm Left arm Left arm Left arm   Patient Position: Lying Lying Lying Lying   Pulse: 91 96 101 99   Resp: 17 16 16 22   Temp: 97.4 °F (36.3 °C) 97.8 °F (36.6 °C) 97 °F (36.1 °C) 97.5 °F (36.4 °C)   TempSrc: Oral Oral Axillary Axillary   SpO2: 100% 90% 96% 100%   Weight:    65.7 kg (144 lb 13.5 oz)   Height:           Flowsheet Rows      Flowsheet Row First Filed Value   Admission Height 177.8 cm (70\") Documented at 12/21/2023 0735   Admission Weight 71.7 kg (158 lb) Documented at 12/21/2023 0735              Intake/Output Summary (Last " 24 hours) at 12/24/2023 0619  Last data filed at 12/23/2023 2352  Gross per 24 hour   Intake 1158.75 ml   Output 900 ml   Net 258.75 ml        TELEMETRY: Atrial fibrillation    Physical Exam:  The patient is alert, oriented and in no distress.  Vital signs as noted above.  Head and neck revealed no carotid bruits or jugular venous distention.  No thyromegaly or lymphadenopathy is present  Lungs clear.  No wheezing.  Breath sounds are normal bilaterally.  Heart normal first and second heart sounds.  No murmur. No precordial rub is present.  No gallop is present.  Abdomen soft and nontender.  No organomegaly is present.  Extremities with good peripheral pulses without any pedal edema.  Skin warm and dry.  Musculoskeletal system is grossly normal  CNS grossly normal    Reviewed and updated.  Results Review:   I reviewed the patient's new clinical results.  Lab Results (last 24 hours)       Procedure Component Value Units Date/Time    Hemoglobin & Hematocrit, Blood [[id number]]  (Abnormal) Collected: 12/23/23 1612    Specimen: Blood Updated: 12/23/23 1702     Hemoglobin 10.3 g/dL      Hematocrit 31.6 %     Blood Culture - Blood, Arm, Right [[id number]]  (Normal) Collected: 12/21/23 0950    Specimen: Blood from Arm, Right Updated: 12/23/23 1000     Blood Culture No growth at 2 days    Blood Culture - Blood, Arm, Left [[id number]]  (Normal) Collected: 12/21/23 0924    Specimen: Blood from Arm, Left Updated: 12/23/23 0931     Blood Culture No growth at 2 days    CBC & Differential [[id number]]  (Abnormal) Collected: 12/23/23 0532    Specimen: Blood from Arm, Left Updated: 12/23/23 0635    Narrative:      The following orders were created for panel order CBC & Differential.  Procedure                               Abnormality         Status                     ---------                               -----------         ------                     CBC Auto Differential[[id number]]        Abnormal            Final result                Scan Slide[[id number]]                                       Final result                 Please view results for these tests on the individual orders.    CBC Auto Differential [[id number]]  (Abnormal) Collected: 12/23/23 0532    Specimen: Blood from Arm, Left Updated: 12/23/23 0635     WBC 33.40 10*3/mm3      RBC 1.91 10*6/mm3      Hemoglobin 6.1 g/dL      Hematocrit 18.8 %      MCV 98.2 fL      MCH 32.0 pg      MCHC 32.6 g/dL      RDW 14.7 %      RDW-SD 50.8 fl      MPV 12.0 fL      Platelets 189 10*3/mm3     Narrative:      The previously reported component NRBC is no longer being reported. Previous result was 0.0 /100 WBC (Reference Range: 0.0-0.2 /100 WBC) on 12/23/2023 at 0612 EST.    Scan Slide [[id number]] Collected: 12/23/23 0532    Specimen: Blood from Arm, Left Updated: 12/23/23 0635     Scan Slide --     Comment: See Manual Differential Results       Manual Differential [[id number]]  (Abnormal) Collected: 12/23/23 0532    Specimen: Blood from Arm, Left Updated: 12/23/23 0635     Neutrophil % 86.0 %      Lymphocyte % 5.0 %      Monocyte % 4.0 %      Bands %  5.0 %      Neutrophils Absolute 30.39 10*3/mm3      Lymphocytes Absolute 1.67 10*3/mm3      Monocytes Absolute 1.34 10*3/mm3      nRBC 2.0 /100 WBC      RBC Morphology Normal     Toxic Granulation Slight/1+     Large Platelets Slight/1+            Imaging Results (Last 24 Hours)       ** No results found for the last 24 hours. **        LAB RESULTS (LAST 7 DAYS)    CBC  Results from last 7 days   Lab Units 12/23/23  1612 12/23/23  0532 12/22/23  0714 12/22/23  0113 12/21/23  1723 12/21/23  0748   WBC 10*3/mm3  --  33.40* 18.30*  --   --  19.70*   RBC 10*6/mm3  --  1.91* 2.71*  --   --  3.68*   HEMOGLOBIN g/dL 10.3* 6.1* 8.7* 8.6* 10.1* 11.3*   HEMATOCRIT % 31.6* 18.8* 26.2* 25.8* 30.5* 34.5*   MCV fL  --  98.2* 96.9  --   --  93.8   PLATELETS 10*3/mm3  --  189 169  --   --  229       BMP  Results from last 7 days   Lab Units 12/23/23  0035  12/22/23  0715 12/21/23  0748   SODIUM mmol/L 138 139 138   POTASSIUM mmol/L 4.0 3.6 3.7   CHLORIDE mmol/L 104 101 95*   CO2 mmol/L 24.0 27.0 28.0   BUN mg/dL 89* 89* 90*   CREATININE mg/dL 1.66* 1.56* 1.96*   GLUCOSE mg/dL 171* 129* 169*   MAGNESIUM mg/dL 2.2 2.3 1.7   PHOSPHORUS mg/dL 2.6 3.3 3.5       CMP   Results from last 7 days   Lab Units 12/23/23  0035 12/22/23  0715 12/21/23  0748   SODIUM mmol/L 138 139 138   POTASSIUM mmol/L 4.0 3.6 3.7   CHLORIDE mmol/L 104 101 95*   CO2 mmol/L 24.0 27.0 28.0   BUN mg/dL 89* 89* 90*   CREATININE mg/dL 1.66* 1.56* 1.96*   GLUCOSE mg/dL 171* 129* 169*   ALBUMIN g/dL 3.1* 3.3* 3.7   BILIRUBIN mg/dL 0.3 0.5 0.7   ALK PHOS U/L 69 75 90   AST (SGOT) U/L 16 18 21   ALT (SGPT) U/L 18 20 21         BNP        TROPONIN        CoAg  Results from last 7 days   Lab Units 12/22/23  0714 12/21/23  0748   INR  1.29* 1.17*   APTT seconds  --  26.3*       Creatinine Clearance  Estimated Creatinine Clearance: 25.8 mL/min (A) (by C-G formula based on SCr of 1.66 mg/dL (H)).    ABG        Radiology  No radiology results for the last day        EKG      I personally viewed and interpreted the patient's EKG/Telemetry data:    ECHOCARDIOGRAM:    Results for orders placed during the hospital encounter of 12/01/23    Adult Transthoracic Echo Complete W/ Cont if Necessary Per Protocol    Interpretation Summary    Left ventricular ejection fraction appears to be 36 - 40%.    The left atrial cavity is moderately dilated.    Estimated right ventricular systolic pressure from tricuspid regurgitation is normal (<35 mmHg).          STRESS TEST        Cardiolite (Tc-99m sestamibi) stress test    CARDIAC CATHETERIZATION  Results for orders placed during the hospital encounter of 12/01/23    Cardiac Catheterization/Vascular Study                OTHER:         Assessment & Plan     Principal Problem:    Melena  Active Problems:    GI bleed      /////////////////////////  History  =============  - Recent  melena.     - Inferior STEMI 12/4/2023.     - Status post PCI with PTCA, stent placement on Pronto catheter atherectomy to the graft to the distal right coronary artery.-12/3/2023-Dr. العلي     Cardiac catheterization 12/3/2023-Dr. العلي     Left Main %: 20 to 30%   Proximal LAD %: 100%  Mid/Distal LAD %: 100%  LCX %: Proximal 80% OM1 100%  Ramus:   RCA %: 100% after the PDA.  Lima %: LIMA to LAD was patent  SVG(s) %: SVG to the marginal branch is patent but has some 30 to 40% disease.  SVG to the diagonal branch is occluded.  SVG to the PDA is occluded.  SVG to the distal RCA is occluded but is the culprit lesion     -Past history of syncope-no further episodes.     -status post loop recorder placement.  [employer] LINQ 12/29/2017      - status post CABG October 2001. cardiac catheterization 12/26/2014 revealed 60% distal circumflex and total LAD and right coronary arteries.  Lima to LAD was patent ( lima coming off the left vertebral artery).  SVG to diagonal   marginal and PDA were patent.  SVG to left ventricular branch was totally occluded (chronic)     - atrial fibrillation -has converted to sinus rhythm and maintaining sinus rhythm.  Recently patient was noted to have atrial dysrhythmia on the monitor with loop recorder.     - sinus bradycardia-asymptomatic     - status post acute inferior myocardial infarction prior to surgery requiring acute stent placement to right coronary artery ) complicated by ventricular fibrillation)     Echocardiogram 12/1/2023    Left ventricular ejection fraction appears to be 36 - 40%.    The left atrial cavity is moderately dilated.    Estimated right ventricular systolic pressure from tricuspid regurgitation is normal (<35 mmHg).      -Dyslipidemia and hypertension     - lower extremity weakness.   Arterial Doppler study of the lower extremity is normal. -  improved .   arterial Doppler study of the lower extremity was normal     - status post cholecystectomy     - allergy to  penicillin iodine and metoprolol (rash).  Intolerance to atenolol due to bradycardia.  Allergy to Levaquin and penicillin.  Intolerance to prednisone Nitropatch IV nitroglycerin and prednisone.  =================    Plan  ==============  Recent melena and GI bleed.  Patient had endoscopy today 12/22/2023 that revealed bleeding duodenal ulcer.  Patient had endoscopic hemostasis with epinephrine and Hemoclip.  Patient was started on PPI.  Patient is on antiplatelet therapy (Plavix) and anticoagulation for atrial fibrillation (Eliquis)    GI has suggested holding Eliquis for at least 5 days.  Will restart Plavix when patient is able to take medications by mouth..  Closely follow cardiovascular status.      Inferior STEMI 12/4/2023.     Status post PCI with PTCA, stent placement on Pronto catheter atherectomy to the graft to the distal right coronary artery.-12/3/2023-Dr. العلي     History of junctional bradycardia.  Temporary pacemaker was placed in the setting of inferior STEMI.  Temporary pacemaker was removed.     Past history of complete heart block.  Patient had temporary pacemaker which was removed.     Atrial fibrillation with controlled ventricular response.     Hypomagnesemia-better at 2.3.    Leukocytosis  WBC 33,000.    Anemia  Hemoglobin 6.1-12/23/2023  Hgb 10.3-12/24/2023    Renal dysfunction  BUN/creatinine 89/1.66-12/23/2023       Patient is off beta-blocker Coreg.     Echocardiogram 12/1/2023-as above     Patient is DNR DNI..     Medications were reviewed and updated.  Current medications include atorvastatin and gabapentin  Pantoprazole.  Eliquis is on hold.  Will restart Plavix as soon as patient can take medications by mouth..  Will discuss with GI service.    Reviewed and updated-12/24/2023    Further plan will depend on patient's progress.  //////////////////////////////////////        Missy Domínguez MD  12/24/23  06:19 EST

## 2023-12-25 ENCOUNTER — INPATIENT HOSPITAL (OUTPATIENT)
Dept: URBAN - METROPOLITAN AREA HOSPITAL 84 | Facility: HOSPITAL | Age: 88
End: 2023-12-25

## 2023-12-25 DIAGNOSIS — K92.2 GASTROINTESTINAL HEMORRHAGE, UNSPECIFIED: ICD-10-CM

## 2023-12-25 LAB
ANISOCYTOSIS BLD QL: ABNORMAL
BASOPHILS # BLD MANUAL: 0.27 10*3/MM3 (ref 0–0.2)
BASOPHILS NFR BLD MANUAL: 1 % (ref 0–1.5)
DEPRECATED RDW RBC AUTO: 53.4 FL (ref 37–54)
ERYTHROCYTE [DISTWIDTH] IN BLOOD BY AUTOMATED COUNT: 16.7 % (ref 12.3–15.4)
HCT VFR BLD AUTO: 26.4 % (ref 37.5–51)
HGB BLD-MCNC: 8.6 G/DL (ref 13–17.7)
LARGE PLATELETS: ABNORMAL
LYMPHOCYTES # BLD MANUAL: 2.98 10*3/MM3 (ref 0.7–3.1)
LYMPHOCYTES NFR BLD MANUAL: 3 % (ref 5–12)
MCH RBC QN AUTO: 30.5 PG (ref 26.6–33)
MCHC RBC AUTO-ENTMCNC: 32.6 G/DL (ref 31.5–35.7)
MCV RBC AUTO: 93.3 FL (ref 79–97)
METAMYELOCYTES NFR BLD MANUAL: 2 % (ref 0–0)
MONOCYTES # BLD: 0.81 10*3/MM3 (ref 0.1–0.9)
MYELOCYTES NFR BLD MANUAL: 4 % (ref 0–0)
NEUTROPHILS # BLD AUTO: 21.14 10*3/MM3 (ref 1.7–7)
NEUTROPHILS NFR BLD MANUAL: 75 % (ref 42.7–76)
NEUTS BAND NFR BLD MANUAL: 3 % (ref 0–5)
PATHOLOGY REVIEW: YES
PHOSPHATE SERPL-MCNC: 2.6 MG/DL (ref 2.5–4.5)
PLATELET # BLD AUTO: 143 10*3/MM3 (ref 140–450)
PMV BLD AUTO: 10.9 FL (ref 6–12)
PROMYELOCYTES NFR BLD MANUAL: 1 % (ref 0–0)
RBC # BLD AUTO: 2.82 10*6/MM3 (ref 4.14–5.8)
SCAN SLIDE: NORMAL
TOXIC GRANULATION: ABNORMAL
VARIANT LYMPHS NFR BLD MANUAL: 11 % (ref 19.6–45.3)
WBC NRBC COR # BLD AUTO: 27.1 10*3/MM3 (ref 3.4–10.8)

## 2023-12-25 PROCEDURE — 25810000003 SODIUM CHLORIDE 0.9 % SOLUTION: Performed by: STUDENT IN AN ORGANIZED HEALTH CARE EDUCATION/TRAINING PROGRAM

## 2023-12-25 PROCEDURE — 25010000002 METRONIDAZOLE 500 MG/100ML SOLUTION: Performed by: HOSPITALIST

## 2023-12-25 PROCEDURE — 25010000002 DIGOXIN PER 500 MCG: Performed by: INTERNAL MEDICINE

## 2023-12-25 PROCEDURE — 25810000003 DEXTROSE 5 % WITH KCL 20 MEQ 20 MEQ/L SOLUTION: Performed by: HOSPITALIST

## 2023-12-25 PROCEDURE — 25010000002 METOCLOPRAMIDE PER 10 MG: Performed by: INTERNAL MEDICINE

## 2023-12-25 PROCEDURE — 99232 SBSQ HOSP IP/OBS MODERATE 35: CPT | Performed by: INTERNAL MEDICINE

## 2023-12-25 PROCEDURE — 84100 ASSAY OF PHOSPHORUS: CPT | Performed by: INTERNAL MEDICINE

## 2023-12-25 PROCEDURE — 25010000002 CEFTRIAXONE PER 250 MG: Performed by: HOSPITALIST

## 2023-12-25 RX ORDER — DIGOXIN 0.25 MG/ML
125 INJECTION INTRAMUSCULAR; INTRAVENOUS ONCE
Qty: 2 ML | Refills: 0 | Status: DISCONTINUED | OUTPATIENT
Start: 2023-12-25 | End: 2023-12-25

## 2023-12-25 RX ORDER — POTASSIUM CHLORIDE, DEXTROSE MONOHYDRATE 150; 5 MG/100ML; G/100ML
75 INJECTION, SOLUTION INTRAVENOUS CONTINUOUS
Status: DISPENSED | OUTPATIENT
Start: 2023-12-25 | End: 2023-12-26

## 2023-12-25 RX ORDER — CLOPIDOGREL BISULFATE 75 MG/1
75 TABLET ORAL DAILY
Status: DISCONTINUED | OUTPATIENT
Start: 2023-12-25 | End: 2023-12-28 | Stop reason: HOSPADM

## 2023-12-25 RX ORDER — SODIUM PHOSPHATE IN 0.9 % NACL 15MMOL/100
15 PLASTIC BAG, INJECTION (ML) INTRAVENOUS ONCE
Status: DISCONTINUED | OUTPATIENT
Start: 2023-12-25 | End: 2023-12-25 | Stop reason: ALTCHOICE

## 2023-12-25 RX ORDER — FENTANYL/ROPIVACAINE/NS/PF 2-625MCG/1
15 PLASTIC BAG, INJECTION (ML) EPIDURAL ONCE
Status: COMPLETED | OUTPATIENT
Start: 2023-12-25 | End: 2023-12-25

## 2023-12-25 RX ADMIN — POTASSIUM CHLORIDE AND DEXTROSE MONOHYDRATE 75 ML/HR: 150; 5 INJECTION, SOLUTION INTRAVENOUS at 14:16

## 2023-12-25 RX ADMIN — PANTOPRAZOLE SODIUM 40 MG: 40 INJECTION, POWDER, FOR SOLUTION INTRAVENOUS at 08:25

## 2023-12-25 RX ADMIN — METRONIDAZOLE 500 MG: 500 INJECTION, SOLUTION INTRAVENOUS at 08:24

## 2023-12-25 RX ADMIN — POTASSIUM PHOSPHATE, MONOBASIC POTASSIUM PHOSPHATE, DIBASIC 15 MMOL: 224; 236 INJECTION, SOLUTION, CONCENTRATE INTRAVENOUS at 02:08

## 2023-12-25 RX ADMIN — SENNOSIDES AND DOCUSATE SODIUM 2 TABLET: 50; 8.6 TABLET ORAL at 21:03

## 2023-12-25 RX ADMIN — DIGOXIN 500 MCG: 250 INJECTION, SOLUTION INTRAMUSCULAR; INTRAVENOUS at 11:19

## 2023-12-25 RX ADMIN — ATORVASTATIN CALCIUM 10 MG: 20 TABLET, FILM COATED ORAL at 21:04

## 2023-12-25 RX ADMIN — CEFTRIAXONE 1000 MG: 1 INJECTION, POWDER, FOR SOLUTION INTRAMUSCULAR; INTRAVENOUS at 11:18

## 2023-12-25 RX ADMIN — METRONIDAZOLE 500 MG: 500 INJECTION, SOLUTION INTRAVENOUS at 23:16

## 2023-12-25 RX ADMIN — LOSARTAN POTASSIUM 25 MG: 25 TABLET, FILM COATED ORAL at 08:25

## 2023-12-25 RX ADMIN — METOCLOPRAMIDE 5 MG: 5 INJECTION, SOLUTION INTRAMUSCULAR; INTRAVENOUS at 08:25

## 2023-12-25 RX ADMIN — SENNOSIDES AND DOCUSATE SODIUM 2 TABLET: 50; 8.6 TABLET ORAL at 08:25

## 2023-12-25 RX ADMIN — METRONIDAZOLE 500 MG: 500 INJECTION, SOLUTION INTRAVENOUS at 15:19

## 2023-12-25 RX ADMIN — METOCLOPRAMIDE 5 MG: 5 INJECTION, SOLUTION INTRAMUSCULAR; INTRAVENOUS at 21:03

## 2023-12-25 RX ADMIN — TAMSULOSIN HYDROCHLORIDE 0.4 MG: 0.4 CAPSULE ORAL at 21:04

## 2023-12-25 RX ADMIN — GABAPENTIN 100 MG: 100 CAPSULE ORAL at 21:03

## 2023-12-25 RX ADMIN — CLOPIDOGREL BISULFATE 75 MG: 75 TABLET ORAL at 14:16

## 2023-12-25 RX ADMIN — Medication 10 ML: at 08:41

## 2023-12-25 RX ADMIN — GABAPENTIN 100 MG: 100 CAPSULE ORAL at 08:25

## 2023-12-25 RX ADMIN — PANTOPRAZOLE SODIUM 40 MG: 40 INJECTION, POWDER, FOR SOLUTION INTRAVENOUS at 21:03

## 2023-12-25 RX ADMIN — BUMETANIDE 1 MG: 1 TABLET ORAL at 08:25

## 2023-12-25 RX ADMIN — Medication 10 ML: at 21:04

## 2023-12-25 NOTE — PLAN OF CARE
Problem: Adult Inpatient Plan of Care  Goal: Plan of Care Review  Outcome: Ongoing, Progressing  Goal: Patient-Specific Goal (Individualized)  Outcome: Ongoing, Progressing  Goal: Absence of Hospital-Acquired Illness or Injury  Outcome: Ongoing, Progressing  Intervention: Identify and Manage Fall Risk  Recent Flowsheet Documentation  Taken 12/25/2023 1438 by Christina Murphy LPN  Safety Promotion/Fall Prevention:   safety round/check completed   room organization consistent   lighting adjusted   activity supervised   assistive device/personal items within reach   clutter free environment maintained  Taken 12/25/2023 1215 by Christina Murphy LPN  Safety Promotion/Fall Prevention:   activity supervised   assistive device/personal items within reach   clutter free environment maintained   lighting adjusted   room organization consistent   safety round/check completed  Taken 12/25/2023 1013 by Christina Murphy LPN  Safety Promotion/Fall Prevention:   activity supervised   assistive device/personal items within reach   clutter free environment maintained   lighting adjusted   room organization consistent   safety round/check completed  Taken 12/25/2023 0823 by Christina Murphy LPN  Safety Promotion/Fall Prevention:   activity supervised   assistive device/personal items within reach   clutter free environment maintained   lighting adjusted   room organization consistent   safety round/check completed  Intervention: Prevent Skin Injury  Recent Flowsheet Documentation  Taken 12/25/2023 0823 by Christina Murphy LPN  Skin Protection:   adhesive use limited   incontinence pads utilized   tubing/devices free from skin contact  Intervention: Prevent and Manage VTE (Venous Thromboembolism) Risk  Recent Flowsheet Documentation  Taken 12/25/2023 0823 by Christina Murphy LPN  VTE Prevention/Management: patient refused intervention  Range of Motion: active ROM (range of motion) encouraged  Intervention: Prevent Infection  Recent  Flowsheet Documentation  Taken 12/25/2023 1438 by Christina Murphy LPN  Infection Prevention:   cohorting utilized   environmental surveillance performed   hand hygiene promoted   personal protective equipment utilized   rest/sleep promoted   single patient room provided   visitors restricted/screened  Taken 12/25/2023 1215 by Christina Murphy LPN  Infection Prevention:   cohorting utilized   environmental surveillance performed   hand hygiene promoted   personal protective equipment utilized   rest/sleep promoted   single patient room provided   visitors restricted/screened  Taken 12/25/2023 1013 by Christina Murphy LPN  Infection Prevention:   cohorting utilized   environmental surveillance performed   hand hygiene promoted   personal protective equipment utilized   rest/sleep promoted   single patient room provided   visitors restricted/screened  Taken 12/25/2023 0823 by Christina Murphy LPN  Infection Prevention:   cohorting utilized   environmental surveillance performed   hand hygiene promoted   personal protective equipment utilized   rest/sleep promoted   single patient room provided   visitors restricted/screened  Goal: Optimal Comfort and Wellbeing  Outcome: Ongoing, Progressing  Intervention: Monitor Pain and Promote Comfort  Recent Flowsheet Documentation  Taken 12/25/2023 1215 by Christina Murphy LPN  Pain Management Interventions:   care clustered   diversional activity provided   position adjusted   pillow support provided  Taken 12/25/2023 0823 by Christina Murphy LPN  Pain Management Interventions:   care clustered   diversional activity provided   position adjusted   prayer utilized  Intervention: Provide Person-Centered Care  Recent Flowsheet Documentation  Taken 12/25/2023 0823 by Christina Murphy LPN  Trust Relationship/Rapport:   care explained   choices provided   thoughts/feelings acknowledged  Goal: Readiness for Transition of Care  Outcome: Ongoing, Progressing     Problem: Adjustment to  Illness (Gastrointestinal Bleeding)  Goal: Optimal Coping with Acute Illness  Outcome: Ongoing, Progressing  Intervention: Optimize Psychosocial Response  Recent Flowsheet Documentation  Taken 12/25/2023 1215 by Christina Murphy LPN  Supportive Measures: active listening utilized  Taken 12/25/2023 0823 by Christina Murphy LPN  Supportive Measures: active listening utilized     Problem: Bleeding (Gastrointestinal Bleeding)  Goal: Hemostasis  Outcome: Ongoing, Progressing  Intervention: Manage Gastrointestinal Bleeding  Recent Flowsheet Documentation  Taken 12/25/2023 1215 by Christina Murphy LPN  Environmental Support: calm environment promoted  Taken 12/25/2023 0823 by Christina Murphy LPN  Environmental Support: calm environment promoted     Problem: Skin Injury Risk Increased  Goal: Skin Health and Integrity  Outcome: Ongoing, Progressing  Intervention: Optimize Skin Protection  Recent Flowsheet Documentation  Taken 12/25/2023 0823 by Christina Murphy LPN  Pressure Reduction Techniques:   frequent weight shift encouraged   weight shift assistance provided  Pressure Reduction Devices: pressure-redistributing mattress utilized  Skin Protection:   adhesive use limited   incontinence pads utilized   tubing/devices free from skin contact     Problem: Fall Injury Risk  Goal: Absence of Fall and Fall-Related Injury  Outcome: Ongoing, Progressing  Intervention: Identify and Manage Contributors  Recent Flowsheet Documentation  Taken 12/25/2023 1438 by Christina Murphy LPN  Medication Review/Management: medications reviewed  Taken 12/25/2023 1215 by Christina Murphy LPN  Medication Review/Management: medications reviewed  Taken 12/25/2023 1013 by Christina Murphy LPN  Medication Review/Management: medications reviewed  Taken 12/25/2023 0823 by Christina Murphy LPN  Medication Review/Management: medications reviewed  Intervention: Promote Injury-Free Environment  Recent Flowsheet Documentation  Taken 12/25/2023 1438 by  Christina Murphy LPN  Safety Promotion/Fall Prevention:   safety round/check completed   room organization consistent   lighting adjusted   activity supervised   assistive device/personal items within reach   clutter free environment maintained  Taken 12/25/2023 1215 by Christina Murphy LPN  Safety Promotion/Fall Prevention:   activity supervised   assistive device/personal items within reach   clutter free environment maintained   lighting adjusted   room organization consistent   safety round/check completed  Taken 12/25/2023 1013 by Christina Murphy LPN  Safety Promotion/Fall Prevention:   activity supervised   assistive device/personal items within reach   clutter free environment maintained   lighting adjusted   room organization consistent   safety round/check completed  Taken 12/25/2023 0823 by Christina Murphy LPN  Safety Promotion/Fall Prevention:   activity supervised   assistive device/personal items within reach   clutter free environment maintained   lighting adjusted   room organization consistent   safety round/check completed   Goal Outcome Evaluation:        Patient diet was changed this shift. Patient continues on IV fluids that have been changed this shift per order. Patient has no c/o pain or discomfort at this time. Patient continues on IV Flagyl & antibiotic therapy per order. No noted bleeding this shift. Call light within reach.

## 2023-12-25 NOTE — PROGRESS NOTES
Referring Provider: Juan Shaffer MD    Reason for follow-up:  Melena.  Status post stent  Status post inferior STEMI     Patient Care Team:  Tricia Aldana APRN as PCP - General (Family Medicine)  Missy Domínguez MD as Consulting Physician (Cardiology)    Subjective .      ROS    Since I have last seen, the patient has been without any chest discomfort ,shortness of breath, palpitations, dizziness or syncope.  Denies having any headache ,abdominal pain ,nausea, vomiting , diarrhea constipation, loss of weight or loss of appetite.  Denies having any excessive bruising ,hematuria.    Review of all systems negative except as indicated    History  Past Medical History:   Diagnosis Date    Anxiety 2014    Atrial fibrillation     Benign prostatic hyperplasia     CAD (coronary artery disease)     Hyperlipidemia     Hypertension     Myocardial infarction        Past Surgical History:   Procedure Laterality Date    CARDIAC CATHETERIZATION N/A 12/1/2023    Procedure: Left Heart Cath;  Surgeon: Son العلي MD;  Location: [facility] CATH INVASIVE LOCATION;  Service: Cardiovascular;  Laterality: N/A;    CARDIAC CATHETERIZATION N/A 12/1/2023    Procedure: Coronary angiography;  Surgeon: Son العلي MD;  Location: [facility] CATH INVASIVE LOCATION;  Service: Cardiovascular;  Laterality: N/A;    CARDIAC CATHETERIZATION N/A 12/1/2023    Procedure: Percutaneous Coronary Intervention;  Surgeon: Son العلي MD;  Location: [facility] CATH INVASIVE LOCATION;  Service: Cardiovascular;  Laterality: N/A;    CARDIAC ELECTROPHYSIOLOGY PROCEDURE N/A 12/1/2023    Procedure: Temporary Pacemaker;  Surgeon: Son العلي MD;  Location: [facility] CATH INVASIVE LOCATION;  Service: Cardiovascular;  Laterality: N/A;    CARDIAC SURGERY      HERNIA REPAIR         Family History   Problem Relation Age of Onset    Heart disease Mother     Heart disease Father        Social History     Tobacco Use    Smoking status: Former     Types: Cigarettes      Quit date: 1970     Years since quittin.3    Smokeless tobacco: Never    Tobacco comments:     more than 50yrs   Vaping Use    Vaping Use: Never used   Substance Use Topics    Alcohol use: No    Drug use: No        Medications Prior to Admission   Medication Sig Dispense Refill Last Dose    acetaminophen (TYLENOL) 650 MG 8 hr tablet Take 1 tablet by mouth Every Night.   2023    apixaban (ELIQUIS) 2.5 MG tablet tablet Take 1 tablet by mouth Every 12 (Twelve) Hours for 30 days. Indications: Atrial Fibrillation 60 tablet 0 2023    aspirin 81 MG chewable tablet Chew 1 tablet Daily for 14 days. 14 tablet 0 2023    atorvastatin (LIPITOR) 10 MG tablet Take 1 tablet by mouth Every Night for 30 days. 30 tablet 0 2023    azelastine (ASTEPRO) 0.15 % solution nasal spray USE 2 SPRAYS IN EACH NOSTRIL TWICE DAILY AS DIRECTED BY PROVIDER 30 mL 3 2023    bumetanide (BUMEX) 1 MG tablet Take 1 tablet by mouth 3 (Three) Times a Day for 30 days. 90 tablet 0 2023    Cholecalciferol 25 MCG (1000 UT) tablet Take 1 tablet by mouth Daily.   2023    clopidogrel (PLAVIX) 75 MG tablet Take 1 tablet by mouth Daily for 30 days. 30 tablet 0 2023    docusate sodium (COLACE) 250 MG capsule Take 1 capsule by mouth Daily.   2023    gabapentin (NEURONTIN) 100 MG capsule TAKE 1 CAPSULE BY MOUTH TWICE DAILY 180 capsule 1 2023    losartan (COZAAR) 25 MG tablet Take 1 tablet by mouth Daily.       Mouthwashes (Biotene dry mouth) liquid liquid Take 15 mL by mouth 3 (Three) Times a Day As Needed for Dry Mouth.       pantoprazole (PROTONIX) 40 MG EC tablet Take 1 tablet by mouth Daily.   2023    potassium chloride 10 MEQ CR tablet Take 1 tablet by mouth Daily.   2023    tamsulosin (FLOMAX) 0.4 MG capsule 24 hr capsule TAKE 1 CAPSULE BY MOUTH DAILY 90 capsule 0 2023       Allergies  Iodine, Levofloxacin, Nitroglycerin, Paroxetine, Penicillin g, Prednisone, Sucralfate,  "Diltiazem hcl, Metoprolol, Pramipexole dihydrochloride, and Ropinirole hcl    Scheduled Meds:[Held by provider] apixaban, 2.5 mg, Oral, Q12H  [Held by provider] aspirin, 81 mg, Oral, Daily  atorvastatin, 10 mg, Oral, Nightly  bumetanide, 1 mg, Oral, BID  cefTRIAXone, 1,000 mg, Intravenous, Q24H  [Held by provider] clopidogrel, 75 mg, Oral, Daily  digoxin, 500 mcg, Intravenous, Daily  gabapentin, 100 mg, Oral, BID  losartan, 25 mg, Oral, Daily  metoclopramide, 5 mg, Intravenous, BID  metroNIDAZOLE, 500 mg, Intravenous, Q8H  pantoprazole, 40 mg, Intravenous, Q12H  senna-docusate sodium, 2 tablet, Oral, BID  sodium chloride, 10 mL, Intravenous, Q12H  tamsulosin, 0.4 mg, Oral, Nightly      Continuous Infusions:dextrose 5 % and sodium chloride 0.45 %, 75 mL/hr, Last Rate: 75 mL/hr (12/24/23 2328)      PRN Meds:.  acetaminophen **OR** acetaminophen **OR** acetaminophen    senna-docusate sodium **AND** polyethylene glycol **AND** bisacodyl **AND** bisacodyl    Calcium Replacement - Follow Nurse / BPA Driven Protocol    Magnesium Cardiology Dose Replacement - Follow Nurse / BPA Driven Protocol    nitroglycerin    ondansetron **OR** ondansetron    Phosphorus Replacement - Follow Nurse / BPA Driven Protocol    Potassium Replacement - Follow Nurse / BPA Driven Protocol    [COMPLETED] Insert Peripheral IV **AND** sodium chloride    sodium chloride    sodium chloride    Objective     VITAL SIGNS  Vitals:    12/25/23 0100 12/25/23 0200 12/25/23 0300 12/25/23 0449   BP:    94/54   BP Location:    Left arm   Patient Position:    Lying   Pulse: 102 103 107 105   Resp:    17   Temp:    97.2 °F (36.2 °C)   TempSrc:    Axillary   SpO2:   99%    Weight:       Height:           Flowsheet Rows      Flowsheet Row First Filed Value   Admission Height 177.8 cm (70\") Documented at 12/21/2023 0735   Admission Weight 71.7 kg (158 lb) Documented at 12/21/2023 0735              Intake/Output Summary (Last 24 hours) at 12/25/2023 0651  Last data " filed at 12/25/2023 0449  Gross per 24 hour   Intake --   Output 1600 ml   Net -1600 ml        TELEMETRY: Atrial fibrillation with moderate ventricular response.    Physical Exam:  The patient is alert, oriented and in no distress.  Vital signs as noted above.  Head and neck revealed no carotid bruits or jugular venous distention.  No thyromegaly or lymphadenopathy is present  Lungs clear.  No wheezing.  Breath sounds are normal bilaterally.  Heart normal first and second heart sounds.  No murmur. No precordial rub is present.  No gallop is present.  Abdomen soft and nontender.  No organomegaly is present.  Extremities with good peripheral pulses without any pedal edema.  Skin warm and dry.  Musculoskeletal system is grossly normal  CNS grossly normal    Reviewed and updated.    Results Review:   I reviewed the patient's new clinical results.  Lab Results (last 24 hours)       Procedure Component Value Units Date/Time    CBC & Differential [[id number]]  (Abnormal) Collected: 12/24/23 2259    Specimen: Blood Updated: 12/25/23 0030    Narrative:      The following orders were created for panel order CBC & Differential.  Procedure                               Abnormality         Status                     ---------                               -----------         ------                     CBC Auto Differential[[id number]]        Abnormal            Final result               Scan Slide[[id number]]                                       Final result                 Please view results for these tests on the individual orders.    CBC Auto Differential [[id number]]  (Abnormal) Collected: 12/24/23 2259    Specimen: Blood Updated: 12/25/23 0030     WBC 27.10 10*3/mm3      RBC 2.82 10*6/mm3      Hemoglobin 8.6 g/dL      Hematocrit 26.4 %      MCV 93.3 fL      MCH 30.5 pg      MCHC 32.6 g/dL      RDW 16.7 %      RDW-SD 53.4 fl      MPV 10.9 fL      Platelets 143 10*3/mm3     Narrative:      The previously reported component  NRBC is no longer being reported. Previous result was 0.4 /100 WBC (Reference Range: 0.0-0.2 /100 WBC) on 12/24/2023 at 2337 EST.    Scan Slide [[id number]] Collected: 12/24/23 2259    Specimen: Blood Updated: 12/25/23 0030     Scan Slide --     Comment: See Manual Differential Results       Manual Differential [[id number]]  (Abnormal) Collected: 12/24/23 2259    Specimen: Blood Updated: 12/25/23 0030     Neutrophil % 75.0 %      Lymphocyte % 11.0 %      Monocyte % 3.0 %      Basophil % 1.0 %      Bands %  3.0 %      Metamyelocyte % 2.0 %      Myelocyte % 4.0 %      Promyelocyte % 1.0 %      Neutrophils Absolute 21.14 10*3/mm3      Lymphocytes Absolute 2.98 10*3/mm3      Monocytes Absolute 0.81 10*3/mm3      Basophils Absolute 0.27 10*3/mm3      Anisocytosis Slight/1+     Toxic Granulation Slight/1+     Large Platelets Slight/1+    Path Consult Reflex [[id number]] Collected: 12/24/23 2259    Specimen: Blood Updated: 12/25/23 0030     Pathology Review Yes    Magnesium [[id number]]  (Normal) Collected: 12/24/23 2259    Specimen: Blood Updated: 12/24/23 2345     Magnesium 2.1 mg/dL     Phosphorus [[id number]]  (Abnormal) Collected: 12/24/23 2259    Specimen: Blood Updated: 12/24/23 2345     Phosphorus 2.2 mg/dL     Comprehensive Metabolic Panel [[id number]]  (Abnormal) Collected: 12/24/23 2259    Specimen: Blood Updated: 12/24/23 2345     Glucose 108 mg/dL      BUN 71 mg/dL      Creatinine 1.69 mg/dL      Sodium 151 mmol/L      Potassium 3.1 mmol/L      Chloride 113 mmol/L      CO2 27.0 mmol/L      Calcium 8.7 mg/dL      Total Protein 5.0 g/dL      Albumin 3.2 g/dL      ALT (SGPT) 32 U/L      AST (SGOT) 32 U/L      Alkaline Phosphatase 78 U/L      Total Bilirubin 0.5 mg/dL      Globulin 1.8 gm/dL      A/G Ratio 1.8 g/dL      BUN/Creatinine Ratio 42.0     Anion Gap 11.0 mmol/L      eGFR 37.4 mL/min/1.73     Narrative:      GFR Normal >60  Chronic Kidney Disease <60  Kidney Failure <15    The GFR formula is only valid for  adults with stable renal function between ages 18 and 70.    C-reactive Protein [[id number]]  (Normal) Collected: 12/24/23 2259    Specimen: Blood Updated: 12/24/23 2345     C-Reactive Protein 0.38 mg/dL     Lactic Acid, Plasma [[id number]]  (Abnormal) Collected: 12/24/23 1815    Specimen: Blood Updated: 12/24/23 1913     Lactate 2.2 mmol/L     Blood Culture - Blood, Arm, Right [[id number]] Collected: 12/24/23 1815    Specimen: Blood from Arm, Right Updated: 12/24/23 1847    Blood Culture - Blood, Arm, Left [[id number]] Collected: 12/24/23 1221    Specimen: Blood from Arm, Left Updated: 12/24/23 1230    Blood Culture - Blood, Arm, Right [[id number]]  (Normal) Collected: 12/21/23 0950    Specimen: Blood from Arm, Right Updated: 12/24/23 1001     Blood Culture No growth at 3 days    Blood Culture - Blood, Arm, Left [[id number]]  (Normal) Collected: 12/21/23 0924    Specimen: Blood from Arm, Left Updated: 12/24/23 0931     Blood Culture No growth at 3 days    CBC & Differential [[id number]]  (Abnormal) Collected: 12/24/23 0708    Specimen: Blood Updated: 12/24/23 0855    Narrative:      The following orders were created for panel order CBC & Differential.  Procedure                               Abnormality         Status                     ---------                               -----------         ------                     CBC Auto Differential[[id number]]        Abnormal            Final result               Scan Slide[[id number]]                                       Final result                 Please view results for these tests on the individual orders.    CBC Auto Differential [[id number]]  (Abnormal) Collected: 12/24/23 0708    Specimen: Blood Updated: 12/24/23 0855     WBC 31.70 10*3/mm3      RBC 3.02 10*6/mm3      Hemoglobin 9.2 g/dL      Hematocrit 28.4 %      MCV 94.2 fL      MCH 30.5 pg      MCHC 32.4 g/dL      RDW 16.6 %      RDW-SD 53.4 fl      MPV 11.3 fL      Platelets 149 10*3/mm3     Narrative:      The  previously reported component NRBC is no longer being reported. Previous result was 0.2 /100 WBC (Reference Range: 0.0-0.2 /100 WBC) on 12/24/2023 at 0823 EST.    Scan Slide [[id number]] Collected: 12/24/23 0708    Specimen: Blood Updated: 12/24/23 0855     Scan Slide --     Comment: See Manual Differential Results       Manual Differential [[id number]]  (Abnormal) Collected: 12/24/23 0708    Specimen: Blood Updated: 12/24/23 0855     Neutrophil % 86.0 %      Lymphocyte % 6.0 %      Monocyte % 1.0 %      Bands %  5.0 %      Myelocyte % 2.0 %      Neutrophils Absolute 28.85 10*3/mm3      Lymphocytes Absolute 1.90 10*3/mm3      Monocytes Absolute 0.32 10*3/mm3      nRBC 1.0 /100 WBC      Anisocytosis Slight/1+     WBC Morphology Normal     Large Platelets Slight/1+    Magnesium [[id number]]  (Normal) Collected: 12/24/23 0708    Specimen: Blood Updated: 12/24/23 0843     Magnesium 2.3 mg/dL     Phosphorus [[id number]]  (Abnormal) Collected: 12/24/23 0708    Specimen: Blood Updated: 12/24/23 0843     Phosphorus 2.3 mg/dL     Comprehensive Metabolic Panel [[id number]]  (Abnormal) Collected: 12/24/23 0708    Specimen: Blood Updated: 12/24/23 0843     Glucose 103 mg/dL      BUN 90 mg/dL      Creatinine 1.71 mg/dL      Sodium 149 mmol/L      Potassium 3.4 mmol/L      Chloride 112 mmol/L      CO2 25.0 mmol/L      Calcium 8.7 mg/dL      Total Protein 4.9 g/dL      Albumin 3.2 g/dL      ALT (SGPT) 20 U/L      AST (SGOT) 19 U/L      Alkaline Phosphatase 70 U/L      Total Bilirubin 0.6 mg/dL      Globulin 1.7 gm/dL      A/G Ratio 1.9 g/dL      BUN/Creatinine Ratio 52.6     Anion Gap 12.0 mmol/L      eGFR 36.9 mL/min/1.73     Narrative:      GFR Normal >60  Chronic Kidney Disease <60  Kidney Failure <15    The GFR formula is only valid for adults with stable renal function between ages 18 and 70.            Imaging Results (Last 24 Hours)       ** No results found for the last 24 hours. **        LAB RESULTS (LAST 7  DAYS)    CBC  Results from last 7 days   Lab Units 12/24/23 2259 12/24/23  0708 12/23/23  1612 12/23/23  0532 12/22/23  0714 12/22/23  0113 12/21/23  1723 12/21/23  0748   WBC 10*3/mm3 27.10* 31.70*  --  33.40* 18.30*  --   --  19.70*   RBC 10*6/mm3 2.82* 3.02*  --  1.91* 2.71*  --   --  3.68*   HEMOGLOBIN g/dL 8.6* 9.2* 10.3* 6.1* 8.7* 8.6* 10.1* 11.3*   HEMATOCRIT % 26.4* 28.4* 31.6* 18.8* 26.2* 25.8* 30.5* 34.5*   MCV fL 93.3 94.2  --  98.2* 96.9  --   --  93.8   PLATELETS 10*3/mm3 143 149  --  189 169  --   --  229       BMP  Results from last 7 days   Lab Units 12/24/23 2259 12/24/23  0708 12/23/23  0035 12/22/23  0715 12/21/23  0748   SODIUM mmol/L 151* 149* 138 139 138   POTASSIUM mmol/L 3.1* 3.4* 4.0 3.6 3.7   CHLORIDE mmol/L 113* 112* 104 101 95*   CO2 mmol/L 27.0 25.0 24.0 27.0 28.0   BUN mg/dL 71* 90* 89* 89* 90*   CREATININE mg/dL 1.69* 1.71* 1.66* 1.56* 1.96*   GLUCOSE mg/dL 108* 103* 171* 129* 169*   MAGNESIUM mg/dL 2.1 2.3 2.2 2.3 1.7   PHOSPHORUS mg/dL 2.2* 2.3* 2.6 3.3 3.5       CMP   Results from last 7 days   Lab Units 12/24/23 2259 12/24/23  0708 12/23/23  0035 12/22/23  0715 12/21/23  0748   SODIUM mmol/L 151* 149* 138 139 138   POTASSIUM mmol/L 3.1* 3.4* 4.0 3.6 3.7   CHLORIDE mmol/L 113* 112* 104 101 95*   CO2 mmol/L 27.0 25.0 24.0 27.0 28.0   BUN mg/dL 71* 90* 89* 89* 90*   CREATININE mg/dL 1.69* 1.71* 1.66* 1.56* 1.96*   GLUCOSE mg/dL 108* 103* 171* 129* 169*   ALBUMIN g/dL 3.2* 3.2* 3.1* 3.3* 3.7   BILIRUBIN mg/dL 0.5 0.6 0.3 0.5 0.7   ALK PHOS U/L 78 70 69 75 90   AST (SGOT) U/L 32 19 16 18 21   ALT (SGPT) U/L 32 20 18 20 21         BNP        TROPONIN        CoAg  Results from last 7 days   Lab Units 12/22/23  0714 12/21/23  0748   INR  1.29* 1.17*   APTT seconds  --  26.3*       Creatinine Clearance  Estimated Creatinine Clearance: 25.4 mL/min (A) (by C-G formula based on SCr of 1.69 mg/dL (H)).    ABG        Radiology  No radiology results for the last day        EKG      I  personally viewed and interpreted the patient's EKG/Telemetry data:    ECHOCARDIOGRAM:    Results for orders placed during the hospital encounter of 12/01/23    Adult Transthoracic Echo Complete W/ Cont if Necessary Per Protocol    Interpretation Summary    Left ventricular ejection fraction appears to be 36 - 40%.    The left atrial cavity is moderately dilated.    Estimated right ventricular systolic pressure from tricuspid regurgitation is normal (<35 mmHg).          STRESS TEST        Cardiolite (Tc-99m sestamibi) stress test    CARDIAC CATHETERIZATION  Results for orders placed during the hospital encounter of 12/01/23    Cardiac Catheterization/Vascular Study                OTHER:         Assessment & Plan     Principal Problem:    Melena  Active Problems:    GI bleed      /////////////////////////  History  =============  - Recent melena.     - Inferior STEMI 12/4/2023.     - Status post PCI with PTCA, stent placement on Pronto catheter atherectomy to the graft to the distal right coronary artery.-12/3/2023-Dr. العلي     Cardiac catheterization 12/3/2023-Dr. العلي     Left Main %: 20 to 30%   Proximal LAD %: 100%  Mid/Distal LAD %: 100%  LCX %: Proximal 80% OM1 100%  Ramus:   RCA %: 100% after the PDA.  Lima %: LIMA to LAD was patent  SVG(s) %: SVG to the marginal branch is patent but has some 30 to 40% disease.  SVG to the diagonal branch is occluded.  SVG to the PDA is occluded.  SVG to the distal RCA is occluded but is the culprit lesion     -Past history of syncope-no further episodes.     -status post loop recorder placement.  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 cardiac catheterization 12/26/2014 revealed 60% distal circumflex and total LAD and right coronary arteries.  Lima to LAD was patent ( lima coming off the left vertebral artery).  SVG to diagonal   marginal and PDA were patent.  SVG to left ventricular branch was totally occluded (chronic)     - atrial fibrillation  -has converted to sinus rhythm and maintaining sinus rhythm.  Recently patient was noted to have atrial dysrhythmia on the monitor with loop recorder.     - sinus bradycardia-asymptomatic     - status post acute inferior myocardial infarction prior to surgery requiring acute stent placement to right coronary artery ) complicated by ventricular fibrillation)     Echocardiogram 12/1/2023    Left ventricular ejection fraction appears to be 36 - 40%.    The left atrial cavity is moderately dilated.    Estimated right ventricular systolic pressure from tricuspid regurgitation is normal (<35 mmHg).      -Dyslipidemia and hypertension     - lower extremity weakness.   Arterial Doppler study of the lower extremity is normal. -  improved .   arterial Doppler study of the lower extremity was normal     - status post cholecystectomy     - allergy to penicillin iodine and metoprolol (rash).  Intolerance to atenolol due to bradycardia.  Allergy to Levaquin and penicillin.  Intolerance to prednisone Nitropatch IV nitroglycerin and prednisone.  =================    Plan  ==============  Recent melena and GI bleed.  Patient had endoscopy today 12/22/2023 that revealed bleeding duodenal ulcer.  Patient had endoscopic hemostasis with epinephrine and Hemoclip.  Patient was started on PPI.  Patient was on antiplatelet therapy (Plavix) and anticoagulation for atrial fibrillation (Eliquis)    GI has suggested holding Eliquis for at least 5 days.  Will restart Plavix today if okay with GI service.  Closely follow cardiovascular status.      Inferior STEMI 12/4/2023.     Status post PCI with PTCA, stent placement on Pronto catheter atherectomy to the graft to the distal right coronary artery.-12/3/2023-Dr. العلي     History of junctional bradycardia.  Temporary pacemaker was placed in the setting of inferior STEMI.  Temporary pacemaker was removed.     Past history of complete heart block.  Patient had temporary pacemaker which was  removed.     Atrial fibrillation with controlled ventricular response.  Rate control can be challenging given the circumstances.  Patient is allergic to metoprolol and Cardizem.  Patient received intravenous digoxin yesterday.  Would like to avoid usage of naloxone given his age.  However, we may not have much choice at this time.  Will give him 0.125 mg IV today.  Will obtain digoxin level tomorrow.    Hypomagnesemia-better at 2.3.    Leukocytosis  WBC 33,000.    71/1.69    Anemia  Hemoglobin 6.1-12/23/2023  Hgb 10.3-12/24/2023    Renal dysfunction  BUN/creatinine 89/1.66-12/23/2023       Patient is off beta-blocker Coreg.     Echocardiogram 12/1/2023-as above     Patient is DNR DNI..     Medications were reviewed and updated.  Current medications include atorvastatin and gabapentin  Pantoprazole.  Eliquis is on hold.  Will restart Plavix today if okay with GI service.      Reviewed and updated-12/25/2023    Further plan will depend on patient's progress.  //////////////////////////////////////        Missy Domínguez MD  12/25/23  06:51 EST

## 2023-12-25 NOTE — PROGRESS NOTES
"HOSPITALIST PROGRESS NOTE     12/25/2023      Clinical Summary / Reason for Hospitalization     Sage Flores is a 89 y.o. male admitted to hospital on 12/21/2023 with c/o Melena.      Admitted on 12/21/2023. Today is hospital day 2.     Subjective     Sage Flores seen and examined this morning.  Patient is comfortable.   Denies any abdominal pain, nausea or vomiting.  Denies any chest pain or shortness of breath.  Patient is saturating at 96% on 7 L high flow nasal cannula.    Interval / Overnight Issues     Objective     Vital Signs     BP 92/49 (BP Location: Left arm, Patient Position: Lying)   Pulse 104   Temp 97.2 °F (36.2 °C) (Oral)   Resp 17   Ht 177.8 cm (70\")   Wt 65.7 kg (144 lb 13.5 oz)   SpO2 100%   BMI 20.78 kg/m²      Input / Output       Intake/Output Summary (Last 24 hours) at 12/25/2023 1230  Last data filed at 12/25/2023 0449  Gross per 24 hour   Intake --   Output 1600 ml   Net -1600 ml        Physical Exam     General : Patient lying in bed in no acute distress.      HEENT : Normocephalic atraumatic. Neck supple, no JVP   PERRLA, EOM intact. Throat clear, mucous membranes moist   CVS : S1 + S2 regular, No R/M/G     Chest : Clear to auscultation bilaterally    Abdomen : Soft, nontender, bowel sounds are present  Extremities : No clubbing, cyanosis, edema    Neurologic exam: Patient is awake, answering questions appropriately.  Moving all extremities.       Current Medications     Scheduled Meds:[Held by provider] apixaban, 2.5 mg, Oral, Q12H  [Held by provider] aspirin, 81 mg, Oral, Daily  atorvastatin, 10 mg, Oral, Nightly  bumetanide, 1 mg, Oral, BID  cefTRIAXone, 1,000 mg, Intravenous, Q24H  clopidogrel, 75 mg, Oral, Daily  digoxin, 125 mcg, Intravenous, Once  gabapentin, 100 mg, Oral, BID  losartan, 25 mg, Oral, Daily  metoclopramide, 5 mg, Intravenous, BID  metroNIDAZOLE, 500 mg, Intravenous, Q8H  pantoprazole, 40 mg, Intravenous, Q12H  senna-docusate sodium, 2 tablet, Oral, " BID  sodium chloride, 10 mL, Intravenous, Q12H  tamsulosin, 0.4 mg, Oral, Nightly      Continuous Infusions:dextrose 5 % and sodium chloride 0.45 %, 75 mL/hr, Last Rate: 75 mL/hr (12/24/23 6953)      PRN Meds:.  acetaminophen **OR** acetaminophen **OR** acetaminophen    senna-docusate sodium **AND** polyethylene glycol **AND** bisacodyl **AND** bisacodyl    Calcium Replacement - Follow Nurse / BPA Driven Protocol    Magnesium Cardiology Dose Replacement - Follow Nurse / BPA Driven Protocol    nitroglycerin    ondansetron **OR** ondansetron    Phosphorus Replacement - Follow Nurse / BPA Driven Protocol    Potassium Replacement - Follow Nurse / BPA Driven Protocol    [COMPLETED] Insert Peripheral IV **AND** sodium chloride    sodium chloride    sodium chloride      Labs & Imaging     Results from last 7 days   Lab Units 12/24/23  3429   WBC 10*3/mm3 27.10*   HEMOGLOBIN g/dL 8.6*   HEMATOCRIT % 26.4*   PLATELETS 10*3/mm3 143   GLUCOSE mg/dL 108*   CREATININE mg/dL 1.69*   BUN mg/dL 71*   SODIUM mmol/L 151*   POTASSIUM mmol/L 3.1*   AST (SGOT) U/L 32   ALT (SGPT) U/L 32   ALK PHOS U/L 78   BILIRUBIN mg/dL 0.5   ANION GAP mmol/L 11.0       No radiology results for the last day    I reviewed the patient's new clinical results.    ASSESSMENT AND PLAN:    Sage Flores is a 89 y.o. male with past medical history of paroxysmal atrial fibrillation on chronic anticoagulation with apixaban, coronary artery disease, BPH, hyperlipidemia, hypertension, presented to emergency room with complaints of black stool and blood in the stool.  Initial hemoglobin on presentation was 11.3 and dropped to 8.6.  This morning hemoglobin is 6.1.  Patient is status post EGD showing which showed a bleeding duodenal ulcer and he had endoscopic hemostasis with epinephrine and Hemoclip.    Patient admitted with    Acute upper GI bleed with acute blood loss anemia: Patient is on Protonix.    Patient is status post EGD with findings of duodenal ulcer  treated with endoscopic hemostasis with epinephrine and Hemoclip.   Continue Protonix.   Due to acute GI bleed and dropping hemoglobin apixaban and Plavix are on hold.  Patient seen by cardiology.    Patient had recent PCI and stenting.  But unfortunately weighing the risks versus benefits we are currently holding the Plavix and apixaban.  Hemoglobin improved after PRBC transfusion. Its 8.6 this AM  GI and cardiology consult and follow-up appreciated    Leukocytosis: WBC count is improving.  Continue ceftriaxone and Flagyl.      Hypernatremia: Likely secondary to dehydration and patient being NPO.  Continue D5W.    Hypokalemia: Replace potassium in the IV fluids above.  Monitor potassium    Dysphagia: Patient currently on gentle IV fluid hydration.  Patient requesting to eat.  Will give him a clear liquid diet after bedside swallow evaluation    Acute kidney injury: Likely prerenal.  Monitor creatinine.    History of CAD with recent ST elevation MI status post RCA stenting: Aspirin and Plavix are on hold.  Beta-blocker being held due to recent heart block.  Continue statin.    Chronic combined systolic and diastolic congestive heart failure: Currently compensated.  Holding Bumex and Cozaar at this time.  Last echocardiogram showing EF of 35%.  Resume bumetanide as patient received multiple PRBC transfusions.    Paroxysmal atrial fibrillation with rapid ventricular response: Continue metoprolol 5 mg IV every 12.  Will give digoxin if blood pressure is soft and heart rate is high while holding metoprolol.      Hypertension: Continue metoprolol and losartan    BPH: Continue Flomax    DVT Prophylaxis:  SCDs due to GI bleed and severe anemia    I spoke to patient and his daughters at bedside, answered all their questions and concerns, communicated all test results, diagnosis, treatment and plan.    Given patient's age, multiple comorbidities, recent MI and acute GI bleed, family wanted no aggressive intervention and are  open to keeping patient comfortable.  And patient is a DO NOT RESUSCITATE and DO NOT INTUBATE.    Code Status:   Code Status and Medical Interventions:   Ordered at: 12/21/23 1525     Medical Intervention Limits:    NO intubation (DNI)     Code Status (Patient has no pulse and is not breathing):    No CPR (Do Not Attempt to Resuscitate)     Medical Interventions (Patient has pulse or is breathing):    Limited Support     Disposition (Where, When):  TBD/ when medically ready     Primary Emergency Contact: TODD BAIN     Copied text in this note has been reviewed and is accurate as of 12/25/2023.    Juan Shaffer MD   Hospitalist

## 2023-12-25 NOTE — CONSULTS
"Nutrition Services    Patient Name: Sage Flores  YOB: 1930  MRN: [id number]  Admission date: 12/21/2023    Given very advanced age and limited intakes, liberalize diet to Regular.    NUTRITION SCREENING      Trending Narrative: 12/25: Pt assessed due to MST score 2+. On admission, pt with concerns for GIB. Since admission, this has been treated and pt is now eating and tolerating PO diet without difficulty. Pt is quite thin -- may be malnourished, though unable to complete NFPE this date. Will follow up.        PO Diet: Diet: Cardiac Diets; Healthy Heart (2-3 Na+); Texture: Regular Texture (IDDSI 7); Fluid Consistency: Thin (IDDSI 0)   PO Supplements: None ordered    Trending PO Intake:  75% or better at recent meals        Nutritionally-Pertinent Medications RDN Reviewed, C/W clinical course         Labs (reviewed below): Reviewed and C/W clinical course      Results from last 7 days   Lab Units 12/24/23 2259 12/24/23  0708 12/23/23  0035   SODIUM mmol/L 151* 149* 138   POTASSIUM mmol/L 3.1* 3.4* 4.0   CHLORIDE mmol/L 113* 112* 104   CO2 mmol/L 27.0 25.0 24.0   BUN mg/dL 71* 90* 89*   CREATININE mg/dL 1.69* 1.71* 1.66*   CALCIUM mg/dL 8.7 8.7 8.5   BILIRUBIN mg/dL 0.5 0.6 0.3   ALK PHOS U/L 78 70 69   ALT (SGPT) U/L 32 20 18   AST (SGOT) U/L 32 19 16   GLUCOSE mg/dL 108* 103* 171*     Results from last 7 days   Lab Units 12/24/23 2259 12/24/23  0708 12/23/23  0532 12/23/23  0035   MAGNESIUM mg/dL 2.1 2.3  --  2.2   PHOSPHORUS mg/dL 2.2* 2.3*  --  2.6   HEMOGLOBIN g/dL 8.6* 9.2*   < >  --    HEMATOCRIT % 26.4* 28.4*   < >  --     < > = values in this interval not displayed.     No results found for: \"HGBA1C\"       GI Function:  Stool Output  Stool Unmeasured Occurrence: 0 (12/24/23 1544)  Bowel Incontinence: No (12/21/23 8512)  Stool Amount: small (12/22/23 2300)          Skin: See skin report for details - R and L gluteal pressure injuries        Weight Review: Estimated body mass index is 20.78 " "kg/m² as calculated from the following:    Height as of this encounter: 177.8 cm (70\").    Weight as of this encounter: 65.7 kg (144 lb 13.5 oz).    Comment:   12/25: Scale weight 65.7 kg; 13.8% loss in 3 months     Wt Readings from Last 30 Encounters:   12/24/23 0338 65.7 kg (144 lb 13.5 oz)   12/22/23 0526 67.6 kg (149 lb 0.5 oz)   12/21/23 0735 71.7 kg (158 lb)   12/11/23 0600 71.8 kg (158 lb 4.6 oz)   12/10/23 0604 72.2 kg (159 lb 2.8 oz)   12/09/23 0446 75.1 kg (165 lb 9.1 oz)   12/08/23 0600 79.6 kg (175 lb 7.8 oz)   12/07/23 0400 79.8 kg (175 lb 14.8 oz)   12/06/23 0517 80.1 kg (176 lb 9.4 oz)   12/05/23 0330 76.5 kg (168 lb 10.4 oz)   12/04/23 0515 74.8 kg (164 lb 14.5 oz)   12/03/23 0348 75.2 kg (165 lb 12.6 oz)   12/03/23 0100 73.6 kg (162 lb 4.1 oz)   12/02/23 1603 73.5 kg (162 lb)   12/02/23 0417 73.7 kg (162 lb 7.7 oz)   12/02/23 0416 74.6 kg (164 lb 7.4 oz)   12/01/23 1603 72.6 kg (160 lb)   09/11/23 1012 76.2 kg (168 lb)   03/10/23 1044 78 kg (172 lb)   09/07/22 1133 78.3 kg (172 lb 9.6 oz)   04/20/22 1126 77.1 kg (170 lb)   02/16/22 0905 76 kg (167 lb 9.6 oz)   08/25/21 1344 76.9 kg (169 lb 9.6 oz)   05/05/21 1454 73.8 kg (162 lb 9.6 oz)   04/13/21 1355 74.8 kg (164 lb 12.8 oz)   04/01/21 1632 73.9 kg (163 lb)   03/26/21 0842 72.6 kg (160 lb)   02/25/21 1111 70.8 kg (156 lb)   01/13/21 1302 64.2 kg (141 lb 9.6 oz)   11/21/20 0901 72.6 kg (160 lb)   09/15/20 1512 66.7 kg (147 lb)   04/13/20 1438 71.7 kg (158 lb)   02/24/20 1122 71.7 kg (158 lb)   12/04/19 1057 74.3 kg (163 lb 12.8 oz)   10/14/19 1426 73.5 kg (162 lb)   08/23/19 0812 73 kg (161 lb)   04/15/19 1423 74.6 kg (164 lb 8 oz)   03/07/19 1438 74.8 kg (165 lb)   10/17/18 1356 77.2 kg (170 lb 4 oz)   08/10/18 0831 73.9 kg (163 lb)   07/17/18 1452 76.2 kg (168 lb)   06/28/18 0903 74.8 kg (164 lb 12.8 oz)   06/14/18 1453 76.7 kg (169 lb)   05/31/18 1259 75.9 kg (167 lb 6.4 oz)   02/14/18 1248 77.8 kg (171 lb 8 oz)          --       Nutrition " Problem Statement: Unintentional weight loss R/t suspected suboptimal intake in the setting of chronic medical concerns and very advanced age; as evidenced by weight loss greater than 7.5% in 3 months.        Nutrition Intervention: Liberalize diet to Regular     Will follow up for NFPE           Monitoring/Evaluation PO intake, Supplement intake, Pertinent labs, Weight, Skin status, GI status, POC/GOC        RD to follow up per protocol.    Electronically signed by:  Marita Osuna RD  12/25/23 18:41 EST

## 2023-12-25 NOTE — PROGRESS NOTES
" LOS: 2 days   Patient Care Team:  Tricia Aldana APRN as PCP - General (Family Medicine)  Missy Domínguez MD as Consulting Physician (Cardiology)      Subjective     Interval History:     Subjective: \"I feel fine\" no new problems reported overnight.  Tolerating diet.  No abdominal pain.  No nausea or vomiting.  No melena or rectal bleeding.      ROS:   No chest pain, shortness of breath, or cough.        Medication Review:     Current Facility-Administered Medications:     acetaminophen (TYLENOL) tablet 650 mg, 650 mg, Oral, Q4H PRN **OR** acetaminophen (TYLENOL) 160 MG/5ML oral solution 650 mg, 650 mg, Oral, Q4H PRN **OR** acetaminophen (TYLENOL) suppository 650 mg, 650 mg, Rectal, Q4H PRN, Tereza Fowler MD    [Held by provider] apixaban (ELIQUIS) tablet 2.5 mg, 2.5 mg, Oral, Q12H, Kulwinder Caballero APRN    [Held by provider] aspirin chewable tablet 81 mg, 81 mg, Oral, Daily, Kulwinder Caballero APRN    atorvastatin (LIPITOR) tablet 10 mg, 10 mg, Oral, Nightly, Tereza Fowler MD, 10 mg at 12/24/23 2015    sennosides-docusate (PERICOLACE) 8.6-50 MG per tablet 2 tablet, 2 tablet, Oral, BID, 2 tablet at 12/25/23 0825 **AND** polyethylene glycol (MIRALAX) packet 17 g, 17 g, Oral, Daily PRN **AND** bisacodyl (DULCOLAX) EC tablet 5 mg, 5 mg, Oral, Daily PRN **AND** bisacodyl (DULCOLAX) suppository 10 mg, 10 mg, Rectal, Daily PRN, Tereza Fowler MD    bumetanide (BUMEX) tablet 1 mg, 1 mg, Oral, BID, Juan Shaffer MD, 1 mg at 12/25/23 0825    Calcium Replacement - Follow Nurse / BPA Driven Protocol, , Does not apply, PRN, Tereza Fowler MD    cefTRIAXone (ROCEPHIN) 1,000 mg in sodium chloride 0.9 % 100 mL IVPB, 1,000 mg, Intravenous, Q24H, Juan Shaffer MD, Last Rate: 200 mL/hr at 12/25/23 1118, 1,000 mg at 12/25/23 1118    clopidogrel (PLAVIX) tablet 75 mg, 75 mg, Oral, Daily, Missy Domínguez MD    dextrose 5 % with KCl 20 mEq/L infusion, 75 mL/hr, Intravenous, Continuous, Deena, " Juan FELICIANO MD    digoxin (LANOXIN) injection 125 mcg, 125 mcg, Intravenous, Once, Missy Domínguez MD    gabapentin (NEURONTIN) capsule 100 mg, 100 mg, Oral, BID, Tereza Fowler MD, 100 mg at 12/25/23 0825    losartan (COZAAR) tablet 25 mg, 25 mg, Oral, Daily, Juan Shaffer MD, 25 mg at 12/25/23 0825    Magnesium Cardiology Dose Replacement - Follow Nurse / BPA Driven Protocol, , Does not apply, Janeth DE LUNA Emori Bizer, MD    metoclopramide (REGLAN) injection 5 mg, 5 mg, Intravenous, BID, Joselito Hanna MD, 5 mg at 12/25/23 0825    metroNIDAZOLE (FLAGYL) IVPB 500 mg, 500 mg, Intravenous, Q8H, Juan Shaffer MD, Last Rate: 100 mL/hr at 12/25/23 0824, 500 mg at 12/25/23 0824    nitroglycerin (NITROSTAT) SL tablet 0.4 mg, 0.4 mg, Sublingual, Q5 Min PRN, Tereza Fowler MD    ondansetron (ZOFRAN) tablet 4 mg, 4 mg, Oral, Q6H PRN **OR** ondansetron (ZOFRAN) injection 4 mg, 4 mg, Intravenous, Q6H PRN, Tereza Fowler MD, 4 mg at 12/23/23 0517    pantoprazole (PROTONIX) injection 40 mg, 40 mg, Intravenous, Q12H, Tereza Fowler MD, 40 mg at 12/25/23 0825    Phosphorus Replacement - Follow Nurse / BPA Driven Protocol, , Does not apply, Janeth DE LUNA Emori Bizer, MD    Potassium Replacement - Follow Nurse / BPA Driven Protocol, , Does not apply, Deena DE LUNA Nisar A, MD    [COMPLETED] Insert Peripheral IV, , , Once **AND** sodium chloride 0.9 % flush 10 mL, 10 mL, Intravenous, PRN, Tereza Fowler MD    sodium chloride 0.9 % flush 10 mL, 10 mL, Intravenous, Q12H, Tereza Fowler MD, 10 mL at 12/25/23 0841    sodium chloride 0.9 % flush 10 mL, 10 mL, Intravenous, PRN, Tereza Fowler MD    sodium chloride 0.9 % infusion 40 mL, 40 mL, Intravenous, PRN, Tereza Fowler MD, 40 mL at 12/24/23 9877    tamsulosin (FLOMAX) 24 hr capsule 0.4 mg, 0.4 mg, Oral, Nightly, Tereza Fowler MD, 0.4 mg at 12/24/23 2015      Objective     Vital Signs  Temp:  [97.1 °F (36.2  °C)-98.1 °F (36.7 °C)] 97.2 °F (36.2 °C)  Heart Rate:  [] 104  Resp:  [16-20] 17  BP: ()/(49-83) 92/49  Physical Exam:    General Appearance:    Awake and alert, in no acute distress   Head:    Normocephalic, without obvious abnormality   Eyes:          Conjunctivae normal, anicteric sclera   Ears:    Hearing intact   Lungs:     Clear to auscultation bilaterally, respirations regular, even and unlabored    Heart:    Regular rhythm and normal rate, normal S1 and S2, no            murmur, no gallop, no rub   Abdomen:     Normal bowel sounds, soft, non-tender, no rebound or guarding, non-distended, no hepatosplenomegaly    Rectal:     Deferred   Extremities:   No edema, no cyanosis, no redness        Results Review:    Lab Results (last 24 hours)       Procedure Component Value Units Date/Time    Blood Culture - Blood, Arm, Left [[id number]]  (Normal) Collected: 12/24/23 1221    Specimen: Blood from Arm, Left Updated: 12/25/23 1231     Blood Culture No growth at 24 hours    Narrative:      Less than seven (7) mL's of blood was collected.  Insufficient quantity may yield false negative results.    Blood Culture - Blood, Arm, Right [[id number]]  (Normal) Collected: 12/21/23 0950    Specimen: Blood from Arm, Right Updated: 12/25/23 1000     Blood Culture No growth at 4 days    Blood Culture - Blood, Arm, Left [[id number]]  (Normal) Collected: 12/21/23 0924    Specimen: Blood from Arm, Left Updated: 12/25/23 0930     Blood Culture No growth at 4 days    CBC & Differential [[id number]]  (Abnormal) Collected: 12/24/23 2259    Specimen: Blood Updated: 12/25/23 0030    Narrative:      The following orders were created for panel order CBC & Differential.  Procedure                               Abnormality         Status                     ---------                               -----------         ------                     CBC Auto Differential[[id number]]        Abnormal            Final result               Scan  Slide[[id number]]                                       Final result                 Please view results for these tests on the individual orders.    CBC Auto Differential [[id number]]  (Abnormal) Collected: 12/24/23 2259    Specimen: Blood Updated: 12/25/23 0030     WBC 27.10 10*3/mm3      RBC 2.82 10*6/mm3      Hemoglobin 8.6 g/dL      Hematocrit 26.4 %      MCV 93.3 fL      MCH 30.5 pg      MCHC 32.6 g/dL      RDW 16.7 %      RDW-SD 53.4 fl      MPV 10.9 fL      Platelets 143 10*3/mm3     Narrative:      The previously reported component NRBC is no longer being reported. Previous result was 0.4 /100 WBC (Reference Range: 0.0-0.2 /100 WBC) on 12/24/2023 at 2337 EST.    Scan Slide [[id number]] Collected: 12/24/23 2259    Specimen: Blood Updated: 12/25/23 0030     Scan Slide --     Comment: See Manual Differential Results       Manual Differential [[id number]]  (Abnormal) Collected: 12/24/23 2259    Specimen: Blood Updated: 12/25/23 0030     Neutrophil % 75.0 %      Lymphocyte % 11.0 %      Monocyte % 3.0 %      Basophil % 1.0 %      Bands %  3.0 %      Metamyelocyte % 2.0 %      Myelocyte % 4.0 %      Promyelocyte % 1.0 %      Neutrophils Absolute 21.14 10*3/mm3      Lymphocytes Absolute 2.98 10*3/mm3      Monocytes Absolute 0.81 10*3/mm3      Basophils Absolute 0.27 10*3/mm3      Anisocytosis Slight/1+     Toxic Granulation Slight/1+     Large Platelets Slight/1+    Path Consult Reflex [[id number]] Collected: 12/24/23 2259    Specimen: Blood Updated: 12/25/23 0030     Pathology Review Yes    Magnesium [[id number]]  (Normal) Collected: 12/24/23 2259    Specimen: Blood Updated: 12/24/23 2345     Magnesium 2.1 mg/dL     Phosphorus [[id number]]  (Abnormal) Collected: 12/24/23 2259    Specimen: Blood Updated: 12/24/23 2345     Phosphorus 2.2 mg/dL     Comprehensive Metabolic Panel [[id number]]  (Abnormal) Collected: 12/24/23 2259    Specimen: Blood Updated: 12/24/23 2345     Glucose 108 mg/dL      BUN 71 mg/dL      Creatinine  1.69 mg/dL      Sodium 151 mmol/L      Potassium 3.1 mmol/L      Chloride 113 mmol/L      CO2 27.0 mmol/L      Calcium 8.7 mg/dL      Total Protein 5.0 g/dL      Albumin 3.2 g/dL      ALT (SGPT) 32 U/L      AST (SGOT) 32 U/L      Alkaline Phosphatase 78 U/L      Total Bilirubin 0.5 mg/dL      Globulin 1.8 gm/dL      A/G Ratio 1.8 g/dL      BUN/Creatinine Ratio 42.0     Anion Gap 11.0 mmol/L      eGFR 37.4 mL/min/1.73     Narrative:      GFR Normal >60  Chronic Kidney Disease <60  Kidney Failure <15    The GFR formula is only valid for adults with stable renal function between ages 18 and 70.    C-reactive Protein [[id number]]  (Normal) Collected: 12/24/23 2259    Specimen: Blood Updated: 12/24/23 2345     C-Reactive Protein 0.38 mg/dL     Lactic Acid, Plasma [[id number]]  (Abnormal) Collected: 12/24/23 1815    Specimen: Blood Updated: 12/24/23 1913     Lactate 2.2 mmol/L     Blood Culture - Blood, Arm, Right [[id number]] Collected: 12/24/23 1815    Specimen: Blood from Arm, Right Updated: 12/24/23 1847            Imaging Results (Last 24 Hours)       ** No results found for the last 24 hours. **              ASSESSMENT:  89-year-old man with acute upper GI bleeding secondary to duodenal ulcer.  He has been stable for 72 hours.  He is tolerating diet.  No evidence of ongoing GI blood loss.  He may continue a regular diet as tolerated.  I recommend pantoprazole 40 mg twice daily for 1 month, then once daily.  Okay to restart anticoagulation.  Avoid aspirin and nonsteroidal medications.    Melena    GI bleed      PLAN:  1.  Regular diet  2.  Pantoprazole 40 mg p.o. twice daily for 1 month, then 40 mg once daily forever  3.  Okay to resume anticoagulants  4.  Okay for discharge home from GI standpoint  5.  Avoid aspirin and nonsteroidal medications  6.  We will see as needed  Joselito Hanna MD  12/25/23  12:59 EST

## 2023-12-25 NOTE — PLAN OF CARE
Goal Outcome Evaluation:  Plan of Care Reviewed With: patient, son      Discussed the lactic acid being lower than previous lab; called  Who gave him a 500 bolus of fluid; tolerated good.  Making sure patient is turning frequently to ensure no further skin breakdown occurs;

## 2023-12-26 LAB
ALBUMIN SERPL-MCNC: 2.7 G/DL (ref 3.5–5.2)
ALBUMIN/GLOB SERPL: 1.7 G/DL
ALP SERPL-CCNC: 71 U/L (ref 39–117)
ALT SERPL W P-5'-P-CCNC: 32 U/L (ref 1–41)
ANION GAP SERPL CALCULATED.3IONS-SCNC: 12 MMOL/L (ref 5–15)
ANION GAP SERPL CALCULATED.3IONS-SCNC: 8 MMOL/L (ref 5–15)
ANISOCYTOSIS BLD QL: ABNORMAL
AST SERPL-CCNC: 26 U/L (ref 1–40)
BACTERIA SPEC AEROBE CULT: NORMAL
BACTERIA SPEC AEROBE CULT: NORMAL
BILIRUB SERPL-MCNC: 0.5 MG/DL (ref 0–1.2)
BUN SERPL-MCNC: 31 MG/DL (ref 8–23)
BUN SERPL-MCNC: 41 MG/DL (ref 8–23)
BUN/CREAT SERPL: 25.4 (ref 7–25)
BUN/CREAT SERPL: 28.1 (ref 7–25)
CALCIUM SPEC-SCNC: 8 MG/DL (ref 8.2–9.6)
CALCIUM SPEC-SCNC: 8.1 MG/DL (ref 8.2–9.6)
CHLORIDE SERPL-SCNC: 108 MMOL/L (ref 98–107)
CHLORIDE SERPL-SCNC: 111 MMOL/L (ref 98–107)
CO2 SERPL-SCNC: 27 MMOL/L (ref 22–29)
CO2 SERPL-SCNC: 29 MMOL/L (ref 22–29)
CREAT SERPL-MCNC: 1.22 MG/DL (ref 0.76–1.27)
CREAT SERPL-MCNC: 1.46 MG/DL (ref 0.76–1.27)
DEPRECATED RDW RBC AUTO: 55.1 FL (ref 37–54)
DIGOXIN SERPL-MCNC: 1.1 NG/ML (ref 0.6–1.2)
EGFRCR SERPLBLD CKD-EPI 2021: 44.6 ML/MIN/1.73
EGFRCR SERPLBLD CKD-EPI 2021: 55.3 ML/MIN/1.73
ERYTHROCYTE [DISTWIDTH] IN BLOOD BY AUTOMATED COUNT: 16.4 % (ref 12.3–15.4)
GLOBULIN UR ELPH-MCNC: 1.6 GM/DL
GLUCOSE SERPL-MCNC: 136 MG/DL (ref 65–99)
GLUCOSE SERPL-MCNC: 238 MG/DL (ref 65–99)
HCT VFR BLD AUTO: 21.8 % (ref 37.5–51)
HGB BLD-MCNC: 7.2 G/DL (ref 13–17.7)
LAB AP CASE REPORT: NORMAL
LARGE PLATELETS: ABNORMAL
LYMPHOCYTES # BLD MANUAL: 1.55 10*3/MM3 (ref 0.7–3.1)
LYMPHOCYTES NFR BLD MANUAL: 7 % (ref 5–12)
MAGNESIUM SERPL-MCNC: 1.7 MG/DL (ref 1.7–2.3)
MAGNESIUM SERPL-MCNC: 2 MG/DL (ref 1.7–2.3)
MCH RBC QN AUTO: 30.6 PG (ref 26.6–33)
MCHC RBC AUTO-ENTMCNC: 33 G/DL (ref 31.5–35.7)
MCV RBC AUTO: 92.7 FL (ref 79–97)
METAMYELOCYTES NFR BLD MANUAL: 1 % (ref 0–0)
MONOCYTES # BLD: 1.2 10*3/MM3 (ref 0.1–0.9)
NEUTROPHILS # BLD AUTO: 14.28 10*3/MM3 (ref 1.7–7)
NEUTROPHILS NFR BLD MANUAL: 81 % (ref 42.7–76)
NEUTS BAND NFR BLD MANUAL: 2 % (ref 0–5)
PATH REPORT.FINAL DX SPEC: NORMAL
PHOSPHATE SERPL-MCNC: 2.1 MG/DL (ref 2.5–4.5)
PHOSPHATE SERPL-MCNC: 3.2 MG/DL (ref 2.5–4.5)
PLATELET # BLD AUTO: 126 10*3/MM3 (ref 140–450)
PMV BLD AUTO: 10.2 FL (ref 6–12)
POTASSIUM SERPL-SCNC: 3.1 MMOL/L (ref 3.5–5.2)
POTASSIUM SERPL-SCNC: 3.7 MMOL/L (ref 3.5–5.2)
PROT SERPL-MCNC: 4.3 G/DL (ref 6–8.5)
RBC # BLD AUTO: 2.35 10*6/MM3 (ref 4.14–5.8)
SCAN SLIDE: NORMAL
SMALL PLATELETS BLD QL SMEAR: ABNORMAL
SODIUM SERPL-SCNC: 147 MMOL/L (ref 136–145)
SODIUM SERPL-SCNC: 148 MMOL/L (ref 136–145)
VARIANT LYMPHS NFR BLD MANUAL: 9 % (ref 19.6–45.3)
WBC MORPH BLD: NORMAL
WBC NRBC COR # BLD AUTO: 17.2 10*3/MM3 (ref 3.4–10.8)

## 2023-12-26 PROCEDURE — 85025 COMPLETE CBC W/AUTO DIFF WBC: CPT | Performed by: INTERNAL MEDICINE

## 2023-12-26 PROCEDURE — 84100 ASSAY OF PHOSPHORUS: CPT | Performed by: INTERNAL MEDICINE

## 2023-12-26 PROCEDURE — 85007 BL SMEAR W/DIFF WBC COUNT: CPT | Performed by: INTERNAL MEDICINE

## 2023-12-26 PROCEDURE — 80053 COMPREHEN METABOLIC PANEL: CPT | Performed by: INTERNAL MEDICINE

## 2023-12-26 PROCEDURE — 0 DEXTROSE 5 % SOLUTION: Performed by: INTERNAL MEDICINE

## 2023-12-26 PROCEDURE — 83735 ASSAY OF MAGNESIUM: CPT | Performed by: INTERNAL MEDICINE

## 2023-12-26 PROCEDURE — 25010000002 CEFTRIAXONE PER 250 MG: Performed by: HOSPITALIST

## 2023-12-26 PROCEDURE — 80162 ASSAY OF DIGOXIN TOTAL: CPT | Performed by: INTERNAL MEDICINE

## 2023-12-26 PROCEDURE — 25010000002 METRONIDAZOLE 500 MG/100ML SOLUTION: Performed by: HOSPITALIST

## 2023-12-26 PROCEDURE — 36415 COLL VENOUS BLD VENIPUNCTURE: CPT | Performed by: INTERNAL MEDICINE

## 2023-12-26 PROCEDURE — 25810000003 SODIUM CHLORIDE 0.9 % SOLUTION: Performed by: INTERNAL MEDICINE

## 2023-12-26 PROCEDURE — 25010000002 METOCLOPRAMIDE PER 10 MG: Performed by: INTERNAL MEDICINE

## 2023-12-26 PROCEDURE — 99232 SBSQ HOSP IP/OBS MODERATE 35: CPT | Performed by: INTERNAL MEDICINE

## 2023-12-26 PROCEDURE — 97110 THERAPEUTIC EXERCISES: CPT

## 2023-12-26 PROCEDURE — 97530 THERAPEUTIC ACTIVITIES: CPT

## 2023-12-26 PROCEDURE — 25010000002 MAGNESIUM SULFATE 2 GM/50ML SOLUTION: Performed by: INTERNAL MEDICINE

## 2023-12-26 RX ORDER — SODIUM PHOSPHATE IN 0.9 % NACL 15MMOL/100
15 PLASTIC BAG, INJECTION (ML) INTRAVENOUS ONCE
Status: COMPLETED | OUTPATIENT
Start: 2023-12-26 | End: 2023-12-26

## 2023-12-26 RX ORDER — POTASSIUM CHLORIDE 20 MEQ/1
40 TABLET, EXTENDED RELEASE ORAL EVERY 4 HOURS
Status: COMPLETED | OUTPATIENT
Start: 2023-12-26 | End: 2023-12-27

## 2023-12-26 RX ORDER — PANTOPRAZOLE SODIUM 40 MG/1
40 TABLET, DELAYED RELEASE ORAL EVERY 12 HOURS
Status: DISCONTINUED | OUTPATIENT
Start: 2023-12-26 | End: 2023-12-28 | Stop reason: HOSPADM

## 2023-12-26 RX ORDER — MAGNESIUM SULFATE HEPTAHYDRATE 40 MG/ML
2 INJECTION, SOLUTION INTRAVENOUS ONCE
Status: COMPLETED | OUTPATIENT
Start: 2023-12-26 | End: 2023-12-26

## 2023-12-26 RX ORDER — DEXTROSE MONOHYDRATE 50 MG/ML
125 INJECTION, SOLUTION INTRAVENOUS CONTINUOUS
Status: DISCONTINUED | OUTPATIENT
Start: 2023-12-26 | End: 2023-12-27

## 2023-12-26 RX ADMIN — CLOPIDOGREL BISULFATE 75 MG: 75 TABLET ORAL at 08:31

## 2023-12-26 RX ADMIN — MAGNESIUM SULFATE HEPTAHYDRATE 2 G: 40 INJECTION, SOLUTION INTRAVENOUS at 09:51

## 2023-12-26 RX ADMIN — METOCLOPRAMIDE 5 MG: 5 INJECTION, SOLUTION INTRAMUSCULAR; INTRAVENOUS at 20:36

## 2023-12-26 RX ADMIN — APIXABAN 2.5 MG: 2.5 TABLET, FILM COATED ORAL at 20:35

## 2023-12-26 RX ADMIN — GABAPENTIN 100 MG: 100 CAPSULE ORAL at 20:35

## 2023-12-26 RX ADMIN — Medication 10 ML: at 08:29

## 2023-12-26 RX ADMIN — BUMETANIDE 1 MG: 1 TABLET ORAL at 17:17

## 2023-12-26 RX ADMIN — SENNOSIDES AND DOCUSATE SODIUM 2 TABLET: 50; 8.6 TABLET ORAL at 08:31

## 2023-12-26 RX ADMIN — GABAPENTIN 100 MG: 100 CAPSULE ORAL at 08:32

## 2023-12-26 RX ADMIN — TAMSULOSIN HYDROCHLORIDE 0.4 MG: 0.4 CAPSULE ORAL at 20:34

## 2023-12-26 RX ADMIN — METRONIDAZOLE 500 MG: 500 INJECTION, SOLUTION INTRAVENOUS at 08:24

## 2023-12-26 RX ADMIN — SODIUM PHOSPHATE, MONOBASIC, MONOHYDRATE AND SODIUM PHOSPHATE, DIBASIC, ANHYDROUS 15 MMOL: 142; 276 INJECTION, SOLUTION INTRAVENOUS at 09:51

## 2023-12-26 RX ADMIN — Medication 10 ML: at 20:36

## 2023-12-26 RX ADMIN — PANTOPRAZOLE SODIUM 40 MG: 40 INJECTION, POWDER, FOR SOLUTION INTRAVENOUS at 08:31

## 2023-12-26 RX ADMIN — CEFTRIAXONE 1000 MG: 1 INJECTION, POWDER, FOR SOLUTION INTRAMUSCULAR; INTRAVENOUS at 14:51

## 2023-12-26 RX ADMIN — PANTOPRAZOLE SODIUM 40 MG: 40 TABLET, DELAYED RELEASE ORAL at 20:35

## 2023-12-26 RX ADMIN — DEXTROSE MONOHYDRATE 125 ML/HR: 50 INJECTION, SOLUTION INTRAVENOUS at 22:39

## 2023-12-26 RX ADMIN — METOCLOPRAMIDE 5 MG: 5 INJECTION, SOLUTION INTRAMUSCULAR; INTRAVENOUS at 08:31

## 2023-12-26 RX ADMIN — POTASSIUM CHLORIDE 40 MEQ: 1500 TABLET, EXTENDED RELEASE ORAL at 22:37

## 2023-12-26 RX ADMIN — DEXTROSE MONOHYDRATE 125 ML/HR: 50 INJECTION, SOLUTION INTRAVENOUS at 09:51

## 2023-12-26 RX ADMIN — ATORVASTATIN CALCIUM 10 MG: 20 TABLET, FILM COATED ORAL at 20:35

## 2023-12-26 RX ADMIN — SENNOSIDES AND DOCUSATE SODIUM 2 TABLET: 50; 8.6 TABLET ORAL at 20:36

## 2023-12-26 NOTE — PLAN OF CARE
Problem: Adult Inpatient Plan of Care  Goal: Plan of Care Review  Outcome: Ongoing, Progressing  Goal: Patient-Specific Goal (Individualized)  Outcome: Ongoing, Progressing  Goal: Absence of Hospital-Acquired Illness or Injury  Outcome: Ongoing, Progressing  Intervention: Identify and Manage Fall Risk  Recent Flowsheet Documentation  Taken 12/26/2023 0825 by Christina Murphy LPN  Safety Promotion/Fall Prevention:   activity supervised   assistive device/personal items within reach   clutter free environment maintained   lighting adjusted   safety round/check completed   room organization consistent  Intervention: Prevent Skin Injury  Recent Flowsheet Documentation  Taken 12/26/2023 1058 by Christina Murphy LPN  Body Position:   turned   right  Taken 12/26/2023 0825 by Christina Murphy LPN  Skin Protection:   adhesive use limited   incontinence pads utilized   tubing/devices free from skin contact  Intervention: Prevent and Manage VTE (Venous Thromboembolism) Risk  Recent Flowsheet Documentation  Taken 12/26/2023 0825 by Christina Murphy LPN  VTE Prevention/Management:   sequential compression devices off   patient refused intervention  Range of Motion: active ROM (range of motion) encouraged  Intervention: Prevent Infection  Recent Flowsheet Documentation  Taken 12/26/2023 0825 by Christina Murphy LPN  Infection Prevention:   cohorting utilized   environmental surveillance performed   single patient room provided   visitors restricted/screened   rest/sleep promoted   personal protective equipment utilized   hand hygiene promoted  Goal: Optimal Comfort and Wellbeing  Outcome: Ongoing, Progressing  Intervention: Monitor Pain and Promote Comfort  Recent Flowsheet Documentation  Taken 12/26/2023 0825 by Christina Murphy LPN  Pain Management Interventions:   care clustered   diversional activity provided   position adjusted   pillow support provided  Intervention: Provide Person-Centered Care  Recent Flowsheet  Documentation  Taken 12/26/2023 0825 by Christina Murphy LPN  Trust Relationship/Rapport:   care explained   choices provided   thoughts/feelings acknowledged  Goal: Readiness for Transition of Care  Outcome: Ongoing, Progressing     Problem: Adjustment to Illness (Gastrointestinal Bleeding)  Goal: Optimal Coping with Acute Illness  Outcome: Ongoing, Progressing  Intervention: Optimize Psychosocial Response  Recent Flowsheet Documentation  Taken 12/26/2023 0825 by Christina Murphy LPN  Supportive Measures: active listening utilized     Problem: Bleeding (Gastrointestinal Bleeding)  Goal: Hemostasis  Outcome: Ongoing, Progressing  Intervention: Manage Gastrointestinal Bleeding  Recent Flowsheet Documentation  Taken 12/26/2023 0825 by Christina Murphy LPN  Environmental Support: calm environment promoted     Problem: Skin Injury Risk Increased  Goal: Skin Health and Integrity  Outcome: Ongoing, Progressing  Intervention: Optimize Skin Protection  Recent Flowsheet Documentation  Taken 12/26/2023 1058 by Christina Murphy LPN  Head of Bed (HOB) Positioning: HOB elevated  Taken 12/26/2023 0825 by Christina Murphy LPN  Pressure Reduction Techniques:   frequent weight shift encouraged   weight shift assistance provided  Pressure Reduction Devices: pressure-redistributing mattress utilized  Skin Protection:   adhesive use limited   incontinence pads utilized   tubing/devices free from skin contact     Problem: Fall Injury Risk  Goal: Absence of Fall and Fall-Related Injury  Outcome: Ongoing, Progressing  Intervention: Identify and Manage Contributors  Recent Flowsheet Documentation  Taken 12/26/2023 0825 by Christina Murphy LPN  Medication Review/Management: medications reviewed  Intervention: Promote Injury-Free Environment  Recent Flowsheet Documentation  Taken 12/26/2023 0825 by Christina Murphy LPN  Safety Promotion/Fall Prevention:   activity supervised   assistive device/personal items within reach   clutter free  environment maintained   lighting adjusted   safety round/check completed   room organization consistent     Problem: Malnutrition  Goal: Improved Nutritional Intake  Outcome: Ongoing, Progressing   Goal Outcome Evaluation:         Patient has been resting quietly in bed at this time. No c/o pain or discomfort noted. Patient is on room air at this time, uses oxygen through out the day when he sleeps. Patient has electrolytes replaced per order. No c/o pain or discomfort noted. Call light within reach.

## 2023-12-26 NOTE — PROGRESS NOTES
Nutrition Services    Patient Name: Sage Flores  YOB: 1930  MRN: [id number]  Admission date: 12/21/2023    PPE Documentation        PPE Worn By Provider gloves   PPE Worn By Patient  N/A     PROGRESS NOTE      Encounter Information: Checking in on PO intake. RD visited pt and visitors at bedside. At least one of pt's visitor is a close neighbor who was able to communicate with pt's daughter at time of RD visit. Pt avoids drinking milk as it upsets his stomach. Pt consumes cheese, butter, ice cream, etc without trouble. 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 Pt's visitors reported poor PO intake x 1 month, since STEMI on 12/1. Prior to this, pt eating well and living home alone. Pt was eating 3 meals daily, sometimes meals provided by pt's neighbors. Pt does not drink ONS regularly but they think he would enjoy these as he loves sweets. RD to add ONS and chocolate ice cream with meals. NFPE completed, consistent with nutrition diagnosis of malnutrition using AND/ASPEN criteria. See MSA below.          PO Diet: Diet: Regular/House Diet; Texture: Regular Texture (IDDSI 7); Fluid Consistency: Thin (IDDSI 0)   PO Supplements: -   PO Intake:  Poor intake this admission, frequent NPO/liquid diets. Pt ate very well at breakfast (biscuits and gravy) per visitor's report.       Current nutrition support: -   Nutrition support review: -       Labs (reviewed below): Reviewed, management per attending.   Hypernatremia - D5  Hyperglycemia   Hypophosphatemia - replacement given       GI Function:  + BM on 12/22, prior GI bleed       Nutrition Intervention Updates: Continue current diet and encourage good PO intake.    Addition of Boost Glucose Control BID (Provides 380 kcals, 32 g protein if consumed). Addition of ice cream with lunch and dinner (260 kcal, 4 g protein)    PES: Severe acute disease related malnutrition related to decreased appetite in the setting of recent STEMI as evidenced by PO intake meeting less  "than 50% of estimated energy requirement for greater than or equal to 5 days, greater than 10% wt loss in less than 6 months, and severe muscle and fat wasting per NFPE.       Results from last 7 days   Lab Units 12/26/23 0450 12/24/23 2259 12/24/23  0708   SODIUM mmol/L 148* 151* 149*   POTASSIUM mmol/L 3.7 3.1* 3.4*   CHLORIDE mmol/L 111* 113* 112*   CO2 mmol/L 29.0 27.0 25.0   BUN mg/dL 41* 71* 90*   CREATININE mg/dL 1.46* 1.69* 1.71*   CALCIUM mg/dL 8.0* 8.7 8.7   BILIRUBIN mg/dL 0.5 0.5 0.6   ALK PHOS U/L 71 78 70   ALT (SGPT) U/L 32 32 20   AST (SGOT) U/L 26 32 19   GLUCOSE mg/dL 238* 108* 103*     Results from last 7 days   Lab Units 12/26/23 0450 12/25/23 2018 12/24/23 2259 12/24/23  0708   MAGNESIUM mg/dL 1.7  --  2.1 2.3   PHOSPHORUS mg/dL 2.1*   < > 2.2* 2.3*   HEMOGLOBIN g/dL 7.2*  --  8.6* 9.2*   HEMATOCRIT % 21.8*  --  26.4* 28.4*    < > = values in this interval not displayed.     COVID19   Date Value Ref Range Status   12/21/2023 Not Detected Not Detected - Ref. Range Final     No results found for: \"HGBA1C\"    Malnutrition Severity Assessment      Patient meets criteria for : Severe Malnutrition  Malnutrition Type (last 8 hours)       Malnutrition Severity Assessment       Row Name 12/26/23 1341       Malnutrition Severity Assessment    Malnutrition Type Acute Disease or Injury - Related Malnutrition      Row Name 12/26/23 1341       Insufficient Energy Intake     Insufficient Energy Intake Findings Severe    Insufficient Energy Intake  < or equal to 50% of est. energy requirement for > or equal to 5d)      Row Name 12/26/23 1341       Unintentional Weight Loss     Unintentional Weight Loss Findings Severe    Unintentional Weight Loss  Weight loss greater than 10% in six months      Row Name 12/26/23 1341       Muscle Loss    Loss of Muscle Mass Findings Severe    Yazdanism Region Severe - deep hollowing/scooping, lack of muscle to touch, facial bones well defined    Clavicle Bone Region Severe - " protruding prominent bone    Acromion Bone Region Severe - squared shoulders, bones, and acromion process protrusion prominent    Scapular Bone Region Severe - prominent bones, depressions easily visible between ribs, scapula, spine, shoulders    Dorsal Hand Region Severe - prominent depression    Patellar Region Severe - prominent bone, square looking, very little muscle definition    Anterior Thigh Region Severe - line/depression along thigh, obviously thin    Posterior Calf Region Severe - thin with very little definition/firmness      Row Name 12/26/23 1340       Fat Loss    Subcutaneous Fat Loss Findings Severe    Orbital Region  Severe - pronounced hollowness/depression, dark circles, loose saggy skin    Upper Arm Region Severe - mostly skin, very little space between folds, fingers touch      Row Name 12/26/23 1343       Criteria Met (Must meet criteria for severity in at least 2 of these categories: M Wasting, Fat Loss, Fluid, Secondary Signs, Wt. Status, Intake)    Patient meets criteria for  Severe Malnutrition                       RD to follow up per protocol.    Electronically signed by:  Kendra Keith RD  12/26/23 13:29 EST

## 2023-12-26 NOTE — CONSULTS
Palliative Care Social Work Progress Note    Code Status:do not resuscitate    Goals of Care: Limited Additonal Intervention     Narrative: Palliative care  met with patient and daughter at bedside to discuss goals of care. Daughter states MD just rounded and stated patient was improving and he would be appropriate for IP rehab. Patient was previously at [facility] and they would like patient to return if possible. Daughter shared patient would be able to follow commands and participate with therapy. [facility] referral was made on a previous admission and daughter states they have contacted her this week and are following patient. Patient is a DNR/DNI. Emotional support provided    Plan: [facility] following. Palliative care team following          Shabnam Salamanca

## 2023-12-26 NOTE — PROGRESS NOTES
"HOSPITALIST PROGRESS NOTE     12/26/2023      Clinical Summary / Reason for Hospitalization     Sage Flores is a 89 y.o. male admitted to hospital on 12/21/2023 with c/o Melena.      Admitted on 12/21/2023. Today is hospital day 3.     Subjective     Sage Flores seen and examined this morning.  Patient is comfortable.   Denies any abdominal pain, nausea or vomiting.  Denies any chest pain or shortness of breath.  Patient is saturating at 96% on 7 L high flow nasal cannula.    Interval / Overnight Issues     Objective     Vital Signs     BP 98/58 (BP Location: Left arm, Patient Position: Lying)   Pulse 86   Temp 97.4 °F (36.3 °C) (Oral)   Resp 21   Ht 177.8 cm (70\")   Wt 61.3 kg (135 lb 2.3 oz)   SpO2 97%   BMI 19.39 kg/m²      Input / Output       Intake/Output Summary (Last 24 hours) at 12/26/2023 1533  Last data filed at 12/26/2023 1125  Gross per 24 hour   Intake 360 ml   Output 1200 ml   Net -840 ml        Physical Exam     General : Patient lying in bed in no acute distress.      HEENT : Normocephalic atraumatic. Neck supple, no JVP   PERRLA, EOM intact. Throat clear, mucous membranes moist   CVS : S1 + S2 regular, No R/M/G     Chest : Clear to auscultation bilaterally    Abdomen : Soft, nontender, bowel sounds are present  Extremities : No clubbing, cyanosis, edema    Neurologic exam: Patient is awake, answering questions appropriately.  Moving all extremities.       Current Medications     Scheduled Meds:apixaban, 2.5 mg, Oral, Q12H  atorvastatin, 10 mg, Oral, Nightly  bumetanide, 1 mg, Oral, BID  cefTRIAXone, 1,000 mg, Intravenous, Q24H  clopidogrel, 75 mg, Oral, Daily  gabapentin, 100 mg, Oral, BID  losartan, 25 mg, Oral, Daily  metoclopramide, 5 mg, Intravenous, BID  pantoprazole, 40 mg, Oral, Q12H  senna-docusate sodium, 2 tablet, Oral, BID  sodium chloride, 10 mL, Intravenous, Q12H  tamsulosin, 0.4 mg, Oral, Nightly      Continuous Infusions:dextrose, 125 mL/hr, Last Rate: 125 mL/hr " (12/26/23 0951)      PRN Meds:.  acetaminophen **OR** acetaminophen **OR** acetaminophen    senna-docusate sodium **AND** polyethylene glycol **AND** bisacodyl **AND** bisacodyl    Calcium Replacement - Follow Nurse / BPA Driven Protocol    Magnesium Cardiology Dose Replacement - Follow Nurse / BPA Driven Protocol    nitroglycerin    ondansetron **OR** ondansetron    Phosphorus Replacement - Follow Nurse / BPA Driven Protocol    Potassium Replacement - Follow Nurse / BPA Driven Protocol    [COMPLETED] Insert Peripheral IV **AND** sodium chloride    sodium chloride    sodium chloride      Labs & Imaging     Results from last 7 days   Lab Units 12/26/23  0450   WBC 10*3/mm3 17.20*   HEMOGLOBIN g/dL 7.2*   HEMATOCRIT % 21.8*   PLATELETS 10*3/mm3 126*   GLUCOSE mg/dL 238*   CREATININE mg/dL 1.46*   BUN mg/dL 41*   SODIUM mmol/L 148*   POTASSIUM mmol/L 3.7   AST (SGOT) U/L 26   ALT (SGPT) U/L 32   ALK PHOS U/L 71   BILIRUBIN mg/dL 0.5   ANION GAP mmol/L 8.0       No radiology results for the last day    I reviewed the patient's new clinical results.    ASSESSMENT AND PLAN:    Sage Flores is a 89 y.o. male with past medical history of paroxysmal atrial fibrillation on chronic anticoagulation with apixaban, coronary artery disease, BPH, hyperlipidemia, hypertension, presented to emergency room with complaints of black stool and blood in the stool.  Initial hemoglobin on presentation was 11.3 and dropped to 8.6.  This morning hemoglobin is 6.1.  Patient is status post EGD showing which showed a bleeding duodenal ulcer and he had endoscopic hemostasis with epinephrine and Hemoclip.    Patient admitted with    Acute upper GI bleed with acute blood loss anemia: Patient is on Protonix.    Patient is status post EGD with findings of duodenal ulcer treated with endoscopic hemostasis with epinephrine and Hemoclip.   Continue Protonix.   Due to acute GI bleed and dropping hemoglobin apixaban and Plavix are on hold.  Patient seen  by cardiology.    Patient had recent PCI and stenting.  But unfortunately weighing the risks versus benefits we are currently holding the Plavix and apixaban. GI clear to resume the plavix and eliquis. GI and cardiology consult and follow-up appreciated    Leukocytosis: WBC count is improving.  Continue ceftriaxone and Flagyl.      Hypernatremia: Likely secondary to dehydration and patient being NPO.  Continue D5W.    Hypokalemia: Replace potassium in the IV fluids above.  Monitor potassium    Dysphagia: Patient currently on gentle IV fluid hydration.  Patient requesting to eat.  Will give him a clear liquid diet after bedside swallow evaluation    Acute kidney injury: Likely prerenal.  Monitor creatinine.    History of CAD with recent ST elevation MI status post RCA stenting: Aspirin and Plavix are on hold.  Beta-blocker being held due to recent heart block.  Continue statin.    Chronic combined systolic and diastolic congestive heart failure: Currently compensated.  Holding Bumex and Cozaar at this time.  Last echocardiogram showing EF of 35%.  Resume bumetanide as patient received multiple PRBC transfusions.    Paroxysmal atrial fibrillation with rapid ventricular response: Continue metoprolol 5 mg IV every 12.  Will give digoxin if blood pressure is soft and heart rate is high while holding metoprolol.      Hypertension: Continue metoprolol and losartan    BPH: Continue Flomax    DVT Prophylaxis:  SCDs due to GI bleed and severe anemia    I spoke to patient and his daughters at bedside, answered all their questions and concerns, communicated all test results, diagnosis, treatment and plan.    Given patient's age, multiple comorbidities, recent MI and acute GI bleed, family wanted no aggressive intervention and are open to keeping patient comfortable.  And patient is a DO NOT RESUSCITATE and DO NOT INTUBATE.    Code Status:   Code Status and Medical Interventions:   Ordered at: 12/21/23 1525     Medical  Intervention Limits:    NO intubation (DNI)     Code Status (Patient has no pulse and is not breathing):    No CPR (Do Not Attempt to Resuscitate)     Medical Interventions (Patient has pulse or is breathing):    Limited Support     Disposition (Where, When):  TBD/ when medically ready     Primary Emergency Contact: TODD BAIN     Copied text in this note has been reviewed and is accurate as of 12/26/2023.    Juan Shaffer MD   Hospitalist

## 2023-12-26 NOTE — THERAPY TREATMENT NOTE
"Subjective: Pt agreeable to therapeutic plan of care.    Objective:     Bed mobility - Min-A and Mod-A  Transfers - N/A or Not attempted.  Ambulation -  N/A or Not attempted.    Therapeutic Exercise - 10 Reps B LE AROM unsupported sitting / EOB    Vitals: Pt's HR  seated eob; BP: 98/63 EOB with dizziness    Pain: Pt expressed not having any pain  Intervention for pain: N/A    Education: Provided education on the importance of mobility in the acute care setting, Verbal/Tactile Cues, Transfer Training, and Energy conservation strategies    Assessment: Sage Flores presents with functional mobility impairments which indicate the need for skilled intervention. Pt required min A to roll towards R side and mod A from sidelying to sitting eob. Pt able to sit eob ~10 minutes expressing feeling \"woozy\" with pt's BP reading 98/63. Pt required CGA-min A to maintain static sitting balance while performing seated ther ex. Tolerating session today without incident. Will continue to follow and progress as tolerated.     Plan/Recommendations:   If medically appropriate, Moderate Intensity Therapy recommended post-acute care. This is recommended as therapy feels the patient would require 3-4 days per week and wouldn't tolerate \"3 hour daily\" rehab intensity. SNF would be the preferred choice. If the patient does not agree to SNF, arrange HH or OP depending on home bound status. If patient is medically complex, consider LTACH. Pt requires no DME at discharge.     Pt desires Skilled Rehab placement at discharge. Pt cooperative; agreeable to therapeutic recommendations and plan of care.         Basic Mobility 6-click:  Rollin = Total, A lot = 2, A little = 3; 4 = None  Supine>Sit:   1 = Total, A lot = 2, A little = 3; 4 = None   Sit>Stand with arms:  1 = Total, A lot = 2, A little = 3; 4 = None  Bed>Chair:   1 = Total, A lot = 2, A little = 3; 4 = None  Ambulate in room:  1 = Total, A lot = 2, A little = 3; 4 = " None  3-5 Steps with railin = Total, A lot = 2, A little = 3; 4 = None  Score: 9    Modified Park: N/A = No pre-op stroke/TIA    Post-Tx Position: Supine with HOB Elevated, Alarms activated, and Call light and personal items within reach  PPE: gloves and surgical mask

## 2023-12-26 NOTE — PROGRESS NOTES
Referring Provider: Anthony Ambriz MD    Reason for follow-up:  Melena.  Status post stent  Status post inferior STEMI     Patient Care Team:  Tricia Aldana APRN as PCP - General (Family Medicine)  Missy Domínguez MD as Consulting Physician (Cardiology)    Subjective .      ROS    Since I have last seen, the patient has been without any chest discomfort ,shortness of breath, palpitations, dizziness or syncope.  Denies having any headache ,abdominal pain ,nausea, vomiting , diarrhea constipation, loss of weight or loss of appetite.  Denies having any excessive bruising ,hematuria.    Review of all systems negative except as indicated    History  Past Medical History:   Diagnosis Date    Anxiety 2014    Atrial fibrillation     Benign prostatic hyperplasia     CAD (coronary artery disease)     Hyperlipidemia     Hypertension     Myocardial infarction        Past Surgical History:   Procedure Laterality Date    CARDIAC CATHETERIZATION N/A 12/1/2023    Procedure: Left Heart Cath;  Surgeon: Son العلي MD;  Location: [facility] CATH INVASIVE LOCATION;  Service: Cardiovascular;  Laterality: N/A;    CARDIAC CATHETERIZATION N/A 12/1/2023    Procedure: Coronary angiography;  Surgeon: Son العلي MD;  Location: [facility] CATH INVASIVE LOCATION;  Service: Cardiovascular;  Laterality: N/A;    CARDIAC CATHETERIZATION N/A 12/1/2023    Procedure: Percutaneous Coronary Intervention;  Surgeon: Son العلي MD;  Location: [facility] CATH INVASIVE LOCATION;  Service: Cardiovascular;  Laterality: N/A;    CARDIAC ELECTROPHYSIOLOGY PROCEDURE N/A 12/1/2023    Procedure: Temporary Pacemaker;  Surgeon: Son العلي MD;  Location: [facility] CATH INVASIVE LOCATION;  Service: Cardiovascular;  Laterality: N/A;    CARDIAC SURGERY      HERNIA REPAIR         Family History   Problem Relation Age of Onset    Heart disease Mother     Heart disease Father        Social History     Tobacco Use    Smoking status: Former     Types: Cigarettes      Quit date: 1970     Years since quittin.3    Smokeless tobacco: Never    Tobacco comments:     more than 50yrs   Vaping Use    Vaping Use: Never used   Substance Use Topics    Alcohol use: No    Drug use: No        Medications Prior to Admission   Medication Sig Dispense Refill Last Dose    acetaminophen (TYLENOL) 650 MG 8 hr tablet Take 1 tablet by mouth Every Night.   2023    apixaban (ELIQUIS) 2.5 MG tablet tablet Take 1 tablet by mouth Every 12 (Twelve) Hours for 30 days. Indications: Atrial Fibrillation 60 tablet 0 2023    aspirin 81 MG chewable tablet Chew 1 tablet Daily for 14 days. 14 tablet 0 2023    atorvastatin (LIPITOR) 10 MG tablet Take 1 tablet by mouth Every Night for 30 days. 30 tablet 0 2023    azelastine (ASTEPRO) 0.15 % solution nasal spray USE 2 SPRAYS IN EACH NOSTRIL TWICE DAILY AS DIRECTED BY PROVIDER 30 mL 3 2023    bumetanide (BUMEX) 1 MG tablet Take 1 tablet by mouth 3 (Three) Times a Day for 30 days. 90 tablet 0 2023    Cholecalciferol 25 MCG (1000 UT) tablet Take 1 tablet by mouth Daily.   2023    clopidogrel (PLAVIX) 75 MG tablet Take 1 tablet by mouth Daily for 30 days. 30 tablet 0 2023    docusate sodium (COLACE) 250 MG capsule Take 1 capsule by mouth Daily.   2023    gabapentin (NEURONTIN) 100 MG capsule TAKE 1 CAPSULE BY MOUTH TWICE DAILY 180 capsule 1 2023    losartan (COZAAR) 25 MG tablet Take 1 tablet by mouth Daily.       Mouthwashes (Biotene dry mouth) liquid liquid Take 15 mL by mouth 3 (Three) Times a Day As Needed for Dry Mouth.       pantoprazole (PROTONIX) 40 MG EC tablet Take 1 tablet by mouth Daily.   2023    potassium chloride 10 MEQ CR tablet Take 1 tablet by mouth Daily.   2023    tamsulosin (FLOMAX) 0.4 MG capsule 24 hr capsule TAKE 1 CAPSULE BY MOUTH DAILY 90 capsule 0 2023       Allergies  Iodine, Levofloxacin, Nitroglycerin, Paroxetine, Penicillin g, Prednisone, Sucralfate,  "Diltiazem hcl, Metoprolol, Pramipexole dihydrochloride, and Ropinirole hcl    Scheduled Meds:[Held by provider] apixaban, 2.5 mg, Oral, Q12H  [Held by provider] aspirin, 81 mg, Oral, Daily  atorvastatin, 10 mg, Oral, Nightly  bumetanide, 1 mg, Oral, BID  cefTRIAXone, 1,000 mg, Intravenous, Q24H  clopidogrel, 75 mg, Oral, Daily  gabapentin, 100 mg, Oral, BID  losartan, 25 mg, Oral, Daily  metoclopramide, 5 mg, Intravenous, BID  metroNIDAZOLE, 500 mg, Intravenous, Q8H  pantoprazole, 40 mg, Intravenous, Q12H  senna-docusate sodium, 2 tablet, Oral, BID  sodium chloride, 10 mL, Intravenous, Q12H  tamsulosin, 0.4 mg, Oral, Nightly      Continuous Infusions:dextrose 5 % with KCl 20 mEq, 75 mL/hr, Last Rate: 75 mL/hr (12/25/23 1416)      PRN Meds:.  acetaminophen **OR** acetaminophen **OR** acetaminophen    senna-docusate sodium **AND** polyethylene glycol **AND** bisacodyl **AND** bisacodyl    Calcium Replacement - Follow Nurse / BPA Driven Protocol    Magnesium Cardiology Dose Replacement - Follow Nurse / BPA Driven Protocol    nitroglycerin    ondansetron **OR** ondansetron    Phosphorus Replacement - Follow Nurse / BPA Driven Protocol    Potassium Replacement - Follow Nurse / BPA Driven Protocol    [COMPLETED] Insert Peripheral IV **AND** sodium chloride    sodium chloride    sodium chloride    Objective     VITAL SIGNS  Vitals:    12/25/23 1529 12/25/23 2050 12/26/23 0006 12/26/23 0358   BP:  104/63 95/45 99/56   BP Location:  Left arm Left arm Left arm   Patient Position:  Lying Lying Lying   Pulse: 104 107 113 87   Resp:  19 22 21   Temp:  97.6 °F (36.4 °C) 97.5 °F (36.4 °C) 97.6 °F (36.4 °C)   TempSrc:  Oral Axillary Axillary   SpO2: 100% 98% 97% 92%   Weight:    61.3 kg (135 lb 2.3 oz)   Height:           Flowsheet Rows      Flowsheet Row First Filed Value   Admission Height 177.8 cm (70\") Documented at 12/21/2023 0735   Admission Weight 71.7 kg (158 lb) Documented at 12/21/2023 0735              Intake/Output " Summary (Last 24 hours) at 12/26/2023 0515  Last data filed at 12/26/2023 0358  Gross per 24 hour   Intake --   Output 1000 ml   Net -1000 ml        TELEMETRY: Atrial fibrillation with moderate ventricular response.    Physical Exam:  The patient is alert, oriented and in no distress.  Vital signs as noted above.  Head and neck revealed no carotid bruits or jugular venous distention.  No thyromegaly or lymphadenopathy is present  Lungs clear.  No wheezing.  Breath sounds are normal bilaterally.  Heart normal first and second heart sounds.  No murmur. No precordial rub is present.  No gallop is present.  Abdomen soft and nontender.  No organomegaly is present.  Extremities with good peripheral pulses without any pedal edema.  Skin warm and dry.  Musculoskeletal system is grossly normal  CNS grossly normal    Reviewed and updated.    Results Review:   I reviewed the patient's new clinical results.  Lab Results (last 24 hours)       Procedure Component Value Units Date/Time    CBC & Differential [[id number]] Collected: 12/26/23 0450    Specimen: Blood Updated: 12/26/23 0513    Narrative:      The following orders were created for panel order CBC & Differential.  Procedure                               Abnormality         Status                     ---------                               -----------         ------                     CBC Auto Differential[[id number]]                            In process                   Please view results for these tests on the individual orders.    CBC Auto Differential [[id number]] Collected: 12/26/23 0450    Specimen: Blood Updated: 12/26/23 0513    Magnesium [[id number]] Collected: 12/26/23 0450    Specimen: Blood Updated: 12/26/23 0513    Phosphorus [[id number]] Collected: 12/26/23 0450    Specimen: Blood Updated: 12/26/23 0513    Comprehensive Metabolic Panel [[id number]] Collected: 12/26/23 0450    Specimen: Blood Updated: 12/26/23 0513    Digoxin Level [[id number]] Collected:  12/26/23 0450    Specimen: Blood Updated: 12/26/23 0513    Phosphorus [[id number]]  (Normal) Collected: 12/25/23 2018    Specimen: Blood Updated: 12/25/23 2056     Phosphorus 2.6 mg/dL     Blood Culture - Blood, Arm, Right [[id number]]  (Normal) Collected: 12/24/23 1815    Specimen: Blood from Arm, Right Updated: 12/25/23 1900     Blood Culture No growth at 24 hours    Blood Culture - Blood, Arm, Left [[id number]]  (Normal) Collected: 12/24/23 1221    Specimen: Blood from Arm, Left Updated: 12/25/23 1231     Blood Culture No growth at 24 hours    Narrative:      Less than seven (7) mL's of blood was collected.  Insufficient quantity may yield false negative results.    Blood Culture - Blood, Arm, Right [[id number]]  (Normal) Collected: 12/21/23 0950    Specimen: Blood from Arm, Right Updated: 12/25/23 1000     Blood Culture No growth at 4 days    Blood Culture - Blood, Arm, Left [[id number]]  (Normal) Collected: 12/21/23 0924    Specimen: Blood from Arm, Left Updated: 12/25/23 0930     Blood Culture No growth at 4 days            Imaging Results (Last 24 Hours)       ** No results found for the last 24 hours. **        LAB RESULTS (LAST 7 DAYS)    CBC  Results from last 7 days   Lab Units 12/24/23  2259 12/24/23  0708 12/23/23  1612 12/23/23  0532 12/22/23  0714 12/22/23  0113 12/21/23  1723 12/21/23  0748   WBC 10*3/mm3 27.10* 31.70*  --  33.40* 18.30*  --   --  19.70*   RBC 10*6/mm3 2.82* 3.02*  --  1.91* 2.71*  --   --  3.68*   HEMOGLOBIN g/dL 8.6* 9.2* 10.3* 6.1* 8.7* 8.6* 10.1* 11.3*   HEMATOCRIT % 26.4* 28.4* 31.6* 18.8* 26.2* 25.8* 30.5* 34.5*   MCV fL 93.3 94.2  --  98.2* 96.9  --   --  93.8   PLATELETS 10*3/mm3 143 149  --  189 169  --   --  229       BMP  Results from last 7 days   Lab Units 12/25/23 2018 12/24/23  2259 12/24/23  0708 12/23/23  0035 12/22/23  0715 12/21/23  0748   SODIUM mmol/L  --  151* 149* 138 139 138   POTASSIUM mmol/L  --  3.1* 3.4* 4.0 3.6 3.7   CHLORIDE mmol/L  --  113* 112* 104 101 95*    CO2 mmol/L  --  27.0 25.0 24.0 27.0 28.0   BUN mg/dL  --  71* 90* 89* 89* 90*   CREATININE mg/dL  --  1.69* 1.71* 1.66* 1.56* 1.96*   GLUCOSE mg/dL  --  108* 103* 171* 129* 169*   MAGNESIUM mg/dL  --  2.1 2.3 2.2 2.3 1.7   PHOSPHORUS mg/dL 2.6 2.2* 2.3* 2.6 3.3 3.5       CMP   Results from last 7 days   Lab Units 12/24/23  2259 12/24/23  0708 12/23/23  0035 12/22/23  0715 12/21/23  0748   SODIUM mmol/L 151* 149* 138 139 138   POTASSIUM mmol/L 3.1* 3.4* 4.0 3.6 3.7   CHLORIDE mmol/L 113* 112* 104 101 95*   CO2 mmol/L 27.0 25.0 24.0 27.0 28.0   BUN mg/dL 71* 90* 89* 89* 90*   CREATININE mg/dL 1.69* 1.71* 1.66* 1.56* 1.96*   GLUCOSE mg/dL 108* 103* 171* 129* 169*   ALBUMIN g/dL 3.2* 3.2* 3.1* 3.3* 3.7   BILIRUBIN mg/dL 0.5 0.6 0.3 0.5 0.7   ALK PHOS U/L 78 70 69 75 90   AST (SGOT) U/L 32 19 16 18 21   ALT (SGPT) U/L 32 20 18 20 21         BNP        TROPONIN        CoAg  Results from last 7 days   Lab Units 12/22/23  0714 12/21/23  0748   INR  1.29* 1.17*   APTT seconds  --  26.3*       Creatinine Clearance  Estimated Creatinine Clearance: 23.7 mL/min (A) (by C-G formula based on SCr of 1.69 mg/dL (H)).    ABG        Radiology  No radiology results for the last day        EKG      I personally viewed and interpreted the patient's EKG/Telemetry data:    ECHOCARDIOGRAM:    Results for orders placed during the hospital encounter of 12/01/23    Adult Transthoracic Echo Complete W/ Cont if Necessary Per Protocol    Interpretation Summary    Left ventricular ejection fraction appears to be 36 - 40%.    The left atrial cavity is moderately dilated.    Estimated right ventricular systolic pressure from tricuspid regurgitation is normal (<35 mmHg).          STRESS TEST        Cardiolite (Tc-99m sestamibi) stress test    CARDIAC CATHETERIZATION  Results for orders placed during the hospital encounter of 12/01/23    Cardiac Catheterization/Vascular Study                OTHER:         Assessment & Plan     Principal Problem:     Melena  Active Problems:    GI bleed      /////////////////////////  History  =============  - Recent melena.     - Inferior STEMI 12/4/2023.     - Status post PCI with PTCA, stent placement on Pronto catheter atherectomy to the graft to the distal right coronary artery.-12/3/2023-Dr. العلي     Cardiac catheterization 12/3/2023-Dr. العلي     Left Main %: 20 to 30%   Proximal LAD %: 100%  Mid/Distal LAD %: 100%  LCX %: Proximal 80% OM1 100%  Ramus:   RCA %: 100% after the PDA.  Lima %: LIMA to LAD was patent  SVG(s) %: SVG to the marginal branch is patent but has some 30 to 40% disease.  SVG to the diagonal branch is occluded.  SVG to the PDA is occluded.  SVG to the distal RCA is occluded but is the culprit lesion     -Past history of syncope-no further episodes.     -status post loop recorder placement.  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 cardiac catheterization 12/26/2014 revealed 60% distal circumflex and total LAD and right coronary arteries.  Lima to LAD was patent ( lima coming off the left vertebral artery).  SVG to diagonal   marginal and PDA were patent.  SVG to left ventricular branch was totally occluded (chronic)     - atrial fibrillation -has converted to sinus rhythm and maintaining sinus rhythm.  Recently patient was noted to have atrial dysrhythmia on the monitor with loop recorder.     - sinus bradycardia-asymptomatic     - status post acute inferior myocardial infarction prior to surgery requiring acute stent placement to right coronary artery ) complicated by ventricular fibrillation)     Echocardiogram 12/1/2023    Left ventricular ejection fraction appears to be 36 - 40%.    The left atrial cavity is moderately dilated.    Estimated right ventricular systolic pressure from tricuspid regurgitation is normal (<35 mmHg).      -Dyslipidemia and hypertension     - lower extremity weakness.   Arterial Doppler study of the lower extremity is normal. -  improved .    arterial Doppler study of the lower extremity was normal     - status post cholecystectomy     - allergy to penicillin iodine and metoprolol (rash).  Intolerance to atenolol due to bradycardia.  Allergy to Levaquin and penicillin.  Intolerance to prednisone Nitropatch IV nitroglycerin and prednisone.  =================    Plan  ==============  Recent melena and GI bleed.  Patient had endoscopy today 12/22/2023 that revealed bleeding duodenal ulcer.  Patient had endoscopic hemostasis with epinephrine and Hemoclip.  Patient was started on PPI.  Patient was on antiplatelet therapy (Plavix)  Start Eliquis today  Observe for toxic effects of high risk medication.    GI has suggested holding Eliquis for at least 5 days.  Will restart Plavix today if okay with GI service.  Closely follow cardiovascular status.      Inferior STEMI 12/4/2023.     Status post PCI with PTCA, stent placement on Pronto catheter atherectomy to the graft to the distal right coronary artery.-12/3/2023-Dr. العلي     History of junctional bradycardia.  Temporary pacemaker was placed in the setting of inferior STEMI.  Temporary pacemaker was removed.     Past history of complete heart block.  Patient had temporary pacemaker which was removed.     Atrial fibrillation with controlled ventricular response.  Rate control can be challenging given the circumstances.  Patient is allergic to metoprolol and Cardizem.  Patient received intravenous digoxin yesterday.  Would like to avoid usage of digoxin given his age.  However, we may not have much choice at this time.  Check digoxin level tomorrow.    Hypomagnesemia-better at 2.3.    Leukocytosis  WBC 33,000.    Renal dysfunction  71/1.69    Anemia  Hemoglobin 6.1-12/23/2023  Hgb 10.3-12/24/2023    Renal dysfunction  BUN/creatinine 89/1.66-12/23/2023       Patient is off beta-blocker Coreg.     Echocardiogram 12/1/2023-as above     Patient is DNR DNI..     Medications were reviewed and updated.  Current  medications include  Eliquis atorvastatin Bumex Plavix losartan pantoprazole    Reviewed and updated 12/26/2023    Further plan will depend on patient's progress.  //////////////////////////////////////        Missy Domínguez MD  12/26/23  05:15 EST

## 2023-12-26 NOTE — PLAN OF CARE
"Goal Outcome Evaluation:         Sage Flores presents with functional mobility impairments which indicate the need for skilled intervention. Pt required min A to roll towards R side and mod A from sidelying to sitting eob. Pt able to sit eob ~10 minutes expressing feeling \"woozy\" with pt's BP reading 98/63. Pt required CGA-min A to maintain static sitting balance while performing seated ther ex. Tolerating session today without incident. Will continue to follow and progress as tolerated.                "

## 2023-12-26 NOTE — PLAN OF CARE
Problem: Adult Inpatient Plan of Care  Goal: Absence of Hospital-Acquired Illness or Injury  Intervention: Identify and Manage Fall Risk  Description: Perform standard risk assessment on admission using a validated tool or comprehensive approach appropriate to the patient; reassess fall risk frequently, with change in status or transfer to another level of care.  Communicate fall injury risk to interprofessional healthcare team.  Determine need for increased observation, equipment and environmental modification, such as low bed, signage and supportive, nonskid footwear.  Adjust safety measures to individual developmental age, stage and identified risk factors.  Reinforce the importance of safety and physical activity with patient and family.  Perform regular intentional rounding to assess need for position change, pain assessment and personal needs, including assistance with toileting.  Recent Flowsheet Documentation  Taken 12/26/2023 0600 by Enoch Reed, RN  Safety Promotion/Fall Prevention:   activity supervised   assistive device/personal items within reach   clutter free environment maintained   fall prevention program maintained   gait belt   safety round/check completed   room organization consistent  Taken 12/26/2023 0400 by Enoch Reed, RN  Safety Promotion/Fall Prevention:   activity supervised   assistive device/personal items within reach   clutter free environment maintained   fall prevention program maintained   gait belt   safety round/check completed   room organization consistent  Taken 12/26/2023 0200 by Enoch Reed, RN  Safety Promotion/Fall Prevention:   activity supervised   clutter free environment maintained   assistive device/personal items within reach   gait belt   fall prevention program maintained   safety round/check completed   room organization consistent  Taken 12/26/2023 0000 by Enoch Reed, RN  Safety Promotion/Fall Prevention:   activity supervised   assistive  device/personal items within reach   clutter free environment maintained   fall prevention program maintained   gait belt   safety round/check completed   room organization consistent  Taken 12/25/2023 2200 by Enoch Reed RN  Safety Promotion/Fall Prevention:   activity supervised   assistive device/personal items within reach   clutter free environment maintained   fall prevention program maintained   gait belt   safety round/check completed   room organization consistent  Taken 12/25/2023 2000 by Enoch Reed RN  Safety Promotion/Fall Prevention:   activity supervised   assistive device/personal items within reach   clutter free environment maintained   fall prevention program maintained   gait belt   safety round/check completed   room organization consistent  Intervention: Prevent Infection  Description: Maintain skin and mucous membrane integrity; promote hand, oral and pulmonary hygiene.  Optimize fluid balance, nutrition, sleep and glycemic control to maximize infection resistance.  Identify potential sources of infection early to prevent or mitigate progression of infection (e.g., wound, lines, devices).  Evaluate ongoing need for invasive devices; remove promptly when no longer indicated.  Recent Flowsheet Documentation  Taken 12/25/2023 2000 by Enoch Reed RN  Infection Prevention:   single patient room provided   rest/sleep promoted   personal protective equipment utilized   hand hygiene promoted   environmental surveillance performed  Goal: Optimal Comfort and Wellbeing  Intervention: Provide Person-Centered Care  Description: Use a family-focused approach to care.  Develop trust and rapport by proactively providing information, encouraging questions, addressing concerns and offering reassurance.  Acknowledge emotional response to hospitalization.  Recognize and utilize personal coping strategies.  Honor spiritual and cultural preferences.  Recent Flowsheet Documentation  Taken 12/25/2023 2000  by Enoch Reed RN  Trust Relationship/Rapport:   care explained   choices provided   emotional support provided   empathic listening provided   questions answered   questions encouraged   reassurance provided   thoughts/feelings acknowledged   Goal Outcome Evaluation:

## 2023-12-27 ENCOUNTER — APPOINTMENT (OUTPATIENT)
Dept: GENERAL RADIOLOGY | Facility: HOSPITAL | Age: 88
End: 2023-12-27
Payer: MEDICARE

## 2023-12-27 PROBLEM — E43 SEVERE MALNUTRITION: Status: ACTIVE | Noted: 2023-12-27

## 2023-12-27 LAB
ABO GROUP BLD: NORMAL
ALBUMIN SERPL-MCNC: 2.9 G/DL (ref 3.5–5.2)
ALBUMIN/GLOB SERPL: 1.9 G/DL
ALP SERPL-CCNC: 71 U/L (ref 39–117)
ALT SERPL W P-5'-P-CCNC: 30 U/L (ref 1–41)
ANION GAP SERPL CALCULATED.3IONS-SCNC: 9 MMOL/L (ref 5–15)
ANISOCYTOSIS BLD QL: ABNORMAL
AST SERPL-CCNC: 28 U/L (ref 1–40)
BILIRUB SERPL-MCNC: 0.5 MG/DL (ref 0–1.2)
BLD GP AB SCN SERPL QL: NEGATIVE
BUN SERPL-MCNC: 28 MG/DL (ref 8–23)
BUN/CREAT SERPL: 24.1 (ref 7–25)
CALCIUM SPEC-SCNC: 7.9 MG/DL (ref 8.2–9.6)
CHLORIDE SERPL-SCNC: 106 MMOL/L (ref 98–107)
CO2 SERPL-SCNC: 27 MMOL/L (ref 22–29)
CREAT SERPL-MCNC: 1.16 MG/DL (ref 0.76–1.27)
DEPRECATED RDW RBC AUTO: 51.6 FL (ref 37–54)
EGFRCR SERPLBLD CKD-EPI 2021: 58.7 ML/MIN/1.73
EOSINOPHIL # BLD MANUAL: 0.16 10*3/MM3 (ref 0–0.4)
EOSINOPHIL NFR BLD MANUAL: 1 % (ref 0.3–6.2)
ERYTHROCYTE [DISTWIDTH] IN BLOOD BY AUTOMATED COUNT: 16 % (ref 12.3–15.4)
GLOBULIN UR ELPH-MCNC: 1.5 GM/DL
GLUCOSE SERPL-MCNC: 129 MG/DL (ref 65–99)
HCT VFR BLD AUTO: 22.7 % (ref 37.5–51)
HGB BLD-MCNC: 7.7 G/DL (ref 13–17.7)
LARGE PLATELETS: ABNORMAL
LYMPHOCYTES # BLD MANUAL: 1.25 10*3/MM3 (ref 0.7–3.1)
LYMPHOCYTES NFR BLD MANUAL: 5 % (ref 5–12)
MAGNESIUM SERPL-MCNC: 1.9 MG/DL (ref 1.7–2.3)
MCH RBC QN AUTO: 32.1 PG (ref 26.6–33)
MCHC RBC AUTO-ENTMCNC: 33.9 G/DL (ref 31.5–35.7)
MCV RBC AUTO: 94.5 FL (ref 79–97)
METAMYELOCYTES NFR BLD MANUAL: 2 % (ref 0–0)
MONOCYTES # BLD: 0.78 10*3/MM3 (ref 0.1–0.9)
MYELOCYTES NFR BLD MANUAL: 1 % (ref 0–0)
NEUTROPHILS # BLD AUTO: 12.95 10*3/MM3 (ref 1.7–7)
NEUTROPHILS NFR BLD MANUAL: 81 % (ref 42.7–76)
NEUTS BAND NFR BLD MANUAL: 2 % (ref 0–5)
PHOSPHATE SERPL-MCNC: 2.6 MG/DL (ref 2.5–4.5)
PLATELET # BLD AUTO: 133 10*3/MM3 (ref 140–450)
PMV BLD AUTO: 10.5 FL (ref 6–12)
POLYCHROMASIA BLD QL SMEAR: ABNORMAL
POTASSIUM SERPL-SCNC: 3.4 MMOL/L (ref 3.5–5.2)
POTASSIUM SERPL-SCNC: 4.2 MMOL/L (ref 3.5–5.2)
PROT SERPL-MCNC: 4.4 G/DL (ref 6–8.5)
RBC # BLD AUTO: 2.4 10*6/MM3 (ref 4.14–5.8)
RH BLD: POSITIVE
SCAN SLIDE: NORMAL
SMALL PLATELETS BLD QL SMEAR: ABNORMAL
SODIUM SERPL-SCNC: 142 MMOL/L (ref 136–145)
T&S EXPIRATION DATE: NORMAL
TOXIC GRANULATION: ABNORMAL
VARIANT LYMPHS NFR BLD MANUAL: 8 % (ref 19.6–45.3)
WBC NRBC COR # BLD AUTO: 15.6 10*3/MM3 (ref 3.4–10.8)

## 2023-12-27 PROCEDURE — 25010000002 CEFTRIAXONE PER 250 MG: Performed by: HOSPITALIST

## 2023-12-27 PROCEDURE — 86850 RBC ANTIBODY SCREEN: CPT | Performed by: INTERNAL MEDICINE

## 2023-12-27 PROCEDURE — 25010000002 METOCLOPRAMIDE PER 10 MG: Performed by: INTERNAL MEDICINE

## 2023-12-27 PROCEDURE — 97110 THERAPEUTIC EXERCISES: CPT

## 2023-12-27 PROCEDURE — 97530 THERAPEUTIC ACTIVITIES: CPT

## 2023-12-27 PROCEDURE — 84132 ASSAY OF SERUM POTASSIUM: CPT | Performed by: INTERNAL MEDICINE

## 2023-12-27 PROCEDURE — 71045 X-RAY EXAM CHEST 1 VIEW: CPT

## 2023-12-27 PROCEDURE — 85025 COMPLETE CBC W/AUTO DIFF WBC: CPT | Performed by: INTERNAL MEDICINE

## 2023-12-27 PROCEDURE — 85007 BL SMEAR W/DIFF WBC COUNT: CPT | Performed by: INTERNAL MEDICINE

## 2023-12-27 PROCEDURE — 86900 BLOOD TYPING SEROLOGIC ABO: CPT | Performed by: INTERNAL MEDICINE

## 2023-12-27 PROCEDURE — 86900 BLOOD TYPING SEROLOGIC ABO: CPT

## 2023-12-27 PROCEDURE — 80053 COMPREHEN METABOLIC PANEL: CPT | Performed by: INTERNAL MEDICINE

## 2023-12-27 PROCEDURE — 86901 BLOOD TYPING SEROLOGIC RH(D): CPT | Performed by: INTERNAL MEDICINE

## 2023-12-27 PROCEDURE — 84100 ASSAY OF PHOSPHORUS: CPT | Performed by: INTERNAL MEDICINE

## 2023-12-27 PROCEDURE — P9016 RBC LEUKOCYTES REDUCED: HCPCS

## 2023-12-27 PROCEDURE — 83735 ASSAY OF MAGNESIUM: CPT | Performed by: INTERNAL MEDICINE

## 2023-12-27 PROCEDURE — 86923 COMPATIBILITY TEST ELECTRIC: CPT

## 2023-12-27 PROCEDURE — 36415 COLL VENOUS BLD VENIPUNCTURE: CPT | Performed by: INTERNAL MEDICINE

## 2023-12-27 PROCEDURE — 36430 TRANSFUSION BLD/BLD COMPNT: CPT

## 2023-12-27 PROCEDURE — 99232 SBSQ HOSP IP/OBS MODERATE 35: CPT | Performed by: INTERNAL MEDICINE

## 2023-12-27 PROCEDURE — 25010000002 MAGNESIUM SULFATE 4 GM/100ML SOLUTION: Performed by: INTERNAL MEDICINE

## 2023-12-27 RX ORDER — MAGNESIUM SULFATE HEPTAHYDRATE 40 MG/ML
4 INJECTION, SOLUTION INTRAVENOUS ONCE
Status: COMPLETED | OUTPATIENT
Start: 2023-12-27 | End: 2023-12-27

## 2023-12-27 RX ORDER — METOCLOPRAMIDE 5 MG/1
5 TABLET ORAL 2 TIMES DAILY
Status: DISCONTINUED | OUTPATIENT
Start: 2023-12-27 | End: 2023-12-28 | Stop reason: HOSPADM

## 2023-12-27 RX ADMIN — APIXABAN 2.5 MG: 2.5 TABLET, FILM COATED ORAL at 08:27

## 2023-12-27 RX ADMIN — SENNOSIDES AND DOCUSATE SODIUM 2 TABLET: 50; 8.6 TABLET ORAL at 08:27

## 2023-12-27 RX ADMIN — METOCLOPRAMIDE 5 MG: 5 INJECTION, SOLUTION INTRAMUSCULAR; INTRAVENOUS at 08:27

## 2023-12-27 RX ADMIN — APIXABAN 2.5 MG: 2.5 TABLET, FILM COATED ORAL at 20:48

## 2023-12-27 RX ADMIN — PANTOPRAZOLE SODIUM 40 MG: 40 TABLET, DELAYED RELEASE ORAL at 20:48

## 2023-12-27 RX ADMIN — CEFTRIAXONE 1000 MG: 1 INJECTION, POWDER, FOR SOLUTION INTRAMUSCULAR; INTRAVENOUS at 12:56

## 2023-12-27 RX ADMIN — Medication 10 ML: at 08:27

## 2023-12-27 RX ADMIN — Medication 10 ML: at 20:50

## 2023-12-27 RX ADMIN — ATORVASTATIN CALCIUM 10 MG: 20 TABLET, FILM COATED ORAL at 20:48

## 2023-12-27 RX ADMIN — CLOPIDOGREL BISULFATE 75 MG: 75 TABLET ORAL at 08:27

## 2023-12-27 RX ADMIN — POTASSIUM CHLORIDE 40 MEQ: 1500 TABLET, EXTENDED RELEASE ORAL at 06:47

## 2023-12-27 RX ADMIN — METOCLOPRAMIDE 5 MG: 5 TABLET ORAL at 20:48

## 2023-12-27 RX ADMIN — TAMSULOSIN HYDROCHLORIDE 0.4 MG: 0.4 CAPSULE ORAL at 20:48

## 2023-12-27 RX ADMIN — GABAPENTIN 100 MG: 100 CAPSULE ORAL at 20:47

## 2023-12-27 RX ADMIN — MAGNESIUM SULFATE HEPTAHYDRATE 4 G: 40 INJECTION, SOLUTION INTRAVENOUS at 08:27

## 2023-12-27 RX ADMIN — POTASSIUM CHLORIDE 40 MEQ: 1500 TABLET, EXTENDED RELEASE ORAL at 02:45

## 2023-12-27 RX ADMIN — GABAPENTIN 100 MG: 100 CAPSULE ORAL at 08:27

## 2023-12-27 RX ADMIN — SENNOSIDES AND DOCUSATE SODIUM 2 TABLET: 50; 8.6 TABLET ORAL at 20:48

## 2023-12-27 RX ADMIN — PANTOPRAZOLE SODIUM 40 MG: 40 TABLET, DELAYED RELEASE ORAL at 08:27

## 2023-12-27 NOTE — THERAPY TREATMENT NOTE
"Subjective: Pt agreeable to therapeutic plan of care.    Objective:     Bed mobility -  Min/Mod A     Sitting balance:  CGA/Min A   Transfers -  not attempted  Ambulation - not attempted     Therapeutic Exercise -  10 reps each BLE strengthening exercises in supine    Vitals: WNL    Pain: 8 VAS   Location: L hip   Intervention for pain: Repositioned and Therapeutic Presence    Education: Provided education on the importance of mobility in the acute care setting, Verbal/Tactile Cues, ADL training, and Energy conservation strategies    Assessment: Sage Flores presents with functional mobility impairments which indicate the need for skilled intervention. Tolerating session today without incident. Pt on 2L O2, telemetry and IV this date.  Pt required Min/Mod A for bed mobility.  Lab entered into room and needed to draw blood.  Pt required CGA/Min A for static sitting balance during lab draw.  Pt became too fatigued to attempt BLE strengthening exercises sitting EOB after lab draw and c/o \"wooziness\".  BP sitting EOB; 90/57.  Pt completed BLE strengthening exercises in supine.  BP in supine at end of session: 111/66.  Will continue to follow and progress as tolerated.     Plan/Recommendations:   If medically appropriate, Moderate Intensity Therapy recommended post-acute care. This is recommended as therapy feels the patient would require 3-4 days per week and wouldn't tolerate \"3 hour daily\" rehab intensity. SNF would be the preferred choice. If the patient does not agree to SNF, arrange HH or OP depending on home bound status. If patient is medically complex, consider LTACH. Pt requires no DME at discharge.     Pt desires Skilled Rehab placement at discharge. Pt cooperative; agreeable to therapeutic recommendations and plan of care.         Basic Mobility 6-click:  Rollin = Total, A lot = 2, A little = 3; 4 = None  Supine>Sit:   1 = Total, A lot = 2, A little = 3; 4 = None   Sit>Stand with arms:  1 = " Total, A lot = 2, A little = 3; 4 = None  Bed>Chair:   1 = Total, A lot = 2, A little = 3; 4 = None  Ambulate in room:  1 = Total, A lot = 2, A little = 3; 4 = None  3-5 Steps with railin = Total, A lot = 2, A little = 3; 4 = None  Score: 8      Post-Tx Position: Supine with HOB Elevated, Alarms activated, and Call light and personal items within reach  PPE: gloves

## 2023-12-27 NOTE — PLAN OF CARE
Goal Outcome Evaluation:           Progress: no change  Outcome Evaluation: Patient in bed eyes closed resting at this time.  Patient on continuous IV fluids.  B/P has been running low throughout the night.

## 2023-12-27 NOTE — PROGRESS NOTES
Referring Provider: Anthony Ambriz MD    Reason for follow-up:  Melena.  Status post stent  Status post inferior STEMI     Patient Care Team:  Tricia Aldana APRN as PCP - General (Family Medicine)  Missy Domínguez MD as Consulting Physician (Cardiology)    Subjective .      ROS    Since I have last seen, the patient has been without any chest discomfort ,shortness of breath, palpitations, dizziness or syncope.  Denies having any headache ,abdominal pain ,nausea, vomiting , diarrhea constipation, loss of weight or loss of appetite.  Denies having any excessive bruising ,hematuria.    Review of all systems negative except as indicated    History  Past Medical History:   Diagnosis Date    Anxiety 2014    Atrial fibrillation     Benign prostatic hyperplasia     CAD (coronary artery disease)     Hyperlipidemia     Hypertension     Myocardial infarction        Past Surgical History:   Procedure Laterality Date    CARDIAC CATHETERIZATION N/A 12/1/2023    Procedure: Left Heart Cath;  Surgeon: Son العلي MD;  Location: [facility] CATH INVASIVE LOCATION;  Service: Cardiovascular;  Laterality: N/A;    CARDIAC CATHETERIZATION N/A 12/1/2023    Procedure: Coronary angiography;  Surgeon: Son العلي MD;  Location: [facility] CATH INVASIVE LOCATION;  Service: Cardiovascular;  Laterality: N/A;    CARDIAC CATHETERIZATION N/A 12/1/2023    Procedure: Percutaneous Coronary Intervention;  Surgeon: Son العلي MD;  Location: [facility] CATH INVASIVE LOCATION;  Service: Cardiovascular;  Laterality: N/A;    CARDIAC ELECTROPHYSIOLOGY PROCEDURE N/A 12/1/2023    Procedure: Temporary Pacemaker;  Surgeon: Son العلي MD;  Location: [facility] CATH INVASIVE LOCATION;  Service: Cardiovascular;  Laterality: N/A;    CARDIAC SURGERY      ENDOSCOPY N/A 12/22/2023    Procedure: ESOPHAGOGASTRODUODENOSCOPY with sclerotherapy and endoscopic clipping x 3 of duodenal ulcer;  Surgeon: Tereza Fowler MD;  Location: [facility] ENDOSCOPY;  Service:  Gastroenterology;  Laterality: N/A;  post op: duodenal ulcer    HERNIA REPAIR         Family History   Problem Relation Age of Onset    Heart disease Mother     Heart disease Father        Social History     Tobacco Use    Smoking status: Former     Types: Cigarettes     Quit date: 1970     Years since quittin.3    Smokeless tobacco: Never    Tobacco comments:     more than 50yrs   Vaping Use    Vaping Use: Never used   Substance Use Topics    Alcohol use: No    Drug use: No        Medications Prior to Admission   Medication Sig Dispense Refill Last Dose    acetaminophen (TYLENOL) 650 MG 8 hr tablet Take 1 tablet by mouth Every Night.   2023    apixaban (ELIQUIS) 2.5 MG tablet tablet Take 1 tablet by mouth Every 12 (Twelve) Hours for 30 days. Indications: Atrial Fibrillation 60 tablet 0 2023    [] aspirin 81 MG chewable tablet Chew 1 tablet Daily for 14 days. 14 tablet 0 2023    atorvastatin (LIPITOR) 10 MG tablet Take 1 tablet by mouth Every Night for 30 days. 30 tablet 0 2023    azelastine (ASTEPRO) 0.15 % solution nasal spray USE 2 SPRAYS IN EACH NOSTRIL TWICE DAILY AS DIRECTED BY PROVIDER 30 mL 3 2023    bumetanide (BUMEX) 1 MG tablet Take 1 tablet by mouth 3 (Three) Times a Day for 30 days. 90 tablet 0 2023    Cholecalciferol 25 MCG (1000 UT) tablet Take 1 tablet by mouth Daily.   2023    clopidogrel (PLAVIX) 75 MG tablet Take 1 tablet by mouth Daily for 30 days. 30 tablet 0 2023    docusate sodium (COLACE) 250 MG capsule Take 1 capsule by mouth Daily.   2023    gabapentin (NEURONTIN) 100 MG capsule TAKE 1 CAPSULE BY MOUTH TWICE DAILY 180 capsule 1 2023    losartan (COZAAR) 25 MG tablet Take 1 tablet by mouth Daily.       Mouthwashes (Biotene dry mouth) liquid liquid Take 15 mL by mouth 3 (Three) Times a Day As Needed for Dry Mouth.       pantoprazole (PROTONIX) 40 MG EC tablet Take 1 tablet by mouth Daily.   2023    potassium  chloride 10 MEQ CR tablet Take 1 tablet by mouth Daily.   12/20/2023    tamsulosin (FLOMAX) 0.4 MG capsule 24 hr capsule TAKE 1 CAPSULE BY MOUTH DAILY 90 capsule 0 12/20/2023       Allergies  Iodine, Levofloxacin, Nitroglycerin, Paroxetine, Penicillin g, Prednisone, Sucralfate, Diltiazem hcl, Metoprolol, Pramipexole dihydrochloride, Ropinirole hcl, Milk protein, and Milk-related compounds    Scheduled Meds:apixaban, 2.5 mg, Oral, Q12H  atorvastatin, 10 mg, Oral, Nightly  bumetanide, 1 mg, Oral, BID  cefTRIAXone, 1,000 mg, Intravenous, Q24H  clopidogrel, 75 mg, Oral, Daily  gabapentin, 100 mg, Oral, BID  losartan, 25 mg, Oral, Daily  metoclopramide, 5 mg, Intravenous, BID  pantoprazole, 40 mg, Oral, Q12H  potassium chloride ER, 40 mEq, Oral, Q4H  senna-docusate sodium, 2 tablet, Oral, BID  sodium chloride, 10 mL, Intravenous, Q12H  tamsulosin, 0.4 mg, Oral, Nightly      Continuous Infusions:dextrose, 125 mL/hr, Last Rate: 125 mL/hr (12/26/23 2239)      PRN Meds:.  acetaminophen **OR** acetaminophen **OR** acetaminophen    senna-docusate sodium **AND** polyethylene glycol **AND** bisacodyl **AND** bisacodyl    Calcium Replacement - Follow Nurse / BPA Driven Protocol    Magnesium Cardiology Dose Replacement - Follow Nurse / BPA Driven Protocol    nitroglycerin    ondansetron **OR** ondansetron    Phosphorus Replacement - Follow Nurse / BPA Driven Protocol    Potassium Replacement - Follow Nurse / BPA Driven Protocol    [COMPLETED] Insert Peripheral IV **AND** sodium chloride    sodium chloride    sodium chloride    Objective     VITAL SIGNS  Vitals:    12/26/23 2026 12/27/23 0024 12/27/23 0431 12/27/23 0440   BP: 107/60 (!) 88/39 (!) 87/40 (!) 89/40   BP Location: Left arm Left arm Left arm Right arm   Patient Position: Lying Lying Lying Lying   Pulse: 89 101 92 103   Resp: 19 20 24    Temp: 98.3 °F (36.8 °C) 98.4 °F (36.9 °C) 97.1 °F (36.2 °C)    TempSrc: Oral Axillary Oral    SpO2:  100% 100% 100%   Weight:      "  Height:           Flowsheet Rows      Flowsheet Row First Filed Value   Admission Height 177.8 cm (70\") Documented at 12/21/2023 0735   Admission Weight 71.7 kg (158 lb) Documented at 12/21/2023 0735              Intake/Output Summary (Last 24 hours) at 12/27/2023 0616  Last data filed at 12/27/2023 0431  Gross per 24 hour   Intake 360 ml   Output 1350 ml   Net -990 ml        TELEMETRY: Atrial fibrillation with controlled ventricular response.    Physical Exam:  The patient is alert, oriented and in no distress.  Vital signs as noted above.  Head and neck revealed no carotid bruits or jugular venous distention.  No thyromegaly or lymphadenopathy is present  Lungs clear.  No wheezing.  Breath sounds are normal bilaterally.  Heart normal first and second heart sounds.  No murmur. No precordial rub is present.  No gallop is present.  Abdomen soft and nontender.  No organomegaly is present.  Extremities with good peripheral pulses without any pedal edema.  Skin warm and dry.  Musculoskeletal system is grossly normal  CNS grossly normal    Reviewed and updated.  Results Review:   I reviewed the patient's new clinical results.  Lab Results (last 24 hours)       Procedure Component Value Units Date/Time    Magnesium [[id number]]  (Normal) Collected: 12/27/23 0436    Specimen: Blood Updated: 12/27/23 0529     Magnesium 1.9 mg/dL     Phosphorus [[id number]]  (Normal) Collected: 12/27/23 0436    Specimen: Blood Updated: 12/27/23 0529     Phosphorus 2.6 mg/dL     Comprehensive Metabolic Panel [[id number]]  (Abnormal) Collected: 12/27/23 0436    Specimen: Blood Updated: 12/27/23 0529     Glucose 129 mg/dL      BUN 28 mg/dL      Creatinine 1.16 mg/dL      Sodium 142 mmol/L      Potassium 3.4 mmol/L      Chloride 106 mmol/L      CO2 27.0 mmol/L      Calcium 7.9 mg/dL      Total Protein 4.4 g/dL      Albumin 2.9 g/dL      ALT (SGPT) 30 U/L      AST (SGOT) 28 U/L      Alkaline Phosphatase 71 U/L      Total Bilirubin 0.5 mg/dL      " Globulin 1.5 gm/dL      A/G Ratio 1.9 g/dL      BUN/Creatinine Ratio 24.1     Anion Gap 9.0 mmol/L      eGFR 58.7 mL/min/1.73     Narrative:      GFR Normal >60  Chronic Kidney Disease <60  Kidney Failure <15    The GFR formula is only valid for adults with stable renal function between ages 18 and 70.    CBC Auto Differential [[id number]]  (Abnormal) Collected: 12/27/23 0436    Specimen: Blood Updated: 12/27/23 0525     WBC 15.60 10*3/mm3      RBC 2.40 10*6/mm3      Hemoglobin 7.7 g/dL      Hematocrit 22.7 %      MCV 94.5 fL      MCH 32.1 pg      MCHC 33.9 g/dL      RDW 16.0 %      RDW-SD 51.6 fl      MPV 10.5 fL      Platelets 133 10*3/mm3      nRBC 0.2 /100 WBC     CBC & Differential [[id number]]  (Abnormal) Collected: 12/27/23 0436    Specimen: Blood Updated: 12/27/23 0512    Narrative:      The following orders were created for panel order CBC & Differential.  Procedure                               Abnormality         Status                     ---------                               -----------         ------                     CBC Auto Differential[[id number]]        Abnormal            Preliminary result         Scan Slide[[id number]]                                       In process                   Please view results for these tests on the individual orders.    Scan Slide [[id number]] Collected: 12/27/23 0436    Specimen: Blood Updated: 12/27/23 0512    Phosphorus [[id number]]  (Normal) Collected: 12/26/23 2001    Specimen: Blood Updated: 12/26/23 2051     Phosphorus 3.2 mg/dL      Comment: Result checked         Basic Metabolic Panel [[id number]]  (Abnormal) Collected: 12/26/23 2001    Specimen: Blood Updated: 12/26/23 2049     Glucose 136 mg/dL      BUN 31 mg/dL      Creatinine 1.22 mg/dL      Sodium 147 mmol/L      Potassium 3.1 mmol/L      Chloride 108 mmol/L      CO2 27.0 mmol/L      Calcium 8.1 mg/dL      BUN/Creatinine Ratio 25.4     Anion Gap 12.0 mmol/L      eGFR 55.3 mL/min/1.73     Narrative:       GFR Normal >60  Chronic Kidney Disease <60  Kidney Failure <15    The GFR formula is only valid for adults with stable renal function between ages 18 and 70.    Magnesium [[id number]]  (Normal) Collected: 12/26/23 2001    Specimen: Blood Updated: 12/26/23 2049     Magnesium 2.0 mg/dL     Blood Culture - Blood, Arm, Right [[id number]]  (Normal) Collected: 12/24/23 1815    Specimen: Blood from Arm, Right Updated: 12/26/23 1900     Blood Culture No growth at 2 days    Blood Culture - Blood, Arm, Left [[id number]]  (Normal) Collected: 12/24/23 1221    Specimen: Blood from Arm, Left Updated: 12/26/23 1231     Blood Culture No growth at 2 days    Narrative:      Less than seven (7) mL's of blood was collected.  Insufficient quantity may yield false negative results.    Pathology Consultation [[id number]] Collected: 12/24/23 2259    Specimen: Blood, Venous Line Updated: 12/26/23 1100     Final Diagnosis --     Leukocytosis with left shifted granulocytes  Anemia  No blasts identified       Case Report --     Surgical Pathology Report                         Case: [id number]                                  Authorizing Provider:  Tereza Fowler MD   Collected:           12/24/2023 10:59 PM          Ordering Location:     [address]    Received:            12/26/2023 07:30 AM          Pathologist:           Erlin Mcgarry MD                                                            Specimen:    Blood, Venous Line                                                                         Blood Culture - Blood, Arm, Right [[id number]]  (Normal) Collected: 12/21/23 0950    Specimen: Blood from Arm, Right Updated: 12/26/23 1000     Blood Culture No growth at 5 days    Blood Culture - Blood, Arm, Left [[id number]]  (Normal) Collected: 12/21/23 0924    Specimen: Blood from Arm, Left Updated: 12/26/23 0930     Blood Culture No growth at 5 days            Imaging Results (Last 24 Hours)       ** No results found for the  last 24 hours. **        LAB RESULTS (LAST 7 DAYS)    CBC  Results from last 7 days   Lab Units 12/27/23  0436 12/26/23 0450 12/24/23 2259 12/24/23  0708 12/23/23  1612 12/23/23  0532 12/22/23  0714 12/21/23  1723 12/21/23  0748   WBC 10*3/mm3 15.60* 17.20* 27.10* 31.70*  --  33.40* 18.30*  --  19.70*   RBC 10*6/mm3 2.40* 2.35* 2.82* 3.02*  --  1.91* 2.71*  --  3.68*   HEMOGLOBIN g/dL 7.7* 7.2* 8.6* 9.2* 10.3* 6.1* 8.7*   < > 11.3*   HEMATOCRIT % 22.7* 21.8* 26.4* 28.4* 31.6* 18.8* 26.2*   < > 34.5*   MCV fL 94.5 92.7 93.3 94.2  --  98.2* 96.9  --  93.8   PLATELETS 10*3/mm3 133* 126* 143 149  --  189 169  --  229    < > = values in this interval not displayed.       BMP  Results from last 7 days   Lab Units 12/27/23 0436 12/26/23 2001 12/26/23 0450 12/25/23 2018 12/24/23 2259 12/24/23  0708 12/23/23  0035 12/22/23  0715   SODIUM mmol/L 142 147* 148*  --  151* 149* 138 139   POTASSIUM mmol/L 3.4* 3.1* 3.7  --  3.1* 3.4* 4.0 3.6   CHLORIDE mmol/L 106 108* 111*  --  113* 112* 104 101   CO2 mmol/L 27.0 27.0 29.0  --  27.0 25.0 24.0 27.0   BUN mg/dL 28* 31* 41*  --  71* 90* 89* 89*   CREATININE mg/dL 1.16 1.22 1.46*  --  1.69* 1.71* 1.66* 1.56*   GLUCOSE mg/dL 129* 136* 238*  --  108* 103* 171* 129*   MAGNESIUM mg/dL 1.9 2.0 1.7  --  2.1 2.3 2.2 2.3   PHOSPHORUS mg/dL 2.6 3.2 2.1* 2.6 2.2* 2.3* 2.6 3.3       CMP   Results from last 7 days   Lab Units 12/27/23  0436 12/26/23 2001 12/26/23  0450 12/24/23  2259 12/24/23  0708 12/23/23  0035 12/22/23  0715 12/21/23  0748   SODIUM mmol/L 142 147* 148* 151* 149* 138 139 138   POTASSIUM mmol/L 3.4* 3.1* 3.7 3.1* 3.4* 4.0 3.6 3.7   CHLORIDE mmol/L 106 108* 111* 113* 112* 104 101 95*   CO2 mmol/L 27.0 27.0 29.0 27.0 25.0 24.0 27.0 28.0   BUN mg/dL 28* 31* 41* 71* 90* 89* 89* 90*   CREATININE mg/dL 1.16 1.22 1.46* 1.69* 1.71* 1.66* 1.56* 1.96*   GLUCOSE mg/dL 129* 136* 238* 108* 103* 171* 129* 169*   ALBUMIN g/dL 2.9*  --  2.7* 3.2* 3.2* 3.1* 3.3* 3.7   BILIRUBIN mg/dL 0.5   --  0.5 0.5 0.6 0.3 0.5 0.7   ALK PHOS U/L 71  --  71 78 70 69 75 90   AST (SGOT) U/L 28  --  26 32 19 16 18 21   ALT (SGPT) U/L 30  --  32 32 20 18 20 21         BNP        TROPONIN        CoAg  Results from last 7 days   Lab Units 12/22/23  0714 12/21/23  0748   INR  1.29* 1.17*   APTT seconds  --  26.3*       Creatinine Clearance  Estimated Creatinine Clearance: 34.5 mL/min (by C-G formula based on SCr of 1.16 mg/dL).    ABG        Radiology  No radiology results for the last day        EKG      I personally viewed and interpreted the patient's EKG/Telemetry data:    ECHOCARDIOGRAM:    Results for orders placed during the hospital encounter of 12/01/23    Adult Transthoracic Echo Complete W/ Cont if Necessary Per Protocol    Interpretation Summary    Left ventricular ejection fraction appears to be 36 - 40%.    The left atrial cavity is moderately dilated.    Estimated right ventricular systolic pressure from tricuspid regurgitation is normal (<35 mmHg).          STRESS TEST        Cardiolite (Tc-99m sestamibi) stress test    CARDIAC CATHETERIZATION  Results for orders placed during the hospital encounter of 12/01/23    Cardiac Catheterization/Vascular Study                OTHER:         Assessment & Plan     Principal Problem:    Melena  Active Problems:    GI bleed      /////////////////////////  History  =============  - Recent melena.     - Inferior STEMI 12/4/2023.     - Status post PCI with PTCA, stent placement on Pronto catheter atherectomy to the graft to the distal right coronary artery.-12/3/2023-Dr. العلي     Cardiac catheterization 12/3/2023-Dr. العلي     Left Main %: 20 to 30%   Proximal LAD %: 100%  Mid/Distal LAD %: 100%  LCX %: Proximal 80% OM1 100%  Ramus:   RCA %: 100% after the PDA.  Lima %: LIMA to LAD was patent  SVG(s) %: SVG to the marginal branch is patent but has some 30 to 40% disease.  SVG to the diagonal branch is occluded.  SVG to the PDA is occluded.  SVG to the distal RCA is  occluded but is the culprit lesion     -Past history of syncope-no further episodes.     -status post loop recorder placement.  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 cardiac catheterization 12/26/2014 revealed 60% distal circumflex and total LAD and right coronary arteries.  Lima to LAD was patent ( lima coming off the left vertebral artery).  SVG to diagonal   marginal and PDA were patent.  SVG to left ventricular branch was totally occluded (chronic)     - atrial fibrillation -has converted to sinus rhythm and maintaining sinus rhythm.  Recently patient was noted to have atrial dysrhythmia on the monitor with loop recorder.     - sinus bradycardia-asymptomatic     - status post acute inferior myocardial infarction prior to surgery requiring acute stent placement to right coronary artery ) complicated by ventricular fibrillation)     Echocardiogram 12/1/2023    Left ventricular ejection fraction appears to be 36 - 40%.    The left atrial cavity is moderately dilated.    Estimated right ventricular systolic pressure from tricuspid regurgitation is normal (<35 mmHg).      -Dyslipidemia and hypertension     - lower extremity weakness.   Arterial Doppler study of the lower extremity is normal. -  improved .   arterial Doppler study of the lower extremity was normal     - status post cholecystectomy     - allergy to penicillin iodine and metoprolol (rash).  Intolerance to atenolol due to bradycardia.  Allergy to Levaquin and penicillin.  Intolerance to prednisone Nitropatch IV nitroglycerin and prednisone.  =================    Plan  ==============  Recent melena and GI bleed.  Patient had endoscopy today 12/22/2023 that revealed bleeding duodenal ulcer.  Patient had endoscopic hemostasis with epinephrine and Hemoclip.  Patient was started on PPI.  Patient is back on Plavix and Eliquis.  Observe for toxic effects of high risk medication.     Inferior STEMI 12/4/2023.     Status post PCI with  PTCA, stent placement on Pronto catheter atherectomy to the graft to the distal right coronary artery.-12/3/2023-Dr. العلي     History of junctional bradycardia.  Temporary pacemaker was placed in the setting of inferior STEMI.  Temporary pacemaker was removed.     Past history of complete heart block.  Patient had temporary pacemaker which was removed.     Atrial fibrillation with controlled ventricular response.  Rate control can be challenging given the circumstances.  Patient is allergic to metoprolol and Cardizem.    Hypomagnesemia-improved    Leukocytosis  WBC 33,000.    Renal dysfunction  71/1.69    Anemia  Hemoglobin 6.1-12/23/2023  Hgb 10.3-12/24/2023  7.7-12/27/2023    Hypokalemia  K 3.4  Supplements.    Renal dysfunction  BUN/creatinine 89/1.66-12/23/2023  28/1.16-12/27/2023    Borderline blood pressure.    Patient is off beta-blocker Coreg.     Echocardiogram 12/1/2023-as above     Patient is DNR DNI..     Medications were reviewed and updated.  Current medications include  Eliquis atorvastatin Bumex Plavix losartan pantoprazole    Reviewed and updated 12/27/2023.    Further plan will depend on patient's progress.  //////////////////////////////////////        Missy Domínguez MD  12/27/23  06:16 EST

## 2023-12-27 NOTE — PLAN OF CARE
Problem: Adult Inpatient Plan of Care  Goal: Plan of Care Review  Outcome: Ongoing, Progressing  Goal: Patient-Specific Goal (Individualized)  Outcome: Ongoing, Progressing  Goal: Absence of Hospital-Acquired Illness or Injury  Outcome: Ongoing, Progressing  Intervention: Identify and Manage Fall Risk  Recent Flowsheet Documentation  Taken 12/27/2023 1624 by Christina Murphy LPN  Safety Promotion/Fall Prevention:   activity supervised   assistive device/personal items within reach   clutter free environment maintained   lighting adjusted   room organization consistent   safety round/check completed  Taken 12/27/2023 1400 by Christina Murphy LPN  Safety Promotion/Fall Prevention:   activity supervised   assistive device/personal items within reach   clutter free environment maintained   lighting adjusted   room organization consistent   safety round/check completed  Taken 12/27/2023 1200 by Christina Murphy LPN  Safety Promotion/Fall Prevention:   safety round/check completed   room organization consistent   lighting adjusted   activity supervised   assistive device/personal items within reach   clutter free environment maintained  Taken 12/27/2023 1045 by Christina Murphy LPN  Safety Promotion/Fall Prevention:   activity supervised   assistive device/personal items within reach   clutter free environment maintained   lighting adjusted   room organization consistent   safety round/check completed  Taken 12/27/2023 0825 by Christina Murphy LPN  Safety Promotion/Fall Prevention:   activity supervised   assistive device/personal items within reach   clutter free environment maintained   lighting adjusted   room organization consistent   safety round/check completed  Intervention: Prevent Skin Injury  Recent Flowsheet Documentation  Taken 12/27/2023 0825 by Christina Murphy LPN  Skin Protection:   adhesive use limited   incontinence pads utilized   tubing/devices free from skin contact  Intervention: Prevent and Manage  VTE (Venous Thromboembolism) Risk  Recent Flowsheet Documentation  Taken 12/27/2023 0825 by Christina Murphy LPN  VTE Prevention/Management: patient refused intervention  Range of Motion: active ROM (range of motion) encouraged  Intervention: Prevent Infection  Recent Flowsheet Documentation  Taken 12/27/2023 1624 by Christina Murphy LPN  Infection Prevention:   cohorting utilized   environmental surveillance performed   hand hygiene promoted   rest/sleep promoted   personal protective equipment utilized   single patient room provided   visitors restricted/screened  Taken 12/27/2023 1400 by Christina Murphy LPN  Infection Prevention:   cohorting utilized   environmental surveillance performed   hand hygiene promoted   personal protective equipment utilized   rest/sleep promoted   single patient room provided   visitors restricted/screened  Taken 12/27/2023 1200 by Christina Murphy LPN  Infection Prevention:   cohorting utilized   environmental surveillance performed   hand hygiene promoted   personal protective equipment utilized   rest/sleep promoted   single patient room provided   visitors restricted/screened  Taken 12/27/2023 1045 by Christina Murphy LPN  Infection Prevention:   cohorting utilized   environmental surveillance performed   hand hygiene promoted   single patient room provided   personal protective equipment utilized   rest/sleep promoted   visitors restricted/screened  Taken 12/27/2023 0825 by Christina Murphy LPN  Infection Prevention:   cohorting utilized   environmental surveillance performed   hand hygiene promoted   personal protective equipment utilized   rest/sleep promoted   single patient room provided   visitors restricted/screened  Goal: Optimal Comfort and Wellbeing  Outcome: Ongoing, Progressing  Intervention: Monitor Pain and Promote Comfort  Recent Flowsheet Documentation  Taken 12/27/2023 0825 by Christina Murphy LPN  Pain Management Interventions:   pillow support provided    position adjusted   diversional activity provided   care clustered  Intervention: Provide Person-Centered Care  Recent Flowsheet Documentation  Taken 12/27/2023 0825 by Christina Murphy LPN  Trust Relationship/Rapport:   care explained   choices provided   thoughts/feelings acknowledged  Goal: Readiness for Transition of Care  Outcome: Ongoing, Progressing     Problem: Adjustment to Illness (Gastrointestinal Bleeding)  Goal: Optimal Coping with Acute Illness  Outcome: Ongoing, Progressing  Intervention: Optimize Psychosocial Response  Recent Flowsheet Documentation  Taken 12/27/2023 0825 by Christina Murphy LPN  Supportive Measures: active listening utilized     Problem: Bleeding (Gastrointestinal Bleeding)  Goal: Hemostasis  Outcome: Ongoing, Progressing  Intervention: Manage Gastrointestinal Bleeding  Recent Flowsheet Documentation  Taken 12/27/2023 0825 by Christina Murphy LPN  Environmental Support: calm environment promoted     Problem: Skin Injury Risk Increased  Goal: Skin Health and Integrity  Outcome: Ongoing, Progressing  Intervention: Optimize Skin Protection  Recent Flowsheet Documentation  Taken 12/27/2023 0825 by Christina Murphy LPN  Pressure Reduction Techniques:   frequent weight shift encouraged   weight shift assistance provided  Pressure Reduction Devices: pressure-redistributing mattress utilized  Skin Protection:   adhesive use limited   incontinence pads utilized   tubing/devices free from skin contact     Problem: Fall Injury Risk  Goal: Absence of Fall and Fall-Related Injury  Outcome: Ongoing, Progressing  Intervention: Identify and Manage Contributors  Recent Flowsheet Documentation  Taken 12/27/2023 1624 by Christina Murphy LPN  Medication Review/Management: medications reviewed  Taken 12/27/2023 1400 by Christina Murphy LPN  Medication Review/Management: medications reviewed  Taken 12/27/2023 1200 by Christina Murphy LPN  Medication Review/Management: medications reviewed  Taken  12/27/2023 1045 by Christina Murphy LPN  Medication Review/Management: medications reviewed  Taken 12/27/2023 0825 by Christina Murphy LPN  Medication Review/Management: medications reviewed  Intervention: Promote Injury-Free Environment  Recent Flowsheet Documentation  Taken 12/27/2023 1624 by Christina Murphy LPN  Safety Promotion/Fall Prevention:   activity supervised   assistive device/personal items within reach   clutter free environment maintained   lighting adjusted   room organization consistent   safety round/check completed  Taken 12/27/2023 1400 by Christina Murphy LPN  Safety Promotion/Fall Prevention:   activity supervised   assistive device/personal items within reach   clutter free environment maintained   lighting adjusted   room organization consistent   safety round/check completed  Taken 12/27/2023 1200 by Christina Murphy LPN  Safety Promotion/Fall Prevention:   safety round/check completed   room organization consistent   lighting adjusted   activity supervised   assistive device/personal items within reach   clutter free environment maintained  Taken 12/27/2023 1045 by Christina Murphy LPN  Safety Promotion/Fall Prevention:   activity supervised   assistive device/personal items within reach   clutter free environment maintained   lighting adjusted   room organization consistent   safety round/check completed  Taken 12/27/2023 0825 by Christina Murphy LPN  Safety Promotion/Fall Prevention:   activity supervised   assistive device/personal items within reach   clutter free environment maintained   lighting adjusted   room organization consistent   safety round/check completed     Problem: Malnutrition  Goal: Improved Nutritional Intake  Outcome: Ongoing, Progressing   Goal Outcome Evaluation:       Patient is receiving a unit of blood per order. Patient has no apparent distress noted to blood transfusion. Patient has no c/o pain or discomfort noted. Call light within reach. Call light within  reach.

## 2023-12-27 NOTE — PLAN OF CARE
"Pt presents with functional mobility impairments which indicate the need for skilled intervention. Tolerating session today without incident. Pt on 2L O2, telemetry and IV this date.  Pt required Min/Mod A for bed mobility.  Lab entered into room and needed to draw blood.  Pt required CGA/Min A for static sitting balance during lab draw.  Pt became too fatigued to attempt BLE strengthening exercises sitting EOB after lab draw and c/o \"wooziness\".  BP sitting EOB; 90/57.  Pt completed BLE strengthening exercises in supine.  BP in supine at end of session: 111/66.  Will continue to follow and progress as tolerated.   "

## 2023-12-27 NOTE — PROGRESS NOTES
"HOSPITALIST PROGRESS NOTE     12/27/2023      Clinical Summary / Reason for Hospitalization     Sage Flores is a 89 y.o. male admitted to hospital on 12/21/2023 with c/o Melena.      Admitted on 12/21/2023. Today is hospital day 4.     Subjective     Sage Flores seen and examined this morning.  Patient is comfortable.   Denies any abdominal pain, nausea or vomiting.  Denies any chest pain or shortness of breath.  Patient is saturating at 96% on 7 L high flow nasal cannula.    Interval / Overnight Issues     Objective     Vital Signs     /53   Pulse 83   Temp 98.4 °F (36.9 °C)   Resp 20   Ht 177.8 cm (70\")   Wt 61.3 kg (135 lb 2.3 oz)   SpO2 100%   BMI 19.39 kg/m²      Input / Output       Intake/Output Summary (Last 24 hours) at 12/27/2023 1630  Last data filed at 12/27/2023 1100  Gross per 24 hour   Intake 240 ml   Output 850 ml   Net -610 ml        Physical Exam     General : Patient lying in bed in no acute distress.      HEENT : Normocephalic atraumatic. Neck supple, no JVP   PERRLA, EOM intact. Throat clear, mucous membranes moist   CVS : S1 + S2 regular, No R/M/G     Chest : Clear to auscultation bilaterally    Abdomen : Soft, nontender, bowel sounds are present  Extremities : No clubbing, cyanosis, edema    Neurologic exam: Patient is awake, answering questions appropriately.  Moving all extremities.       Current Medications     Scheduled Meds:apixaban, 2.5 mg, Oral, Q12H  atorvastatin, 10 mg, Oral, Nightly  [Held by provider] bumetanide, 1 mg, Oral, BID  cefTRIAXone, 1,000 mg, Intravenous, Q24H  clopidogrel, 75 mg, Oral, Daily  gabapentin, 100 mg, Oral, BID  [Held by provider] losartan, 25 mg, Oral, Daily  metoclopramide, 5 mg, Oral, BID  pantoprazole, 40 mg, Oral, Q12H  senna-docusate sodium, 2 tablet, Oral, BID  sodium chloride, 10 mL, Intravenous, Q12H  tamsulosin, 0.4 mg, Oral, Nightly      Continuous Infusions:     PRN Meds:.  acetaminophen **OR** acetaminophen **OR** acetaminophen   "  senna-docusate sodium **AND** polyethylene glycol **AND** bisacodyl **AND** bisacodyl    Calcium Replacement - Follow Nurse / BPA Driven Protocol    Magnesium Cardiology Dose Replacement - Follow Nurse / BPA Driven Protocol    nitroglycerin    ondansetron **OR** ondansetron    Phosphorus Replacement - Follow Nurse / BPA Driven Protocol    Potassium Replacement - Follow Nurse / BPA Driven Protocol    [COMPLETED] Insert Peripheral IV **AND** sodium chloride    sodium chloride    sodium chloride      Labs & Imaging     Results from last 7 days   Lab Units 12/27/23  1044 12/27/23  0436   WBC 10*3/mm3  --  15.60*   HEMOGLOBIN g/dL  --  7.7*   HEMATOCRIT %  --  22.7*   PLATELETS 10*3/mm3  --  133*   GLUCOSE mg/dL  --  129*   CREATININE mg/dL  --  1.16   BUN mg/dL  --  28*   SODIUM mmol/L  --  142   POTASSIUM mmol/L 4.2 3.4*   AST (SGOT) U/L  --  28   ALT (SGPT) U/L  --  30   ALK PHOS U/L  --  71   BILIRUBIN mg/dL  --  0.5   ANION GAP mmol/L  --  9.0       XR Chest 1 View    Result Date: 12/27/2023  Impression: No acute process identified. Electronically Signed: Geovanny Parsons MD  12/27/2023 7:52 AM CST  Workstation ID: [id number]     I reviewed the patient's new clinical results.    ASSESSMENT AND PLAN:    Sage Flores is a 89 y.o. male with past medical history of paroxysmal atrial fibrillation on chronic anticoagulation with apixaban, coronary artery disease, BPH, hyperlipidemia, hypertension, presented to emergency room with complaints of black stool and blood in the stool.  Initial hemoglobin on presentation was 11.3 and dropped to 8.6.  This morning hemoglobin is 6.1.  Patient is status post EGD showing which showed a bleeding duodenal ulcer and he had endoscopic hemostasis with epinephrine and Hemoclip.    Patient admitted with    Acute upper GI bleed with acute blood loss anemia: Patient is on Protonix.    Patient is status post EGD with findings of duodenal ulcer treated with endoscopic hemostasis with epinephrine  and Hemoclip.   Continue Protonix.   Due to acute GI bleed and dropping hemoglobin apixaban and Plavix are on hold.  Patient seen by cardiology.    Patient had recent PCI and stenting.  But unfortunately weighing the risks versus benefits we are currently holding the Plavix and apixaban. GI clear to resume the plavix and eliquis. GI and cardiology consult and follow-up appreciated    Anemia -1 PRBC give today to make hemoglobin more than 8.    Leukocytosis: WBC count is improving.  Continue ceftriaxone and Flagyl.      History of CAD with recent ST elevation MI status post RCA stenting: Aspirin and Plavix are on hold.  Beta-blocker being held due to recent heart block.  Continue statin.    Chronic combined systolic and diastolic congestive heart failure: Currently compensated.  Holding Bumex and Cozaar at this time.  Last echocardiogram showing EF of 35%.  Resume bumetanide as patient received multiple PRBC transfusions.    Paroxysmal atrial fibrillation with rapid ventricular response: Continue metoprolol 5 mg IV every 12.  Will give digoxin if blood pressure is soft and heart rate is high while holding metoprolol.      Hypertension: Continue metoprolol and losartan    BPH: Continue Flomax    DVT Prophylaxis:  SCDs due to GI bleed and severe anemia    I spoke to patient and his daughters at bedside, answered all their questions and concerns, communicated all test results, diagnosis, treatment and plan.    Given patient's age, multiple comorbidities, recent MI and acute GI bleed, family wanted no aggressive intervention and are open to keeping patient comfortable.  And patient is a DO NOT RESUSCITATE and DO NOT INTUBATE.    Code Status:   Code Status and Medical Interventions:   Ordered at: 12/21/23 4986     Medical Intervention Limits:    NO intubation (DNI)     Code Status (Patient has no pulse and is not breathing):    No CPR (Do Not Attempt to Resuscitate)     Medical Interventions (Patient has pulse or is  breathing):    Limited Support     Disposition (Where, When):  TBD/ when medically ready     Primary Emergency Contact: TODD BAIN     Copied text in this note has been reviewed and is accurate as of 12/27/2023.    Juan Shaffer MD   Hospitalist

## 2023-12-28 VITALS
BODY MASS INDEX: 19.35 KG/M2 | RESPIRATION RATE: 28 BRPM | HEIGHT: 70 IN | TEMPERATURE: 97.5 F | SYSTOLIC BLOOD PRESSURE: 96 MMHG | DIASTOLIC BLOOD PRESSURE: 57 MMHG | HEART RATE: 92 BPM | WEIGHT: 135.14 LBS | OXYGEN SATURATION: 99 %

## 2023-12-28 LAB
ALBUMIN SERPL-MCNC: 2.7 G/DL (ref 3.5–5.2)
ALBUMIN/GLOB SERPL: 1.5 G/DL
ALP SERPL-CCNC: 72 U/L (ref 39–117)
ALT SERPL W P-5'-P-CCNC: 25 U/L (ref 1–41)
ANION GAP SERPL CALCULATED.3IONS-SCNC: 8 MMOL/L (ref 5–15)
ANISOCYTOSIS BLD QL: ABNORMAL
AST SERPL-CCNC: 22 U/L (ref 1–40)
BILIRUB SERPL-MCNC: 0.6 MG/DL (ref 0–1.2)
BUN SERPL-MCNC: 22 MG/DL (ref 8–23)
BUN/CREAT SERPL: 20 (ref 7–25)
CALCIUM SPEC-SCNC: 7.8 MG/DL (ref 8.2–9.6)
CHLORIDE SERPL-SCNC: 110 MMOL/L (ref 98–107)
CO2 SERPL-SCNC: 24 MMOL/L (ref 22–29)
CREAT SERPL-MCNC: 1.1 MG/DL (ref 0.76–1.27)
DEPRECATED RDW RBC AUTO: 52.5 FL (ref 37–54)
EGFRCR SERPLBLD CKD-EPI 2021: 62.6 ML/MIN/1.73
EOSINOPHIL # BLD MANUAL: 0.15 10*3/MM3 (ref 0–0.4)
EOSINOPHIL NFR BLD MANUAL: 1 % (ref 0.3–6.2)
ERYTHROCYTE [DISTWIDTH] IN BLOOD BY AUTOMATED COUNT: 16.3 % (ref 12.3–15.4)
GLOBULIN UR ELPH-MCNC: 1.8 GM/DL
GLUCOSE SERPL-MCNC: 95 MG/DL (ref 65–99)
HCT VFR BLD AUTO: 27.5 % (ref 37.5–51)
HGB BLD-MCNC: 9.2 G/DL (ref 13–17.7)
LYMPHOCYTES # BLD MANUAL: 1.66 10*3/MM3 (ref 0.7–3.1)
LYMPHOCYTES NFR BLD MANUAL: 9 % (ref 5–12)
MAGNESIUM SERPL-MCNC: 2.6 MG/DL (ref 1.7–2.3)
MCH RBC QN AUTO: 31.4 PG (ref 26.6–33)
MCHC RBC AUTO-ENTMCNC: 33.3 G/DL (ref 31.5–35.7)
MCV RBC AUTO: 94.3 FL (ref 79–97)
METAMYELOCYTES NFR BLD MANUAL: 1 % (ref 0–0)
MONOCYTES # BLD: 1.36 10*3/MM3 (ref 0.1–0.9)
MYELOCYTES NFR BLD MANUAL: 1 % (ref 0–0)
NEUTROPHILS # BLD AUTO: 11.63 10*3/MM3 (ref 1.7–7)
NEUTROPHILS NFR BLD MANUAL: 73 % (ref 42.7–76)
NEUTS BAND NFR BLD MANUAL: 4 % (ref 0–5)
NRBC SPEC MANUAL: 1 /100 WBC (ref 0–0.2)
PHOSPHATE SERPL-MCNC: 2.4 MG/DL (ref 2.5–4.5)
PLATELET # BLD AUTO: 134 10*3/MM3 (ref 140–450)
PMV BLD AUTO: 10.9 FL (ref 6–12)
POTASSIUM SERPL-SCNC: 4.1 MMOL/L (ref 3.5–5.2)
PROT SERPL-MCNC: 4.5 G/DL (ref 6–8.5)
RBC # BLD AUTO: 2.92 10*6/MM3 (ref 4.14–5.8)
SCAN SLIDE: NORMAL
SMALL PLATELETS BLD QL SMEAR: ABNORMAL
SODIUM SERPL-SCNC: 142 MMOL/L (ref 136–145)
VARIANT LYMPHS NFR BLD MANUAL: 1 % (ref 0–5)
VARIANT LYMPHS NFR BLD MANUAL: 10 % (ref 19.6–45.3)
WBC MORPH BLD: NORMAL
WBC NRBC COR # BLD AUTO: 15.1 10*3/MM3 (ref 3.4–10.8)

## 2023-12-28 PROCEDURE — 97116 GAIT TRAINING THERAPY: CPT

## 2023-12-28 PROCEDURE — 99232 SBSQ HOSP IP/OBS MODERATE 35: CPT | Performed by: INTERNAL MEDICINE

## 2023-12-28 PROCEDURE — 25010000002 CEFTRIAXONE PER 250 MG: Performed by: HOSPITALIST

## 2023-12-28 PROCEDURE — 97530 THERAPEUTIC ACTIVITIES: CPT

## 2023-12-28 PROCEDURE — 92526 ORAL FUNCTION THERAPY: CPT

## 2023-12-28 PROCEDURE — 97110 THERAPEUTIC EXERCISES: CPT

## 2023-12-28 RX ORDER — BUMETANIDE 1 MG/1
0.5 TABLET ORAL 2 TIMES DAILY
Qty: 30 TABLET | Refills: 0 | Status: SHIPPED | OUTPATIENT
Start: 2023-12-28 | End: 2023-12-28 | Stop reason: SDUPTHER

## 2023-12-28 RX ORDER — METOCLOPRAMIDE 5 MG/1
5 TABLET ORAL 2 TIMES DAILY PRN
Qty: 30 TABLET | Refills: 0 | Status: SHIPPED | OUTPATIENT
Start: 2023-12-28

## 2023-12-28 RX ORDER — PANTOPRAZOLE SODIUM 40 MG/1
40 TABLET, DELAYED RELEASE ORAL 2 TIMES DAILY
Qty: 60 TABLET | Refills: 0 | Status: SHIPPED | OUTPATIENT
Start: 2023-12-28 | End: 2024-01-27

## 2023-12-28 RX ORDER — PANTOPRAZOLE SODIUM 40 MG/1
40 TABLET, DELAYED RELEASE ORAL DAILY
Qty: 360 TABLET | Refills: 0 | Status: SHIPPED | OUTPATIENT
Start: 2024-01-28 | End: 2023-12-28 | Stop reason: SDUPTHER

## 2023-12-28 RX ORDER — PANTOPRAZOLE SODIUM 40 MG/1
40 TABLET, DELAYED RELEASE ORAL DAILY
Qty: 360 TABLET | Refills: 0 | Status: SHIPPED | OUTPATIENT
Start: 2024-01-28

## 2023-12-28 RX ORDER — PANTOPRAZOLE SODIUM 40 MG/1
40 TABLET, DELAYED RELEASE ORAL 2 TIMES DAILY
Qty: 60 TABLET | Refills: 0 | Status: SHIPPED | OUTPATIENT
Start: 2023-12-28 | End: 2023-12-28 | Stop reason: SDUPTHER

## 2023-12-28 RX ORDER — MIDODRINE HYDROCHLORIDE 5 MG/1
5 TABLET ORAL
Qty: 180 TABLET | Refills: 0 | Status: SHIPPED | OUTPATIENT
Start: 2023-12-28 | End: 2023-12-28 | Stop reason: SDUPTHER

## 2023-12-28 RX ORDER — MIDODRINE HYDROCHLORIDE 5 MG/1
5 TABLET ORAL
Qty: 180 TABLET | Refills: 0 | Status: SHIPPED | OUTPATIENT
Start: 2023-12-28

## 2023-12-28 RX ORDER — METOCLOPRAMIDE 5 MG/1
5 TABLET ORAL 2 TIMES DAILY PRN
Qty: 30 TABLET | Refills: 0 | Status: SHIPPED | OUTPATIENT
Start: 2023-12-28 | End: 2023-12-28 | Stop reason: SDUPTHER

## 2023-12-28 RX ORDER — BUMETANIDE 1 MG/1
0.5 TABLET ORAL DAILY
Qty: 15 TABLET | Refills: 0 | Status: SHIPPED | OUTPATIENT
Start: 2023-12-28 | End: 2024-01-27

## 2023-12-28 RX ADMIN — APIXABAN 2.5 MG: 2.5 TABLET, FILM COATED ORAL at 11:17

## 2023-12-28 RX ADMIN — METOCLOPRAMIDE 5 MG: 5 TABLET ORAL at 11:18

## 2023-12-28 RX ADMIN — CEFTRIAXONE 1000 MG: 1 INJECTION, POWDER, FOR SOLUTION INTRAMUSCULAR; INTRAVENOUS at 11:17

## 2023-12-28 RX ADMIN — PANTOPRAZOLE SODIUM 40 MG: 40 TABLET, DELAYED RELEASE ORAL at 11:18

## 2023-12-28 RX ADMIN — SENNOSIDES AND DOCUSATE SODIUM 2 TABLET: 50; 8.6 TABLET ORAL at 11:17

## 2023-12-28 RX ADMIN — CLOPIDOGREL BISULFATE 75 MG: 75 TABLET ORAL at 11:18

## 2023-12-28 RX ADMIN — Medication 10 ML: at 11:18

## 2023-12-28 RX ADMIN — GABAPENTIN 100 MG: 100 CAPSULE ORAL at 11:18

## 2023-12-28 NOTE — THERAPY RE-EVALUATION
Acute Care - Speech Language Pathology   Swallow Re-Evaluation  Reji     Patient Name: Sage Flores  : 1930  MRN: [id number]  Today's Date: 2023               Admit Date: 2023    Visit Dx:     ICD-10-CM ICD-9-CM   1. Gastrointestinal hemorrhage, unspecified gastrointestinal hemorrhage type  K92.2 578.9   2. Leukocytosis, unspecified type  D72.829 288.60   3. Melena  K92.1 578.1     Patient Active Problem List   Diagnosis    Allergic rhinitis    Atrial fibrillation    Claudication    Coronary heart disease    Edema    Extremity pain    History of left heart catheterization (LHC)    Hyperlipidemia    Essential hypertension    Inguinal hernia, right    Laceration    Lower extremity edema    Myocardial infarction    Need for other prophylactic vaccination and inoculation against single diseases    Presence of other cardiac implants and grafts    Status post coronary artery bypass graft    Status post percutaneous transluminal coronary angioplasty    Viral URI    Rib pain on left side    Dizziness    Acute cystitis without hematuria    Status post placement of implantable loop recorder    Lab test negative for COVID-19 virus    Cellulitis of left leg    Iron deficiency anemia    Encounter for support and coordination of transition of care    Hematoma of right lower extremity    Encounter for screening for malignant neoplasm of prostate     Medicare annual wellness visit, subsequent    Vitamin D deficiency    Hyponatremia    Gastroesophageal reflux disease without esophagitis    Acute ST elevation myocardial infarction (STEMI) involving right coronary artery    Melena    GI bleed    Severe malnutrition     Past Medical History:   Diagnosis Date    Anxiety 2014    Atrial fibrillation     Benign prostatic hyperplasia     CAD (coronary artery disease)     Hyperlipidemia     Hypertension     Myocardial infarction      Past Surgical History:   Procedure Laterality Date    CARDIAC CATHETERIZATION N/A  12/1/2023    Procedure: Left Heart Cath;  Surgeon: Son العلي MD;  Location: [facility] CATH INVASIVE LOCATION;  Service: Cardiovascular;  Laterality: N/A;    CARDIAC CATHETERIZATION N/A 12/1/2023    Procedure: Coronary angiography;  Surgeon: Son العلي MD;  Location: [facility] CATH INVASIVE LOCATION;  Service: Cardiovascular;  Laterality: N/A;    CARDIAC CATHETERIZATION N/A 12/1/2023    Procedure: Percutaneous Coronary Intervention;  Surgeon: Son العلي MD;  Location: [facility] CATH INVASIVE LOCATION;  Service: Cardiovascular;  Laterality: N/A;    CARDIAC ELECTROPHYSIOLOGY PROCEDURE N/A 12/1/2023    Procedure: Temporary Pacemaker;  Surgeon: Son العلي MD;  Location: [facility] CATH INVASIVE LOCATION;  Service: Cardiovascular;  Laterality: N/A;    CARDIAC SURGERY      ENDOSCOPY N/A 12/22/2023    Procedure: ESOPHAGOGASTRODUODENOSCOPY with sclerotherapy and endoscopic clipping x 3 of duodenal ulcer;  Surgeon: Tereza Fowler MD;  Location: [facility] ENDOSCOPY;  Service: Gastroenterology;  Laterality: N/A;  post op: duodenal ulcer    HERNIA REPAIR         SLP Recommendation and Plan       EDUCATION  The patient has been educated in the following areas:   Modified Diet Instruction.     Pt made NPO this AM by nrsg following reported coughing/choking incident w/ regular/thin bfast tray. Pt was upright in bed and agreeable to re-evaluation. Dentures were donned prior to PO trials. Pt began masticating, dentures were noted to be ill-fitting, eventually had to be removed from pt's oral cavity d/t safety concerns. Coughing w/ 2/2 trials of regular texture trials. Pt had to expectorate residue. Pt w/ functional pull, timely oral transit, and no overt s/s of aspiration w/ puree. Trials of thins via cup/straw w/o issue. Pt and nurse verbalized understanding of below recommendation.     Recommending pt initiate a Puree/Thins diet w/ standard safe swallow precautions. SLP to continue following to determine pt's readiness to  advance diet.        SLP GOALS       Row Name 12/28/23 1100       (LTG) Swallow    (LTG) Swallow Pt will maximize swallow function for least restrictive PO diet, exhibiting no complication associated with dysphagia, adequate PO intake, and demonstrating independent use of swallow compensation.  -KL    [place] (Swallow Long Term Goal) with minimal cues (75-90% accuracy)  -KL    Time Frame (Swallow Long Term Goal) by discharge  -KL    Progress/Outcomes (Swallow Long Term Goal) good progress toward goal  -KL    Comment (Swallow Long Term Goal) See above re-eval  -KL       (STG) Swallow 1    (STG) Swallow 1 Patient will participate in ongoing assessment of swallow, including reevaluation clinically and/or including instrumental assessment of swallow if indicated, to further assess swallow function and return to oral nutrition.  -KL    [place] (Swallow Short Term Goal 1) with minimal cues (75-90% accuracy)  -KL    Time Frame (Swallow Short Term Goal 1) 1 week  -KL    Progress/Outcomes (Swallow Short Term Goal 1) good progress toward goal  -KL    Comment (Swallow Short Term Goal 1) See above re-eval  -KL       (STG) Swallow 2    (STG) Swallow 2 The patient will participate in a meal/follow-up assessment to determine safety and adequacy of recommended diet, independent use of safe swallow compensations, pt/family education and additional goals/recommendations to follow  -KL    Time Frame (Swallow Short Term Goal 2) 1 week  -KL              User Key  (r) = Recorded By, (t) = Taken By, (c) = Cosigned By      Initials Name Provider Type    Amanda Raymundo Speech and Language Pathologist                  Amanda Denise  12/28/2023

## 2023-12-28 NOTE — PROGRESS NOTES
Referring Provider: Anthony Ambriz MD    Reason for follow-up:  Melena.  Status post stent  Status post inferior STEMI     Patient Care Team:  Tricia Aldana APRN as PCP - General (Family Medicine)  Missy Domínguez MD as Consulting Physician (Cardiology)    Subjective .      ROS    Since I have last seen, the patient has been without any chest discomfort ,shortness of breath, palpitations, dizziness or syncope.  Denies having any headache ,abdominal pain ,nausea, vomiting , diarrhea constipation, loss of weight or loss of appetite.  Denies having any excessive bruising ,hematuria.    Review of all systems negative except as indicated    History  Past Medical History:   Diagnosis Date    Anxiety 2014    Atrial fibrillation     Benign prostatic hyperplasia     CAD (coronary artery disease)     Hyperlipidemia     Hypertension     Myocardial infarction        Past Surgical History:   Procedure Laterality Date    CARDIAC CATHETERIZATION N/A 12/1/2023    Procedure: Left Heart Cath;  Surgeon: Son العلي MD;  Location: [facility] CATH INVASIVE LOCATION;  Service: Cardiovascular;  Laterality: N/A;    CARDIAC CATHETERIZATION N/A 12/1/2023    Procedure: Coronary angiography;  Surgeon: Son العلي MD;  Location: [facility] CATH INVASIVE LOCATION;  Service: Cardiovascular;  Laterality: N/A;    CARDIAC CATHETERIZATION N/A 12/1/2023    Procedure: Percutaneous Coronary Intervention;  Surgeon: Son العلي MD;  Location: [facility] CATH INVASIVE LOCATION;  Service: Cardiovascular;  Laterality: N/A;    CARDIAC ELECTROPHYSIOLOGY PROCEDURE N/A 12/1/2023    Procedure: Temporary Pacemaker;  Surgeon: Son العلي MD;  Location: [facility] CATH INVASIVE LOCATION;  Service: Cardiovascular;  Laterality: N/A;    CARDIAC SURGERY      ENDOSCOPY N/A 12/22/2023    Procedure: ESOPHAGOGASTRODUODENOSCOPY with sclerotherapy and endoscopic clipping x 3 of duodenal ulcer;  Surgeon: Tereza Fowler MD;  Location: [facility] ENDOSCOPY;  Service:  Gastroenterology;  Laterality: N/A;  post op: duodenal ulcer    HERNIA REPAIR         Family History   Problem Relation Age of Onset    Heart disease Mother     Heart disease Father        Social History     Tobacco Use    Smoking status: Former     Types: Cigarettes     Quit date: 1970     Years since quittin.3    Smokeless tobacco: Never    Tobacco comments:     more than 50yrs   Vaping Use    Vaping Use: Never used   Substance Use Topics    Alcohol use: No    Drug use: No        Medications Prior to Admission   Medication Sig Dispense Refill Last Dose    acetaminophen (TYLENOL) 650 MG 8 hr tablet Take 1 tablet by mouth Every Night.   2023    apixaban (ELIQUIS) 2.5 MG tablet tablet Take 1 tablet by mouth Every 12 (Twelve) Hours for 30 days. Indications: Atrial Fibrillation 60 tablet 0 2023    [] aspirin 81 MG chewable tablet Chew 1 tablet Daily for 14 days. 14 tablet 0 2023    atorvastatin (LIPITOR) 10 MG tablet Take 1 tablet by mouth Every Night for 30 days. 30 tablet 0 2023    azelastine (ASTEPRO) 0.15 % solution nasal spray USE 2 SPRAYS IN EACH NOSTRIL TWICE DAILY AS DIRECTED BY PROVIDER 30 mL 3 2023    bumetanide (BUMEX) 1 MG tablet Take 1 tablet by mouth 3 (Three) Times a Day for 30 days. 90 tablet 0 2023    Cholecalciferol 25 MCG (1000 UT) tablet Take 1 tablet by mouth Daily.   2023    clopidogrel (PLAVIX) 75 MG tablet Take 1 tablet by mouth Daily for 30 days. 30 tablet 0 2023    docusate sodium (COLACE) 250 MG capsule Take 1 capsule by mouth Daily.   2023    gabapentin (NEURONTIN) 100 MG capsule TAKE 1 CAPSULE BY MOUTH TWICE DAILY 180 capsule 1 2023    losartan (COZAAR) 25 MG tablet Take 1 tablet by mouth Daily.       Mouthwashes (Biotene dry mouth) liquid liquid Take 15 mL by mouth 3 (Three) Times a Day As Needed for Dry Mouth.       pantoprazole (PROTONIX) 40 MG EC tablet Take 1 tablet by mouth Daily.   2023    potassium  chloride 10 MEQ CR tablet Take 1 tablet by mouth Daily.   12/20/2023    tamsulosin (FLOMAX) 0.4 MG capsule 24 hr capsule TAKE 1 CAPSULE BY MOUTH DAILY 90 capsule 0 12/20/2023       Allergies  Iodine, Levofloxacin, Nitroglycerin, Paroxetine, Penicillin g, Prednisone, Sucralfate, Diltiazem hcl, Metoprolol, Pramipexole dihydrochloride, Ropinirole hcl, Milk protein, and Milk-related compounds    Scheduled Meds:apixaban, 2.5 mg, Oral, Q12H  atorvastatin, 10 mg, Oral, Nightly  [Held by provider] bumetanide, 1 mg, Oral, BID  cefTRIAXone, 1,000 mg, Intravenous, Q24H  clopidogrel, 75 mg, Oral, Daily  gabapentin, 100 mg, Oral, BID  [Held by provider] losartan, 25 mg, Oral, Daily  metoclopramide, 5 mg, Oral, BID  pantoprazole, 40 mg, Oral, Q12H  senna-docusate sodium, 2 tablet, Oral, BID  sodium chloride, 10 mL, Intravenous, Q12H  tamsulosin, 0.4 mg, Oral, Nightly      Continuous Infusions:     PRN Meds:.  acetaminophen **OR** acetaminophen **OR** acetaminophen    senna-docusate sodium **AND** polyethylene glycol **AND** bisacodyl **AND** bisacodyl    Calcium Replacement - Follow Nurse / BPA Driven Protocol    Magnesium Cardiology Dose Replacement - Follow Nurse / BPA Driven Protocol    nitroglycerin    ondansetron **OR** ondansetron    Phosphorus Replacement - Follow Nurse / BPA Driven Protocol    Potassium Replacement - Follow Nurse / BPA Driven Protocol    [COMPLETED] Insert Peripheral IV **AND** sodium chloride    sodium chloride    sodium chloride    Objective     VITAL SIGNS  Vitals:    12/27/23 1746 12/27/23 2048 12/27/23 2357 12/28/23 0354   BP: 101/70 114/52 92/68 105/69   BP Location:  Left arm Right arm Right arm   Patient Position:  Lying Lying Lying   Pulse: 64 77 88    Resp: 20 15 23 14   Temp: 97.9 °F (36.6 °C) 98.3 °F (36.8 °C) 98.3 °F (36.8 °C) 97.9 °F (36.6 °C)   TempSrc: Oral Oral Axillary Oral   SpO2: 100% 97% 95% 97%   Weight:       Height:           Flowsheet Rows      Flowsheet Row First Filed Value  "  Admission Height 177.8 cm (70\") Documented at 12/21/2023 0735   Admission Weight 71.7 kg (158 lb) Documented at 12/21/2023 0735              Intake/Output Summary (Last 24 hours) at 12/28/2023 0612  Last data filed at 12/27/2023 1746  Gross per 24 hour   Intake 466.25 ml   Output 100 ml   Net 366.25 ml        TELEMETRY: Atrial fibrillation with controlled ventricular response.    Physical Exam:  The patient is alert, oriented and in no distress.  Vital signs as noted above.  Head and neck revealed no carotid bruits or jugular venous distention.  No thyromegaly or lymphadenopathy is present  Lungs clear.  No wheezing.  Breath sounds are normal bilaterally.  Heart normal first and second heart sounds.  No murmur. No precordial rub is present.  No gallop is present.  Abdomen soft and nontender.  No organomegaly is present.  Extremities with good peripheral pulses without any pedal edema.  Skin warm and dry.  Musculoskeletal system is grossly normal  CNS grossly normal    Reviewed and updated.    Results Review:   I reviewed the patient's new clinical results.  Lab Results (last 24 hours)       Procedure Component Value Units Date/Time    CBC & Differential [[id number]]  (Abnormal) Collected: 12/27/23 2339    Specimen: Blood Updated: 12/28/23 0058    Narrative:      The following orders were created for panel order CBC & Differential.  Procedure                               Abnormality         Status                     ---------                               -----------         ------                     CBC Auto Differential[[id number]]        Abnormal            Final result               Scan Slide[[id number]]                                       Final result                 Please view results for these tests on the individual orders.    CBC Auto Differential [[id number]]  (Abnormal) Collected: 12/27/23 2339    Specimen: Blood Updated: 12/28/23 0058     WBC 15.10 10*3/mm3      RBC 2.92 10*6/mm3      Hemoglobin 9.2 " g/dL      Hematocrit 27.5 %      Comment: Result checked          MCV 94.3 fL      MCH 31.4 pg      MCHC 33.3 g/dL      RDW 16.3 %      RDW-SD 52.5 fl      MPV 10.9 fL      Platelets 134 10*3/mm3     Narrative:      The previously reported component NRBC is no longer being reported. Previous result was 0.1 /100 WBC (Reference Range: 0.0-0.2 /100 WBC) on 12/28/2023 at 0054 EST.    Scan Slide [[id number]] Collected: 12/27/23 2339    Specimen: Blood Updated: 12/28/23 0058     Scan Slide --     Comment: See Manual Differential Results       Manual Differential [[id number]]  (Abnormal) Collected: 12/27/23 2339    Specimen: Blood Updated: 12/28/23 0058     Neutrophil % 73.0 %      Lymphocyte % 10.0 %      Monocyte % 9.0 %      Eosinophil % 1.0 %      Bands %  4.0 %      Metamyelocyte % 1.0 %      Myelocyte % 1.0 %      Atypical Lymphocyte % 1.0 %      Neutrophils Absolute 11.63 10*3/mm3      Lymphocytes Absolute 1.66 10*3/mm3      Monocytes Absolute 1.36 10*3/mm3      Eosinophils Absolute 0.15 10*3/mm3      nRBC 1.0 /100 WBC      Anisocytosis Slight/1+     WBC Morphology Normal     Platelet Estimate Decreased    Magnesium [[id number]]  (Abnormal) Collected: 12/27/23 2339    Specimen: Blood Updated: 12/28/23 0055     Magnesium 2.6 mg/dL     Phosphorus [[id number]]  (Abnormal) Collected: 12/27/23 2339    Specimen: Blood Updated: 12/28/23 0053     Phosphorus 2.4 mg/dL     Comprehensive Metabolic Panel [[id number]]  (Abnormal) Collected: 12/27/23 2339    Specimen: Blood Updated: 12/28/23 0053     Glucose 95 mg/dL      BUN 22 mg/dL      Creatinine 1.10 mg/dL      Sodium 142 mmol/L      Potassium 4.1 mmol/L      Chloride 110 mmol/L      CO2 24.0 mmol/L      Calcium 7.8 mg/dL      Total Protein 4.5 g/dL      Albumin 2.7 g/dL      ALT (SGPT) 25 U/L      AST (SGOT) 22 U/L      Alkaline Phosphatase 72 U/L      Total Bilirubin 0.6 mg/dL      Globulin 1.8 gm/dL      A/G Ratio 1.5 g/dL      BUN/Creatinine Ratio 20.0     Anion Gap 8.0  mmol/L      eGFR 62.6 mL/min/1.73     Narrative:      GFR Normal >60  Chronic Kidney Disease <60  Kidney Failure <15    The GFR formula is only valid for adults with stable renal function between ages 18 and 70.    Blood Culture - Blood, Arm, Right [[id number]]  (Normal) Collected: 12/24/23 1815    Specimen: Blood from Arm, Right Updated: 12/27/23 1900     Blood Culture No growth at 3 days    Blood Culture - Blood, Arm, Left [[id number]]  (Normal) Collected: 12/24/23 1221    Specimen: Blood from Arm, Left Updated: 12/27/23 1245     Blood Culture No growth at 3 days    Narrative:      Less than seven (7) mL's of blood was collected.  Insufficient quantity may yield false negative results.    Potassium [[id number]]  (Normal) Collected: 12/27/23 1044    Specimen: Blood Updated: 12/27/23 1121     Potassium 4.2 mmol/L     CBC & Differential [[id number]]  (Abnormal) Collected: 12/27/23 0436    Specimen: Blood Updated: 12/27/23 0717    Narrative:      The following orders were created for panel order CBC & Differential.  Procedure                               Abnormality         Status                     ---------                               -----------         ------                     CBC Auto Differential[[id number]]        Abnormal            Final result               Scan Slide[[id number]]                                       Final result                 Please view results for these tests on the individual orders.    CBC Auto Differential [[id number]]  (Abnormal) Collected: 12/27/23 0436    Specimen: Blood Updated: 12/27/23 0717     WBC 15.60 10*3/mm3      RBC 2.40 10*6/mm3      Hemoglobin 7.7 g/dL      Hematocrit 22.7 %      MCV 94.5 fL      MCH 32.1 pg      MCHC 33.9 g/dL      RDW 16.0 %      RDW-SD 51.6 fl      MPV 10.5 fL      Platelets 133 10*3/mm3     Narrative:      The previously reported component NRBC is no longer being reported. Previous result was 0.2 /100 WBC (Reference Range: 0.0-0.2 /100 WBC) on  12/27/2023 at 0525 EST.    Scan Slide [[id number]] Collected: 12/27/23 0436    Specimen: Blood Updated: 12/27/23 0717     Scan Slide --     Comment: See Manual Differential Results       Manual Differential [[id number]]  (Abnormal) Collected: 12/27/23 0436    Specimen: Blood Updated: 12/27/23 0717     Neutrophil % 81.0 %      Lymphocyte % 8.0 %      Monocyte % 5.0 %      Eosinophil % 1.0 %      Bands %  2.0 %      Metamyelocyte % 2.0 %      Myelocyte % 1.0 %      Neutrophils Absolute 12.95 10*3/mm3      Lymphocytes Absolute 1.25 10*3/mm3      Monocytes Absolute 0.78 10*3/mm3      Eosinophils Absolute 0.16 10*3/mm3      Anisocytosis Slight/1+     Polychromasia Slight/1+     Toxic Granulation Slight/1+     Platelet Estimate Decreased     Large Platelets Slight/1+            Imaging Results (Last 24 Hours)       Procedure Component Value Units Date/Time    XR Chest 1 View [[id number]] Collected: 12/27/23 0852     Updated: 12/27/23 0854    Narrative:      XR CHEST 1 VW    Date of Exam: 12/27/2023 7:08 AM CST    Indication: Pneumonia    Comparison: 12/6/2023    Findings:  Cardiomediastinal silhouette is unremarkable.  No airspace disease, pneumothorax, nor pleural effusion. No acute osseous abnormality identified.      Impression:      Impression:  No acute process identified.      Electronically Signed: Geovanny Parsons MD    12/27/2023 7:52 AM CST    Workstation ID: [id number]        LAB RESULTS (LAST 7 DAYS)    CBC  Results from last 7 days   Lab Units 12/27/23  2339 12/27/23  0436 12/26/23  0450 12/24/23  2259 12/24/23  0708 12/23/23  1612 12/23/23  0532 12/22/23  0714   WBC 10*3/mm3 15.10* 15.60* 17.20* 27.10* 31.70*  --  33.40* 18.30*   RBC 10*6/mm3 2.92* 2.40* 2.35* 2.82* 3.02*  --  1.91* 2.71*   HEMOGLOBIN g/dL 9.2* 7.7* 7.2* 8.6* 9.2* 10.3* 6.1* 8.7*   HEMATOCRIT % 27.5* 22.7* 21.8* 26.4* 28.4* 31.6* 18.8* 26.2*   MCV fL 94.3 94.5 92.7 93.3 94.2  --  98.2* 96.9   PLATELETS 10*3/mm3 134* 133* 126* 143 149  --  189 169        BMP  Results from last 7 days   Lab Units 12/27/23  2339 12/27/23  1044 12/27/23  0436 12/26/23 2001 12/26/23 0450 12/25/23 2018 12/24/23  2259 12/24/23  0708 12/23/23  0035   SODIUM mmol/L 142  --  142 147* 148*  --  151* 149* 138   POTASSIUM mmol/L 4.1 4.2 3.4* 3.1* 3.7  --  3.1* 3.4* 4.0   CHLORIDE mmol/L 110*  --  106 108* 111*  --  113* 112* 104   CO2 mmol/L 24.0  --  27.0 27.0 29.0  --  27.0 25.0 24.0   BUN mg/dL 22  --  28* 31* 41*  --  71* 90* 89*   CREATININE mg/dL 1.10  --  1.16 1.22 1.46*  --  1.69* 1.71* 1.66*   GLUCOSE mg/dL 95  --  129* 136* 238*  --  108* 103* 171*   MAGNESIUM mg/dL 2.6*  --  1.9 2.0 1.7  --  2.1 2.3 2.2   PHOSPHORUS mg/dL 2.4*  --  2.6 3.2 2.1* 2.6 2.2* 2.3* 2.6       CMP   Results from last 7 days   Lab Units 12/27/23  2339 12/27/23  1044 12/27/23  0436 12/26/23 2001 12/26/23 0450 12/24/23 2259 12/24/23  0708 12/23/23  0035 12/22/23  0715   SODIUM mmol/L 142  --  142 147* 148* 151* 149* 138 139   POTASSIUM mmol/L 4.1 4.2 3.4* 3.1* 3.7 3.1* 3.4* 4.0 3.6   CHLORIDE mmol/L 110*  --  106 108* 111* 113* 112* 104 101   CO2 mmol/L 24.0  --  27.0 27.0 29.0 27.0 25.0 24.0 27.0   BUN mg/dL 22  --  28* 31* 41* 71* 90* 89* 89*   CREATININE mg/dL 1.10  --  1.16 1.22 1.46* 1.69* 1.71* 1.66* 1.56*   GLUCOSE mg/dL 95  --  129* 136* 238* 108* 103* 171* 129*   ALBUMIN g/dL 2.7*  --  2.9*  --  2.7* 3.2* 3.2* 3.1* 3.3*   BILIRUBIN mg/dL 0.6  --  0.5  --  0.5 0.5 0.6 0.3 0.5   ALK PHOS U/L 72  --  71  --  71 78 70 69 75   AST (SGOT) U/L 22  --  28  --  26 32 19 16 18   ALT (SGPT) U/L 25  --  30  --  32 32 20 18 20         BNP        TROPONIN        CoAg  Results from last 7 days   Lab Units 12/22/23  0714 12/21/23  0748   INR  1.29* 1.17*   APTT seconds  --  26.3*       Creatinine Clearance  Estimated Creatinine Clearance: 36.4 mL/min (by C-G formula based on SCr of 1.1 mg/dL).    ABG        Radiology  XR Chest 1 View    Result Date: 12/27/2023  Impression: No acute process identified.  Electronically Signed: Geovanny Parsons MD  12/27/2023 7:52 AM CST  Workstation ID: [id number]         EKG      I personally viewed and interpreted the patient's EKG/Telemetry data:    ECHOCARDIOGRAM:    Results for orders placed during the hospital encounter of 12/01/23    Adult Transthoracic Echo Complete W/ Cont if Necessary Per Protocol    Interpretation Summary    Left ventricular ejection fraction appears to be 36 - 40%.    The left atrial cavity is moderately dilated.    Estimated right ventricular systolic pressure from tricuspid regurgitation is normal (<35 mmHg).          STRESS TEST        Cardiolite (Tc-99m sestamibi) stress test    CARDIAC CATHETERIZATION  Results for orders placed during the hospital encounter of 12/01/23    Cardiac Catheterization/Vascular Study                OTHER:         Assessment & Plan     Principal Problem:    Melena  Active Problems:    GI bleed    Severe malnutrition      /////////////////////////  History  =============  - Recent melena.     - Inferior STEMI 12/4/2023.     - Status post PCI with PTCA, stent placement on Pronto catheter atherectomy to the graft to the distal right coronary artery.-12/3/2023-Dr. العلي     Cardiac catheterization 12/3/2023-Dr. العلي     Left Main %: 20 to 30%   Proximal LAD %: 100%  Mid/Distal LAD %: 100%  LCX %: Proximal 80% OM1 100%  [place]:   RCA %: 100% after the PDA.  Lima %: LIMA to LAD was patent  SVG(s) %: SVG to the marginal branch is patent but has some 30 to 40% disease.  SVG to the diagonal branch is occluded.  SVG to the PDA is occluded.  SVG to the distal RCA is occluded but is the culprit lesion     -Past history of syncope-no further episodes.     -status post loop recorder placement.  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 cardiac catheterization 12/26/2014 revealed 60% distal circumflex and total LAD and right coronary arteries.  Lima to LAD was patent ( lima coming off the left vertebral artery).  SVG to  diagonal   marginal and PDA were patent.  SVG to left ventricular branch was totally occluded (chronic)     - atrial fibrillation -has converted to sinus rhythm and maintaining sinus rhythm.  Recently patient was noted to have atrial dysrhythmia on the monitor with loop recorder.     - sinus bradycardia-asymptomatic     - status post acute inferior myocardial infarction prior to surgery requiring acute stent placement to right coronary artery ) complicated by ventricular fibrillation)     Echocardiogram 12/1/2023    Left ventricular ejection fraction appears to be 36 - 40%.    The left atrial cavity is moderately dilated.    Estimated right ventricular systolic pressure from tricuspid regurgitation is normal (<35 mmHg).      -Dyslipidemia and hypertension     - lower extremity weakness.   Arterial Doppler study of the lower extremity is normal. -  improved .   arterial Doppler study of the lower extremity was normal     - status post cholecystectomy     - allergy to penicillin iodine and metoprolol (rash).  Intolerance to atenolol due to bradycardia.  Allergy to Levaquin and penicillin.  Intolerance to prednisone Nitropatch IV nitroglycerin and prednisone.  =================    Plan  ==============  Recent melena and GI bleed.  Patient had endoscopy today 12/22/2023 that revealed bleeding duodenal ulcer.  Patient had endoscopic hemostasis with epinephrine and Hemoclip.  Patient was started on PPI.  Patient is back on Plavix and Eliquis.  Observe for toxic effects of high risk medication.  So far tolerating well.     Inferior STEMI 12/4/2023.     Status post PCI with PTCA, stent placement on Pronto catheter atherectomy to the graft to the distal right coronary artery.-12/3/2023-Dr. العلي     History of junctional bradycardia.  Temporary pacemaker was placed in the setting of inferior STEMI.  Temporary pacemaker was removed.     Past history of complete heart block.  Patient had temporary pacemaker which was  removed.     Atrial fibrillation with controlled ventricular response.  Rate control can be challenging given the circumstances.  Patient is allergic to metoprolol and Cardizem.    Hypomagnesemia-improved  2.6-12/28/2023.    Leukocytosis  WBC 33,000.    Renal dysfunction  71/1.69    Anemia  Hemoglobin 6.1-12/23/2023  Hgb 10.3-12/24/2023  7.7-12/27/2023  9.2-12/28/2023.    Hypokalemia  K 3.4  Supplements.  Improved at 4.1-12/28/2023    Renal dysfunction  BUN/creatinine 89/1.66-12/23/2023  28/1.16-12/27/2023  22/1.10-12/28/2023    Borderline blood pressure.    Patient is off beta-blocker Coreg.     Echocardiogram 12/1/2023-as above     Patient is DNR DNI..     Medications were reviewed and updated.  Current medications include  Eliquis atorvastatin Bumex Plavix losartan pantoprazole    Reviewed and updated-12/28/2023    Further plan will depend on patient's progress.  //////////////////////////////////////        Missy Domínguez MD  12/28/23  06:12 EST

## 2023-12-28 NOTE — THERAPY TREATMENT NOTE
"Subjective: Pt agreeable to therapeutic plan of care.    Objective:     Bed mobility - Min-A supine to sit.  Pt sat at edge of the bed for some lower extremity exercises  Transfers - Min-A and with rolling walker  noted some posterior lean with initial standing.   Ambulation - 40 feet Min-A and with rolling walker    Therapeutic Exercise - 10 Reps B LE AROM supported sitting / EOB    Vitals: WNL    Pain: 0 VAS       Education: Provided education on the importance of mobility in the acute care setting, Verbal/Tactile Cues, Transfer Training, and Gait Training    Assessment: Sage Flores presents with functional mobility impairments which indicate the need for skilled intervention. Tolerating session today without incident. Pt had some minor dizziness with initial sitting at edge of the bed. Pt sat for ble exercises until dizziness subsided.  Pt was able to progress to short distance gait.  Pt still with weakness and impaired mobility.  Recommend rehab at d/c. Will continue to follow and progress as tolerated.     Plan/Recommendations:   If medically appropriate, Moderate Intensity Therapy recommended post-acute care. This is recommended as therapy feels the patient would require 3-4 days per week and wouldn't tolerate \"3 hour daily\" rehab intensity. SNF would be the preferred choice.  Pt requires no DME at discharge.     Pt desires Skilled Rehab placement at discharge. Pt cooperative; agreeable to therapeutic recommendations and plan of care.     Basic Mobility 6-click:  Rollin = Total, A lot = 2, A little = 3; 4 = None  Supine>Sit:   1 = Total, A lot = 2, A little = 3; 4 = None   Sit>Stand with arms:  1 = Total, A lot = 2, A little = 3; 4 = None  Bed>Chair:   1 = Total, A lot = 2, A little = 3; 4 = None  Ambulate in room:  1 = Total, A lot = 2, A little = 3; 4 = None  3-5 Steps with railin = Total, A lot = 2, A little = 3; 4 = None  Score: 15    Modified [place]: 4 = Moderately severe disability " (Unable to attend to own bodily needs without assistance, and unable to walk unassisted)     Post-Tx Position: Up in Chair, Alarms activated, and Call light and personal items within reach  PPE: gloves and surgical mask

## 2023-12-28 NOTE — DISCHARGE SUMMARY
Discharge Note   Sage Flores 9/7/1930 [id number]     Date of Admission:12/21/2023     Date of Discharge: 12/28/23     Admission Diagnosis: Melena [K92.1]  Gastrointestinal bleed [K92.2]  GI bleed [K92.2]  Leukocytosis, unspecified type [D72.829]  Gastrointestinal hemorrhage, unspecified gastrointestinal hemorrhage type [K92.2]     Discharge Diagnosis:     Melena    GI bleed    Severe malnutrition       Consults: GI    Hospital Course:     89 y.o. male with PMH of PAF on chronic anticoagulation with apixaban, BPH, CAD, HLD, and HTN, and presented to the hospital for blood in stool from [facility] facility, and was admitted with a principal diagnosis of Melena. EGD done showing there is duodenal ulcer treated with endoscopic hemostasis.  He is on Protonix 40 mg twice daily.  He is also given blood transfusion for anemia.  He is being treated with IV antibiotic as there is leukocytosis probably from aspiration in the setting of dysphagia.  SLP following the patient and swallow study has done.  Patient is restarted on Plavix and Eliquis later on on hold GI team clear.  Patient has low blood pressure started on midodrine and also hold losartan.  Discharged with low-dose Bumex.  Discharged with stable clinical condition to skilled facility.     In review of patient's previous medical record, patient was hospitalized at this facility from 12/1/2023 - 12/11/2023 for heart block with bradycardia, underwent a PCI to the RCA, and noted to be in systolic heart failure that was compensated with an ejection fraction of 36-40%.  Patient was discharged from this facility on dual antiplatelet therapy in conjunction with his apixaban.  Heart block was felt to be related to coronary artery disease, and did resolve.  Patient has paroxysmal atrial fibrillation at baseline.              Vitals:    12/28/23 1200   BP: 96/57   Pulse: 92   Resp: 28   Temp: 97.5 °F (36.4 °C)   SpO2: 99%        Disposition: SNF     Discharged  Condition: good    Activity: activity as tolerated    Diet:  Diet Order   Procedures    Diet: Regular/House Diet; Texture: Pureed (NDD 1); Fluid Consistency: Thin (IDDSI 0)        Labs:    Results from last 7 days   Lab Units 12/27/23  2339 12/27/23  0436 12/26/23 0450 12/24/23 2259 12/24/23  0708   WBC 10*3/mm3 15.10* 15.60* 17.20* 27.10* 31.70*   HEMOGLOBIN g/dL 9.2* 7.7* 7.2* 8.6* 9.2*   HEMATOCRIT % 27.5* 22.7* 21.8* 26.4* 28.4*   PLATELETS 10*3/mm3 134* 133* 126* 143 149       Results from last 7 days   Lab Units 12/27/23  2339 12/27/23  1044 12/27/23  0436 12/26/23 2001 12/26/23 0450 12/24/23  2259   SODIUM mmol/L 142  --  142 147* 148* 151*   POTASSIUM mmol/L 4.1 4.2 3.4* 3.1* 3.7 3.1*   CHLORIDE mmol/L 110*  --  106 108* 111* 113*   CO2 mmol/L 24.0  --  27.0 27.0 29.0 27.0   BUN mg/dL 22  --  28* 31* 41* 71*   CREATININE mg/dL 1.10  --  1.16 1.22 1.46* 1.69*                  Discharge Medications        New Medications        Instructions Start Date   metoclopramide 5 MG tablet  Commonly known as: REGLAN   5 mg, Oral, 2 Times Daily PRN      midodrine 5 MG tablet  Commonly known as: PROAMATINE   5 mg, Oral, 3 Times Daily Before Meals             Changes to Medications        Instructions Start Date   bumetanide 1 MG tablet  Commonly known as: BUMEX  What changed:   how much to take  when to take this   0.5 mg, Oral, Daily      pantoprazole 40 MG EC tablet  Commonly known as: PROTONIX  What changed: when to take this   40 mg, Oral, 2 Times Daily      pantoprazole 40 MG EC tablet  Commonly known as: PROTONIX  What changed: You were already taking a medication with the same name, and this prescription was added. Make sure you understand how and when to take each.   40 mg, Oral, Daily   Start Date: January 28, 2024            Continue These Medications        Instructions Start Date   acetaminophen 650 MG 8 hr tablet  Commonly known as: TYLENOL   650 mg, Oral, Nightly      apixaban 2.5 MG tablet  tablet  Commonly known as: ELIQUIS   2.5 mg, Oral, Every 12 Hours Scheduled      atorvastatin 10 MG tablet  Commonly known as: LIPITOR   10 mg, Oral, Nightly      azelastine 0.15 % solution nasal spray  Commonly known as: ASTEPRO   USE 2 SPRAYS IN EACH NOSTRIL TWICE DAILY AS DIRECTED BY PROVIDER      Biotene dry mouth liquid liquid   15 mL, Oral, 3 Times Daily PRN      cholecalciferol 25 MCG (1000 UT) tablet  Commonly known as: VITAMIN D3   1,000 Units, Oral, Daily      clopidogrel 75 MG tablet  Commonly known as: PLAVIX   75 mg, Oral, Daily      docusate sodium 250 MG capsule  Commonly known as: COLACE   250 mg, Oral, Daily      gabapentin 100 MG capsule  Commonly known as: NEURONTIN   TAKE 1 CAPSULE BY MOUTH TWICE DAILY      tamsulosin 0.4 MG capsule 24 hr capsule  Commonly known as: FLOMAX   0.4 mg, Oral, Daily             Stop These Medications      aspirin 81 MG chewable tablet     losartan 25 MG tablet  Commonly known as: COZAAR     potassium chloride 10 MEQ CR tablet               Spent at least 35 minutes in the management of patient's care including but not limited to physical exam, review of vital signs, labs, cultures and imaging studies, discussing the hospital stay along with plan of care at home, preparation and coordinating of discharge, arranging follow up care and referrals as indicated. Plan also discussed with ALEXX.    Anthony Ambriz MD  Hospitalist  12/28/23   16:25 EST

## 2023-12-29 LAB
BACTERIA SPEC AEROBE CULT: NORMAL
BACTERIA SPEC AEROBE CULT: NORMAL
BH BB BLOOD EXPIRATION DATE: NORMAL
BH BB BLOOD TYPE BARCODE: 5100
BH BB DISPENSE STATUS: NORMAL
BH BB PRODUCT CODE: NORMAL
BH BB UNIT NUMBER: NORMAL
CROSSMATCH INTERPRETATION: NORMAL
UNIT  ABO: NORMAL
UNIT  RH: NORMAL

## 2023-12-30 NOTE — CASE MANAGEMENT/SOCIAL WORK
Continued Stay Note  LISSETT Mendoza     Patient Name: Sage Flores  MRN: [id number]  Today's Date: 12/29/2023    Admit Date: 12/21/2023    Plan: Return to [facility]; No precert or PASRR   Discharge Plan       Row Name 12/29/23 2124       Plan    Final Discharge Disposition Code 03 - skilled nursing facility (SNF)    Final Note [facility]                  Expected Discharge Date Expected Discharge Time    Dec 31 2023               Katt Quiñonez RN

## 2023-12-30 NOTE — CASE MANAGEMENT/SOCIAL WORK
Case Management Discharge Note      Final Note: [facility]                   Transportation Services  Private: Car    Final Discharge Disposition Code: 03 - skilled nursing facility (SNF)

## 2024-01-11 ENCOUNTER — TELEPHONE (OUTPATIENT)
Dept: CARDIAC REHAB | Facility: HOSPITAL | Age: 89
End: 2024-01-11
Payer: MEDICARE

## 2024-01-11 NOTE — TELEPHONE ENCOUNTER
"Patient's daughter states, \"he will be interested in cardiac rehab. We will call back when he is discharged from [facility].\"  "

## 2024-02-12 ENCOUNTER — OFFICE VISIT (OUTPATIENT)
Dept: CARDIOLOGY | Facility: CLINIC | Age: 89
End: 2024-02-12
Payer: MEDICARE

## 2024-02-12 VITALS
HEIGHT: 70 IN | HEART RATE: 64 BPM | DIASTOLIC BLOOD PRESSURE: 70 MMHG | BODY MASS INDEX: 22.19 KG/M2 | SYSTOLIC BLOOD PRESSURE: 114 MMHG | WEIGHT: 155 LBS

## 2024-02-12 DIAGNOSIS — Z95.820 STATUS POST ANGIOPLASTY WITH STENT: Primary | ICD-10-CM

## 2024-02-12 DIAGNOSIS — E78.5 DYSLIPIDEMIA: ICD-10-CM

## 2024-02-12 DIAGNOSIS — Z95.1 HX OF CABG: ICD-10-CM

## 2024-02-12 DIAGNOSIS — I49.5 TACHYCARDIA-BRADYCARDIA: ICD-10-CM

## 2024-02-12 DIAGNOSIS — I10 ESSENTIAL HYPERTENSION: ICD-10-CM

## 2024-02-12 PROCEDURE — 99214 OFFICE O/P EST MOD 30 MIN: CPT | Performed by: INTERNAL MEDICINE

## 2024-02-12 PROCEDURE — 1159F MED LIST DOCD IN RCRD: CPT | Performed by: INTERNAL MEDICINE

## 2024-02-12 PROCEDURE — 1160F RVW MEDS BY RX/DR IN RCRD: CPT | Performed by: INTERNAL MEDICINE

## 2024-02-14 ENCOUNTER — TELEPHONE (OUTPATIENT)
Dept: CARDIOLOGY | Facility: CLINIC | Age: 89
End: 2024-02-14
Payer: MEDICARE

## 2024-02-14 ENCOUNTER — READMISSION MANAGEMENT (OUTPATIENT)
Dept: CALL CENTER | Facility: HOSPITAL | Age: 89
End: 2024-02-14
Payer: MEDICARE

## 2024-02-14 ENCOUNTER — HOME HEALTH ADMISSION (OUTPATIENT)
Dept: HOME HEALTH SERVICES | Facility: HOME HEALTHCARE | Age: 89
End: 2024-02-14
Payer: MEDICARE

## 2024-02-14 ENCOUNTER — TRANSCRIBE ORDERS (OUTPATIENT)
Dept: HOME HEALTH SERVICES | Facility: HOME HEALTHCARE | Age: 89
End: 2024-02-14
Payer: MEDICARE

## 2024-02-14 ENCOUNTER — DOCUMENTATION (OUTPATIENT)
Dept: HOME HEALTH SERVICES | Facility: HOME HEALTHCARE | Age: 89
End: 2024-02-14
Payer: MEDICARE

## 2024-02-14 DIAGNOSIS — I48.20 CHRONIC ATRIAL FIBRILLATION: Primary | ICD-10-CM

## 2024-02-15 ENCOUNTER — TRANSITIONAL CARE MANAGEMENT TELEPHONE ENCOUNTER (OUTPATIENT)
Dept: CALL CENTER | Facility: HOSPITAL | Age: 89
End: 2024-02-15
Payer: MEDICARE

## 2024-02-15 ENCOUNTER — HOME CARE VISIT (OUTPATIENT)
Dept: HOME HEALTH SERVICES | Facility: HOME HEALTHCARE | Age: 89
End: 2024-02-15

## 2024-02-15 ENCOUNTER — TELEPHONE (OUTPATIENT)
Dept: FAMILY MEDICINE CLINIC | Facility: CLINIC | Age: 89
End: 2024-02-15
Payer: MEDICARE

## 2024-02-15 RX ORDER — MIDODRINE HYDROCHLORIDE 5 MG/1
5 TABLET ORAL
Qty: 90 TABLET | Refills: 0 | Status: SHIPPED | OUTPATIENT
Start: 2024-02-15

## 2024-02-15 RX ORDER — BUMETANIDE 1 MG/1
0.5 TABLET ORAL DAILY
Qty: 15 TABLET | Refills: 0 | Status: CANCELLED | OUTPATIENT
Start: 2024-02-15 | End: 2024-03-16

## 2024-02-15 RX ORDER — CLOPIDOGREL BISULFATE 75 MG/1
75 TABLET ORAL DAILY
Qty: 90 TABLET | Refills: 3 | Status: SHIPPED | OUTPATIENT
Start: 2024-02-15

## 2024-02-15 RX ORDER — PANTOPRAZOLE SODIUM 40 MG/1
40 TABLET, DELAYED RELEASE ORAL DAILY
Qty: 30 TABLET | Refills: 0 | Status: SHIPPED | OUTPATIENT
Start: 2024-02-15

## 2024-02-16 ENCOUNTER — HOME CARE VISIT (OUTPATIENT)
Dept: HOME HEALTH SERVICES | Facility: HOME HEALTHCARE | Age: 89
End: 2024-02-16
Payer: MEDICARE

## 2024-02-16 VITALS
TEMPERATURE: 98 F | HEART RATE: 82 BPM | OXYGEN SATURATION: 96 % | RESPIRATION RATE: 16 BRPM | DIASTOLIC BLOOD PRESSURE: 78 MMHG | SYSTOLIC BLOOD PRESSURE: 118 MMHG

## 2024-02-16 PROCEDURE — G0299 HHS/HOSPICE OF RN EA 15 MIN: HCPCS

## 2024-02-16 RX ORDER — TAMSULOSIN HYDROCHLORIDE 0.4 MG/1
1 CAPSULE ORAL DAILY
Qty: 90 CAPSULE | Refills: 0 | Status: SHIPPED | OUTPATIENT
Start: 2024-02-16

## 2024-02-19 ENCOUNTER — HOME CARE VISIT (OUTPATIENT)
Dept: HOME HEALTH SERVICES | Facility: HOME HEALTHCARE | Age: 89
End: 2024-02-19
Payer: MEDICARE

## 2024-02-19 VITALS
DIASTOLIC BLOOD PRESSURE: 68 MMHG | SYSTOLIC BLOOD PRESSURE: 112 MMHG | RESPIRATION RATE: 18 BRPM | TEMPERATURE: 97.8 F | HEART RATE: 70 BPM

## 2024-02-19 PROCEDURE — G0151 HHCP-SERV OF PT,EA 15 MIN: HCPCS

## 2024-02-20 ENCOUNTER — HOME CARE VISIT (OUTPATIENT)
Dept: HOME HEALTH SERVICES | Facility: HOME HEALTHCARE | Age: 89
End: 2024-02-20
Payer: MEDICARE

## 2024-02-20 VITALS
RESPIRATION RATE: 17 BRPM | TEMPERATURE: 98.7 F | OXYGEN SATURATION: 96 % | SYSTOLIC BLOOD PRESSURE: 114 MMHG | HEART RATE: 89 BPM | DIASTOLIC BLOOD PRESSURE: 70 MMHG

## 2024-02-20 PROCEDURE — G0299 HHS/HOSPICE OF RN EA 15 MIN: HCPCS

## 2024-02-20 PROCEDURE — G0152 HHCP-SERV OF OT,EA 15 MIN: HCPCS

## 2024-02-20 NOTE — CASE COMMUNICATION
"Eval Note    Patient stated goal: \"Be able to walk better.\"    Services required to achieve goals: PT    Potential Issues for goal attainment: None noted.    Problems identified: The patient is a 89 year old male admitted to home health services by SN on 2/16/2024 following IP Rehab stay which followed hospitalization for ST elevation myocardial infarction (STEMI) 12/2023 with rehab stay complicated by GI bleed of duodenal ulcer treated  with endoscopic hemostasis.    Functional status and safety: PT Assessment this day (2/19/2024) revealed the problems of general lower extremity weakness (bilateral lower extremity strength graded 4-/5 throughout with weakness noted to negatively impact balance, transfers and gait), impaired transfers (requires stand-by to minimal assistance), limited safe ambulation (80 feet with walker requiring from stand-by to minimal assistance),  abnormal gait (short, discontinuous and shuffling steps), imbalance (high falls risk as shown by a low score of 8/28 on the Tinetti Balance Assessment).     Describe any environmental issues: none.    Any equipment needs: none noted.    Plan is to see patient 2WK4, 1WK3 for PT then discharge with HEP to care of family.    POC confirmed with ROSA Fraga 2/19/2024.    Plan for next visit: Continue and advance exercises as i nitiated. Work on tranfers technique and gait mechanics, advancing ambulation distance as indicated."

## 2024-02-20 NOTE — HOME HEALTH
Routine Visit Note: Patient resting in chair at time of SN arrival. Family members present at visit. Patient and family states that patient is doing well. Patient states that his legs still feel weak. Patient continues wearing home oxygen as needed. Denies any new or worsening SOB. SN educated patient and family on bleeding precautions and to monitor urine and stool for s/s of blood from blood thinners. Patient denies recent falls. Denies elimination concerns. Vitals WNL.    Skill/education provided: CP assess, falls/safety assess, med teaching/assess for changes, anxiety assess/monitor, at home oxygen teaching/monitoring, bleeding precaution teaching    Patient/caregiver response: patient and caregiver able to partially teach back education    Plan for next visit: CP assess, falls/safety assess, med teaching/assess for changes, f/u on appointment with PCP, assess oxygen use, assess for chest pain/heaviness, assess anxiety    Other pertinent info: NA

## 2024-02-20 NOTE — HOME HEALTH
"PT Evaluation Summary: The patient is a 89 year old male admitted to home health services by SN on 2/16/2024 following IP Rehab stay which followed hospitalization for ST elevation myocardial infarction (STEMI) 12/2023 with rehab stay complicated by GI bleed of duodenal ulcer treated with endoscopic hemostasis.    Past Medical History: ST elevation myocardial infarction (STEMI) 12/1/2023, Anxiety, Benign prostatic hyperplasia, CAD (coronary artery disease) s/p CABG, Hyperlipidemia, Hypertension, Myocardial infarction, GI bleed of duodenal ulcer, on home o2 PRN, paroxysmal atrial fibrillation on chronic anticoagulation.    Prior Functional Level: Some limitations in ambulation distance but was able to live alone in his home safely and independently ambulate and transfer at will.    Social History: Lives alone Family and neighbors assist with meals.  Patient stated goal: \"Be able to walk better.\"    PT Assessment this day (2/19/2024) revealed the problems of general lower extremity weakness (bilateral lower extremity strength graded 4-/5 throughout with weakness noted to negatively impact balance, transfers and gait), impaired transfers (requires stand-by to minimal assistance), limited safe ambulation (80 feet with walker requiring from stand-by to minimal assistance), abnormal gait (short, discontinuous and shuffling steps), imbalance (high falls risk as shown by a low score of 8/28 on the Tinetti Balance Assessment).     The patient will require the PT interventions of therapeutic exercise, transfer training, gait training, and patient education (including home safety and home exercise program (HEP) instruction) to address the above noted problems.    Rehab potential good due to prior functional level and availability of caregiver assistance.    Plan is to see patient 2WK4, 1WK3 for PT then discharge with HEP to care of family.    Skilled Intervention this visit: PT assessment, therapeutic exercise, gait training, " transfer training, home exercise program (HEP) instruction.    Session Notes: Patient's granddaughter, Na present throughout session.    Plan for next visit: Continue and advance exercises as initiated. Work on tranfers technique and gait mechanics, advancing ambulation distance as indicated.

## 2024-02-21 VITALS
HEART RATE: 74 BPM | RESPIRATION RATE: 17 BRPM | DIASTOLIC BLOOD PRESSURE: 72 MMHG | TEMPERATURE: 98 F | OXYGEN SATURATION: 97 % | SYSTOLIC BLOOD PRESSURE: 118 MMHG

## 2024-02-22 ENCOUNTER — HOME CARE VISIT (OUTPATIENT)
Dept: HOME HEALTH SERVICES | Facility: HOME HEALTHCARE | Age: 89
End: 2024-02-22
Payer: MEDICARE

## 2024-02-22 PROCEDURE — G0157 HHC PT ASSISTANT EA 15: HCPCS

## 2024-02-22 NOTE — HOME HEALTH
Patient pleasant and cooperative.  No new complaints or falls  Patient PLOF independent with increased strength  Patient on 2l of continuous o2.  Patient biggest concern is shortness of beath    Patient could benefit from skilled OT services to address UE /pulmonary HEP    Patient in agreement with POC and goals

## 2024-02-23 ENCOUNTER — HOME CARE VISIT (OUTPATIENT)
Dept: HOME HEALTH SERVICES | Facility: HOME HEALTHCARE | Age: 89
End: 2024-02-23
Payer: MEDICARE

## 2024-02-23 ENCOUNTER — OFFICE VISIT (OUTPATIENT)
Dept: FAMILY MEDICINE CLINIC | Facility: CLINIC | Age: 89
End: 2024-02-23
Payer: MEDICARE

## 2024-02-23 ENCOUNTER — LAB (OUTPATIENT)
Dept: FAMILY MEDICINE CLINIC | Facility: CLINIC | Age: 89
End: 2024-02-23
Payer: MEDICARE

## 2024-02-23 VITALS
SYSTOLIC BLOOD PRESSURE: 124 MMHG | HEIGHT: 70 IN | BODY MASS INDEX: 20.19 KG/M2 | DIASTOLIC BLOOD PRESSURE: 80 MMHG | WEIGHT: 141 LBS | RESPIRATION RATE: 20 BRPM

## 2024-02-23 VITALS
HEART RATE: 77 BPM | SYSTOLIC BLOOD PRESSURE: 128 MMHG | OXYGEN SATURATION: 98 % | TEMPERATURE: 97.5 F | DIASTOLIC BLOOD PRESSURE: 78 MMHG

## 2024-02-23 DIAGNOSIS — K26.4 GASTROINTESTINAL HEMORRHAGE ASSOCIATED WITH DUODENAL ULCER: ICD-10-CM

## 2024-02-23 DIAGNOSIS — Z09 HOSPITAL DISCHARGE FOLLOW-UP: Primary | ICD-10-CM

## 2024-02-23 LAB
ALBUMIN SERPL-MCNC: 3.8 G/DL (ref 3.5–5.2)
ALBUMIN/GLOB SERPL: 1.4 G/DL
ALP SERPL-CCNC: 106 U/L (ref 39–117)
ALT SERPL W P-5'-P-CCNC: 21 U/L (ref 1–41)
ANION GAP SERPL CALCULATED.3IONS-SCNC: 15.1 MMOL/L (ref 5–15)
AST SERPL-CCNC: 20 U/L (ref 1–40)
BILIRUB SERPL-MCNC: 0.8 MG/DL (ref 0–1.2)
BUN SERPL-MCNC: 19 MG/DL (ref 8–23)
BUN/CREAT SERPL: 15.8 (ref 7–25)
CALCIUM SPEC-SCNC: 9.2 MG/DL (ref 8.2–9.6)
CHLORIDE SERPL-SCNC: 105 MMOL/L (ref 98–107)
CO2 SERPL-SCNC: 21.9 MMOL/L (ref 22–29)
CREAT SERPL-MCNC: 1.2 MG/DL (ref 0.76–1.27)
DEPRECATED RDW RBC AUTO: 51.5 FL (ref 37–54)
EGFRCR SERPLBLD CKD-EPI 2021: 56.4 ML/MIN/1.73
ERYTHROCYTE [DISTWIDTH] IN BLOOD BY AUTOMATED COUNT: 15 % (ref 12.3–15.4)
GLOBULIN UR ELPH-MCNC: 2.8 GM/DL
GLUCOSE SERPL-MCNC: 95 MG/DL (ref 65–99)
HCT VFR BLD AUTO: 37.4 % (ref 37.5–51)
HGB BLD-MCNC: 13.2 G/DL (ref 13–17.7)
MCH RBC QN AUTO: 33.2 PG (ref 26.6–33)
MCHC RBC AUTO-ENTMCNC: 35.3 G/DL (ref 31.5–35.7)
MCV RBC AUTO: 94 FL (ref 79–97)
PLATELET # BLD AUTO: 303 10*3/MM3 (ref 140–450)
PMV BLD AUTO: 12.5 FL (ref 6–12)
POTASSIUM SERPL-SCNC: 3.6 MMOL/L (ref 3.5–5.2)
PROT SERPL-MCNC: 6.6 G/DL (ref 6–8.5)
RBC # BLD AUTO: 3.98 10*6/MM3 (ref 4.14–5.8)
SODIUM SERPL-SCNC: 142 MMOL/L (ref 136–145)
WBC NRBC COR # BLD AUTO: 7.53 10*3/MM3 (ref 3.4–10.8)

## 2024-02-23 PROCEDURE — 85027 COMPLETE CBC AUTOMATED: CPT | Performed by: NURSE PRACTITIONER

## 2024-02-23 PROCEDURE — 36415 COLL VENOUS BLD VENIPUNCTURE: CPT

## 2024-02-23 PROCEDURE — 80053 COMPREHEN METABOLIC PANEL: CPT | Performed by: NURSE PRACTITIONER

## 2024-02-23 NOTE — PROGRESS NOTES
Chief Complaint  Transitional Care Management    Subjective          Sage Flores presents to [facility]  History of Present Illness    A-fib on chronic anticoagulation with apixaban, BPH, CAD (s/p STEMI with stent placed 12/3/23), HLD, and HTN, gerd, vit d def., iron def.    Was admitted to the hospital on 12/21/23 and discharged to skilled nursing care on 12/28/23  Is here today for hospital follow up    He was admitted with GI bleeding, malnutrition  Duodenal ulcer on EGD, treated with endoscopic hemostasis with epinephrine and hemoclip - eliquis and plavix restarted post procedure  Was transfused for anemia, iv antibiotics for leukocytosis, ?aspiration pneumonia  On protonix 40 mg bid  discharged on low dose bumex, losartan held for low BP, midodrine started    Current medicines are apap, eliquis, azelastine, vit d, plavix, colace, gabapentin, midodrine, remeron, o2, pantoprazole, flomax    New medicines since last visit (September 2023) are apap in place of asa, pantoprazole in place of omeprazole, losartan discontinued, midodrine added, diazepam discontinued, remeron added, O2 added    Have been advised to hold the midodrine if SBP is >= 135    He is using the home O2 only as needed    Home health has been established, he has nursing, PT/OT, someone is coming in 3 days/week    He had follow up with Dr. Domínguez on 2/14/24, plan follow up in 3 months    He is living at home alone, has multiple family members and neighbors checking in on him as well as cameras set up for monitoring  He is performing all self care     Review of Systems   Constitutional:         He endorses that he gets tired more easily   Respiratory:  Positive for shortness of breath.         Endorses some shortness of breath with activity   Cardiovascular: Negative.  Negative for chest pain.   Neurological:  Positive for weakness. Negative for dizziness.        Endorses weakness, gets tired easily  Is working  "with home health PT/OT   Psychiatric/Behavioral:  Negative for sleep disturbance.      Objective   Vital Signs:  /80   Resp 20   Ht 177.8 cm (70\")   Wt 64 kg (141 lb)   BMI 20.23 kg/m²     BP Readings from Last 3 Encounters:   02/23/24 124/80   02/22/24 128/78   02/20/24 118/72        Wt Readings from Last 3 Encounters:   02/23/24 64 kg (141 lb)   02/12/24 70.3 kg (155 lb)   12/26/23 61.3 kg (135 lb 2.3 oz)              Physical Exam  Vitals reviewed.   Constitutional:       Appearance: Normal appearance.   Neck:      Vascular: No carotid bruit.   Cardiovascular:      Rate and Rhythm: Normal rate and regular rhythm.      Heart sounds: Normal heart sounds.   Pulmonary:      Effort: Pulmonary effort is normal.      Breath sounds: Normal breath sounds.   Musculoskeletal:         General: Normal range of motion.      Cervical back: Neck supple.      Right lower leg: No edema.      Left lower leg: No edema.   Neurological:      Mental Status: He is alert and oriented to person, place, and time.        Result Review :     CMP          12/24/2023    07:08 12/24/2023    22:59 12/26/2023    04:50 12/26/2023    20:01 12/27/2023    04:36 12/27/2023    10:44 12/27/2023    23:39   CMP   Glucose 103  108  238  136  129   95    BUN 90  71  41  31  28   22    Creatinine 1.71  1.69  1.46  1.22  1.16   1.10    EGFR 36.9  37.4  44.6  55.3  58.7   62.6    Sodium 149  151  148  147  142   142    Potassium 3.4  3.1  3.7  3.1  3.4  4.2  4.1    Chloride 112  113  111  108  106   110    Calcium 8.7  8.7  8.0  8.1  7.9   7.8    Total Protein 4.9  5.0  4.3   4.4   4.5    Albumin 3.2  3.2  2.7   2.9   2.7    Globulin 1.7  1.8  1.6   1.5   1.8    Total Bilirubin 0.6  0.5  0.5   0.5   0.6    Alkaline Phosphatase 70  78  71   71   72    AST (SGOT) 19  32  26   28   22    ALT (SGPT) 20  32  32   30   25    Albumin/Globulin Ratio 1.9  1.8  1.7   1.9   1.5    BUN/Creatinine Ratio 52.6  42.0  28.1  25.4  24.1   20.0    Anion Gap 12.0  11.0  " 8.0  12.0  9.0   8.0      CBC          12/24/2023    07:08 12/24/2023    22:59 12/26/2023    04:50 12/27/2023    04:36 12/27/2023    23:39   CBC   WBC 31.70  27.10  17.20  15.60  15.10    RBC 3.02  2.82  2.35  2.40  2.92    Hemoglobin 9.2  8.6  7.2  7.7  9.2    Hematocrit 28.4  26.4  21.8  22.7  27.5    MCV 94.2  93.3  92.7  94.5  94.3    MCH 30.5  30.5  30.6  32.1  31.4    MCHC 32.4  32.6  33.0  33.9  33.3    RDW 16.6  16.7  16.4  16.0  16.3    Platelets 149  143  126  133  134                Assessment and Plan    Diagnoses and all orders for this visit:    1. Hospital discharge follow-up (Primary)    2. Gastrointestinal hemorrhage associated with duodenal ulcer  -     CBC No Differential; Future  -     Comprehensive metabolic panel; Future           Follow Up   Return in about 3 months (around 5/23/2024) for Medicare Wellness.  Patient was given instructions and counseling regarding his condition or for health maintenance advice. Please see specific information pulled into the AVS if appropriate.

## 2024-02-26 ENCOUNTER — HOME CARE VISIT (OUTPATIENT)
Dept: HOME HEALTH SERVICES | Facility: HOME HEALTHCARE | Age: 89
End: 2024-02-26
Payer: MEDICARE

## 2024-02-26 ENCOUNTER — TELEPHONE (OUTPATIENT)
Dept: FAMILY MEDICINE CLINIC | Facility: CLINIC | Age: 89
End: 2024-02-26
Payer: MEDICARE

## 2024-02-26 PROCEDURE — G0152 HHCP-SERV OF OT,EA 15 MIN: HCPCS

## 2024-02-26 PROCEDURE — G0157 HHC PT ASSISTANT EA 15: HCPCS

## 2024-02-26 NOTE — TELEPHONE ENCOUNTER
----- Message from ROSA Fraga sent at 2/26/2024 12:11 PM EST -----  Please call with results, thank you  The metabolic panel labs are improved, calcium is back to normal and protein is up, would continue the protein shakes.  His kidney functions are a little off, would also encourage him to stay well hydrated.  The blood counts are improved, and look to be returning to normal.

## 2024-02-27 ENCOUNTER — HOME CARE VISIT (OUTPATIENT)
Dept: HOME HEALTH SERVICES | Facility: HOME HEALTHCARE | Age: 89
End: 2024-02-27
Payer: MEDICARE

## 2024-02-27 VITALS
DIASTOLIC BLOOD PRESSURE: 80 MMHG | TEMPERATURE: 97.8 F | SYSTOLIC BLOOD PRESSURE: 126 MMHG | HEART RATE: 72 BPM | OXYGEN SATURATION: 97 %

## 2024-02-27 VITALS
DIASTOLIC BLOOD PRESSURE: 62 MMHG | TEMPERATURE: 97.6 F | HEART RATE: 58 BPM | OXYGEN SATURATION: 97 % | SYSTOLIC BLOOD PRESSURE: 124 MMHG | RESPIRATION RATE: 16 BRPM

## 2024-02-27 VITALS — SYSTOLIC BLOOD PRESSURE: 114 MMHG | DIASTOLIC BLOOD PRESSURE: 70 MMHG

## 2024-02-27 PROCEDURE — G0299 HHS/HOSPICE OF RN EA 15 MIN: HCPCS

## 2024-02-28 NOTE — HOME HEALTH
Routine Visit Note:CHRISTAL SEEN 2/27/24 FOR ROUTINE SKILLED NURSE VISIT. PATIENT AND CAREGIVER TAUGHT ABOUT WATCHING FOR BLOOD IN URINE AND STOOL. PATIENT EDUCATED ABOUT PRESSURE RELIEF. REVIEWED NEED FOR O2 AND WHEN TO CALL FOR HELP. PATIENT IS ALERT AND ORIENTED X4, CONTINENT OF BOWEL AND BLADDER WITH LAST BM 2/27/24,  VS WNL, LUNGS CLEAR, SKIN IS CLEAR OF NEW AREAS, DENIES FALLS, DENIES PAIN, DENIES MEDICATION CHANGES, AND BOTTLES REVIEWED A FEW DOSING CHANGES MADE TO MAR. PATIENT UNDERSTANDS HIGH RISK MEDICATION TEACHING WELL AND STATED UNDERSTANDING.     TEACH BACK: PRESSURE RELIEF, HOW TO PUT CREAM ON SACRAL AREA, WHEN TO CALL HH, MEDICATION ADMINISTRATION    HOMEBOUND STATUS: IS HOMEBOUND DUE TO WEAKNESS, AMBULATION REQUIRES ASSISTANCE, LIMITED ENDURANCE, POOR COORDINATION, DIFFICULTY AMBULATING, GAIT LIMITED TO HOUSEHOLD DISTANCES, IMPAIRED DRIVING ABILITY, MECHANICAL ASSISITANCE, FALL RISK, AND IMPAIRED GAIT.      Skill/education provided: CARDIOPULMONARY ASSESSMENT, GASTROINTESTINAL ASSESSMENT, SKIN ASSESSMENT, SAFETY ASSESSMENT, PAIN ASSESSMENT, MEDICATION ASSESSMENT     Patient/caregiver response: PATIENT STATED UNDERSTANDING OF ALL EDUCATION, AND TOLERATED ALL PROCEDURES WITHOUT COMPLAINT    Plan for next visit: CARDIOPULMONARY ASSESSMENT, GASTROINTESTINAL ASSESSMENT, SKIN ASSESSMENT, SAFETY ASSESSMENT, PAIN ASSESSMENT, MEDICATION ASSESSMENT      Other pertinent info: NA

## 2024-02-29 ENCOUNTER — HOME CARE VISIT (OUTPATIENT)
Dept: HOME HEALTH SERVICES | Facility: HOME HEALTHCARE | Age: 89
End: 2024-02-29
Payer: MEDICARE

## 2024-02-29 VITALS
TEMPERATURE: 97.9 F | HEART RATE: 70 BPM | SYSTOLIC BLOOD PRESSURE: 102 MMHG | DIASTOLIC BLOOD PRESSURE: 66 MMHG | RESPIRATION RATE: 18 BRPM

## 2024-02-29 PROCEDURE — G0151 HHCP-SERV OF PT,EA 15 MIN: HCPCS

## 2024-02-29 NOTE — HOME HEALTH
Routine Visit Note    Skilled Interventions: Therapeutic exercise, gait training, transfer training, home exercise program (HEP) instruction.    Patient Response: Patient participates very well in session but requires 3 sitting rest breaks to complete the exercises. Patient denies pain or other lasting problem post-exercise. Patient and caregiver verbalize intention to perform HEP as recommended.    Session Notes: Seen by MD Friday, 2/23/2024 - no changes in medication.    Plan for next visit: Continue with exercises as performed this day with advancement per patient ability and tolerance. Advance ambulation distance as able.

## 2024-03-01 ENCOUNTER — HOME CARE VISIT (OUTPATIENT)
Dept: HOME HEALTH SERVICES | Facility: HOME HEALTHCARE | Age: 89
End: 2024-03-01
Payer: MEDICARE

## 2024-03-01 VITALS
HEART RATE: 90 BPM | RESPIRATION RATE: 16 BRPM | OXYGEN SATURATION: 96 % | SYSTOLIC BLOOD PRESSURE: 112 MMHG | DIASTOLIC BLOOD PRESSURE: 66 MMHG | TEMPERATURE: 98.2 F

## 2024-03-01 PROCEDURE — G0299 HHS/HOSPICE OF RN EA 15 MIN: HCPCS

## 2024-03-01 NOTE — HOME HEALTH
Routine Visit Note: Patient resting in chair at time of SN arrival. Family member present at visit. Patient states that he is doing well today. Denies recent medical changes. Denies recent falls. No open wounds present. Patient denies any recent chest pain or pressure. Vitals WNL. States that he thinks he is getting stronger from PT and OT.     Skill/education provided: CP assess, falls/safety assess, med teaching/assess for changes, cardiac teaching/assess    Patient/caregiver response: patient able to partially teach back education    Plan for next visit: CP assess, falls/safety assess, med teaching/assess for changes, cardiac assessment/teaching, assess for open wounds    Other pertinent info: NA

## 2024-03-04 ENCOUNTER — HOME CARE VISIT (OUTPATIENT)
Dept: HOME HEALTH SERVICES | Facility: HOME HEALTHCARE | Age: 89
End: 2024-03-04
Payer: MEDICARE

## 2024-03-04 PROCEDURE — G0157 HHC PT ASSISTANT EA 15: HCPCS

## 2024-03-05 ENCOUNTER — HOME CARE VISIT (OUTPATIENT)
Dept: HOME HEALTH SERVICES | Facility: HOME HEALTHCARE | Age: 89
End: 2024-03-05
Payer: MEDICARE

## 2024-03-05 VITALS
TEMPERATURE: 96.9 F | SYSTOLIC BLOOD PRESSURE: 130 MMHG | DIASTOLIC BLOOD PRESSURE: 82 MMHG | HEART RATE: 79 BPM | OXYGEN SATURATION: 99 %

## 2024-03-05 VITALS
HEART RATE: 68 BPM | RESPIRATION RATE: 16 BRPM | TEMPERATURE: 97.6 F | SYSTOLIC BLOOD PRESSURE: 112 MMHG | DIASTOLIC BLOOD PRESSURE: 66 MMHG | OXYGEN SATURATION: 97 %

## 2024-03-05 PROCEDURE — G0299 HHS/HOSPICE OF RN EA 15 MIN: HCPCS

## 2024-03-05 NOTE — HOME HEALTH
Routine Visit Note: Patient resting in chair at time of SN arrival. Patient states that he is doing pretty good today. Denies recent medical changes. States that he can tell his legs are getting stronger from his PT. Patient denies recent falls. Denies elimination concerns. States that his appeite is good and that he is not losing any weight. No edema noted to BLE. Patient states that he continues wearing his home o2 as needed. States that he has not been needing it as much lately. Denies any heart palpiations or chest discomfort. Vitals WNL.    Skill/education provided: CP assess, falls/safety assess, med teaching/assess for changes, edema monitoring, cardiac assessment/education    Patient/caregiver response: patient able to partially teach back education    Plan for next visit: CP assess, falls/safety assess, med teaching/assess for changes, assess for edema, assess for chest discomfort/heart palpiations    Other pertinent info: NA

## 2024-03-07 ENCOUNTER — HOME CARE VISIT (OUTPATIENT)
Dept: HOME HEALTH SERVICES | Facility: HOME HEALTHCARE | Age: 89
End: 2024-03-07
Payer: MEDICARE

## 2024-03-07 PROCEDURE — G0157 HHC PT ASSISTANT EA 15: HCPCS

## 2024-03-07 PROCEDURE — G0152 HHCP-SERV OF OT,EA 15 MIN: HCPCS

## 2024-03-08 VITALS
TEMPERATURE: 97.6 F | OXYGEN SATURATION: 99 % | DIASTOLIC BLOOD PRESSURE: 78 MMHG | HEART RATE: 60 BPM | SYSTOLIC BLOOD PRESSURE: 124 MMHG

## 2024-03-09 VITALS
TEMPERATURE: 98 F | RESPIRATION RATE: 17 BRPM | OXYGEN SATURATION: 96 % | SYSTOLIC BLOOD PRESSURE: 124 MMHG | DIASTOLIC BLOOD PRESSURE: 72 MMHG | HEART RATE: 60 BPM

## 2024-03-09 NOTE — HOME HEALTH
Patient pleasant and cooperative  No new complaints or falls    Patient participated in HEP incorporating pulmonary exercises using breather    Patient performed return demonstration with use of device with min cues    Instructed patient on energy conservation techniques during ADLS    Continue OT per POC

## 2024-03-11 ENCOUNTER — HOME CARE VISIT (OUTPATIENT)
Dept: HOME HEALTH SERVICES | Facility: HOME HEALTHCARE | Age: 89
End: 2024-03-11
Payer: MEDICARE

## 2024-03-11 PROCEDURE — G0157 HHC PT ASSISTANT EA 15: HCPCS

## 2024-03-12 ENCOUNTER — HOME CARE VISIT (OUTPATIENT)
Dept: HOME HEALTH SERVICES | Facility: HOME HEALTHCARE | Age: 89
End: 2024-03-12
Payer: MEDICARE

## 2024-03-12 VITALS
OXYGEN SATURATION: 96 % | HEART RATE: 79 BPM | TEMPERATURE: 96.9 F | DIASTOLIC BLOOD PRESSURE: 80 MMHG | SYSTOLIC BLOOD PRESSURE: 126 MMHG

## 2024-03-12 PROCEDURE — G0299 HHS/HOSPICE OF RN EA 15 MIN: HCPCS

## 2024-03-12 RX ORDER — MIRTAZAPINE 15 MG/1
15 TABLET, FILM COATED ORAL NIGHTLY
Qty: 90 TABLET | Refills: 1 | Status: SHIPPED | OUTPATIENT
Start: 2024-03-12

## 2024-03-12 NOTE — TELEPHONE ENCOUNTER
Incoming Refill Request      Medication requested (name and dose):   mirtazapine (REMERON) 15 MG tablet  7.5 mg, Nightly       Pharmacy where request should be sent:   [id number] - [place], [place] - [address] - [phone] PH - [phone]       Additional details provided by patient: PATIENT'S DAUGHTER CALLED TO REQUEST A REFILL ON THIS, SHE IS NOT SURE IF HE NEEDS IT OR NOT BUT DID NOT WANT TO LET HIM RUN OUT    Best call back number: [phone]    Does the patient have less than a 3 day supply:  [x] Yes  [] No    Juhi Wong Rep  03/12/24, 09:46 EDT

## 2024-03-12 NOTE — HOME HEALTH
Routine Visit Note: Patient resting in chair at time of SN arrival. Daughter present at visit. Patient states that he is doing ok. Denies recent changes. Daughter manages meds and has no questions or concerns at this time. Patient denies recent falls. Denies elimination concerns. Vitals WNL.    Skill/education provided: CP assess, falls/safety assess, med teaching/assess for changes, edema monitoring/assess, CHF education    Patient/caregiver response: patient able to partially teach back education    Plan for next visit: CP assess, falls/safety assess, med teaching/assess for changes, cardiac assessment/education, edema monitoring    Other pertinent info: NA

## 2024-03-13 ENCOUNTER — HOME CARE VISIT (OUTPATIENT)
Dept: HOME HEALTH SERVICES | Facility: HOME HEALTHCARE | Age: 89
End: 2024-03-13
Payer: MEDICARE

## 2024-03-13 VITALS
SYSTOLIC BLOOD PRESSURE: 124 MMHG | RESPIRATION RATE: 17 BRPM | TEMPERATURE: 98.2 F | HEART RATE: 80 BPM | OXYGEN SATURATION: 94 % | DIASTOLIC BLOOD PRESSURE: 68 MMHG

## 2024-03-13 PROCEDURE — G0152 HHCP-SERV OF OT,EA 15 MIN: HCPCS

## 2024-03-14 ENCOUNTER — HOME CARE VISIT (OUTPATIENT)
Dept: HOME HEALTH SERVICES | Facility: HOME HEALTHCARE | Age: 89
End: 2024-03-14
Payer: MEDICARE

## 2024-03-14 VITALS
SYSTOLIC BLOOD PRESSURE: 130 MMHG | DIASTOLIC BLOOD PRESSURE: 70 MMHG | RESPIRATION RATE: 16 BRPM | OXYGEN SATURATION: 99 % | HEART RATE: 80 BPM | TEMPERATURE: 97.2 F

## 2024-03-14 PROCEDURE — G0157 HHC PT ASSISTANT EA 15: HCPCS

## 2024-03-14 NOTE — HOME HEALTH
Patient is doing well today.  Increased strength and indepence    Reviewed UE HEP and pulmonary HEP    Independent with UB and LB dressing    No new falls    Plan to dc next visit

## 2024-03-15 VITALS
DIASTOLIC BLOOD PRESSURE: 80 MMHG | HEART RATE: 78 BPM | SYSTOLIC BLOOD PRESSURE: 128 MMHG | TEMPERATURE: 97.5 F | OXYGEN SATURATION: 99 %

## 2024-03-15 RX ORDER — MIDODRINE HYDROCHLORIDE 5 MG/1
5 TABLET ORAL
Qty: 90 TABLET | Refills: 0 | Status: CANCELLED | OUTPATIENT
Start: 2024-03-15

## 2024-03-18 ENCOUNTER — HOME CARE VISIT (OUTPATIENT)
Dept: HOME HEALTH SERVICES | Facility: HOME HEALTHCARE | Age: 89
End: 2024-03-18
Payer: MEDICARE

## 2024-03-18 PROCEDURE — G0151 HHCP-SERV OF PT,EA 15 MIN: HCPCS

## 2024-03-19 ENCOUNTER — TELEPHONE (OUTPATIENT)
Dept: FAMILY MEDICINE CLINIC | Facility: CLINIC | Age: 89
End: 2024-03-19

## 2024-03-19 ENCOUNTER — HOME CARE VISIT (OUTPATIENT)
Dept: HOME HEALTH SERVICES | Facility: HOME HEALTHCARE | Age: 89
End: 2024-03-19
Payer: MEDICARE

## 2024-03-19 VITALS
TEMPERATURE: 97.7 F | DIASTOLIC BLOOD PRESSURE: 68 MMHG | HEART RATE: 81 BPM | RESPIRATION RATE: 16 BRPM | SYSTOLIC BLOOD PRESSURE: 114 MMHG | OXYGEN SATURATION: 99 %

## 2024-03-19 VITALS
OXYGEN SATURATION: 95 % | DIASTOLIC BLOOD PRESSURE: 64 MMHG | RESPIRATION RATE: 18 BRPM | HEART RATE: 70 BPM | SYSTOLIC BLOOD PRESSURE: 122 MMHG | TEMPERATURE: 97 F

## 2024-03-19 PROCEDURE — G0299 HHS/HOSPICE OF RN EA 15 MIN: HCPCS

## 2024-03-19 NOTE — HOME HEALTH
--- START 30 day Assessment ---  Clinical condition of patient at initial or last assessment:  Initial PT Assessment (2/19/2024) revealed the problems of general lower extremity weakness (bilateral lower extremity strength graded 4-/5 throughout with weakness noted to negatively impact balance, transfers and gait), impaired transfers (required stand-by to minimal assistance), limited safe ambulation (80 feet with walker requiring from stand-by to minimal assistance), abnormal gait (short, discontinuous and shuffling steps), imbalance (high falls risk as shown by a low score of 8/28 on the Tinetti Balance Assessment).     Current clinical condition of patient:  PT Assessment this day (3/18/2024) reveals the problems of general lower extremity weakness (bilateral lower extremity strength graded 4/5 throughout with weakness noted to negatively impact balance, transfers and gait), impaired transfers (requires stand-by assistance), limited safe ambulation (100 feet with walker requiring stand-by assistance), abnormal gait (intermittently shuffling steps), imbalance (high falls risk as shown by a low score of 18/28 on the Tinetti Balance Assessment).     Overall progress towards measurable treatment goals:  - Improved household ambulation with patient demonstrating the ability to independently ambulate 150 feet using walker and exhibiting no shuffling and functional step lengths to improve home and light community level mobility within 7 weeks.  Initial: Limited to 80 feet ambulation requiring walker and from stand-by to minimal assistance  Current: Ambulates up to 100 feet with walker requiring no more than stand-by assistance for safety.    Patient will demonstrate improved balance to decrease falls risk by an increase on the Tinetti Balance Assessment score from 8/28 to 20/28 within 7 weeks.  - Initial: Patient demonstrates a high falls risk as shown by a low score of 8/28 on the Tinetti Balance Assessment.  Current:  High falls risk persists but improved balance noted by an increase on the Tinetti Balance Assessment score to 18/28.    - Patient will demonstrate independence with sit to stand, bed to chair and toilet transfers within 7 weeks.  Initial: In transfers patient required stand-by to minimal assistance.  Current: Patient requires stand-by assistance for safety in transfers.    - Patient will exhibit bilateral lower extremity strength 4+ to 5/5 throughout to improve transfers, balance and gait within 7 weeks.  Initial: Bilateral lower extremity strength graded 4-/5 throughout with weakness noted to negatively impact balance, transfers and gait  Current: Bilateral lower extremity strength graded 4/5 throughout with weakness noted to negatively impact balance, transfers and gait.    Effectiveness of current plan:  PT effective as shown by progress to date as detailed this note.    Plan for continuing or discontinuing service:  Will continue PT remainder of current PT plan of care - 1WK2 then discharge to [facility].    Changes to goals or care plan:  No need to changes to plan of care at this time.    Statement of expectation of continued progress toward goals:  Patient expected to meet most or all of established PT goals and be safe to discharge to [facility] and care of family.    Necessity of continue physical therapy:  Continue PT necessary to help patient reach high functional level to decrease falls risk and allow him to remain living in his home safely  --- END 30 day Assessment ---    Skilled Interventions: PT assessment, therapeutic exercise, gait training, patient instruction (home safety and HEP instruction).    Patient Response: Patient denies exhaustion with exercise but does admit to fatigue that resolves with sitting rest. Patient verbalizes intention to perform HEP as recommended.    Session Notes: Patient asserts that he is getting better. Reports that family allow him to live alone but with regular visit. He denies  anyone staying at night with him in the home.    Plan for next visit: Continue with exercise as established, continue working on HEP independence and gait advancement.

## 2024-03-19 NOTE — HOME HEALTH
Routine Visit Note: Patient resting in chair at time of SN arrival. Patient states that he is doing ok today. Denies recent medical changes. States that he is tired today. Patient denies any medical changes or concerns. Denies s/s of active bleeding. Patient denies recent falls. Denies elimination concerns. No edema to BLE. Patient denies chest pain or discomfort. Denies heart palpitations. Vitals WNL.    Skill/education provided: CP assess, falls/safety assess, med teaching/assess for changes, cardiac assessment and education    Patient/caregiver response: patient able to partially teach back education    Plan for next visit: CP assess, falls/safety assess, med teaching/assess, assess for recent chest discomfort, heart racing, assess recent vitals    Other pertinent info: NA

## 2024-03-19 NOTE — TELEPHONE ENCOUNTER
Caller: TODD BAIN    Relationship: Emergency Contact    Best call back number:     [phone] (Mobile       What medication are you requesting: BUMETANIDE 1MG   AND   PANTOPRAZOLE 40 MG     PRESCRIBED IN [facility]     Have you had these symptoms before:    [x] Yes  [] No    Have you been treated for these symptoms before:   [x] Yes  [] No    If a prescription is needed, what is your preferred pharmacy and phone number: [id number] - [place], [place] - [address]  - [phone]  - [phone] FX     Additional notes:

## 2024-03-20 NOTE — TELEPHONE ENCOUNTER
I do not know if he needs to continue with these medicines.  I would have him ask GI if he needs to continue the pantoprazole and if so for how long, and I would have him ask the cardiologist if he needs to continue the bumex and if so for how long.

## 2024-03-21 ENCOUNTER — TELEPHONE (OUTPATIENT)
Dept: CARDIOLOGY | Facility: CLINIC | Age: 89
End: 2024-03-21
Payer: MEDICARE

## 2024-03-21 ENCOUNTER — HOME CARE VISIT (OUTPATIENT)
Dept: HOME HEALTH SERVICES | Facility: HOME HEALTHCARE | Age: 89
End: 2024-03-21
Payer: MEDICARE

## 2024-03-21 ENCOUNTER — PATIENT MESSAGE (OUTPATIENT)
Dept: CARDIOLOGY | Facility: CLINIC | Age: 89
End: 2024-03-21
Payer: MEDICARE

## 2024-03-21 PROCEDURE — G0152 HHCP-SERV OF OT,EA 15 MIN: HCPCS

## 2024-03-21 RX ORDER — BUMETANIDE 1 MG/1
1 TABLET ORAL DAILY
Qty: 90 TABLET | Refills: 3 | Status: SHIPPED | OUTPATIENT
Start: 2024-03-21

## 2024-03-21 RX ORDER — MIDODRINE HYDROCHLORIDE 5 MG/1
5 TABLET ORAL
Qty: 90 TABLET | Refills: 0 | Status: SHIPPED | OUTPATIENT
Start: 2024-03-21 | End: 2024-03-22

## 2024-03-21 NOTE — TELEPHONE ENCOUNTER
"Patient's daughter sent the following Biodesy message,     \"Susy,  I'm Tristan's daughter and POA, he was dismissed from [facility] last month with a new regimen of prescriptions established by the doctor inhouse, two of which are due for refill. Steve at [facility] [place] denied them pending a doctor's approval. My father's PCP said to reach out to your office regarding them.  The rxs are : Bumetanide, 1 MG and                         Pantoprazole 40 MG.     Thank you,  Alicja Osborne  [phone] \"      I advised her she will need to speak with PCP regarding Pantoprazole but would verify if it is ok that you start following the Bumex and then we can send it in.       Please advise.     "

## 2024-03-21 NOTE — TELEPHONE ENCOUNTER
Rx Refill Note  Requested Prescriptions     Signed Prescriptions Disp Refills    bumetanide (BUMEX) 1 MG tablet 90 tablet 3     Sig: Take 1 tablet by mouth Daily.     Authorizing Provider: APOLINAR ODEN     Ordering User: KASSIDY SCHULTE      Last office visit with prescribing clinician: 2/12/2024   Last telemedicine visit with prescribing clinician: Visit date not found   Next office visit with prescribing clinician: 5/20/2024                         Would you like a call back once the refill request has been completed: [] Yes [] No    If the office needs to give you a call back, can they leave a voicemail: [] Yes [] No    Kassidy Schulte MA  03/21/24, 16:17 EDT

## 2024-03-22 RX ORDER — MIDODRINE HYDROCHLORIDE 5 MG/1
5 TABLET ORAL
Qty: 270 TABLET | Refills: 1 | Status: SHIPPED | OUTPATIENT
Start: 2024-03-22

## 2024-03-23 VITALS
OXYGEN SATURATION: 96 % | RESPIRATION RATE: 17 BRPM | TEMPERATURE: 98 F | DIASTOLIC BLOOD PRESSURE: 72 MMHG | SYSTOLIC BLOOD PRESSURE: 124 MMHG | HEART RATE: 74 BPM

## 2024-03-23 NOTE — HOME HEALTH
Patient is being discharged from OT services.    All goals have been met at this time    Patient is now functioning at [facility] with HEP

## 2024-03-25 ENCOUNTER — HOME CARE VISIT (OUTPATIENT)
Dept: HOME HEALTH SERVICES | Facility: HOME HEALTHCARE | Age: 89
End: 2024-03-25
Payer: MEDICARE

## 2024-03-25 PROCEDURE — G0157 HHC PT ASSISTANT EA 15: HCPCS

## 2024-03-26 ENCOUNTER — HOME CARE VISIT (OUTPATIENT)
Dept: HOME HEALTH SERVICES | Facility: HOME HEALTHCARE | Age: 89
End: 2024-03-26
Payer: MEDICARE

## 2024-03-26 VITALS
OXYGEN SATURATION: 97 % | HEART RATE: 80 BPM | SYSTOLIC BLOOD PRESSURE: 124 MMHG | TEMPERATURE: 97.7 F | DIASTOLIC BLOOD PRESSURE: 70 MMHG | RESPIRATION RATE: 7 BRPM

## 2024-03-26 PROCEDURE — G0299 HHS/HOSPICE OF RN EA 15 MIN: HCPCS

## 2024-03-26 NOTE — HOME HEALTH
Routine Visit Note: Patient and daughter present at SN arrival. Patient states that he is doing ok today. States that his R eye gramajo a lot. States that he was rubbing it earlier and it got really red. Skin around R eye is slightly red. Sclera appears normal. SN advised patient to try to not rub or scratch eye with hands. Patient denies any new cardiac s/s. Denies recent falls. Denies elimination concerns. Vitals WNL.    Skill/education provided: CP assess, falls.safety assess, med teaching/assess for changes, cardiac assessment/education    Patient/caregiver response: patient able to partially teach back education    Plan for next visit: CP assess, falls/safety assess, med teaching/assess for changes, cardiac teaching/assess for exacerbation s/s, asses if R eye is still itching/red  ,  Other pertinent info: NA

## 2024-03-27 ENCOUNTER — HOME CARE VISIT (OUTPATIENT)
Dept: HOME HEALTH SERVICES | Facility: HOME HEALTHCARE | Age: 89
End: 2024-03-27
Payer: MEDICARE

## 2024-03-27 VITALS
SYSTOLIC BLOOD PRESSURE: 128 MMHG | DIASTOLIC BLOOD PRESSURE: 78 MMHG | OXYGEN SATURATION: 99 % | HEART RATE: 60 BPM | TEMPERATURE: 97.6 F

## 2024-03-27 NOTE — CASE COMMUNICATION
Discharge Decision:      Plan for discharge: Plan is to discharge patient from PT after session on 4/1/2024 as scheduled planned.    Barriers to discharge: None    Ongoing needs: Continued HEP performance.    Any referrals needed: No    Any declines in outcomes: No declines. Patient has made notable progress through PT and should be able to continue with HEP.    Teaching needed prior to discharge: HEP finalization    Assign OASIS discha rge responsibility: Patient remains under care of SN and SN will likely do OASIS discharge.

## 2024-04-01 ENCOUNTER — HOME CARE VISIT (OUTPATIENT)
Dept: HOME HEALTH SERVICES | Facility: HOME HEALTHCARE | Age: 89
End: 2024-04-01
Payer: MEDICARE

## 2024-04-01 VITALS
RESPIRATION RATE: 20 BRPM | TEMPERATURE: 98.5 F | DIASTOLIC BLOOD PRESSURE: 64 MMHG | SYSTOLIC BLOOD PRESSURE: 122 MMHG | OXYGEN SATURATION: 96 % | HEART RATE: 62 BPM

## 2024-04-01 PROCEDURE — G0151 HHCP-SERV OF PT,EA 15 MIN: HCPCS

## 2024-04-01 NOTE — HOME HEALTH
"PT Discharge Summary: The patient is a 89 year old male admitted to home health services by SN on 2/16/2024 following IP Rehab stay which followed hospitalization for ST elevation myocardial infarction (STEMI) 12/2023 with rehab stay complicated by GI bleed of duodenal ulcer treated with endoscopic hemostasis.    Initial PT Assessment (2/19/2024) revealed the problems of general lower extremity weakness (bilateral lower extremity strength graded 4-/5 throughout with weakness noted to negatively impact balance, transfers and gait), impaired transfers (requires stand-by to minimal assistance), limited safe ambulation (80 feet with walker requiring from stand-by to minimal assistance), abnormal gait (short, discontinuous and shuffling steps), imbalance (high falls risk as shown by a low score of 8/28 on the Tinetti Balance Assessment).     The patient accepted the PT interventions of therapeutic exercise, transfer training, gait training and patient education (including home safety and home exercise program (HEP) instruction) meeting all established goals.    Social History: Lives alone Family and neighbors assist with meals and family stays with patient throughout the day as needed.    Current Functional Level: Independent in ambulation in the home up to 150 feet, independent in household transfers.    Patient discharged from home health PT after session this day as planned and per patient consent. At time of PT discipline discharge the patient remained under the care of home health skilled nursing.    Upcoming Medical Appointment:  Dr. Garcia on 4/17/2024    Skilled Interventions: PT Assessment, therapeutic exercise, gait training, home exercise program (HEP) finalization.    Patient Response: Patient demonstrates independence with final HEP.    Session Notes: Patient consents to discharge from PT after session today as planned. When questioned if he wants PT to discharge the patient responds \"Yes\" and indicates that " he can exercise himself now.

## 2024-04-01 NOTE — CASE COMMUNICATION
PT Discharge Summary: The patient is a 89 year old male admitted to home health services by SN on 2/16/2024 following IP Rehab stay which followed hospitalization for ST elevation myocardial infarction (STEMI) 12/2023 with rehab stay complicated by GI bleed of duodenal ulcer treated with endoscopic hemostasis.    Initial PT Assessment (2/19/2024) revealed the problems of general lower extremity weakness (bilateral lower extremity streng th graded 4-/5 throughout with weakness noted to negatively impact balance, transfers and gait), impaired transfers (requires stand-by to minimal assistance), limited safe ambulation (80 feet with walker requiring from stand-by to minimal assistance), abnormal gait (short, discontinuous and shuffling steps), imbalance (high falls risk as shown by a low score of 8/28 on the Tinetti Balance Assessment).     The patient accepted the PT int erventions of therapeutic exercise, transfer training, gait training and patient education (including home safety and home exercise program (HEP) instruction) meeting all established goals.    Social History: Lives alone Family and neighbors assist with meals and family stays with patient throughout the day as needed.    Current Functional Level: Independent in ambulation in the home up to 150 feet, independent in household transfers.     Patient discharged from home health PT after session this day as planned and per patient consent. At time of PT discipline discharge the patient remained under the care of home health skilled nursing.    Upcoming Medical Appointment:  Dr. Garcia on 4/17/2024

## 2024-04-03 ENCOUNTER — HOME CARE VISIT (OUTPATIENT)
Dept: HOME HEALTH SERVICES | Facility: HOME HEALTHCARE | Age: 89
End: 2024-04-03
Payer: MEDICARE

## 2024-04-03 VITALS
HEART RATE: 86 BPM | RESPIRATION RATE: 17 BRPM | OXYGEN SATURATION: 99 % | DIASTOLIC BLOOD PRESSURE: 72 MMHG | SYSTOLIC BLOOD PRESSURE: 112 MMHG | TEMPERATURE: 97.8 F

## 2024-04-03 PROCEDURE — G0299 HHS/HOSPICE OF RN EA 15 MIN: HCPCS

## 2024-04-03 NOTE — HOME HEALTH
Routine Visit Note: Patient and daughter present at SN visit. States that patient has had a rough morning. Daughter states that patient has been dizzy and just has not felt well today. States that patient has not eaten anything since around 5pm yesterday. All vitals WNL. Daughter states that patient is out of pantoprazole and has been having increased difficulty swallowing certain foods. States that a SLP was working with patient in rehab and has previously been on mechanical soft diet due to swallaowing issues. Daughter states that the MD will not renew rx until patient is seen in the office. Patient has appointment with Dr. Fowler on Mon. Daughter will ask MD if patient would benefit from SLP.     Skill/education provided: CP assess, falls/safety assess, med teaching/assess for changes, hypoglycemia s/s education, cardiac assessment/education    Patient/caregiver response: patient able to partially teach back education    Plan for next visit: CP assess, falls/safety assess, med teaching/assess for changes, assess for dizzy episodes, assess appetite/hydration status, f/u on GI appointment    Other pertinent info: NA

## 2024-04-08 ENCOUNTER — OFFICE (OUTPATIENT)
Dept: URBAN - METROPOLITAN AREA CLINIC 64 | Facility: CLINIC | Age: 89
End: 2024-04-08

## 2024-04-08 VITALS
HEART RATE: 83 BPM | WEIGHT: 130 LBS | HEIGHT: 71 IN | SYSTOLIC BLOOD PRESSURE: 114 MMHG | DIASTOLIC BLOOD PRESSURE: 62 MMHG

## 2024-04-08 DIAGNOSIS — Z87.11 PERSONAL HISTORY OF PEPTIC ULCER DISEASE: ICD-10-CM

## 2024-04-08 DIAGNOSIS — R63.0 ANOREXIA: ICD-10-CM

## 2024-04-08 DIAGNOSIS — R06.00 DYSPNEA, UNSPECIFIED: ICD-10-CM

## 2024-04-08 PROCEDURE — 99213 OFFICE O/P EST LOW 20 MIN: CPT | Performed by: INTERNAL MEDICINE

## 2024-04-08 RX ORDER — PANTOPRAZOLE SODIUM 40 MG/1
40 TABLET, DELAYED RELEASE ORAL
Qty: 90 | Refills: 3 | Status: ACTIVE
Start: 2024-04-08

## 2024-04-29 ENCOUNTER — HOME HEALTH ADMISSION (OUTPATIENT)
Dept: HOME HEALTH SERVICES | Facility: HOME HEALTHCARE | Age: 89
End: 2024-04-29
Payer: MEDICARE

## 2024-04-29 ENCOUNTER — TRANSCRIBE ORDERS (OUTPATIENT)
Dept: HOME HEALTH SERVICES | Facility: HOME HEALTHCARE | Age: 89
End: 2024-04-29
Payer: MEDICARE

## 2024-04-29 ENCOUNTER — DOCUMENTATION (OUTPATIENT)
Dept: HOME HEALTH SERVICES | Facility: HOME HEALTHCARE | Age: 89
End: 2024-04-29
Payer: MEDICARE

## 2024-04-29 DIAGNOSIS — S06.5X0A TRAUMATIC SUBDURAL HEMATOMA WITHOUT LOSS OF CONSCIOUSNESS, INITIAL ENCOUNTER: Primary | ICD-10-CM

## 2024-04-29 NOTE — PROGRESS NOTES
Facility Discharge  Sage Flores - 9/7/1930  Medicare  DC 4/26 from [facility]  RN/PT/OT  Recent patient (4/2024)    Tricia Aldana NP ([facility]) is the PCP/POC/Attending provider and agrees to follow the HH/ POC on 4/29/24 at 10:12.    Dr. Sage Frazier is the ordering provider: RN/PT/OT     Dr. Sage Frazier performed the F2F on 4/20/24    DX: SDH, diplopia, hypotension, ЕЛЕНА, hypokalemia, hypomagnesemia, afib, CAD, anemia, COPD, GERD, BPH.     Contact:  Daughter Nathalie Helm -  [phone]    Address confirmed:  [address], [address]    I spoke with the patient's daughter, Alicja and they are agreeable to home health and do not have any other skilled services in the home at this time.

## 2024-05-01 ENCOUNTER — HOME CARE VISIT (OUTPATIENT)
Dept: HOME HEALTH SERVICES | Facility: HOME HEALTHCARE | Age: 89
End: 2024-05-01
Payer: MEDICARE

## 2024-05-01 PROCEDURE — G0299 HHS/HOSPICE OF RN EA 15 MIN: HCPCS

## 2024-05-02 ENCOUNTER — HOME CARE VISIT (OUTPATIENT)
Dept: HOME HEALTH SERVICES | Facility: HOME HEALTHCARE | Age: 89
End: 2024-05-02
Payer: MEDICARE

## 2024-05-02 VITALS
HEART RATE: 87 BPM | SYSTOLIC BLOOD PRESSURE: 122 MMHG | DIASTOLIC BLOOD PRESSURE: 60 MMHG | RESPIRATION RATE: 17 BRPM | TEMPERATURE: 97 F | OXYGEN SATURATION: 99 %

## 2024-05-02 VITALS
HEART RATE: 62 BPM | SYSTOLIC BLOOD PRESSURE: 128 MMHG | RESPIRATION RATE: 14 BRPM | TEMPERATURE: 98.9 F | DIASTOLIC BLOOD PRESSURE: 62 MMHG

## 2024-05-02 PROCEDURE — G0151 HHCP-SERV OF PT,EA 15 MIN: HCPCS

## 2024-05-02 NOTE — HOME HEALTH
"Eval Note: Pt is 89 yo male who was admitted onto  services following fall at home with head injury and stay at [facility] and subsequent stay at [facility] rehab. Pt lives with dtr and granddaughter and is weaker/more unsteady at current than his baseline.     Patient's goal(s): \"get out of this recliner and move more\"    Services required to achieve goals: PT, OT, SN, HHA    Potential Issues for goal attainment: NA    Describe the Functional status and safety: Pt has mild weakness at all B LE joints with more notable weakness in ankle DF.  He has impaired balance and activity tolerance as well.  Beginning of pressure injury to sacrum    Describe any environmental issues: narrow walkway into restroom.      Any equipment needs: Pt may benefit from ROHO cushion in recliner for improved pressure distribution    Bed Mobility: Supervision assist for safety    Transfers: CGA with 4ww    Gait: CGA with 4ww for household distances    Strength: 3+/5 at B ankles, 4-/5 for all other tested B LE MMT    ROM:WFL    Posture: forward flexed with mobility"

## 2024-05-02 NOTE — HOME HEALTH
"SOC Note:VIKKI SEEN 5/1/24 FOR SKILLED NURSE START OF CARE VISIT. PATIENT REVENTLY HOSPITALIZED FOR A FALL THAT RESULTED IN SUBDURAL HEMATOMA AND BRAIN BLEED. PATIENT STAYED IN ACUTE CARE AT [facility] FOR A TIME AND THEN WENT TO hospitals REHAB FOR THERAPY. PATIENT WILL BE SEEN BY SN 2WK4, 1WK5 FOR DISEASE PROCESS TEACHING, MEDICATION TEACHING, FALLS PREVENTION STATEGIES. PATIENT WILL ALSO BENIFIT FROM PT/OT/HHA.    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 caregiver concerns, social isolation, transportation, food insecurity, environment, income etc.)/Need for MSW: NA    Plan for next visit: CARDIOPULMONARY ASSESSMENT, GASTROINTESTINAL ASSESSMENT, SKIN ASSESSMENT, SAFETY ASSESSMENT, PAIN ASSESSMENT, MEDICATION ASSESSMENT"

## 2024-05-03 ENCOUNTER — HOME CARE VISIT (OUTPATIENT)
Dept: HOME HEALTH SERVICES | Facility: HOME HEALTHCARE | Age: 89
End: 2024-05-03
Payer: MEDICARE

## 2024-05-03 VITALS
TEMPERATURE: 97.3 F | DIASTOLIC BLOOD PRESSURE: 80 MMHG | HEART RATE: 80 BPM | SYSTOLIC BLOOD PRESSURE: 100 MMHG | OXYGEN SATURATION: 97 %

## 2024-05-03 PROCEDURE — G0152 HHCP-SERV OF OT,EA 15 MIN: HCPCS

## 2024-05-03 PROCEDURE — G0299 HHS/HOSPICE OF RN EA 15 MIN: HCPCS

## 2024-05-03 NOTE — REMOTE PATIENT MONITORING NOTE
Remote patient monitoring set up complete. Device connectivity successful. Instructions left in the home with the following information:  the monitoring process,  how to contact the [facility] and when to call the [facility] for any technical issues with the device. The patient was also informed that they may receive follow up calls from [facility] when an alarm triggers and that the caller ID will identify the call as an unknown number.  The patient was informed that [facility] is  not a replacement for emergency services and to notify [facility] for any patient emergency.

## 2024-05-03 NOTE — HOME HEALTH
INITIAL OT EVALUATION      Pt is 89 y/o male  admitted to home health on 5/1/24 by   . Pt referred to OT due to   admitted onto  services following fall at home with head injury and stay at [facility] and subsequent stay at [facility] rehab. Pt lives with dtr and granddaughter and is weaker/more unsteady at current than his baseline.  Pt denies any falls or changes of medications since last HH visit.       PMH: Reviewed. Pertinent to rehab:  MI and PNA back in Sept 2023     Social/Environment: Pt lives in single story house with daughter/granddaughter.  The granddaughter will be living with pt fulltime.      PLOF:   Pt lived alone indep with Rollator for fx mob and ADL's. Pt fixed simple meals indep using microwaved.  Pt had family stop by 2x weekly for doing housecleaning. Pt was not on O2 proirly.  Pt's daughter organized pills in pill organizer and pt took with vc from [facility] am/pm with mod indep.     Current Status:  Medication Management: Pt dgter gives pt his meds.    Mod Barthel Index: 73/100 mod dependency  Risks for falls:fell recently with injury while getting up and going to restroom, fell backwards, pt's family has 3 camera in the home.     on oxygen  only uses in morning if needed, per granddaughter reports sometime 92% in am and stays on it for a few hours then usually does not need it for the rest of the day.  Cognition: Oriented person, place, only year and season. SMT difficulty reported.   Standing endurance poor, upright 2 min with rollator with min SOB 02 94% on RA HR 65 bpm. toilet transfer SBA ; Tub transfer mod A       DME: Pt has  rollator, shower chair/GB/HHSH, gildardo toilet with arms     Pt’s Goals:  to do things for myself again     OT Eval Recommendations and Treatment:    1. TTBS - granddaughter to order from [employer]    2. using stool for getting socks and shoes     Skilled OT recommended [id number] for  ADL/IADLs, fx transfer, upright endurance, home safety, pt/CG ed and training. Pt in  agreement with POC.      Rehab Potential  good     Plan for next visit ADL's upright endurance and safety

## 2024-05-04 VITALS
DIASTOLIC BLOOD PRESSURE: 74 MMHG | RESPIRATION RATE: 17 BRPM | HEART RATE: 78 BPM | TEMPERATURE: 97.3 F | SYSTOLIC BLOOD PRESSURE: 114 MMHG | OXYGEN SATURATION: 100 %

## 2024-05-04 NOTE — HOME HEALTH
Routine Visit Note: Patient sitting in chair at time of SN arrival. Marilou present at visit. Patient states that he is doing ok today. Denies any medical changes or concerns. Marilou states that patient's appetite is good and that he is eating 3 meals per day. Patient denies recent falls. Denies elimination concerns. States that his head is still somewhat sore from the fall. Vitals WNL.    Skill/education provided: CP assess, falls/safety assess, med teaching/assess for changes, neuro checks, vitals, bleeding precaution teaching    Patient/caregiver response: patient able to partially teach back education    Plan for next visit: CP assess, falls/safety assess, med teaching/assess for changes, neuro checks, assess for HA, bleeding precaution teaching    Other pertinent info: NA

## 2024-05-05 NOTE — PROGRESS NOTES
Encounter Date:05/20/2024      Last seen-2/12/2024    Patient ID: Sage Flores is a 89 y.o. male.    Chief complaint  Status post NSTEMI  Status post stent  Tachybradycardia syndrome  Status post CABG     History of present illness.  Since I have last seen, the patient has been without any chest discomfort ,shortness of breath, palpitations, dizziness or syncope.  Denies having any headache ,abdominal pain ,nausea, vomiting , diarrhea constipation, loss of weight or loss of appetite.  Denies having any excessive bruising ,hematuria or blood in the stool.    Review of all systems negative except as indicated.    Reviewed ROS.  Assessment and plan  /////////////////////////  History  =============   - status post CABG October 2001. cardiac catheterization 12/26/2014 revealed 60% distal circumflex and total LAD and right coronary arteries.  Lima to LAD was patent ( lima coming off the left vertebral artery).  SVG to diagonal   marginal and PDA were patent.  SVG to left ventricular branch was totally occluded (chronic)      - status post acute inferior myocardial infarction prior to surgery requiring acute stent placement to right coronary artery ) complicated by ventricular fibrillation)     - Status post inferior STEMI 12/4/2023.     - Status post PCI with PTCA, stent placement on Pronto catheter atherectomy to the graft to the distal right coronary artery.-12/3/2023-Dr. العلي     Cardiac catheterization 12/3/2023-Dr. العلي     Left Main %: 20 to 30%   Proximal LAD %: 100%  Mid/Distal LAD %: 100%  LCX %: Proximal 80% OM1 100%  Ramus:   RCA %: 100% after the PDA.  Lima %: LIMA to LAD was patent  SVG(s) %: SVG to the marginal branch is patent but has some 30 to 40% disease.  SVG to the diagonal branch is occluded.  SVG to the PDA is occluded.  SVG to the distal RCA is occluded but is the culprit lesion    Echocardiogram 12/1/2023    Left ventricular ejection fraction appears to be 36 - 40%.    The left atrial cavity  is moderately dilated.    Estimated right ventricular systolic pressure from tricuspid regurgitation is normal (<35 mmHg).     -Past history of syncope-no further episodes.     -status post loop recorder placement.  [employer] LINQ 12/29/2017      - atrial fibrillation -has converted to sinus rhythm and maintaining sinus rhythm.  Recently patient was noted to have atrial dysrhythmia on the monitor with loop recorder.     - sinus bradycardia-asymptomatic    - History of melena.  Patient had endoscopy 12/22/2023 that revealed bleeding duodenal ulcer.  Patient had endoscopic hemostasis with epinephrine and Hemoclip.  Patient is back on Plavix and Eliquis.  Observe for toxic effects of high risk medication.      -Dyslipidemia and hypertension     - lower extremity weakness.   Arterial Doppler study of the lower extremity is normal. -  improved .   arterial Doppler study of the lower extremity was normal     - status post cholecystectomy     - allergy to penicillin iodine and metoprolol (rash).  Intolerance to atenolol due to bradycardia.  Allergy to Levaquin and penicillin.  Intolerance to prednisone Nitropatch IV nitroglycerin and prednisone.  =================    Plan  ==============  History of melena and GI bleed.  Patient had endoscopy 12/22/2023 that revealed bleeding duodenal ulcer.  Patient had endoscopic hemostasis with epinephrine and Hemoclip.  Patient was started on PPI.  Patient is back on Plavix and Eliquis.  Observe for toxic effects of high risk medication.  No further episodes.    Status post CABG      Inferior STEMI 12/4/2023.     Status post PCI with PTCA, stent placement on Pronto catheter atherectomy to the graft to the distal right coronary artery.-12/3/2023-Dr. العلي  Patient is taking Plavix and Eliquis.  Nursing home records were reviewed.    Patient is not having any angina pectoris or congestive heart failure.    Tachybradycardia syndrome  History of junctional bradycardia.  Temporary  pacemaker was placed in the setting of inferior STEMI.  Temporary pacemaker was removed.      History of atrial fibrillation with controlled ventricular response.  Rate control can be challenging given the circumstances.  Patient is allergic to metoprolol and Cardizem.      Renal dysfunction  71/1.69  BUN/creatinine 89/1.66-12/23/2023  28/1.16-12/27/2023     Borderline blood pressure.  128/63     Patient is off beta-blocker Coreg.      Patient is DNR DNI..  Patient is staying at home.     Medications were reviewed and updated.  Current medications include  Eliquis atorvastatin Bumex Plavix losartan pantoprazole     Further plan will depend on patient's progress.     Follow-up in the office in 6 months.    Reviewed and updated-5/20/2024.  //////////////////////////////////////               Diagnosis Plan   1. Status post angioplasty with stent        2. Essential hypertension        3. Tachycardia-bradycardia        4. Hx of CABG        5. Dyslipidemia        LAB RESULTS (LAST 7 DAYS)    CBC        BMP        CMP         BNP        TROPONIN        CoAg        Creatinine Clearance  CrCl cannot be calculated (Patient's most recent lab result is older than the maximum 30 days allowed.).    ABG        Radiology  No radiology results for the last day                The following portions of the patient's history were reviewed and updated as appropriate: allergies, current medications, past family history, past medical history, past social history, past surgical history, and problem list.    Review of Systems   Constitutional: Negative for malaise/fatigue.   Cardiovascular:  Negative for chest pain, dyspnea on exertion, leg swelling and palpitations.   Respiratory:  Negative for cough and shortness of breath.    Gastrointestinal:  Negative for abdominal pain, nausea and vomiting.   Neurological:  Negative for dizziness, focal weakness, headaches, light-headedness and numbness.   All other systems reviewed and are  negative.      Current Outpatient Medications:     acetaminophen (TYLENOL) 650 MG 8 hr tablet, Take 1 tablet by mouth Every Night. Indications: Pain, Disp: , Rfl:     apixaban (ELIQUIS) 2.5 MG tablet tablet, Take 1 tablet by mouth 2 (Two) Times a Day., Disp: 180 tablet, Rfl: 1    aspirin 81 MG chewable tablet, Chew 81 mg Daily. Indications: antiplatelet, Disp: , Rfl:     azelastine (ASTEPRO) 0.15 % solution nasal spray, USE 2 SPRAYS IN EACH NOSTRIL TWICE DAILY AS DIRECTED BY PROVIDER (Patient not taking: No sig reported), Disp: 30 mL, Rfl: 3    bumetanide (BUMEX) 1 MG tablet, Take 1 tablet by mouth Daily., Disp: 90 tablet, Rfl: 3    Cholecalciferol 25 MCG (1000 UT) tablet, Take 1 tablet by mouth Daily. Indications: Vitamin D Deficiency, Disp: , Rfl:     clopidogrel (PLAVIX) 75 MG tablet, Take 1 tablet by mouth Daily., Disp: 90 tablet, Rfl: 3    docusate sodium (COLACE) 250 MG capsule, Take 1 capsule by mouth Daily. Indications: Constipation, Disp: , Rfl:     gabapentin (NEURONTIN) 100 MG capsule, TAKE 1 CAPSULE BY MOUTH TWICE DAILY (Patient not taking: No sig reported), Disp: 180 capsule, Rfl: 1    midodrine (PROAMATINE) 5 MG tablet, TAKE 1 TABLET BY MOUTH THREE TIMES DAILY BEFORE MEALS, Disp: 270 tablet, Rfl: 1    mirtazapine (REMERON) 15 MG tablet, Take 1 tablet by mouth Every Night. Indications: Major Depressive Disorder, Disp: 90 tablet, Rfl: 1    O2 (OXYGEN), Inhale 3 L/min Continuous As Needed. Indications: SOB, Disp: , Rfl:     pantoprazole (PROTONIX) 40 MG pack packet, Take 40 mg by mouth Daily. Indications: Heartburn, Disp: , Rfl:     tamsulosin (FLOMAX) 0.4 MG capsule 24 hr capsule, TAKE 1 CAPSULE BY MOUTH DAILY, Disp: 90 capsule, Rfl: 0    Allergies   Allergen Reactions    Iodine Unknown (See Comments)     Pt unsure of reaction      Levofloxacin Unknown (See Comments)    Nitroglycerin Unknown (See Comments) and Other (See Comments)    Paroxetine Unknown (See Comments)    Penicillin G Unknown (See  Comments)    Prednisone Unknown (See Comments)    Sucralfate Unknown (See Comments)    Diltiazem Hcl Hives    Metoprolol Other (See Comments)     Lowers heart rate     Pramipexole Dihydrochloride Unknown (See Comments)    Ropinirole Hcl Other (See Comments)       Family History   Problem Relation Age of Onset    Heart disease Mother     Heart disease Father        Past Surgical History:   Procedure Laterality Date    CARDIAC CATHETERIZATION N/A 12/01/2023    Procedure: Left Heart Cath;  Surgeon: Son العلي MD;  Location: [facility] CATH INVASIVE LOCATION;  Service: Cardiovascular;  Laterality: N/A;    CARDIAC CATHETERIZATION N/A 12/01/2023    Procedure: Coronary angiography;  Surgeon: Son العلي MD;  Location: [facility] CATH INVASIVE LOCATION;  Service: Cardiovascular;  Laterality: N/A;    CARDIAC CATHETERIZATION N/A 12/01/2023    Procedure: Percutaneous Coronary Intervention;  Surgeon: Son العلي MD;  Location: [facility] CATH INVASIVE LOCATION;  Service: Cardiovascular;  Laterality: N/A;    CARDIAC ELECTROPHYSIOLOGY PROCEDURE N/A 12/01/2023    Procedure: Temporary Pacemaker;  Surgeon: Son العلي MD;  Location: [facility] CATH INVASIVE LOCATION;  Service: Cardiovascular;  Laterality: N/A;    CARDIAC SURGERY      CORONARY STENT PLACEMENT      ENDOSCOPY N/A 12/22/2023    Procedure: ESOPHAGOGASTRODUODENOSCOPY with sclerotherapy and endoscopic clipping x 3 of duodenal ulcer;  Surgeon: Tereza Fowler MD;  Location: [facility] ENDOSCOPY;  Service: Gastroenterology;  Laterality: N/A;  post op: duodenal ulcer    HERNIA REPAIR         Past Medical History:   Diagnosis Date    Anxiety 2014    Arrhythmia     Atrial fibrillation     Benign prostatic hyperplasia     CAD (coronary artery disease)     Hyperlipidemia     Hypertension     Myocardial infarction        Family History   Problem Relation Age of Onset    Heart disease Mother     Heart disease Father        Social History     Socioeconomic History    Marital status:     Tobacco Use    Smoking status: Former     Current packs/day: 0.00     Types: Cigarettes     Quit date: 1970     Years since quittin.7    Smokeless tobacco: Never    Tobacco comments:     more than 50yrs   Vaping Use    Vaping status: Never Used   Substance and Sexual Activity    Alcohol use: No    Drug use: No    Sexual activity: Not Currently         Procedures      Objective:       Physical Exam    There were no vitals taken for this visit.  The patient is alert, oriented and in no distress.    Vital signs as noted above.    Head and neck revealed no carotid bruits or jugular venous distension.  No thyromegaly or lymphadenopathy is present.    Lungs clear.  No wheezing.  Breath sounds are normal bilaterally.    Heart normal first and second heart sounds.  No murmur..  No pericardial rub is present.  No gallop is present.    Abdomen soft and nontender.  No organomegaly is present.    Extremities revealed good peripheral pulses without any pedal edema.    Skin warm and dry.    Musculoskeletal system is grossly normal.    CNS grossly normal.    Reviewed and updated.

## 2024-05-07 ENCOUNTER — HOME CARE VISIT (OUTPATIENT)
Dept: HOME HEALTH SERVICES | Facility: HOME HEALTHCARE | Age: 89
End: 2024-05-07
Payer: MEDICARE

## 2024-05-07 VITALS
TEMPERATURE: 97.3 F | SYSTOLIC BLOOD PRESSURE: 108 MMHG | HEART RATE: 89 BPM | RESPIRATION RATE: 18 BRPM | DIASTOLIC BLOOD PRESSURE: 70 MMHG

## 2024-05-07 PROCEDURE — G0299 HHS/HOSPICE OF RN EA 15 MIN: HCPCS

## 2024-05-07 PROCEDURE — G0151 HHCP-SERV OF PT,EA 15 MIN: HCPCS

## 2024-05-08 ENCOUNTER — HOME CARE VISIT (OUTPATIENT)
Dept: HOME HEALTH SERVICES | Facility: HOME HEALTHCARE | Age: 89
End: 2024-05-08
Payer: MEDICARE

## 2024-05-08 VITALS — SYSTOLIC BLOOD PRESSURE: 130 MMHG | OXYGEN SATURATION: 98 % | DIASTOLIC BLOOD PRESSURE: 85 MMHG | HEART RATE: 70 BPM

## 2024-05-08 VITALS
HEART RATE: 61 BPM | DIASTOLIC BLOOD PRESSURE: 60 MMHG | TEMPERATURE: 97 F | OXYGEN SATURATION: 100 % | SYSTOLIC BLOOD PRESSURE: 122 MMHG | RESPIRATION RATE: 17 BRPM

## 2024-05-08 PROCEDURE — G0152 HHCP-SERV OF OT,EA 15 MIN: HCPCS

## 2024-05-09 ENCOUNTER — HOME CARE VISIT (OUTPATIENT)
Dept: HOME HEALTH SERVICES | Facility: HOME HEALTHCARE | Age: 89
End: 2024-05-09
Payer: MEDICARE

## 2024-05-09 VITALS
WEIGHT: 133.1 LBS | TEMPERATURE: 97.2 F | SYSTOLIC BLOOD PRESSURE: 124 MMHG | RESPIRATION RATE: 18 BRPM | DIASTOLIC BLOOD PRESSURE: 78 MMHG | BODY MASS INDEX: 19.1 KG/M2

## 2024-05-09 PROCEDURE — G0151 HHCP-SERV OF PT,EA 15 MIN: HCPCS

## 2024-05-10 ENCOUNTER — HOME CARE VISIT (OUTPATIENT)
Dept: HOME HEALTH SERVICES | Facility: HOME HEALTHCARE | Age: 89
End: 2024-05-10
Payer: MEDICARE

## 2024-05-12 ENCOUNTER — HOME CARE VISIT (OUTPATIENT)
Dept: HOME HEALTH SERVICES | Facility: HOME HEALTHCARE | Age: 89
End: 2024-05-12
Payer: MEDICARE

## 2024-05-13 ENCOUNTER — HOME CARE VISIT (OUTPATIENT)
Dept: HOME HEALTH SERVICES | Facility: HOME HEALTHCARE | Age: 89
End: 2024-05-13
Payer: MEDICARE

## 2024-05-13 VITALS
RESPIRATION RATE: 18 BRPM | OXYGEN SATURATION: 95 % | TEMPERATURE: 97.1 F | HEART RATE: 52 BPM | DIASTOLIC BLOOD PRESSURE: 62 MMHG | SYSTOLIC BLOOD PRESSURE: 100 MMHG

## 2024-05-13 PROCEDURE — G0156 HHCP-SVS OF AIDE,EA 15 MIN: HCPCS

## 2024-05-13 PROCEDURE — G0151 HHCP-SERV OF PT,EA 15 MIN: HCPCS

## 2024-05-13 NOTE — HOME HEALTH
Skilled Interventions: Therapeutic exercise, gait training, transfer training, home exercise program (HEP) instruction.    Patient Response: Patient admits to fatigue with exercises performed today but recovers well with sitting rest break. Patient verbalizes intention to perform HEP as recommended.     Session Notes: Patient and caregiver present deny any new changes or problems. Patient did report dizziness this AM but this resolved after eating breakfast and with supplemental oxygen. Patient not using oxygen at this time and denies problems.    Plan for next visit: Continue exercises and gait per patient ability and tolerance, advancing as able.

## 2024-05-14 ENCOUNTER — HOME CARE VISIT (OUTPATIENT)
Dept: HOME HEALTH SERVICES | Facility: HOME HEALTHCARE | Age: 89
End: 2024-05-14
Payer: MEDICARE

## 2024-05-14 VITALS
TEMPERATURE: 98.4 F | DIASTOLIC BLOOD PRESSURE: 68 MMHG | OXYGEN SATURATION: 99 % | RESPIRATION RATE: 16 BRPM | SYSTOLIC BLOOD PRESSURE: 116 MMHG | HEART RATE: 77 BPM

## 2024-05-14 PROCEDURE — G0299 HHS/HOSPICE OF RN EA 15 MIN: HCPCS

## 2024-05-14 NOTE — HOME HEALTH
Routine Visit Note: Patient and daughter present at SN visit. Patient states that he is doing ok. Denies any recent medical changes or concerns. Patient denies recent falls. Denies elimination concerns. Vitals WNL. Current monitor picked up at this time per patient and daughter request.    Skill/education provided: CP assess, falls/safety assess, med teaching/assess for changes, bleeding precaution teaching/monitoring, CHF teach/monitor, neuro assessment    Patient/caregiver response: patient able to partially teach back education    Plan for next visit: CP assess, falls/safety assess, med teaching/assess for changes, bleeding precaution teaching, neuro check/teach, CHF teaching/monitoring    Other pertinent info: NA

## 2024-05-15 ENCOUNTER — HOME CARE VISIT (OUTPATIENT)
Dept: HOME HEALTH SERVICES | Facility: HOME HEALTHCARE | Age: 89
End: 2024-05-15
Payer: MEDICARE

## 2024-05-15 VITALS — HEART RATE: 64 BPM | SYSTOLIC BLOOD PRESSURE: 122 MMHG | DIASTOLIC BLOOD PRESSURE: 70 MMHG | OXYGEN SATURATION: 97 %

## 2024-05-15 PROCEDURE — G0152 HHCP-SERV OF OT,EA 15 MIN: HCPCS

## 2024-05-16 ENCOUNTER — HOME CARE VISIT (OUTPATIENT)
Dept: HOME HEALTH SERVICES | Facility: HOME HEALTHCARE | Age: 89
End: 2024-05-16
Payer: MEDICARE

## 2024-05-16 VITALS
HEART RATE: 60 BPM | TEMPERATURE: 97.1 F | RESPIRATION RATE: 18 BRPM | OXYGEN SATURATION: 97 % | DIASTOLIC BLOOD PRESSURE: 62 MMHG | SYSTOLIC BLOOD PRESSURE: 102 MMHG

## 2024-05-16 PROCEDURE — G0156 HHCP-SVS OF AIDE,EA 15 MIN: HCPCS

## 2024-05-16 PROCEDURE — G0151 HHCP-SERV OF PT,EA 15 MIN: HCPCS

## 2024-05-16 NOTE — HOME HEALTH
Skilled Interventions: Therapeutic exercise, gait training, home exercise program (HEP) instruction.    Patient Response: Patient participates very well in session. No complaints of pain or exhaustion. He does require 3 sitting rests to complete the exercises performed but denies distress.    Session Notes: Patient reportedly moving about the home a lot yesterday. No new problems per patient/caregiver.    Plan for next visit: Continue exercises, decreasing rest breaks as able. Continue to emphasize need for daily home exercise program (HEP) performance.

## 2024-05-17 ENCOUNTER — HOME CARE VISIT (OUTPATIENT)
Dept: HOME HEALTH SERVICES | Facility: HOME HEALTHCARE | Age: 89
End: 2024-05-17
Payer: MEDICARE

## 2024-05-17 PROCEDURE — G0299 HHS/HOSPICE OF RN EA 15 MIN: HCPCS

## 2024-05-20 ENCOUNTER — OFFICE VISIT (OUTPATIENT)
Dept: CARDIOLOGY | Facility: CLINIC | Age: 89
End: 2024-05-20
Payer: MEDICARE

## 2024-05-20 VITALS — RESPIRATION RATE: 16 BRPM | DIASTOLIC BLOOD PRESSURE: 60 MMHG | TEMPERATURE: 98.5 F | SYSTOLIC BLOOD PRESSURE: 102 MMHG

## 2024-05-20 VITALS
HEIGHT: 70 IN | WEIGHT: 138.5 LBS | BODY MASS INDEX: 19.83 KG/M2 | SYSTOLIC BLOOD PRESSURE: 128 MMHG | DIASTOLIC BLOOD PRESSURE: 63 MMHG | OXYGEN SATURATION: 97 % | HEART RATE: 63 BPM

## 2024-05-20 DIAGNOSIS — E78.5 DYSLIPIDEMIA: ICD-10-CM

## 2024-05-20 DIAGNOSIS — Z95.1 HX OF CABG: ICD-10-CM

## 2024-05-20 DIAGNOSIS — I10 ESSENTIAL HYPERTENSION: ICD-10-CM

## 2024-05-20 DIAGNOSIS — Z95.820 STATUS POST ANGIOPLASTY WITH STENT: Primary | ICD-10-CM

## 2024-05-20 DIAGNOSIS — I49.5 TACHYCARDIA-BRADYCARDIA: ICD-10-CM

## 2024-05-20 NOTE — HOME HEALTH
Routine Visit Note: Patient sitting in chair at time of SN arrival. Grandaughter present at visit. Patient states that he is pretty dizzy this morning. States that this happens in the mornings, but usually subsides after eating and being up for a while. BP is low this AM. SN educated patient and grandaughter to ensure that patient drinks plenty of water today and eats something salty. Patient denies recent falls. Denies elimination concerns. States that his HAs have resolved.     Skill/education provided: CP assess, falls/safety assess/teaching, neuro checks, assess for HA, cardiac teach/assess, assess for dizzy episodes, hypotension teaching    Patient/caregiver response: patient able to partially teach back education    Plan for next visit: CP assess, falls/safety assess, med teaching/assess for changes, neuro checks, cardiac assess/teach, assess dizzy spells, assess recent BP readings from home monitor    Other pertinent info: NA

## 2024-05-21 ENCOUNTER — HOME CARE VISIT (OUTPATIENT)
Dept: HOME HEALTH SERVICES | Facility: HOME HEALTHCARE | Age: 89
End: 2024-05-21
Payer: MEDICARE

## 2024-05-21 VITALS
TEMPERATURE: 97.6 F | OXYGEN SATURATION: 95 % | DIASTOLIC BLOOD PRESSURE: 62 MMHG | RESPIRATION RATE: 18 BRPM | HEART RATE: 64 BPM | SYSTOLIC BLOOD PRESSURE: 122 MMHG

## 2024-05-21 PROCEDURE — G0151 HHCP-SERV OF PT,EA 15 MIN: HCPCS

## 2024-05-21 PROCEDURE — G0299 HHS/HOSPICE OF RN EA 15 MIN: HCPCS

## 2024-05-21 NOTE — HOME HEALTH
Skilled Interventions: Therapeutic exercise, gait training, transfer training, home exercise program (HEP) instruction.    Patient Response: Patient accepts exercises and gait without reports of pain onset. He does admit to some shortness of breath but reports this decreases with rest.    Session Notes: Patient denies any new problems. Seen by Dr. Domínguez yesterday.    Plan for next visit: Continue with progression of gait able.  Continue exercises with progression if able.

## 2024-05-22 ENCOUNTER — HOME CARE VISIT (OUTPATIENT)
Dept: HOME HEALTH SERVICES | Facility: HOME HEALTHCARE | Age: 89
End: 2024-05-22
Payer: MEDICARE

## 2024-05-22 VITALS
OXYGEN SATURATION: 99 % | RESPIRATION RATE: 16 BRPM | DIASTOLIC BLOOD PRESSURE: 60 MMHG | SYSTOLIC BLOOD PRESSURE: 102 MMHG | TEMPERATURE: 97.8 F | HEART RATE: 60 BPM

## 2024-05-22 VITALS
SYSTOLIC BLOOD PRESSURE: 100 MMHG | HEART RATE: 89 BPM | DIASTOLIC BLOOD PRESSURE: 60 MMHG | OXYGEN SATURATION: 99 % | TEMPERATURE: 97.6 F

## 2024-05-22 PROCEDURE — G0152 HHCP-SERV OF OT,EA 15 MIN: HCPCS

## 2024-05-23 ENCOUNTER — OFFICE VISIT (OUTPATIENT)
Dept: FAMILY MEDICINE CLINIC | Facility: CLINIC | Age: 89
End: 2024-05-23
Payer: MEDICARE

## 2024-05-23 ENCOUNTER — HOME CARE VISIT (OUTPATIENT)
Dept: HOME HEALTH SERVICES | Facility: HOME HEALTHCARE | Age: 89
End: 2024-05-23
Payer: MEDICARE

## 2024-05-23 VITALS
DIASTOLIC BLOOD PRESSURE: 60 MMHG | BODY MASS INDEX: 19.76 KG/M2 | RESPIRATION RATE: 20 BRPM | WEIGHT: 138 LBS | SYSTOLIC BLOOD PRESSURE: 126 MMHG | HEIGHT: 70 IN

## 2024-05-23 DIAGNOSIS — Z00.00 MEDICARE ANNUAL WELLNESS VISIT, SUBSEQUENT: Primary | ICD-10-CM

## 2024-05-23 PROCEDURE — 1170F FXNL STATUS ASSESSED: CPT | Performed by: NURSE PRACTITIONER

## 2024-05-23 PROCEDURE — 1159F MED LIST DOCD IN RCRD: CPT | Performed by: NURSE PRACTITIONER

## 2024-05-23 PROCEDURE — 1126F AMNT PAIN NOTED NONE PRSNT: CPT | Performed by: NURSE PRACTITIONER

## 2024-05-23 PROCEDURE — 1160F RVW MEDS BY RX/DR IN RCRD: CPT | Performed by: NURSE PRACTITIONER

## 2024-05-23 PROCEDURE — G0439 PPPS, SUBSEQ VISIT: HCPCS | Performed by: NURSE PRACTITIONER

## 2024-05-23 RX ORDER — MIRTAZAPINE 7.5 MG/1
7.5 TABLET, FILM COATED ORAL NIGHTLY
Qty: 90 TABLET | Refills: 1 | Status: SHIPPED | OUTPATIENT
Start: 2024-05-23

## 2024-05-23 RX ORDER — POLYMYXIN B SULFATE AND TRIMETHOPRIM 1; 10000 MG/ML; [USP'U]/ML
SOLUTION OPHTHALMIC
COMMUNITY
Start: 2024-04-30

## 2024-05-23 NOTE — PROGRESS NOTES
"The ABCs of the Annual Wellness Visit  Subsequent Medicare Wellness Visit    Subjective      Sage Flores is a 89 y.o. male who presents for a Subsequent Medicare Wellness Visit.    The following portions of the patient's history were reviewed and   updated as appropriate: allergies, current medications, past family history, past medical history, past social history, past surgical history, and problem list.    A-fib on chronic anticoagulation with apixaban, BPH, CAD (s/p STEMI with stent placed 12/3/23), HLD, and HTN, gerd, vit d def., iron def.     His daughter and granddaughter are with him today.  His granddaughter has moved into his home and is helping to care for him.  She is cooking and he is eating 3 meals daily, they tell me that his weight has increased by 5 pounds since she moved in.    He is current with home health and is having physical therapy, OT, and a nurse, they expect another 2 weeks with this service.    BP Readings from Last 3 Encounters:   05/23/24 126/60   05/22/24 100/60   05/21/24 102/60    /60   Resp 20   Ht 177.8 cm (70\")   Wt 62.6 kg (138 lb)   BMI 19.80 kg/m²      Review of Systems   Constitutional:  Negative for appetite change and fatigue.   Respiratory: Negative.  Negative for shortness of breath.    Cardiovascular: Negative.  Negative for chest pain and palpitations.   Gastrointestinal: Negative.  Negative for abdominal pain, constipation and diarrhea.   Genitourinary:  Positive for frequency and urgency.   Musculoskeletal: Negative.  Negative for arthralgias and myalgias.   Neurological:  Positive for light-headedness. Negative for dizziness, weakness and headaches.        Has been feeling light headed in the mornings, they think this started after increasing the dose of mirtazapine to help with his appetite, the family is requesting to decrease the mirtazapine back to 7.5 at this time    He tells me that he feels steady on his feet with use of his walker, he did fall " recently in April when he was trying to urinate in the night   Psychiatric/Behavioral: Negative.  Negative for dysphoric mood and sleep disturbance. The patient is not nervous/anxious.      Compared to one year ago, the patient feels his physical   health is the same.    Compared to one year ago, the patient feels his mental   health is the same.    Physical Exam  Vitals reviewed.   Constitutional:       Appearance: Normal appearance.   HENT:      Right Ear: Tympanic membrane, ear canal and external ear normal.      Left Ear: Tympanic membrane, ear canal and external ear normal.      Nose: Nose normal.      Mouth/Throat:      Mouth: Mucous membranes are moist.      Pharynx: Oropharynx is clear.   Neck:      Thyroid: No thyromegaly.      Vascular: No carotid bruit.   Cardiovascular:      Rate and Rhythm: Normal rate and regular rhythm.      Pulses: Normal pulses.      Heart sounds: Normal heart sounds.   Pulmonary:      Effort: Pulmonary effort is normal.      Breath sounds: Normal breath sounds.   Abdominal:      General: Bowel sounds are normal.      Palpations: Abdomen is soft.      Tenderness: There is no abdominal tenderness. There is no right CVA tenderness or left CVA tenderness.   Musculoskeletal:         General: Normal range of motion.      Cervical back: Neck supple. No tenderness.      Right lower leg: No edema.      Left lower leg: No edema.   Lymphadenopathy:      Cervical: No cervical adenopathy.   Skin:     General: Skin is warm.   Neurological:      Mental Status: He is alert and oriented to person, place, and time.   Psychiatric:         Mood and Affect: Mood normal.         Behavior: Behavior normal.          WBC   Date Value Ref Range Status   02/23/2024 7.53 3.40 - 10.80 10*3/mm3 Final     RBC   Date Value Ref Range Status   02/23/2024 3.98 (L) 4.14 - 5.80 10*6/mm3 Final     Hemoglobin   Date Value Ref Range Status   02/23/2024 13.2 13.0 - 17.7 g/dL Final     Hematocrit   Date Value Ref Range  Status   02/23/2024 37.4 (L) 37.5 - 51.0 % Final     MCV   Date Value Ref Range Status   02/23/2024 94.0 79.0 - 97.0 fL Final     MCH   Date Value Ref Range Status   02/23/2024 33.2 (H) 26.6 - 33.0 pg Final     MCHC   Date Value Ref Range Status   02/23/2024 35.3 31.5 - 35.7 g/dL Final     RDW   Date Value Ref Range Status   02/23/2024 15.0 12.3 - 15.4 % Final     RDW-SD   Date Value Ref Range Status   02/23/2024 51.5 37.0 - 54.0 fl Final     MPV   Date Value Ref Range Status   02/23/2024 12.5 (H) 6.0 - 12.0 fL Final     Platelets   Date Value Ref Range Status   02/23/2024 303 140 - 450 10*3/mm3 Final     Neutrophil %   Date Value Ref Range Status   12/22/2023 80.2 (H) 42.7 - 76.0 % Final     Lymphocyte %   Date Value Ref Range Status   12/22/2023 14.1 (L) 19.6 - 45.3 % Final     Monocyte %   Date Value Ref Range Status   12/22/2023 5.0 5.0 - 12.0 % Final     Eosinophil %   Date Value Ref Range Status   12/22/2023 0.3 0.3 - 6.2 % Final     Basophil %   Date Value Ref Range Status   12/22/2023 0.4 0.0 - 1.5 % Final     Immature Grans %   Date Value Ref Range Status   03/10/2023 0.4 0.0 - 0.5 % Final     Neutrophils Absolute   Date Value Ref Range Status   04/16/2024 6.8 (H) 1.7 - 6.0 x10(3)/ul Final     Lymphocytes, Absolute   Date Value Ref Range Status   12/22/2023 2.60 0.70 - 3.10 10*3/mm3 Final     Monocytes, Absolute   Date Value Ref Range Status   12/22/2023 0.90 0.10 - 0.90 10*3/mm3 Final     Eosinophils Absolute   Date Value Ref Range Status   04/16/2024 0.2 0.0 - 0.6 x10(3)/ul Final     Basophils Absolute   Date Value Ref Range Status   04/16/2024 0.1 0.0 - 0.3 x10(3)/ul Final     Immature Grans, Absolute   Date Value Ref Range Status   03/10/2023 0.04 0.00 - 0.05 10*3/mm3 Final     nRBC   Date Value Ref Range Status   12/27/2023 1.0 (H) 0.0 - 0.2 /100 WBC Final   12/22/2023 0.1 0.0 - 0.2 /100 WBC Final      Lab Results   Component Value Date    GLUCOSE 95 02/23/2024    BUN 19 02/23/2024    CREATININE 1.20  02/23/2024    EGFR 56.4 (L) 02/23/2024    BCR 15.8 02/23/2024    K 3.6 02/23/2024    CO2 21.9 (L) 02/23/2024    CALCIUM 9.2 02/23/2024    ALBUMIN 3.8 02/23/2024    BILITOT 0.8 02/23/2024    AST 20 02/23/2024    ALT 21 02/23/2024       Recent Hospitalizations:  This patient has had a [facility] admission record on file within the last 365 days.    Current Medical Providers:  Patient Care Team:  Tricia Aldana APRN as PCP - General (Family Medicine)  Missy Domínguez MD as Consulting Physician (Cardiology)    Outpatient Medications Prior to Visit   Medication Sig Dispense Refill    acetaminophen (TYLENOL) 650 MG 8 hr tablet Take 1 tablet by mouth Every Night. Indications: Pain      apixaban (ELIQUIS) 2.5 MG tablet tablet Take 1 tablet by mouth 2 (Two) Times a Day. 180 tablet 1    aspirin 81 MG chewable tablet Chew 1 tablet Daily. Indications: antiplatelet      bumetanide (BUMEX) 1 MG tablet Take 1 tablet by mouth Daily. 90 tablet 3    Cholecalciferol 25 MCG (1000 UT) tablet Take 1 tablet by mouth Daily. Indications: Vitamin D Deficiency      clopidogrel (PLAVIX) 75 MG tablet Take 1 tablet by mouth Daily. 90 tablet 3    docusate sodium (COLACE) 250 MG capsule Take 1 capsule by mouth Daily. Indications: Constipation      midodrine (PROAMATINE) 5 MG tablet TAKE 1 TABLET BY MOUTH THREE TIMES DAILY BEFORE MEALS 270 tablet 1    O2 (OXYGEN) Inhale 3 L/min Continuous As Needed. Indications: SOB      pantoprazole (PROTONIX) 40 MG pack packet Take 1 packet by mouth Daily. Indications: Heartburn      tamsulosin (FLOMAX) 0.4 MG capsule 24 hr capsule TAKE 1 CAPSULE BY MOUTH DAILY 90 capsule 0    trimethoprim-polymyxin b (POLYTRIM) 19311-9.1 UNIT/ML-% ophthalmic solution INSTILL 1 DROP INTO THE AFFECTED EYE EVERY 6 HOURS AS DIRECTED      mirtazapine (REMERON) 15 MG tablet Take 1 tablet by mouth Every Night. Indications: Major Depressive Disorder 90 tablet 1     No facility-administered medications prior to visit.        No opioid medication identified on active medication list. I have reviewed chart for other potential  high risk medication/s and harmful drug interactions in the elderly.        Aspirin is on active medication list. Aspirin use is indicated based on review of current medical condition/s. Pros and cons of this therapy have been discussed today. Benefits of this medication outweigh potential harm.  Patient has been encouraged to continue taking this medication.  .      Patient Active Problem List   Diagnosis    Allergic rhinitis    Atrial fibrillation    Claudication    Coronary heart disease    Edema    Extremity pain    History of left heart catheterization (LHC)    Hyperlipidemia    Essential hypertension    Inguinal hernia, right    Laceration    Lower extremity edema    Myocardial infarction    Need for other prophylactic vaccination and inoculation against single diseases    Presence of other cardiac implants and grafts    Status post coronary artery bypass graft    Status post percutaneous transluminal coronary angioplasty    Viral URI    Rib pain on left side    Dizziness    Acute cystitis without hematuria    Status post placement of implantable loop recorder    Lab test negative for COVID-19 virus    Cellulitis of left leg    Iron deficiency anemia    Encounter for support and coordination of transition of care    Hematoma of right lower extremity    Encounter for screening for malignant neoplasm of prostate     Medicare annual wellness visit, subsequent    Vitamin D deficiency    Hyponatremia    Gastroesophageal reflux disease without esophagitis    Acute ST elevation myocardial infarction (STEMI) involving right coronary artery    Melena    GI bleed    Severe malnutrition     Advance Care Planning   Advance Care Planning     Advance Directive is on file.  ACP discussion was declined by the patient. Patient has an advance directive in EMR which is still valid.      Objective    Vitals:    05/23/24  "0955   BP: 126/60   Resp: 20   Weight: 62.6 kg (138 lb)   Height: 177.8 cm (70\")     Estimated body mass index is 19.8 kg/m² as calculated from the following:    Height as of this encounter: 177.8 cm (70\").    Weight as of this encounter: 62.6 kg (138 lb).    BMI is within normal parameters. No other follow-up for BMI required.      Does the patient have evidence of cognitive impairment?   No            HEALTH RISK ASSESSMENT    Smoking Status:  Social History     Tobacco Use   Smoking Status Former    Current packs/day: 0.00    Types: Cigarettes    Quit date: 1970    Years since quittin.7    Passive exposure: Never   Smokeless Tobacco Never   Tobacco Comments    more than 50yrs     Alcohol Consumption:  Social History     Substance and Sexual Activity   Alcohol Use No     Fall Risk Screen:    SUZI Fall Risk Assessment was completed, and patient is at LOW risk for falls.Assessment completed on:2024    Depression Screenin/23/2024     10:00 AM   PHQ-2/PHQ-9 Depression Screening   Little Interest or Pleasure in Doing Things 0-->not at all   Feeling Down, Depressed or Hopeless 0-->not at all   PHQ-9: Brief Depression Severity Measure Score 0       Health Habits and Functional and Cognitive Screenin/23/2024    10:00 AM   Functional & Cognitive Status   Do you have difficulty preparing food and eating? No   Do you have difficulty bathing yourself, getting dressed or grooming yourself? Yes   Do you have difficulty using the toilet? No   Do you have difficulty moving around from place to place? Yes   Do you have trouble with steps or getting out of a bed or a chair? Yes   Current Diet Well Balanced Diet   Dental Exam Up to date   Eye Exam Up to date   Exercise (times per week) 2 times per week   Do you need help using the phone?  Yes   Are you deaf or do you have serious difficulty hearing?  Yes   Do you need help to go to places out of walking distance? Yes   Do you need help shopping? Yes "   Do you need help preparing meals?  Yes   Do you need help with housework?  Yes   Do you need help with laundry? Yes   Do you need help taking your medications? Yes   Do you need help managing money? Yes   Do you ever drive or ride in a car without wearing a seat belt? No   Have you felt unusual stress, anger or loneliness in the last month? No   If you need help, do you have trouble finding someone available to you? No   Have you been bothered in the last four weeks by sexual problems? No   Do you have difficulty concentrating, remembering or making decisions? No       Age-appropriate Screening Schedule:  Refer to the list below for future screening recommendations based on patient's age, sex and/or medical conditions. Orders for these recommended tests are listed in the plan section. The patient has been provided with a written plan.    Health Maintenance   Topic Date Due    TDAP/TD VACCINES (1 - Tdap) Never done    ZOSTER VACCINE (1 of 2) Never done    RSV Vaccine - Adults (1 - 1-dose 60+ series) Never done    LIPID PANEL  03/10/2024    COVID-19 Vaccine (4 - 2023-24 season) 05/25/2024 (Originally 9/1/2023)    INFLUENZA VACCINE  08/01/2024    ANNUAL WELLNESS VISIT  05/23/2025    Pneumococcal Vaccine 65+  Completed                  CMS Preventative Services Quick Reference  Risk Factors Identified During Encounter:    Fall Risk-High or Moderate: Discussed Fall Prevention in the home and Information on Fall Prevention Shared in After Visit Summary  Glaucoma or Family History of Glaucoma:   is followed by ophthalmologist    The above risks/problems have been discussed with the patient.  Pertinent information has been shared with the patient in the After Visit Summary.    Diagnoses and all orders for this visit:    1. Medicare annual wellness visit, subsequent (Primary)    Other orders  -     mirtazapine (REMERON) 7.5 MG tablet; Take 1 tablet by mouth Every Night.  Dispense: 90 tablet; Refill: 1        Follow Up:    Next Medicare Wellness visit to be scheduled in 1 year.      An After Visit Summary and PPPS were made available to the patient.

## 2024-05-23 NOTE — HOME HEALTH
Routine Visit Note: Patient and daughter present at SN arrival. Patient states that he is doing ok today. States that he worked with PT this morning. States that he has still been getting dizzy in the mornings. BP has been running lower than his usual. Patient continues taking midodrine as ordered. Patient has appointment with PCP this week and will address. Patient denies recent falls. Denies elimination concerns.     Skill/education provided: CP assess, falls/safety assess, med teaching/assess for changes, neuro teach/assess, hypotension management education    Patient/caregiver response: patient able to partially teach back education    Plan for next visit: CP assess, falls/safety assess, med teaching/assess for changes, neuro checks/teaching, assess dizzy episodes, f/u on appointment with PCP    Other pertinent info: NA

## 2024-05-24 ENCOUNTER — HOME CARE VISIT (OUTPATIENT)
Dept: HOME HEALTH SERVICES | Facility: HOME HEALTHCARE | Age: 89
End: 2024-05-24
Payer: MEDICARE

## 2024-05-24 VITALS
DIASTOLIC BLOOD PRESSURE: 64 MMHG | HEART RATE: 64 BPM | SYSTOLIC BLOOD PRESSURE: 122 MMHG | TEMPERATURE: 97 F | RESPIRATION RATE: 18 BRPM

## 2024-05-24 VITALS
DIASTOLIC BLOOD PRESSURE: 60 MMHG | OXYGEN SATURATION: 99 % | HEART RATE: 61 BPM | RESPIRATION RATE: 16 BRPM | SYSTOLIC BLOOD PRESSURE: 106 MMHG | TEMPERATURE: 98 F

## 2024-05-24 PROCEDURE — G0151 HHCP-SERV OF PT,EA 15 MIN: HCPCS

## 2024-05-24 PROCEDURE — G0299 HHS/HOSPICE OF RN EA 15 MIN: HCPCS

## 2024-05-24 NOTE — HOME HEALTH
Pt discharged from OT services this date as pt has met goals.   Pt and therapist in agreement with dc.  Pt to continue with HEP.      Pt denies any falls or med changes

## 2024-05-24 NOTE — HOME HEALTH
Skilled Interventions: Therapeutic exercise, gait training, home exercise program (HEP) instruction.    Patient Response: Patient accepts all interventions without reports of pain or other problems. Patient verbalizes intention to walk for exercise.    Session Notes: Patent denies any changes in medications or any problems with medications.    Plan for next visit: Continue exercises and gait per patient ability and tolerance.

## 2024-05-25 NOTE — HOME HEALTH
Routine Visit Note: Patient resting in chair at time of SN arrival. Marilou present at visit. Patient had MD appointment with PCP yesterday. Marilou states that the only change is that the MD decreased the mirtazapine to 1/2 tab daily due to dizzy episodes. Med list updated. Patient denies recent falls. Denies elimination concerns. Denies s/s of active bleeding. Denies HAs. Patient states that he continues with dizzy episodes, mostly in the AM. SN educated patient and caregiver on slow and careful transfers and to increase sodium intake when BP low and patient feeling dizzy.     Skill/education provided: CP assess, falls/safety assess, med teaching/assess for changes, hypotension teach/monitor, assess/educate on dizxzy episodes, neuro checks/teaching    Patient/caregiver response: Patient and caregiver able to partially teach back education    Plan for next visit: CP assess, falls/safety assess, med teaching/assess for changes, assess for dizzy episodes, neuro checks, assess for HAs, assess if BP readings have increased since taking midodrine TID    Other pertinent info: NA

## 2024-05-28 ENCOUNTER — HOME CARE VISIT (OUTPATIENT)
Dept: HOME HEALTH SERVICES | Facility: HOME HEALTHCARE | Age: 89
End: 2024-05-28
Payer: MEDICARE

## 2024-05-28 VITALS
DIASTOLIC BLOOD PRESSURE: 62 MMHG | SYSTOLIC BLOOD PRESSURE: 102 MMHG | OXYGEN SATURATION: 97 % | TEMPERATURE: 97 F | HEART RATE: 62 BPM | RESPIRATION RATE: 16 BRPM

## 2024-05-28 PROCEDURE — G0299 HHS/HOSPICE OF RN EA 15 MIN: HCPCS

## 2024-05-28 NOTE — HOME HEALTH
Routine Visit Note: Patient sitting in chair at time of SN visit. Daughter present at visit. Patient states that he is doing ok today. Daughter states that patient's dizziness has improved since she has started giving midodrine TID instead of BID. Patient denies recent falls. Denies elimination concerns. Vitals WNL.    Skill/education provided: CP assess, falls/safety assess, med teaching/assess for changes, neuro checks, assess for HA, assess dizziness and lightheadedness    Patient/caregiver response: patient able to partially teach back education    Plan for next visit: CP assess, falls/safety assess, med teaching/assess for changes, neuro checks, assess for HA, assess dizziness and lightheadedness    Other pertinent info: NA

## 2024-05-29 ENCOUNTER — HOME CARE VISIT (OUTPATIENT)
Dept: HOME HEALTH SERVICES | Facility: HOME HEALTHCARE | Age: 89
End: 2024-05-29
Payer: MEDICARE

## 2024-05-29 VITALS
OXYGEN SATURATION: 95 % | TEMPERATURE: 96.8 F | DIASTOLIC BLOOD PRESSURE: 70 MMHG | HEART RATE: 72 BPM | RESPIRATION RATE: 18 BRPM | SYSTOLIC BLOOD PRESSURE: 118 MMHG

## 2024-05-29 PROCEDURE — G0151 HHCP-SERV OF PT,EA 15 MIN: HCPCS

## 2024-05-29 NOTE — HOME HEALTH
--- START 30 day Assessment ---  Clinical condition of patient at initial or last assessment:  Initial PT assessment (5/2/2024) revealed the problems of impaired tranfers (required contact guard assistance and use of 4 wheeled walker) limited ambulation ability (required contact guard assistance), lower extremity weakness (bilateral lower extremity strength 4-/5 except ankles 3+/5), imbalance (high falls risk as shown by a low score of 14/28 on the Tinetti Balance Assessment).     Current clinical condition of patient:  PT assessment this day (5/29/2024) reveals the problems of impaired tranfers (requires only supervision and use of 4 wheeled walker, limited ambulation ability (requires supervision for safety), lower extremity weakness (bilateral lower extremity strength 4-/5 except ankles 3+/5), imbalance (high falls risk as shown by a low score of 14/28 on the Tinetti Balance Assessment).    Overall progress towards measurable treatment goals:  - Improved standard balance testing score from 14/28 to 18/28 per Tinetti within 30 days.  Initial: Patient scored 14/28 on the Tinetti Balance Assessment.  Current: Patient scores 17/28 on the Tinetti Balance Assessment demonstrating significant improvement but persistent falls risk.    - Improved quality of gait as evidenced by improved gait distance and stability with gait in 2 weeks.  Initial: Patient limited to one lap in the home (60 feet) with rollator walker and contact guard assistance.  Current: Patient ambulates 180 feet with rollator (4 wheeled) walker requiring no more than supervision for safety.    - Improved transfer ability as evidenced by independent with device level of assist in 2 weeks.  Initial: Patient required contact guard assistance for transfers.  Current: Patient performs sit to stand, bed to chair and toilet transfers with no more than supervision.    Effectiveness of current plan:  PT plan effective as shown by progress towards goals as  detailed this note.    Plan for continuing or discontinuing service:  Plan is to see patient one final visit for HEP finalization.    Changes to goals or care plan:  No need for changes to goals or care plan at this time.    Statement of expectation of continued progress toward goals:  Patient expected to meet all established goals.    Necessity of continue physical therapy:  Continue PT indicated for HEP finalization.  --- END 30 day Assessment ---    Skilled Interventions: PT assessment, gait training, therapeutic exercise, transfer training, home exercise program (HEP) instruction.    Patient Response: Patient accepts all interventions without reports of pain or exhaustion. Patient verbalizes intention to perform HEP as recommended.    Session Notes: Patient sitting in recliner when PT arrives. Via phone advised daughter (primary caregiver) of upcoming/planned and scheduled discharge from PT next week. Also advised patient of this during visit this day. Patient and caregiver consents to planned discharge.    Plan for next visit: Finalization of HEP, therapeutic exercise, gait training, transfer training, discharge from PT to HEP.

## 2024-05-30 ENCOUNTER — HOME CARE VISIT (OUTPATIENT)
Dept: HOME HEALTH SERVICES | Facility: HOME HEALTHCARE | Age: 89
End: 2024-05-30
Payer: MEDICARE

## 2024-05-30 PROCEDURE — G0156 HHCP-SVS OF AIDE,EA 15 MIN: HCPCS

## 2024-06-05 ENCOUNTER — HOME CARE VISIT (OUTPATIENT)
Dept: HOME HEALTH SERVICES | Facility: HOME HEALTHCARE | Age: 89
End: 2024-06-05
Payer: MEDICARE

## 2024-06-05 VITALS
OXYGEN SATURATION: 94 % | HEART RATE: 74 BPM | SYSTOLIC BLOOD PRESSURE: 122 MMHG | DIASTOLIC BLOOD PRESSURE: 70 MMHG | RESPIRATION RATE: 18 BRPM | TEMPERATURE: 97.5 F

## 2024-06-05 VITALS
SYSTOLIC BLOOD PRESSURE: 118 MMHG | TEMPERATURE: 97.8 F | OXYGEN SATURATION: 97 % | RESPIRATION RATE: 17 BRPM | HEART RATE: 78 BPM | DIASTOLIC BLOOD PRESSURE: 68 MMHG

## 2024-06-05 PROCEDURE — G0151 HHCP-SERV OF PT,EA 15 MIN: HCPCS

## 2024-06-05 PROCEDURE — G0299 HHS/HOSPICE OF RN EA 15 MIN: HCPCS

## 2024-06-05 NOTE — HOME HEALTH
PT Discharge Summary: The patient is a 89 year old male admitted to home health services by  on 5/1/2024 following history of hospitalization for fall that resulted in subdural hematoma and brain bleed.    Initial PT assessment (5/2/2024) revealed the problems of impaired tranfers (required contact guard assistance and use of 4 wheeled walker) limited ambulation ability (required contact guard assistance), lower extremity weakness (bilateral lower extremity strength 4-/5 except ankles 3+/5), imbalance (high falls risk as shown by a low score of 14/28 on the Tinetti Balance Assessment).    Social History: Lives in home with granddaughter and visits from daughter regularly.     Current Functional Status: Independent with ambulation up to 150 feet using rollator walker, independent with household transfers, improved balance (Tinetti Balance Assessment score 20/28 - improved but need for walker when up for safety).    Patient discharged from home health PT after session today due to all goals met. At time of PT discipline discharge the patient remained under the care of home health skilled nursing.    Skilled Interventions: PT assessment, therapeutic exercise, gait training, transfer training, home exercise program (HEP) review/finalization, discharge instructions.    Patient Response: Patient accepts all exercises without reports of pain or exhaustion. Patient demonstrates independence with final HEP and verbalizes intention to perform HEP as recommended.     Session Notes: Patient and caregiver (daughter) consent to discharge from PT after session today as planned.

## 2024-06-05 NOTE — HOME HEALTH
Routine Visit Note: Patient resting in chair at time of SN arrival. Daughter present at visit. Patient denies recent falls. Denies elimination concerns. Patient states that his throat is sore and he has been congested for the last 2 days. States that he has some drainage as well. Daughter states that she will start giving patient mucinex and cough medicine. SN educated patient and daughter on importance of patient staying hydrated. Vitals WNL.    Skill/education provided: CP assess, falls/safety assess, med teaching/assess for changes, neuro check/teach, cardiac assess/educate, cough/congestion management education    Patient/caregiver response: patient and caregiver able to partially teach back education    Plan for next visit: CP assess, falls/safety assess, med teaching/assess for changes, assess/teach cardiac sx, neuro checks/educate, assess if cough and congestion has resolved    Other pertinent info: NA

## 2024-06-05 NOTE — CASE COMMUNICATION
PT Discharge Summary: The patient is a 89 year old male admitted to home health services by  on 5/1/2024 following history of hospitalization for fall that resulted in subdural hematoma and brain bleed.    Initial PT assessment (5/2/2024) revealed the problems of impaired tranfers (required contact guard assistance and use of 4 wheeled walker) limited ambulation ability (required contact guard assistance), lower extremity weakness (bi lateral lower extremity strength 4-/5 except ankles 3+/5), imbalance (high falls risk as shown by a low score of 14/28 on the Tinetti Balance Assessment).    Social History: Lives in home with granddaughter and visits from daughter regularly.     Current Functional Status: Independent with ambulation up to 150 feet using rollator walker, independent with household transfers, improved balance (Tinetti Balance Assessment score 20/28 - imp roved but need for walker when up for safety).    Patient discharged from home health PT after session today due to all goals met. At time of PT discipline discharge the patient remained under the care of home health skilled nursing.

## 2024-06-06 ENCOUNTER — HOME CARE VISIT (OUTPATIENT)
Dept: HOME HEALTH SERVICES | Facility: HOME HEALTHCARE | Age: 89
End: 2024-06-06
Payer: MEDICARE

## 2024-06-06 PROCEDURE — G0156 HHCP-SVS OF AIDE,EA 15 MIN: HCPCS

## 2024-06-12 ENCOUNTER — HOME CARE VISIT (OUTPATIENT)
Dept: HOME HEALTH SERVICES | Facility: HOME HEALTHCARE | Age: 89
End: 2024-06-12
Payer: MEDICARE

## 2024-06-12 VITALS
DIASTOLIC BLOOD PRESSURE: 70 MMHG | TEMPERATURE: 97.3 F | SYSTOLIC BLOOD PRESSURE: 118 MMHG | RESPIRATION RATE: 17 BRPM | HEART RATE: 58 BPM | OXYGEN SATURATION: 98 %

## 2024-06-12 PROCEDURE — G0299 HHS/HOSPICE OF RN EA 15 MIN: HCPCS

## 2024-06-18 ENCOUNTER — HOME CARE VISIT (OUTPATIENT)
Dept: HOME HEALTH SERVICES | Facility: HOME HEALTHCARE | Age: 89
End: 2024-06-18
Payer: MEDICARE

## 2024-06-18 PROCEDURE — G0299 HHS/HOSPICE OF RN EA 15 MIN: HCPCS

## 2024-06-19 VITALS
RESPIRATION RATE: 16 BRPM | DIASTOLIC BLOOD PRESSURE: 70 MMHG | HEART RATE: 60 BPM | SYSTOLIC BLOOD PRESSURE: 116 MMHG | OXYGEN SATURATION: 98 % | TEMPERATURE: 97.3 F

## 2024-06-20 ENCOUNTER — HOME CARE VISIT (OUTPATIENT)
Dept: HOME HEALTH SERVICES | Facility: HOME HEALTHCARE | Age: 89
End: 2024-06-20
Payer: MEDICARE

## 2024-06-20 PROCEDURE — G0156 HHCP-SVS OF AIDE,EA 15 MIN: HCPCS

## 2024-06-20 NOTE — TELEPHONE ENCOUNTER
Caller: TODD BAIN    Relationship: Emergency Contact    Best call back number: [phone]     What was the call regarding: PATIENTS DAUGHTER IS FOLLOWING UP ON THIS MED REFILL    PATIENT IS COMPLETELY OUT OF MEDICATION    PLEASE ADVISE

## 2024-06-21 ENCOUNTER — TELEPHONE (OUTPATIENT)
Dept: FAMILY MEDICINE CLINIC | Facility: CLINIC | Age: 89
End: 2024-06-21
Payer: MEDICARE

## 2024-06-21 RX ORDER — MIDODRINE HYDROCHLORIDE 5 MG/1
5 TABLET ORAL
Qty: 270 TABLET | Refills: 1 | Status: SHIPPED | OUTPATIENT
Start: 2024-06-21 | End: 2024-06-24 | Stop reason: SDUPTHER

## 2024-06-21 NOTE — TELEPHONE ENCOUNTER
Pharmacy Name: [id number] - [place], [place] - [address] [facility] - [phone] [facility] [phone] FX     What medication are you calling in regards to: midodrine (PROAMATINE) 5 MG     What question does the pharmacy have: MEDICATION IS VERY EXPENSIVE . DAUGHTER IS REQUESTING ALTERNATIVE OR GENERIC

## 2024-06-24 RX ORDER — MIDODRINE HYDROCHLORIDE 5 MG/1
5 TABLET ORAL
Qty: 270 TABLET | Refills: 1 | Status: SHIPPED | OUTPATIENT
Start: 2024-06-24 | End: 2024-06-25 | Stop reason: SDUPTHER

## 2024-06-24 NOTE — TELEPHONE ENCOUNTER
PATIENT'S DAUGHTER IS CALLING TO CHECK ON THE STATUS OF THIS. PATIENT HAS BEEN OUT FOR FOUR DAYS. THERE HAS BEEN NO ISSUES WHILE HE'S BEEN OFF OF IT, BUT NEEDS AN ALTERNATIVE CALLED IN ASAP.

## 2024-06-24 NOTE — HOME HEALTH
Routine Visit Note: Patient sitting in chair at time of SN arrival. Multiple family members present. Patient denies recent medical changes or concerns. Patient will be ready for DC next week if no changes.    Skill/education provided: CP assess, falls/safety assess, med teaching/assess for changes, GIORGIO education    Patient/caregiver response: patient able to teach back education    Other pertinent info: NA

## 2024-06-25 ENCOUNTER — TELEPHONE (OUTPATIENT)
Dept: FAMILY MEDICINE CLINIC | Facility: CLINIC | Age: 89
End: 2024-06-25
Payer: MEDICARE

## 2024-06-25 RX ORDER — MIRTAZAPINE 7.5 MG/1
7.5 TABLET, FILM COATED ORAL NIGHTLY
Qty: 90 TABLET | Refills: 1 | Status: SHIPPED | OUTPATIENT
Start: 2024-06-25

## 2024-06-25 RX ORDER — CLOPIDOGREL BISULFATE 75 MG/1
75 TABLET ORAL DAILY
Qty: 90 TABLET | Refills: 1 | Status: SHIPPED | OUTPATIENT
Start: 2024-06-25

## 2024-06-25 RX ORDER — BUMETANIDE 1 MG/1
1 TABLET ORAL DAILY
Qty: 90 TABLET | Refills: 1 | Status: SHIPPED | OUTPATIENT
Start: 2024-06-25

## 2024-06-25 RX ORDER — MIDODRINE HYDROCHLORIDE 5 MG/1
5 TABLET ORAL
Qty: 270 TABLET | Refills: 1 | Status: SHIPPED | OUTPATIENT
Start: 2024-06-25

## 2024-06-25 RX ORDER — TAMSULOSIN HYDROCHLORIDE 0.4 MG/1
1 CAPSULE ORAL DAILY
Qty: 90 CAPSULE | Refills: 1 | Status: SHIPPED | OUTPATIENT
Start: 2024-06-25

## 2024-06-25 NOTE — TELEPHONE ENCOUNTER
"----- Message from Mirlande MAGED sent at 6/24/2024  4:32 PM EDT -----  Regarding: FW: [employer]  Contact: [phone]    ----- Message -----  From: Sage Flores \"Tristan\"  Sent: 6/24/2024   3:43 PM EDT  To: [facility]  Subject: [employer]                                    Thank you.  I will  the current rxs due at [facility] tomorrow, but after that I would like everything to run thru [facility]'s pharmacy.    Tamsulosin . 4mg  Midodrine 5mg  Mirtazapine 15mg  Clopidogrel 75mg  Eliquis 2.5 mg  Gabapentin 100mg  Bumetanide 1mg  "

## 2024-06-27 ENCOUNTER — HOME CARE VISIT (OUTPATIENT)
Dept: HOME HEALTH SERVICES | Facility: HOME HEALTHCARE | Age: 89
End: 2024-06-27
Payer: MEDICARE

## 2024-06-27 PROCEDURE — G0156 HHCP-SVS OF AIDE,EA 15 MIN: HCPCS

## 2024-06-28 ENCOUNTER — HOME CARE VISIT (OUTPATIENT)
Dept: HOME HEALTH SERVICES | Facility: HOME HEALTHCARE | Age: 89
End: 2024-06-28
Payer: MEDICARE

## 2024-06-28 PROCEDURE — G0299 HHS/HOSPICE OF RN EA 15 MIN: HCPCS

## 2024-07-01 VITALS
SYSTOLIC BLOOD PRESSURE: 106 MMHG | TEMPERATURE: 97.7 F | HEART RATE: 67 BPM | OXYGEN SATURATION: 98 % | RESPIRATION RATE: 16 BRPM | DIASTOLIC BLOOD PRESSURE: 62 MMHG

## 2024-08-27 RX ORDER — APIXABAN 2.5 MG/1
2.5 TABLET, FILM COATED ORAL 2 TIMES DAILY
Qty: 180 TABLET | Refills: 1 | Status: SHIPPED | OUTPATIENT
Start: 2024-08-27

## 2024-08-27 NOTE — TELEPHONE ENCOUNTER
Rx Refill Note  Requested Prescriptions     Signed Prescriptions Disp Refills    Eliquis 2.5 MG tablet tablet 180 tablet 1     Sig: TAKE 1 TABLET BY MOUTH TWICE DAILY     Authorizing Provider: APOLINAR ODEN     Ordering User: KASSIDY SCHULTE      Last office visit with prescribing clinician: 5/20/2024   Last telemedicine visit with prescribing clinician: Visit date not found   Next office visit with prescribing clinician: 11/21/2024                         Would you like a call back once the refill request has been completed: [] Yes [] No    If the office needs to give you a call back, can they leave a voicemail: [] Yes [] No    Kassidy Schulte MA  08/27/24, 09:38 EDT

## 2024-10-17 ENCOUNTER — TELEPHONE (OUTPATIENT)
Dept: FAMILY MEDICINE CLINIC | Facility: CLINIC | Age: 89
End: 2024-10-17

## 2024-10-17 NOTE — TELEPHONE ENCOUNTER
“Please be informed that patient has passed. Patient has been marked  in the system. The date of death is:  10-16-24    Caller: TODD BAIN    Relationship: Emergency Contact    Best call back number:     [place] () [phone] (Mobile)       Did the patient have surgery within 30 days of their passing (Y/N):

## 2025-06-26 NOTE — TELEPHONE ENCOUNTER
"  Assessment & Plan     Abnormal TSH  - TSH with free T4 reflex  - T4 free    Autoimmune thyroiditis  - TSH with free T4 reflex  - T4 free      Feeling good.  Follow TSH.      Subjective   Rey is a 83 year old, presenting for the following health issues:  Thyroid Problem and Dizziness        6/27/2025     9:52 AM   Additional Questions   Roomed by Jessica Almendarez       Was in ER 5/3/25 for some lightheadedness after having 3 episodes of diarrhea and not drinking any fluids.  Resolved and no recurrent symptoms.  He did have a mildly abnormal TSH, which is due for repeat.  He does have past history of autoimmune thyroiditis.      He reports he is on OTC biotin daily - could affect thyroid lab levels?        Hypothyroidism Follow-up    Since last visit, patient describes the following symptoms: Weight stable, no hair loss, no skin changes, no constipation, no loose stools          Objective    /62 (BP Location: Left arm, Patient Position: Sitting, Cuff Size: Adult Regular)   Pulse 55   Temp 97  F (36.1  C) (Tympanic)   Resp 16   Ht 1.638 m (5' 4.5\")   Wt 69.9 kg (154 lb)   SpO2 94%   BMI 26.03 kg/m    Body mass index is 26.03 kg/m .  Physical Exam  Constitutional:       General: He is not in acute distress.     Appearance: Normal appearance.   Neck:      Vascular: No carotid bruit.   Cardiovascular:      Rate and Rhythm: Normal rate and regular rhythm.      Heart sounds: Normal heart sounds. No murmur heard.  Pulmonary:      Effort: Pulmonary effort is normal.      Breath sounds: Normal breath sounds.   Lymphadenopathy:      Cervical: No cervical adenopathy.   Neurological:      Mental Status: He is alert and oriented to person, place, and time.                    Signed Electronically by: Kenneth Alonzo DO    " GAVE MESSAGE TO DAUGHTER

## (undated) DEVICE — THE CARR-LOCKE INJECTION NEEDLE IS A SINGLE USE, DISPOSABLE, FLEXIBLE SHEATH INJECTION NEEDLE USED FOR THE INJECTION OF VARIOUS TYPES OF MEDIA THROUGH FLEXIBLE ENDOSCOPES.

## (undated) DEVICE — CATH DIAG IMPULSE FR4 6F 100CM

## (undated) DEVICE — SWAN-GANZ BIPOLAR PACING CATHETER: Brand: SWAN-GANZ

## (undated) DEVICE — MEDICINE CUP, GRADUATED, STER: Brand: MEDLINE

## (undated) DEVICE — CATH DIAG IMPULSE FL4 6F 100CM

## (undated) DEVICE — CATH DIAG IMPULSE PIG .056 6F 110CM

## (undated) DEVICE — GUIDE CATHETER: Brand: MACH1™

## (undated) DEVICE — NC TREK NEO™ CORONARY DILATATION CATHETER 2.50 X 15 MM / RAPID-EXCHANGE: Brand: NC TREK NEO™

## (undated) DEVICE — ELECTRD DEFIB M/FUNC PROPADZ RADIOL 2PK

## (undated) DEVICE — PK ENDO GI 50

## (undated) DEVICE — BITEBLOCK ENDO W/STRAP 60F A/ LF DISP

## (undated) DEVICE — STPCK 3WY HP ROT

## (undated) DEVICE — GW DIAG EMERALD HEPCOAT MOVE JTIP STD .035 3MM 150CM

## (undated) DEVICE — SYR LL TP 10ML STRL

## (undated) DEVICE — BOWL PLSTC MD 16OZ BLU STRL

## (undated) DEVICE — PK TRY HEART CATH 50

## (undated) DEVICE — PINNACLE INTRODUCER SHEATH: Brand: PINNACLE

## (undated) DEVICE — 6F .070 3 DRC 100CM: Brand: VISTA BRITE TIP

## (undated) DEVICE — HI-TORQUE WHISPER MS GUIDE WIRE .014 STRAIGHT TIP 3.0 CM X 190 CM: Brand: HI-TORQUE WHISPER

## (undated) DEVICE — CONTRST ISOVUE300 61PCT 50ML

## (undated) DEVICE — CVR PROB ULTRASND CIVFLEX GEN/PURP TELESCP/FOLD 5.5X96IN LF

## (undated) DEVICE — DEV INFL COMPAK W/ACCESSPLUS IN4530

## (undated) DEVICE — THE PRONTO CATHETER IS INDICATED FOR THE REMOVAL OF FRESH, SOFT EMBOLI AND THROMBI FROM VESSELS IN THE CORONARY AND PERIPHERAL VASCULATURE.: Brand: PRONTO® V4 EXTRACTION CATHETER